# Patient Record
Sex: FEMALE | Race: WHITE | NOT HISPANIC OR LATINO | Employment: OTHER | URBAN - METROPOLITAN AREA
[De-identification: names, ages, dates, MRNs, and addresses within clinical notes are randomized per-mention and may not be internally consistent; named-entity substitution may affect disease eponyms.]

---

## 2017-01-04 ENCOUNTER — OFFICE VISIT (OUTPATIENT)
Dept: PAIN MEDICINE | Facility: CLINIC | Age: 52
End: 2017-01-04

## 2017-01-04 VITALS
BODY MASS INDEX: 45.99 KG/M2 | RESPIRATION RATE: 16 BRPM | WEIGHT: 293 LBS | TEMPERATURE: 98.2 F | HEART RATE: 99 BPM | DIASTOLIC BLOOD PRESSURE: 82 MMHG | HEIGHT: 67 IN | SYSTOLIC BLOOD PRESSURE: 151 MMHG | OXYGEN SATURATION: 90 %

## 2017-01-04 DIAGNOSIS — M51.36 DDD (DEGENERATIVE DISC DISEASE), LUMBAR: ICD-10-CM

## 2017-01-04 DIAGNOSIS — Z79.899 ENCOUNTER FOR LONG-TERM (CURRENT) USE OF HIGH-RISK MEDICATION: ICD-10-CM

## 2017-01-04 DIAGNOSIS — M96.1 POSTLAMINECTOMY SYNDROME OF THORACIC REGION: ICD-10-CM

## 2017-01-04 DIAGNOSIS — G89.29 OTHER CHRONIC PAIN: Primary | ICD-10-CM

## 2017-01-04 DIAGNOSIS — M47.817 OSTEOARTHRITIS OF LUMBOSACRAL SPINE WITHOUT MYELOPATHY: ICD-10-CM

## 2017-01-04 DIAGNOSIS — M54.5 LOW BACK PAIN, UNSPECIFIED BACK PAIN LATERALITY, UNSPECIFIED CHRONICITY, WITH SCIATICA PRESENCE UNSPECIFIED: ICD-10-CM

## 2017-01-04 PROCEDURE — 99214 OFFICE O/P EST MOD 30 MIN: CPT | Performed by: NURSE PRACTITIONER

## 2017-01-04 RX ORDER — CYCLOBENZAPRINE HCL 10 MG
TABLET ORAL
Qty: 90 TABLET | Refills: 0 | Status: SHIPPED | OUTPATIENT
Start: 2017-01-04 | End: 2017-01-30 | Stop reason: SDUPTHER

## 2017-01-04 RX ORDER — HYDROCODONE BITARTRATE AND ACETAMINOPHEN 10; 325 MG/1; MG/1
1 TABLET ORAL EVERY 4 HOURS PRN
Qty: 150 TABLET | Refills: 0 | Status: SHIPPED | OUTPATIENT
Start: 2017-01-04 | End: 2017-02-01 | Stop reason: SDUPTHER

## 2017-01-24 ENCOUNTER — TREATMENT (OUTPATIENT)
Dept: PHYSICAL THERAPY | Facility: CLINIC | Age: 52
End: 2017-01-24

## 2017-01-24 DIAGNOSIS — M54.6 CHRONIC THORACIC BACK PAIN, UNSPECIFIED BACK PAIN LATERALITY: ICD-10-CM

## 2017-01-24 DIAGNOSIS — G89.29 CHRONIC THORACIC BACK PAIN, UNSPECIFIED BACK PAIN LATERALITY: ICD-10-CM

## 2017-01-24 DIAGNOSIS — M54.42 CHRONIC LOW BACK PAIN WITH LEFT-SIDED SCIATICA, UNSPECIFIED BACK PAIN LATERALITY: Primary | ICD-10-CM

## 2017-01-24 DIAGNOSIS — G89.29 CHRONIC LOW BACK PAIN WITH LEFT-SIDED SCIATICA, UNSPECIFIED BACK PAIN LATERALITY: Primary | ICD-10-CM

## 2017-01-24 PROCEDURE — G8978 MOBILITY CURRENT STATUS: HCPCS | Performed by: PHYSICAL THERAPIST

## 2017-01-24 PROCEDURE — G8979 MOBILITY GOAL STATUS: HCPCS | Performed by: PHYSICAL THERAPIST

## 2017-01-24 PROCEDURE — 97110 THERAPEUTIC EXERCISES: CPT | Performed by: PHYSICAL THERAPIST

## 2017-01-24 PROCEDURE — 97162 PT EVAL MOD COMPLEX 30 MIN: CPT | Performed by: PHYSICAL THERAPIST

## 2017-01-30 RX ORDER — CYCLOBENZAPRINE HCL 10 MG
TABLET ORAL
Qty: 90 TABLET | Refills: 1 | Status: SHIPPED | OUTPATIENT
Start: 2017-01-30 | End: 2017-03-30 | Stop reason: SDUPTHER

## 2017-01-30 RX ORDER — GABAPENTIN 600 MG/1
TABLET ORAL
Qty: 180 TABLET | Refills: 1 | Status: SHIPPED | OUTPATIENT
Start: 2017-01-30 | End: 2017-03-30 | Stop reason: SDUPTHER

## 2017-01-30 RX ORDER — LEVETIRACETAM 500 MG/1
TABLET ORAL
Qty: 90 TABLET | Refills: 1 | Status: SHIPPED | OUTPATIENT
Start: 2017-01-30 | End: 2017-03-30 | Stop reason: SDUPTHER

## 2017-02-01 ENCOUNTER — TREATMENT (OUTPATIENT)
Dept: PHYSICAL THERAPY | Facility: CLINIC | Age: 52
End: 2017-02-01

## 2017-02-01 ENCOUNTER — OFFICE VISIT (OUTPATIENT)
Dept: PAIN MEDICINE | Facility: CLINIC | Age: 52
End: 2017-02-01

## 2017-02-01 VITALS
RESPIRATION RATE: 16 BRPM | BODY MASS INDEX: 45.67 KG/M2 | SYSTOLIC BLOOD PRESSURE: 147 MMHG | DIASTOLIC BLOOD PRESSURE: 90 MMHG | TEMPERATURE: 97.7 F | OXYGEN SATURATION: 96 % | HEART RATE: 102 BPM | WEIGHT: 291 LBS | HEIGHT: 67 IN

## 2017-02-01 DIAGNOSIS — G89.29 CHRONIC THORACIC BACK PAIN, UNSPECIFIED BACK PAIN LATERALITY: ICD-10-CM

## 2017-02-01 DIAGNOSIS — M51.36 DDD (DEGENERATIVE DISC DISEASE), LUMBAR: ICD-10-CM

## 2017-02-01 DIAGNOSIS — M51.36 BULGE OF LUMBAR DISC WITHOUT MYELOPATHY: ICD-10-CM

## 2017-02-01 DIAGNOSIS — Z79.899 ENCOUNTER FOR LONG-TERM (CURRENT) USE OF HIGH-RISK MEDICATION: ICD-10-CM

## 2017-02-01 DIAGNOSIS — M96.1 POSTLAMINECTOMY SYNDROME OF THORACIC REGION: ICD-10-CM

## 2017-02-01 DIAGNOSIS — G89.29 CHRONIC LOW BACK PAIN WITH LEFT-SIDED SCIATICA, UNSPECIFIED BACK PAIN LATERALITY: Primary | ICD-10-CM

## 2017-02-01 DIAGNOSIS — Z87.39 HISTORY OF OSTEOMYELITIS: ICD-10-CM

## 2017-02-01 DIAGNOSIS — G89.29 OTHER CHRONIC PAIN: Primary | ICD-10-CM

## 2017-02-01 DIAGNOSIS — M54.42 CHRONIC LOW BACK PAIN WITH LEFT-SIDED SCIATICA, UNSPECIFIED BACK PAIN LATERALITY: Primary | ICD-10-CM

## 2017-02-01 DIAGNOSIS — M54.6 CHRONIC THORACIC BACK PAIN, UNSPECIFIED BACK PAIN LATERALITY: ICD-10-CM

## 2017-02-01 DIAGNOSIS — M54.6 PAIN IN THORACIC SPINE: ICD-10-CM

## 2017-02-01 LAB
POC AMPHETAMINES: NEGATIVE
POC BARBITURATES: NEGATIVE
POC BENZODIAZEPHINES: NEGATIVE
POC COCAINE: NEGATIVE
POC METHADONE: NEGATIVE
POC METHAMPHETAMINE SCREEN URINE: NEGATIVE
POC OPIATES: POSITIVE
POC OXYCODONE: POSITIVE
POC PHENCYCLIDINE: NEGATIVE
POC PROPOXYPHENE: NEGATIVE
POC THC: NEGATIVE
POC TRICYCLIC ANTIDEPRESSANTS: POSITIVE

## 2017-02-01 PROCEDURE — 99213 OFFICE O/P EST LOW 20 MIN: CPT | Performed by: NURSE PRACTITIONER

## 2017-02-01 PROCEDURE — 80305 DRUG TEST PRSMV DIR OPT OBS: CPT | Performed by: NURSE PRACTITIONER

## 2017-02-01 PROCEDURE — 97110 THERAPEUTIC EXERCISES: CPT | Performed by: PHYSICAL THERAPIST

## 2017-02-01 RX ORDER — HYDROCODONE BITARTRATE AND ACETAMINOPHEN 10; 325 MG/1; MG/1
1 TABLET ORAL EVERY 4 HOURS PRN
Qty: 150 TABLET | Refills: 0 | Status: SHIPPED | OUTPATIENT
Start: 2017-02-01 | End: 2017-03-01 | Stop reason: SDUPTHER

## 2017-02-14 ENCOUNTER — TREATMENT (OUTPATIENT)
Dept: PHYSICAL THERAPY | Facility: CLINIC | Age: 52
End: 2017-02-14

## 2017-02-14 DIAGNOSIS — G89.29 CHRONIC LOW BACK PAIN WITH LEFT-SIDED SCIATICA, UNSPECIFIED BACK PAIN LATERALITY: Primary | ICD-10-CM

## 2017-02-14 DIAGNOSIS — M54.42 CHRONIC LOW BACK PAIN WITH LEFT-SIDED SCIATICA, UNSPECIFIED BACK PAIN LATERALITY: Primary | ICD-10-CM

## 2017-02-14 PROCEDURE — 97110 THERAPEUTIC EXERCISES: CPT | Performed by: PHYSICAL THERAPIST

## 2017-03-01 ENCOUNTER — OFFICE VISIT (OUTPATIENT)
Dept: PAIN MEDICINE | Facility: CLINIC | Age: 52
End: 2017-03-01

## 2017-03-01 VITALS
WEIGHT: 285 LBS | RESPIRATION RATE: 16 BRPM | HEART RATE: 104 BPM | OXYGEN SATURATION: 94 % | TEMPERATURE: 100.3 F | DIASTOLIC BLOOD PRESSURE: 86 MMHG | SYSTOLIC BLOOD PRESSURE: 174 MMHG | HEIGHT: 67 IN | BODY MASS INDEX: 44.73 KG/M2

## 2017-03-01 DIAGNOSIS — Z87.39 HISTORY OF OSTEOMYELITIS: ICD-10-CM

## 2017-03-01 DIAGNOSIS — Z79.899 ENCOUNTER FOR LONG-TERM (CURRENT) USE OF HIGH-RISK MEDICATION: ICD-10-CM

## 2017-03-01 DIAGNOSIS — G89.29 OTHER CHRONIC PAIN: Primary | ICD-10-CM

## 2017-03-01 DIAGNOSIS — M51.36 BULGE OF LUMBAR DISC WITHOUT MYELOPATHY: ICD-10-CM

## 2017-03-01 DIAGNOSIS — M96.1 POSTLAMINECTOMY SYNDROME OF THORACIC REGION: ICD-10-CM

## 2017-03-01 DIAGNOSIS — M54.6 PAIN IN THORACIC SPINE: ICD-10-CM

## 2017-03-01 PROCEDURE — 99214 OFFICE O/P EST MOD 30 MIN: CPT | Performed by: NURSE PRACTITIONER

## 2017-03-01 RX ORDER — HYDROCODONE BITARTRATE AND ACETAMINOPHEN 10; 325 MG/1; MG/1
1 TABLET ORAL EVERY 4 HOURS PRN
Qty: 150 TABLET | Refills: 0 | Status: SHIPPED | OUTPATIENT
Start: 2017-03-01 | End: 2017-03-30 | Stop reason: SDUPTHER

## 2017-03-30 ENCOUNTER — OFFICE VISIT (OUTPATIENT)
Dept: PAIN MEDICINE | Facility: CLINIC | Age: 52
End: 2017-03-30

## 2017-03-30 VITALS
OXYGEN SATURATION: 92 % | SYSTOLIC BLOOD PRESSURE: 143 MMHG | HEART RATE: 87 BPM | HEIGHT: 67 IN | TEMPERATURE: 98.1 F | BODY MASS INDEX: 45.77 KG/M2 | RESPIRATION RATE: 16 BRPM | WEIGHT: 291.6 LBS | DIASTOLIC BLOOD PRESSURE: 83 MMHG

## 2017-03-30 DIAGNOSIS — G89.29 OTHER CHRONIC PAIN: Primary | ICD-10-CM

## 2017-03-30 DIAGNOSIS — Z79.899 ENCOUNTER FOR LONG-TERM (CURRENT) USE OF HIGH-RISK MEDICATION: ICD-10-CM

## 2017-03-30 DIAGNOSIS — Z87.39 HISTORY OF OSTEOMYELITIS: ICD-10-CM

## 2017-03-30 DIAGNOSIS — M96.1 POSTLAMINECTOMY SYNDROME OF THORACIC REGION: ICD-10-CM

## 2017-03-30 DIAGNOSIS — M54.6 PAIN IN THORACIC SPINE: ICD-10-CM

## 2017-03-30 DIAGNOSIS — G54.3 THORACIC ROOT LESION: ICD-10-CM

## 2017-03-30 PROCEDURE — 99214 OFFICE O/P EST MOD 30 MIN: CPT | Performed by: NURSE PRACTITIONER

## 2017-03-30 RX ORDER — DOXYCYCLINE HYCLATE 100 MG/1
CAPSULE ORAL
COMMUNITY
Start: 2017-03-17 | End: 2017-04-27

## 2017-03-30 RX ORDER — HYDROCODONE BITARTRATE AND ACETAMINOPHEN 10; 325 MG/1; MG/1
1 TABLET ORAL EVERY 6 HOURS PRN
Qty: 120 TABLET | Refills: 0 | Status: SHIPPED | OUTPATIENT
Start: 2017-03-30 | End: 2017-04-27 | Stop reason: SDUPTHER

## 2017-03-30 RX ORDER — GABAPENTIN 600 MG/1
TABLET ORAL
Qty: 180 TABLET | Refills: 1 | Status: SHIPPED | OUTPATIENT
Start: 2017-03-30 | End: 2017-05-22 | Stop reason: SDUPTHER

## 2017-03-30 RX ORDER — CYCLOBENZAPRINE HCL 10 MG
TABLET ORAL
Qty: 90 TABLET | Refills: 1 | Status: SHIPPED | OUTPATIENT
Start: 2017-03-30 | End: 2017-05-22 | Stop reason: SDUPTHER

## 2017-03-30 RX ORDER — LEVETIRACETAM 500 MG/1
TABLET ORAL
Qty: 90 TABLET | Refills: 1 | Status: SHIPPED | OUTPATIENT
Start: 2017-03-30 | End: 2017-06-01 | Stop reason: SDUPTHER

## 2017-04-25 ENCOUNTER — DOCUMENTATION (OUTPATIENT)
Dept: PHYSICAL THERAPY | Facility: CLINIC | Age: 52
End: 2017-04-25

## 2017-04-27 ENCOUNTER — OFFICE VISIT (OUTPATIENT)
Dept: PAIN MEDICINE | Facility: CLINIC | Age: 52
End: 2017-04-27

## 2017-04-27 VITALS
SYSTOLIC BLOOD PRESSURE: 144 MMHG | BODY MASS INDEX: 42.19 KG/M2 | TEMPERATURE: 98.2 F | DIASTOLIC BLOOD PRESSURE: 89 MMHG | HEART RATE: 90 BPM | HEIGHT: 67 IN | RESPIRATION RATE: 18 BRPM | WEIGHT: 268.8 LBS | OXYGEN SATURATION: 92 %

## 2017-04-27 DIAGNOSIS — M96.1 POSTLAMINECTOMY SYNDROME OF THORACIC REGION: ICD-10-CM

## 2017-04-27 DIAGNOSIS — Z87.39 HISTORY OF OSTEOMYELITIS: ICD-10-CM

## 2017-04-27 DIAGNOSIS — G89.29 OTHER CHRONIC PAIN: Primary | ICD-10-CM

## 2017-04-27 DIAGNOSIS — M54.6 PAIN IN THORACIC SPINE: ICD-10-CM

## 2017-04-27 DIAGNOSIS — Z79.899 ENCOUNTER FOR LONG-TERM (CURRENT) USE OF HIGH-RISK MEDICATION: ICD-10-CM

## 2017-04-27 PROCEDURE — 80305 DRUG TEST PRSMV DIR OPT OBS: CPT | Performed by: NURSE PRACTITIONER

## 2017-04-27 PROCEDURE — 99213 OFFICE O/P EST LOW 20 MIN: CPT | Performed by: NURSE PRACTITIONER

## 2017-04-27 RX ORDER — HYDROCODONE BITARTRATE AND ACETAMINOPHEN 10; 325 MG/1; MG/1
1 TABLET ORAL EVERY 6 HOURS PRN
Qty: 120 TABLET | Refills: 0 | Status: SHIPPED | OUTPATIENT
Start: 2017-04-27 | End: 2017-05-26 | Stop reason: SDUPTHER

## 2017-05-22 RX ORDER — GABAPENTIN 600 MG/1
TABLET ORAL
Qty: 180 TABLET | Refills: 1 | Status: SHIPPED | OUTPATIENT
Start: 2017-05-22 | End: 2017-07-20 | Stop reason: SDUPTHER

## 2017-05-22 RX ORDER — CYCLOBENZAPRINE HCL 10 MG
TABLET ORAL
Qty: 90 TABLET | Refills: 1 | Status: SHIPPED | OUTPATIENT
Start: 2017-05-22 | End: 2017-07-18 | Stop reason: SDUPTHER

## 2017-05-26 ENCOUNTER — OFFICE VISIT (OUTPATIENT)
Dept: PAIN MEDICINE | Facility: CLINIC | Age: 52
End: 2017-05-26

## 2017-05-26 VITALS
HEIGHT: 67 IN | BODY MASS INDEX: 40.97 KG/M2 | SYSTOLIC BLOOD PRESSURE: 131 MMHG | HEART RATE: 88 BPM | TEMPERATURE: 99.2 F | DIASTOLIC BLOOD PRESSURE: 81 MMHG | WEIGHT: 261 LBS | OXYGEN SATURATION: 95 % | RESPIRATION RATE: 18 BRPM

## 2017-05-26 DIAGNOSIS — Z79.899 ENCOUNTER FOR LONG-TERM (CURRENT) USE OF HIGH-RISK MEDICATION: ICD-10-CM

## 2017-05-26 DIAGNOSIS — M96.1 POSTLAMINECTOMY SYNDROME OF THORACIC REGION: ICD-10-CM

## 2017-05-26 DIAGNOSIS — G89.29 OTHER CHRONIC PAIN: Primary | ICD-10-CM

## 2017-05-26 DIAGNOSIS — Z87.39 HISTORY OF OSTEOMYELITIS: ICD-10-CM

## 2017-05-26 DIAGNOSIS — M54.6 PAIN IN THORACIC SPINE: ICD-10-CM

## 2017-05-26 DIAGNOSIS — G54.3 THORACIC ROOT LESION: ICD-10-CM

## 2017-05-26 PROCEDURE — 99213 OFFICE O/P EST LOW 20 MIN: CPT | Performed by: NURSE PRACTITIONER

## 2017-05-26 RX ORDER — HYDROCODONE BITARTRATE AND ACETAMINOPHEN 10; 325 MG/1; MG/1
1 TABLET ORAL EVERY 6 HOURS PRN
Qty: 120 TABLET | Refills: 0 | Status: SHIPPED | OUTPATIENT
Start: 2017-05-26 | End: 2017-06-27 | Stop reason: SDUPTHER

## 2017-06-01 RX ORDER — LEVETIRACETAM 500 MG/1
TABLET ORAL
Qty: 90 TABLET | Refills: 2 | Status: SHIPPED | OUTPATIENT
Start: 2017-06-01 | End: 2017-08-26 | Stop reason: SDUPTHER

## 2017-06-27 ENCOUNTER — OFFICE VISIT (OUTPATIENT)
Dept: PAIN MEDICINE | Facility: CLINIC | Age: 52
End: 2017-06-27

## 2017-06-27 VITALS
DIASTOLIC BLOOD PRESSURE: 73 MMHG | WEIGHT: 271 LBS | HEART RATE: 109 BPM | SYSTOLIC BLOOD PRESSURE: 103 MMHG | OXYGEN SATURATION: 92 % | BODY MASS INDEX: 42.53 KG/M2 | TEMPERATURE: 99.3 F | HEIGHT: 67 IN | RESPIRATION RATE: 16 BRPM

## 2017-06-27 DIAGNOSIS — G89.29 OTHER CHRONIC PAIN: Primary | ICD-10-CM

## 2017-06-27 DIAGNOSIS — M54.6 PAIN IN THORACIC SPINE: ICD-10-CM

## 2017-06-27 DIAGNOSIS — Z79.899 ENCOUNTER FOR LONG-TERM (CURRENT) USE OF HIGH-RISK MEDICATION: ICD-10-CM

## 2017-06-27 DIAGNOSIS — M96.1 POSTLAMINECTOMY SYNDROME OF THORACIC REGION: ICD-10-CM

## 2017-06-27 DIAGNOSIS — Z87.39 HISTORY OF OSTEOMYELITIS: ICD-10-CM

## 2017-06-27 DIAGNOSIS — M54.5 LOW BACK PAIN, UNSPECIFIED BACK PAIN LATERALITY, UNSPECIFIED CHRONICITY, WITH SCIATICA PRESENCE UNSPECIFIED: ICD-10-CM

## 2017-06-27 PROCEDURE — 99214 OFFICE O/P EST MOD 30 MIN: CPT | Performed by: NURSE PRACTITIONER

## 2017-06-27 RX ORDER — HYDROCODONE BITARTRATE AND ACETAMINOPHEN 10; 325 MG/1; MG/1
1 TABLET ORAL EVERY 6 HOURS PRN
Qty: 120 TABLET | Refills: 0 | Status: SHIPPED | OUTPATIENT
Start: 2017-06-27 | End: 2017-07-31 | Stop reason: SDUPTHER

## 2017-07-18 RX ORDER — CYCLOBENZAPRINE HCL 10 MG
TABLET ORAL
Qty: 90 TABLET | Refills: 0 | Status: SHIPPED | OUTPATIENT
Start: 2017-07-18 | End: 2017-08-25 | Stop reason: SDUPTHER

## 2017-07-20 RX ORDER — GABAPENTIN 600 MG/1
600 TABLET ORAL 3 TIMES DAILY
Qty: 180 TABLET | Refills: 2 | OUTPATIENT
Start: 2017-07-20 | End: 2017-11-07 | Stop reason: SDUPTHER

## 2017-07-25 ENCOUNTER — APPOINTMENT (OUTPATIENT)
Dept: GENERAL RADIOLOGY | Facility: HOSPITAL | Age: 52
End: 2017-07-25

## 2017-07-25 ENCOUNTER — HOSPITAL ENCOUNTER (EMERGENCY)
Facility: HOSPITAL | Age: 52
Discharge: HOME | End: 2017-07-25
Attending: EMERGENCY MEDICINE | Admitting: EMERGENCY MEDICINE

## 2017-07-25 VITALS
BODY MASS INDEX: 41.91 KG/M2 | OXYGEN SATURATION: 95 % | WEIGHT: 267 LBS | HEIGHT: 67 IN | HEART RATE: 81 BPM | RESPIRATION RATE: 20 BRPM | SYSTOLIC BLOOD PRESSURE: 124 MMHG | DIASTOLIC BLOOD PRESSURE: 89 MMHG | TEMPERATURE: 97.9 F

## 2017-07-25 DIAGNOSIS — J44.1 COPD EXACERBATION (HCC): Primary | ICD-10-CM

## 2017-07-25 DIAGNOSIS — J18.9 PNEUMONIA OF RIGHT UPPER LOBE DUE TO INFECTIOUS ORGANISM: ICD-10-CM

## 2017-07-25 LAB
ALBUMIN SERPL-MCNC: 4.3 G/DL (ref 3.5–5.2)
ALBUMIN/GLOB SERPL: 1.1 G/DL
ALP SERPL-CCNC: 67 U/L (ref 39–117)
ALT SERPL W P-5'-P-CCNC: 26 U/L (ref 1–33)
ANION GAP SERPL CALCULATED.3IONS-SCNC: 14.8 MMOL/L
AST SERPL-CCNC: 17 U/L (ref 1–32)
BASOPHILS # BLD AUTO: 0.01 10*3/MM3 (ref 0–0.2)
BASOPHILS NFR BLD AUTO: 0.1 % (ref 0–1.5)
BILIRUB SERPL-MCNC: 0.8 MG/DL (ref 0.1–1.2)
BUN BLD-MCNC: 11 MG/DL (ref 6–20)
BUN/CREAT SERPL: 11.7 (ref 7–25)
CALCIUM SPEC-SCNC: 9.5 MG/DL (ref 8.6–10.5)
CHLORIDE SERPL-SCNC: 100 MMOL/L (ref 98–107)
CO2 SERPL-SCNC: 25.2 MMOL/L (ref 22–29)
CREAT BLD-MCNC: 0.94 MG/DL (ref 0.57–1)
D DIMER PPP FEU-MCNC: 0.49 MCGFEU/ML (ref 0–0.49)
D-LACTATE SERPL-SCNC: 1.3 MMOL/L (ref 0.5–2)
DEPRECATED RDW RBC AUTO: 50.1 FL (ref 37–54)
EOSINOPHIL # BLD AUTO: 0.03 10*3/MM3 (ref 0–0.7)
EOSINOPHIL NFR BLD AUTO: 0.3 % (ref 0.3–6.2)
ERYTHROCYTE [DISTWIDTH] IN BLOOD BY AUTOMATED COUNT: 15.4 % (ref 11.7–13)
GFR SERPL CREATININE-BSD FRML MDRD: 62 ML/MIN/1.73
GLOBULIN UR ELPH-MCNC: 4 GM/DL
GLUCOSE BLD-MCNC: 111 MG/DL (ref 65–99)
HCG SERPL QL: NEGATIVE
HCT VFR BLD AUTO: 43.4 % (ref 35.6–45.5)
HGB BLD-MCNC: 13.6 G/DL (ref 11.9–15.5)
HOLD SPECIMEN: NORMAL
IMM GRANULOCYTES # BLD: 0.03 10*3/MM3 (ref 0–0.03)
IMM GRANULOCYTES NFR BLD: 0.3 % (ref 0–0.5)
LYMPHOCYTES # BLD AUTO: 0.61 10*3/MM3 (ref 0.9–4.8)
LYMPHOCYTES NFR BLD AUTO: 7 % (ref 19.6–45.3)
MCH RBC QN AUTO: 27.8 PG (ref 26.9–32)
MCHC RBC AUTO-ENTMCNC: 31.3 G/DL (ref 32.4–36.3)
MCV RBC AUTO: 88.6 FL (ref 80.5–98.2)
MONOCYTES # BLD AUTO: 0.79 10*3/MM3 (ref 0.2–1.2)
MONOCYTES NFR BLD AUTO: 9 % (ref 5–12)
NEUTROPHILS # BLD AUTO: 7.29 10*3/MM3 (ref 1.9–8.1)
NEUTROPHILS NFR BLD AUTO: 83.3 % (ref 42.7–76)
NT-PROBNP SERPL-MCNC: 371.1 PG/ML (ref 5–900)
PLATELET # BLD AUTO: 255 10*3/MM3 (ref 140–500)
PMV BLD AUTO: 11.3 FL (ref 6–12)
POTASSIUM BLD-SCNC: 3.5 MMOL/L (ref 3.5–5.2)
PROT SERPL-MCNC: 8.3 G/DL (ref 6–8.5)
RBC # BLD AUTO: 4.9 10*6/MM3 (ref 3.9–5.2)
SODIUM BLD-SCNC: 140 MMOL/L (ref 136–145)
TROPONIN T SERPL-MCNC: <0.01 NG/ML (ref 0–0.03)
WBC NRBC COR # BLD: 8.76 10*3/MM3 (ref 4.5–10.7)
WHOLE BLOOD HOLD SPECIMEN: NORMAL
WHOLE BLOOD HOLD SPECIMEN: NORMAL

## 2017-07-25 PROCEDURE — 85025 COMPLETE CBC W/AUTO DIFF WBC: CPT

## 2017-07-25 PROCEDURE — 84703 CHORIONIC GONADOTROPIN ASSAY: CPT

## 2017-07-25 PROCEDURE — 83880 ASSAY OF NATRIURETIC PEPTIDE: CPT

## 2017-07-25 PROCEDURE — 96374 THER/PROPH/DIAG INJ IV PUSH: CPT

## 2017-07-25 PROCEDURE — 83605 ASSAY OF LACTIC ACID: CPT | Performed by: EMERGENCY MEDICINE

## 2017-07-25 PROCEDURE — 84484 ASSAY OF TROPONIN QUANT: CPT

## 2017-07-25 PROCEDURE — 93010 ELECTROCARDIOGRAM REPORT: CPT | Performed by: INTERNAL MEDICINE

## 2017-07-25 PROCEDURE — 85379 FIBRIN DEGRADATION QUANT: CPT | Performed by: EMERGENCY MEDICINE

## 2017-07-25 PROCEDURE — 99284 EMERGENCY DEPT VISIT MOD MDM: CPT

## 2017-07-25 PROCEDURE — 25010000002 METHYLPREDNISOLONE PER 125 MG: Performed by: EMERGENCY MEDICINE

## 2017-07-25 PROCEDURE — 80053 COMPREHEN METABOLIC PANEL: CPT

## 2017-07-25 PROCEDURE — 93005 ELECTROCARDIOGRAM TRACING: CPT

## 2017-07-25 PROCEDURE — 94640 AIRWAY INHALATION TREATMENT: CPT

## 2017-07-25 PROCEDURE — 71020 HC CHEST PA AND LATERAL: CPT

## 2017-07-25 PROCEDURE — 94799 UNLISTED PULMONARY SVC/PX: CPT

## 2017-07-25 RX ORDER — METHYLPREDNISOLONE SODIUM SUCCINATE 125 MG/2ML
125 INJECTION, POWDER, LYOPHILIZED, FOR SOLUTION INTRAMUSCULAR; INTRAVENOUS ONCE
Status: COMPLETED | OUTPATIENT
Start: 2017-07-25 | End: 2017-07-25

## 2017-07-25 RX ORDER — DOXYCYCLINE 100 MG/1
100 CAPSULE ORAL 2 TIMES DAILY
Qty: 14 CAPSULE | Refills: 0 | Status: SHIPPED | OUTPATIENT
Start: 2017-07-25 | End: 2017-08-01

## 2017-07-25 RX ORDER — SODIUM CHLORIDE 0.9 % (FLUSH) 0.9 %
10 SYRINGE (ML) INJECTION AS NEEDED
Status: DISCONTINUED | OUTPATIENT
Start: 2017-07-25 | End: 2017-07-25 | Stop reason: HOSPADM

## 2017-07-25 RX ORDER — PREDNISONE 20 MG/1
20 TABLET ORAL
Qty: 15 TABLET | Refills: 0 | Status: SHIPPED | OUTPATIENT
Start: 2017-07-25 | End: 2018-02-19

## 2017-07-25 RX ORDER — IPRATROPIUM BROMIDE AND ALBUTEROL SULFATE 2.5; .5 MG/3ML; MG/3ML
3 SOLUTION RESPIRATORY (INHALATION) ONCE
Status: COMPLETED | OUTPATIENT
Start: 2017-07-25 | End: 2017-07-25

## 2017-07-25 RX ORDER — ALBUTEROL SULFATE 2.5 MG/3ML
2.5 SOLUTION RESPIRATORY (INHALATION)
Status: COMPLETED | OUTPATIENT
Start: 2017-07-25 | End: 2017-07-25

## 2017-07-25 RX ADMIN — ALBUTEROL SULFATE 2.5 MG: 2.5 SOLUTION RESPIRATORY (INHALATION) at 15:22

## 2017-07-25 RX ADMIN — IPRATROPIUM BROMIDE AND ALBUTEROL SULFATE 3 ML: .5; 3 SOLUTION RESPIRATORY (INHALATION) at 15:21

## 2017-07-25 RX ADMIN — ALBUTEROL SULFATE 2.5 MG: 2.5 SOLUTION RESPIRATORY (INHALATION) at 15:42

## 2017-07-25 RX ADMIN — METHYLPREDNISOLONE SODIUM SUCCINATE 125 MG: 125 INJECTION, POWDER, FOR SOLUTION INTRAMUSCULAR; INTRAVENOUS at 15:34

## 2017-07-31 ENCOUNTER — OFFICE VISIT (OUTPATIENT)
Dept: PAIN MEDICINE | Facility: CLINIC | Age: 52
End: 2017-07-31

## 2017-07-31 VITALS
HEART RATE: 77 BPM | BODY MASS INDEX: 43.19 KG/M2 | TEMPERATURE: 98 F | WEIGHT: 275.2 LBS | RESPIRATION RATE: 18 BRPM | DIASTOLIC BLOOD PRESSURE: 85 MMHG | SYSTOLIC BLOOD PRESSURE: 153 MMHG | HEIGHT: 67 IN | OXYGEN SATURATION: 94 %

## 2017-07-31 DIAGNOSIS — Z79.899 ENCOUNTER FOR LONG-TERM (CURRENT) USE OF HIGH-RISK MEDICATION: ICD-10-CM

## 2017-07-31 DIAGNOSIS — M51.36 BULGE OF LUMBAR DISC WITHOUT MYELOPATHY: ICD-10-CM

## 2017-07-31 DIAGNOSIS — G89.4 CHRONIC PAIN SYNDROME: Primary | ICD-10-CM

## 2017-07-31 DIAGNOSIS — M96.1 POSTLAMINECTOMY SYNDROME OF THORACIC REGION: ICD-10-CM

## 2017-07-31 DIAGNOSIS — M51.36 DDD (DEGENERATIVE DISC DISEASE), LUMBAR: ICD-10-CM

## 2017-07-31 DIAGNOSIS — Z87.39 HISTORY OF OSTEOMYELITIS: ICD-10-CM

## 2017-07-31 PROCEDURE — 99213 OFFICE O/P EST LOW 20 MIN: CPT | Performed by: ANESTHESIOLOGY

## 2017-07-31 RX ORDER — HYDROCODONE BITARTRATE AND ACETAMINOPHEN 10; 325 MG/1; MG/1
1 TABLET ORAL EVERY 6 HOURS PRN
Qty: 120 TABLET | Refills: 0 | Status: SHIPPED | OUTPATIENT
Start: 2017-07-31 | End: 2017-08-30 | Stop reason: SDUPTHER

## 2017-08-03 RX ORDER — CYCLOBENZAPRINE HCL 10 MG
TABLET ORAL
Qty: 90 TABLET | Refills: 0 | OUTPATIENT
Start: 2017-08-03

## 2017-08-28 RX ORDER — LEVETIRACETAM 500 MG/1
TABLET ORAL
Qty: 90 TABLET | Refills: 2 | Status: SHIPPED | OUTPATIENT
Start: 2017-08-28 | End: 2018-01-05 | Stop reason: SDUPTHER

## 2017-08-28 RX ORDER — CYCLOBENZAPRINE HCL 10 MG
TABLET ORAL
Qty: 90 TABLET | Refills: 0 | Status: SHIPPED | OUTPATIENT
Start: 2017-08-28 | End: 2017-10-03 | Stop reason: SDUPTHER

## 2017-08-30 ENCOUNTER — OFFICE VISIT (OUTPATIENT)
Dept: PAIN MEDICINE | Facility: CLINIC | Age: 52
End: 2017-08-30

## 2017-08-30 VITALS
OXYGEN SATURATION: 92 % | HEIGHT: 67 IN | DIASTOLIC BLOOD PRESSURE: 93 MMHG | SYSTOLIC BLOOD PRESSURE: 144 MMHG | RESPIRATION RATE: 16 BRPM | TEMPERATURE: 98.3 F | BODY MASS INDEX: 41.81 KG/M2 | HEART RATE: 109 BPM | WEIGHT: 266.4 LBS

## 2017-08-30 DIAGNOSIS — M96.1 POSTLAMINECTOMY SYNDROME OF THORACIC REGION: ICD-10-CM

## 2017-08-30 DIAGNOSIS — Z87.39 HISTORY OF OSTEOMYELITIS: ICD-10-CM

## 2017-08-30 DIAGNOSIS — G89.4 CHRONIC PAIN SYNDROME: Primary | ICD-10-CM

## 2017-08-30 DIAGNOSIS — Z79.899 ENCOUNTER FOR LONG-TERM (CURRENT) USE OF HIGH-RISK MEDICATION: ICD-10-CM

## 2017-08-30 DIAGNOSIS — M40.209 ACQUIRED KYPHOSIS: ICD-10-CM

## 2017-08-30 PROCEDURE — 99212 OFFICE O/P EST SF 10 MIN: CPT | Performed by: ANESTHESIOLOGY

## 2017-08-30 RX ORDER — HYDROCODONE BITARTRATE AND ACETAMINOPHEN 10; 325 MG/1; MG/1
1 TABLET ORAL EVERY 6 HOURS PRN
Qty: 120 TABLET | Refills: 0 | Status: SHIPPED | OUTPATIENT
Start: 2017-08-30 | End: 2017-09-27 | Stop reason: SDUPTHER

## 2017-09-27 ENCOUNTER — OFFICE VISIT (OUTPATIENT)
Dept: PAIN MEDICINE | Facility: CLINIC | Age: 52
End: 2017-09-27

## 2017-09-27 VITALS
WEIGHT: 276 LBS | HEIGHT: 67 IN | TEMPERATURE: 98.4 F | SYSTOLIC BLOOD PRESSURE: 119 MMHG | BODY MASS INDEX: 43.32 KG/M2 | OXYGEN SATURATION: 93 % | HEART RATE: 104 BPM | DIASTOLIC BLOOD PRESSURE: 77 MMHG | RESPIRATION RATE: 16 BRPM

## 2017-09-27 DIAGNOSIS — G89.4 CHRONIC PAIN SYNDROME: Primary | ICD-10-CM

## 2017-09-27 DIAGNOSIS — M79.18 CERVICAL MYOFASCIAL PAIN SYNDROME: ICD-10-CM

## 2017-09-27 DIAGNOSIS — M51.36 DDD (DEGENERATIVE DISC DISEASE), LUMBAR: ICD-10-CM

## 2017-09-27 DIAGNOSIS — M96.1 POSTLAMINECTOMY SYNDROME OF THORACIC REGION: ICD-10-CM

## 2017-09-27 DIAGNOSIS — Z79.899 ENCOUNTER FOR LONG-TERM (CURRENT) USE OF HIGH-RISK MEDICATION: ICD-10-CM

## 2017-09-27 DIAGNOSIS — Z87.39 HISTORY OF OSTEOMYELITIS: ICD-10-CM

## 2017-09-27 PROCEDURE — 99212 OFFICE O/P EST SF 10 MIN: CPT | Performed by: ANESTHESIOLOGY

## 2017-09-27 PROCEDURE — 20552 NJX 1/MLT TRIGGER POINT 1/2: CPT | Performed by: ANESTHESIOLOGY

## 2017-09-27 RX ORDER — HYDROCODONE BITARTRATE AND ACETAMINOPHEN 10; 325 MG/1; MG/1
1 TABLET ORAL EVERY 6 HOURS PRN
Qty: 120 TABLET | Refills: 0 | Status: SHIPPED | OUTPATIENT
Start: 2017-09-27 | End: 2017-10-25 | Stop reason: SDUPTHER

## 2017-10-04 RX ORDER — CYCLOBENZAPRINE HCL 10 MG
TABLET ORAL
Qty: 90 TABLET | Refills: 1 | Status: SHIPPED | OUTPATIENT
Start: 2017-10-04 | End: 2017-11-22 | Stop reason: SDUPTHER

## 2017-10-25 ENCOUNTER — OFFICE VISIT (OUTPATIENT)
Dept: PAIN MEDICINE | Facility: CLINIC | Age: 52
End: 2017-10-25

## 2017-10-25 VITALS
WEIGHT: 268.4 LBS | RESPIRATION RATE: 16 BRPM | HEART RATE: 91 BPM | TEMPERATURE: 98.5 F | BODY MASS INDEX: 42.12 KG/M2 | HEIGHT: 67 IN | DIASTOLIC BLOOD PRESSURE: 83 MMHG | SYSTOLIC BLOOD PRESSURE: 118 MMHG | OXYGEN SATURATION: 95 %

## 2017-10-25 DIAGNOSIS — M79.18 CERVICAL MYOFASCIAL PAIN SYNDROME: ICD-10-CM

## 2017-10-25 DIAGNOSIS — Z87.39 HISTORY OF OSTEOMYELITIS: ICD-10-CM

## 2017-10-25 DIAGNOSIS — G89.4 CHRONIC PAIN SYNDROME: Primary | ICD-10-CM

## 2017-10-25 DIAGNOSIS — Z79.899 ENCOUNTER FOR LONG-TERM (CURRENT) USE OF HIGH-RISK MEDICATION: ICD-10-CM

## 2017-10-25 DIAGNOSIS — M96.1 POSTLAMINECTOMY SYNDROME OF THORACIC REGION: ICD-10-CM

## 2017-10-25 DIAGNOSIS — M51.36 DDD (DEGENERATIVE DISC DISEASE), LUMBAR: ICD-10-CM

## 2017-10-25 PROCEDURE — 99213 OFFICE O/P EST LOW 20 MIN: CPT | Performed by: ANESTHESIOLOGY

## 2017-10-25 RX ORDER — HYDROCODONE BITARTRATE AND ACETAMINOPHEN 10; 325 MG/1; MG/1
1 TABLET ORAL EVERY 6 HOURS PRN
Qty: 120 TABLET | Refills: 0 | Status: SHIPPED | OUTPATIENT
Start: 2017-10-25 | End: 2017-12-01 | Stop reason: SDUPTHER

## 2017-11-07 RX ORDER — GABAPENTIN 600 MG/1
TABLET ORAL
Qty: 180 TABLET | Refills: 2 | OUTPATIENT
Start: 2017-11-07 | End: 2018-03-09 | Stop reason: SDUPTHER

## 2017-11-22 RX ORDER — CYCLOBENZAPRINE HCL 10 MG
TABLET ORAL
Qty: 90 TABLET | Refills: 1 | Status: SHIPPED | OUTPATIENT
Start: 2017-11-22 | End: 2018-04-02 | Stop reason: SDUPTHER

## 2017-12-01 RX ORDER — HYDROCODONE BITARTRATE AND ACETAMINOPHEN 10; 325 MG/1; MG/1
1 TABLET ORAL EVERY 6 HOURS PRN
Qty: 120 TABLET | Refills: 0 | Status: SHIPPED | OUTPATIENT
Start: 2017-12-01 | End: 2017-12-20 | Stop reason: SDUPTHER

## 2017-12-20 ENCOUNTER — OFFICE VISIT (OUTPATIENT)
Dept: PAIN MEDICINE | Facility: CLINIC | Age: 52
End: 2017-12-20

## 2017-12-20 VITALS
SYSTOLIC BLOOD PRESSURE: 131 MMHG | OXYGEN SATURATION: 95 % | BODY MASS INDEX: 40.65 KG/M2 | HEIGHT: 67 IN | TEMPERATURE: 99.1 F | WEIGHT: 259 LBS | DIASTOLIC BLOOD PRESSURE: 91 MMHG | RESPIRATION RATE: 16 BRPM | HEART RATE: 106 BPM

## 2017-12-20 DIAGNOSIS — M40.209 ACQUIRED KYPHOSIS: ICD-10-CM

## 2017-12-20 DIAGNOSIS — M79.18 CERVICAL MYOFASCIAL PAIN SYNDROME: ICD-10-CM

## 2017-12-20 DIAGNOSIS — Z79.899 ENCOUNTER FOR LONG-TERM (CURRENT) USE OF HIGH-RISK MEDICATION: ICD-10-CM

## 2017-12-20 DIAGNOSIS — M51.36 DDD (DEGENERATIVE DISC DISEASE), LUMBAR: ICD-10-CM

## 2017-12-20 DIAGNOSIS — M96.1 POSTLAMINECTOMY SYNDROME OF THORACIC REGION: ICD-10-CM

## 2017-12-20 DIAGNOSIS — M53.3 COCCYDYNIA: Primary | ICD-10-CM

## 2017-12-20 DIAGNOSIS — M54.14 THORACIC RADICULOPATHY DUE TO INTERVERTEBRAL DISC DISORDER: ICD-10-CM

## 2017-12-20 DIAGNOSIS — G89.4 CHRONIC PAIN SYNDROME: ICD-10-CM

## 2017-12-20 PROCEDURE — 99214 OFFICE O/P EST MOD 30 MIN: CPT | Performed by: ANESTHESIOLOGY

## 2017-12-20 RX ORDER — HYDROCODONE BITARTRATE AND ACETAMINOPHEN 10; 325 MG/1; MG/1
1 TABLET ORAL EVERY 6 HOURS PRN
Qty: 120 TABLET | Refills: 0 | Status: SHIPPED | OUTPATIENT
Start: 2017-12-20 | End: 2018-02-06 | Stop reason: SDUPTHER

## 2018-01-07 RX ORDER — LEVETIRACETAM 500 MG/1
TABLET ORAL
Qty: 90 TABLET | Refills: 2 | Status: SHIPPED | OUTPATIENT
Start: 2018-01-07 | End: 2018-04-02 | Stop reason: SDUPTHER

## 2018-02-06 RX ORDER — HYDROCODONE BITARTRATE AND ACETAMINOPHEN 10; 325 MG/1; MG/1
1 TABLET ORAL EVERY 6 HOURS PRN
Qty: 120 TABLET | Refills: 0 | Status: SHIPPED | OUTPATIENT
Start: 2018-02-06 | End: 2018-02-19 | Stop reason: SDUPTHER

## 2018-02-19 ENCOUNTER — OFFICE VISIT (OUTPATIENT)
Dept: PAIN MEDICINE | Facility: CLINIC | Age: 53
End: 2018-02-19

## 2018-02-19 VITALS
OXYGEN SATURATION: 93 % | DIASTOLIC BLOOD PRESSURE: 83 MMHG | TEMPERATURE: 98.6 F | RESPIRATION RATE: 18 BRPM | BODY MASS INDEX: 40.93 KG/M2 | SYSTOLIC BLOOD PRESSURE: 111 MMHG | HEIGHT: 67 IN | WEIGHT: 260.8 LBS | HEART RATE: 98 BPM

## 2018-02-19 DIAGNOSIS — M51.36 DDD (DEGENERATIVE DISC DISEASE), LUMBAR: ICD-10-CM

## 2018-02-19 DIAGNOSIS — M96.1 POSTLAMINECTOMY SYNDROME OF THORACIC REGION: ICD-10-CM

## 2018-02-19 DIAGNOSIS — Z79.899 ENCOUNTER FOR LONG-TERM (CURRENT) USE OF HIGH-RISK MEDICATION: ICD-10-CM

## 2018-02-19 DIAGNOSIS — M79.18 CERVICAL MYOFASCIAL PAIN SYNDROME: ICD-10-CM

## 2018-02-19 DIAGNOSIS — M54.16 LUMBAR RADICULOPATHY: ICD-10-CM

## 2018-02-19 DIAGNOSIS — G89.4 CHRONIC PAIN SYNDROME: Primary | ICD-10-CM

## 2018-02-19 PROCEDURE — 20552 NJX 1/MLT TRIGGER POINT 1/2: CPT | Performed by: ANESTHESIOLOGY

## 2018-02-19 PROCEDURE — 99214 OFFICE O/P EST MOD 30 MIN: CPT | Performed by: ANESTHESIOLOGY

## 2018-02-19 RX ORDER — HYDROCODONE BITARTRATE AND ACETAMINOPHEN 10; 325 MG/1; MG/1
1 TABLET ORAL EVERY 6 HOURS PRN
Qty: 120 TABLET | Refills: 0 | Status: SHIPPED | OUTPATIENT
Start: 2018-02-19 | End: 2018-04-02 | Stop reason: SDUPTHER

## 2018-02-19 RX ORDER — PANTOPRAZOLE SODIUM 40 MG/1
TABLET, DELAYED RELEASE ORAL
Status: ON HOLD | COMMUNITY
Start: 2018-01-25 | End: 2019-04-22 | Stop reason: SDUPTHER

## 2018-03-01 ENCOUNTER — OUTSIDE FACILITY SERVICE (OUTPATIENT)
Dept: PAIN MEDICINE | Facility: CLINIC | Age: 53
End: 2018-03-01

## 2018-03-01 ENCOUNTER — DOCUMENTATION (OUTPATIENT)
Dept: PAIN MEDICINE | Facility: CLINIC | Age: 53
End: 2018-03-01

## 2018-03-01 PROCEDURE — 62323 NJX INTERLAMINAR LMBR/SAC: CPT | Performed by: ANESTHESIOLOGY

## 2018-03-01 PROCEDURE — 62321 NJX INTERLAMINAR CRV/THRC: CPT | Performed by: ANESTHESIOLOGY

## 2018-03-09 RX ORDER — GABAPENTIN 600 MG/1
TABLET ORAL
Qty: 180 TABLET | Refills: 5 | OUTPATIENT
Start: 2018-03-09 | End: 2018-08-09

## 2018-04-02 ENCOUNTER — OFFICE VISIT (OUTPATIENT)
Dept: PAIN MEDICINE | Facility: CLINIC | Age: 53
End: 2018-04-02

## 2018-04-02 VITALS
DIASTOLIC BLOOD PRESSURE: 88 MMHG | TEMPERATURE: 99.2 F | RESPIRATION RATE: 16 BRPM | HEIGHT: 67 IN | BODY MASS INDEX: 40.81 KG/M2 | SYSTOLIC BLOOD PRESSURE: 153 MMHG | OXYGEN SATURATION: 95 % | HEART RATE: 102 BPM | WEIGHT: 260 LBS

## 2018-04-02 DIAGNOSIS — G89.4 CHRONIC PAIN SYNDROME: Primary | ICD-10-CM

## 2018-04-02 DIAGNOSIS — Z79.899 ENCOUNTER FOR LONG-TERM (CURRENT) USE OF HIGH-RISK MEDICATION: ICD-10-CM

## 2018-04-02 DIAGNOSIS — M51.36 DDD (DEGENERATIVE DISC DISEASE), LUMBAR: ICD-10-CM

## 2018-04-02 DIAGNOSIS — M96.1 POSTLAMINECTOMY SYNDROME OF THORACIC REGION: ICD-10-CM

## 2018-04-02 PROCEDURE — 99214 OFFICE O/P EST MOD 30 MIN: CPT | Performed by: ANESTHESIOLOGY

## 2018-04-02 RX ORDER — CYCLOBENZAPRINE HCL 10 MG
10 TABLET ORAL 3 TIMES DAILY PRN
Qty: 270 TABLET | Refills: 1 | Status: SHIPPED | OUTPATIENT
Start: 2018-04-02 | End: 2018-10-17 | Stop reason: SDUPTHER

## 2018-04-02 RX ORDER — HYDROCODONE BITARTRATE AND ACETAMINOPHEN 10; 325 MG/1; MG/1
1 TABLET ORAL EVERY 6 HOURS PRN
Qty: 120 TABLET | Refills: 0 | Status: SHIPPED | OUTPATIENT
Start: 2018-04-02 | End: 2018-05-02 | Stop reason: SDUPTHER

## 2018-04-02 RX ORDER — LEVETIRACETAM 500 MG/1
500 TABLET ORAL 3 TIMES DAILY
Qty: 270 TABLET | Refills: 1 | Status: SHIPPED | OUTPATIENT
Start: 2018-04-02 | End: 2018-10-28 | Stop reason: SDUPTHER

## 2018-05-02 ENCOUNTER — OFFICE VISIT (OUTPATIENT)
Dept: PAIN MEDICINE | Facility: CLINIC | Age: 53
End: 2018-05-02

## 2018-05-02 VITALS
RESPIRATION RATE: 16 BRPM | SYSTOLIC BLOOD PRESSURE: 113 MMHG | HEART RATE: 99 BPM | HEIGHT: 67 IN | DIASTOLIC BLOOD PRESSURE: 81 MMHG | WEIGHT: 260 LBS | BODY MASS INDEX: 40.81 KG/M2 | OXYGEN SATURATION: 94 % | TEMPERATURE: 98.6 F

## 2018-05-02 DIAGNOSIS — M96.1 POSTLAMINECTOMY SYNDROME OF THORACIC REGION: ICD-10-CM

## 2018-05-02 DIAGNOSIS — Z79.899 ENCOUNTER FOR LONG-TERM (CURRENT) USE OF HIGH-RISK MEDICATION: ICD-10-CM

## 2018-05-02 DIAGNOSIS — M51.36 DDD (DEGENERATIVE DISC DISEASE), LUMBAR: ICD-10-CM

## 2018-05-02 DIAGNOSIS — G89.4 CHRONIC PAIN SYNDROME: Primary | ICD-10-CM

## 2018-05-02 DIAGNOSIS — Z87.39 HISTORY OF OSTEOMYELITIS: ICD-10-CM

## 2018-05-02 DIAGNOSIS — G54.3 THORACIC ROOT LESION: ICD-10-CM

## 2018-05-02 DIAGNOSIS — M54.6 PAIN IN THORACIC SPINE: ICD-10-CM

## 2018-05-02 PROCEDURE — 99213 OFFICE O/P EST LOW 20 MIN: CPT | Performed by: ANESTHESIOLOGY

## 2018-05-02 RX ORDER — HYDROCODONE BITARTRATE AND ACETAMINOPHEN 10; 325 MG/1; MG/1
1 TABLET ORAL EVERY 6 HOURS PRN
Qty: 120 TABLET | Refills: 0 | Status: SHIPPED | OUTPATIENT
Start: 2018-05-02 | End: 2018-06-13 | Stop reason: SDUPTHER

## 2018-06-13 ENCOUNTER — OFFICE VISIT (OUTPATIENT)
Dept: PAIN MEDICINE | Facility: CLINIC | Age: 53
End: 2018-06-13

## 2018-06-13 VITALS
TEMPERATURE: 98.4 F | HEART RATE: 107 BPM | DIASTOLIC BLOOD PRESSURE: 84 MMHG | BODY MASS INDEX: 42.85 KG/M2 | OXYGEN SATURATION: 97 % | WEIGHT: 273 LBS | RESPIRATION RATE: 18 BRPM | SYSTOLIC BLOOD PRESSURE: 143 MMHG | HEIGHT: 67 IN

## 2018-06-13 DIAGNOSIS — M51.36 DDD (DEGENERATIVE DISC DISEASE), LUMBAR: ICD-10-CM

## 2018-06-13 DIAGNOSIS — M51.36 BULGE OF LUMBAR DISC WITHOUT MYELOPATHY: ICD-10-CM

## 2018-06-13 DIAGNOSIS — G89.4 CHRONIC PAIN SYNDROME: Primary | ICD-10-CM

## 2018-06-13 DIAGNOSIS — Z79.899 ENCOUNTER FOR LONG-TERM (CURRENT) USE OF HIGH-RISK MEDICATION: ICD-10-CM

## 2018-06-13 DIAGNOSIS — G54.3 THORACIC ROOT LESION: ICD-10-CM

## 2018-06-13 DIAGNOSIS — Z87.39 HISTORY OF OSTEOMYELITIS: ICD-10-CM

## 2018-06-13 DIAGNOSIS — M96.1 POSTLAMINECTOMY SYNDROME OF THORACIC REGION: ICD-10-CM

## 2018-06-13 PROCEDURE — 80305 DRUG TEST PRSMV DIR OPT OBS: CPT | Performed by: NURSE PRACTITIONER

## 2018-06-13 PROCEDURE — 99214 OFFICE O/P EST MOD 30 MIN: CPT | Performed by: NURSE PRACTITIONER

## 2018-06-13 RX ORDER — HYDROCODONE BITARTRATE AND ACETAMINOPHEN 10; 325 MG/1; MG/1
1 TABLET ORAL EVERY 6 HOURS PRN
Qty: 120 TABLET | Refills: 0 | Status: SHIPPED | OUTPATIENT
Start: 2018-06-13 | End: 2018-07-09 | Stop reason: SDUPTHER

## 2018-06-14 ENCOUNTER — OUTSIDE FACILITY SERVICE (OUTPATIENT)
Dept: PAIN MEDICINE | Facility: CLINIC | Age: 53
End: 2018-06-14

## 2018-06-14 ENCOUNTER — DOCUMENTATION (OUTPATIENT)
Dept: PAIN MEDICINE | Facility: CLINIC | Age: 53
End: 2018-06-14

## 2018-06-14 ENCOUNTER — RESULTS ENCOUNTER (OUTPATIENT)
Dept: PAIN MEDICINE | Facility: CLINIC | Age: 53
End: 2018-06-14

## 2018-06-14 DIAGNOSIS — G89.4 CHRONIC PAIN SYNDROME: ICD-10-CM

## 2018-06-14 PROCEDURE — 62323 NJX INTERLAMINAR LMBR/SAC: CPT | Performed by: ANESTHESIOLOGY

## 2018-07-09 ENCOUNTER — OFFICE VISIT (OUTPATIENT)
Dept: PAIN MEDICINE | Facility: CLINIC | Age: 53
End: 2018-07-09

## 2018-07-09 VITALS
RESPIRATION RATE: 16 BRPM | HEIGHT: 67 IN | OXYGEN SATURATION: 95 % | BODY MASS INDEX: 42.53 KG/M2 | SYSTOLIC BLOOD PRESSURE: 156 MMHG | WEIGHT: 271 LBS | HEART RATE: 93 BPM | DIASTOLIC BLOOD PRESSURE: 77 MMHG | TEMPERATURE: 97.9 F

## 2018-07-09 DIAGNOSIS — G89.4 CHRONIC PAIN SYNDROME: Primary | ICD-10-CM

## 2018-07-09 DIAGNOSIS — Z79.899 ENCOUNTER FOR LONG-TERM (CURRENT) USE OF HIGH-RISK MEDICATION: ICD-10-CM

## 2018-07-09 DIAGNOSIS — M96.1 POSTLAMINECTOMY SYNDROME OF THORACIC REGION: ICD-10-CM

## 2018-07-09 DIAGNOSIS — M51.36 DDD (DEGENERATIVE DISC DISEASE), LUMBAR: ICD-10-CM

## 2018-07-09 DIAGNOSIS — M54.14 THORACIC RADICULOPATHY: ICD-10-CM

## 2018-07-09 PROCEDURE — 99214 OFFICE O/P EST MOD 30 MIN: CPT | Performed by: ANESTHESIOLOGY

## 2018-07-09 RX ORDER — HYDROCODONE BITARTRATE AND ACETAMINOPHEN 10; 325 MG/1; MG/1
1 TABLET ORAL EVERY 6 HOURS PRN
Qty: 120 TABLET | Refills: 0 | Status: SHIPPED | OUTPATIENT
Start: 2018-07-09 | End: 2018-08-09 | Stop reason: SDUPTHER

## 2018-07-17 ENCOUNTER — DOCUMENTATION (OUTPATIENT)
Dept: PAIN MEDICINE | Facility: CLINIC | Age: 53
End: 2018-07-17

## 2018-07-26 ENCOUNTER — OUTSIDE FACILITY SERVICE (OUTPATIENT)
Dept: PAIN MEDICINE | Facility: CLINIC | Age: 53
End: 2018-07-26

## 2018-07-26 ENCOUNTER — DOCUMENTATION (OUTPATIENT)
Dept: PAIN MEDICINE | Facility: CLINIC | Age: 53
End: 2018-07-26

## 2018-07-26 PROCEDURE — 62321 NJX INTERLAMINAR CRV/THRC: CPT | Performed by: ANESTHESIOLOGY

## 2018-08-09 ENCOUNTER — OFFICE VISIT (OUTPATIENT)
Dept: PAIN MEDICINE | Facility: CLINIC | Age: 53
End: 2018-08-09

## 2018-08-09 VITALS
TEMPERATURE: 99.5 F | SYSTOLIC BLOOD PRESSURE: 131 MMHG | HEIGHT: 67 IN | BODY MASS INDEX: 42.22 KG/M2 | DIASTOLIC BLOOD PRESSURE: 88 MMHG | OXYGEN SATURATION: 92 % | RESPIRATION RATE: 15 BRPM | HEART RATE: 101 BPM | WEIGHT: 269 LBS

## 2018-08-09 DIAGNOSIS — Z79.899 ENCOUNTER FOR LONG-TERM (CURRENT) USE OF HIGH-RISK MEDICATION: ICD-10-CM

## 2018-08-09 DIAGNOSIS — G89.4 CHRONIC PAIN SYNDROME: Primary | ICD-10-CM

## 2018-08-09 DIAGNOSIS — M54.6 PAIN IN THORACIC SPINE: ICD-10-CM

## 2018-08-09 DIAGNOSIS — M51.36 DDD (DEGENERATIVE DISC DISEASE), LUMBAR: ICD-10-CM

## 2018-08-09 DIAGNOSIS — M96.1 POSTLAMINECTOMY SYNDROME OF THORACIC REGION: ICD-10-CM

## 2018-08-09 DIAGNOSIS — M54.5 LOW BACK PAIN, UNSPECIFIED BACK PAIN LATERALITY, UNSPECIFIED CHRONICITY, WITH SCIATICA PRESENCE UNSPECIFIED: ICD-10-CM

## 2018-08-09 PROCEDURE — 99214 OFFICE O/P EST MOD 30 MIN: CPT | Performed by: NURSE PRACTITIONER

## 2018-08-09 RX ORDER — HYDROCODONE BITARTRATE AND ACETAMINOPHEN 10; 325 MG/1; MG/1
1 TABLET ORAL EVERY 6 HOURS PRN
Qty: 120 TABLET | Refills: 0 | Status: SHIPPED | OUTPATIENT
Start: 2018-08-09 | End: 2018-09-13 | Stop reason: SDUPTHER

## 2018-09-13 ENCOUNTER — OFFICE VISIT (OUTPATIENT)
Dept: PAIN MEDICINE | Facility: CLINIC | Age: 53
End: 2018-09-13

## 2018-09-13 VITALS
DIASTOLIC BLOOD PRESSURE: 81 MMHG | SYSTOLIC BLOOD PRESSURE: 141 MMHG | HEART RATE: 95 BPM | WEIGHT: 271 LBS | HEIGHT: 67 IN | OXYGEN SATURATION: 95 % | RESPIRATION RATE: 16 BRPM | TEMPERATURE: 97.8 F | BODY MASS INDEX: 42.53 KG/M2

## 2018-09-13 DIAGNOSIS — M96.1 POSTLAMINECTOMY SYNDROME OF THORACIC REGION: ICD-10-CM

## 2018-09-13 DIAGNOSIS — M51.36 DDD (DEGENERATIVE DISC DISEASE), LUMBAR: ICD-10-CM

## 2018-09-13 DIAGNOSIS — M54.6 PAIN IN THORACIC SPINE: ICD-10-CM

## 2018-09-13 DIAGNOSIS — Z79.899 ENCOUNTER FOR LONG-TERM (CURRENT) USE OF HIGH-RISK MEDICATION: ICD-10-CM

## 2018-09-13 DIAGNOSIS — G89.4 CHRONIC PAIN SYNDROME: Primary | ICD-10-CM

## 2018-09-13 PROCEDURE — 99213 OFFICE O/P EST LOW 20 MIN: CPT | Performed by: NURSE PRACTITIONER

## 2018-09-13 RX ORDER — HYDROCODONE BITARTRATE AND ACETAMINOPHEN 10; 325 MG/1; MG/1
1 TABLET ORAL EVERY 6 HOURS PRN
Qty: 120 TABLET | Refills: 0 | Status: SHIPPED | OUTPATIENT
Start: 2018-09-13 | End: 2018-10-01 | Stop reason: SDUPTHER

## 2018-09-18 RX ORDER — GABAPENTIN 600 MG/1
TABLET ORAL
Qty: 180 TABLET | Refills: 2 | Status: SHIPPED | OUTPATIENT
Start: 2018-09-18 | End: 2018-12-13 | Stop reason: SDUPTHER

## 2018-10-01 RX ORDER — HYDROCODONE BITARTRATE AND ACETAMINOPHEN 10; 325 MG/1; MG/1
1 TABLET ORAL EVERY 6 HOURS PRN
Qty: 20 TABLET | Refills: 0 | Status: SHIPPED | OUTPATIENT
Start: 2018-10-01 | End: 2018-10-16 | Stop reason: SDUPTHER

## 2018-10-16 ENCOUNTER — OFFICE VISIT (OUTPATIENT)
Dept: PAIN MEDICINE | Facility: CLINIC | Age: 53
End: 2018-10-16

## 2018-10-16 VITALS
HEIGHT: 67 IN | HEART RATE: 93 BPM | WEIGHT: 279 LBS | SYSTOLIC BLOOD PRESSURE: 144 MMHG | RESPIRATION RATE: 15 BRPM | OXYGEN SATURATION: 97 % | TEMPERATURE: 98.7 F | DIASTOLIC BLOOD PRESSURE: 93 MMHG | BODY MASS INDEX: 43.79 KG/M2

## 2018-10-16 DIAGNOSIS — M54.6 PAIN IN THORACIC SPINE: ICD-10-CM

## 2018-10-16 DIAGNOSIS — M51.36 DDD (DEGENERATIVE DISC DISEASE), LUMBAR: ICD-10-CM

## 2018-10-16 DIAGNOSIS — G89.4 CHRONIC PAIN SYNDROME: Primary | ICD-10-CM

## 2018-10-16 DIAGNOSIS — M47.817 OSTEOARTHRITIS OF LUMBOSACRAL SPINE WITHOUT MYELOPATHY: ICD-10-CM

## 2018-10-16 DIAGNOSIS — Z79.899 ENCOUNTER FOR LONG-TERM (CURRENT) USE OF HIGH-RISK MEDICATION: ICD-10-CM

## 2018-10-16 DIAGNOSIS — M96.1 POSTLAMINECTOMY SYNDROME OF THORACIC REGION: ICD-10-CM

## 2018-10-16 PROCEDURE — 99214 OFFICE O/P EST MOD 30 MIN: CPT | Performed by: NURSE PRACTITIONER

## 2018-10-16 RX ORDER — HYDROCODONE BITARTRATE AND ACETAMINOPHEN 10; 325 MG/1; MG/1
1 TABLET ORAL EVERY 6 HOURS PRN
Qty: 120 TABLET | Refills: 0 | Status: SHIPPED | OUTPATIENT
Start: 2018-10-16 | End: 2018-11-13 | Stop reason: SDUPTHER

## 2018-10-17 RX ORDER — CYCLOBENZAPRINE HCL 10 MG
10 TABLET ORAL 3 TIMES DAILY PRN
Qty: 270 TABLET | Refills: 1 | Status: SHIPPED | OUTPATIENT
Start: 2018-10-17 | End: 2019-03-26 | Stop reason: ALTCHOICE

## 2018-10-30 RX ORDER — LEVETIRACETAM 500 MG/1
TABLET ORAL
Qty: 270 TABLET | Refills: 1 | Status: SHIPPED | OUTPATIENT
Start: 2018-10-30 | End: 2019-07-02 | Stop reason: SDUPTHER

## 2018-11-13 ENCOUNTER — OFFICE VISIT (OUTPATIENT)
Dept: PAIN MEDICINE | Facility: CLINIC | Age: 53
End: 2018-11-13

## 2018-11-13 VITALS
SYSTOLIC BLOOD PRESSURE: 154 MMHG | HEART RATE: 85 BPM | RESPIRATION RATE: 16 BRPM | DIASTOLIC BLOOD PRESSURE: 84 MMHG | WEIGHT: 279 LBS | OXYGEN SATURATION: 94 % | HEIGHT: 67 IN | BODY MASS INDEX: 43.79 KG/M2

## 2018-11-13 DIAGNOSIS — M51.36 DDD (DEGENERATIVE DISC DISEASE), LUMBAR: ICD-10-CM

## 2018-11-13 DIAGNOSIS — M54.6 PAIN IN THORACIC SPINE: ICD-10-CM

## 2018-11-13 DIAGNOSIS — M96.1 POSTLAMINECTOMY SYNDROME OF THORACIC REGION: ICD-10-CM

## 2018-11-13 DIAGNOSIS — M62.838 MUSCLE SPASM: ICD-10-CM

## 2018-11-13 DIAGNOSIS — Z79.899 ENCOUNTER FOR LONG-TERM (CURRENT) USE OF HIGH-RISK MEDICATION: ICD-10-CM

## 2018-11-13 DIAGNOSIS — G89.29 OTHER CHRONIC PAIN: Primary | ICD-10-CM

## 2018-11-13 PROCEDURE — 99213 OFFICE O/P EST LOW 20 MIN: CPT | Performed by: NURSE PRACTITIONER

## 2018-11-13 PROCEDURE — 20552 NJX 1/MLT TRIGGER POINT 1/2: CPT | Performed by: NURSE PRACTITIONER

## 2018-11-13 RX ORDER — HYDROCODONE BITARTRATE AND ACETAMINOPHEN 10; 325 MG/1; MG/1
1 TABLET ORAL EVERY 6 HOURS PRN
Qty: 120 TABLET | Refills: 0 | Status: SHIPPED | OUTPATIENT
Start: 2018-11-13 | End: 2018-12-13 | Stop reason: SDUPTHER

## 2018-12-13 ENCOUNTER — RESULTS ENCOUNTER (OUTPATIENT)
Dept: PAIN MEDICINE | Facility: CLINIC | Age: 53
End: 2018-12-13

## 2018-12-13 ENCOUNTER — OFFICE VISIT (OUTPATIENT)
Dept: PAIN MEDICINE | Facility: CLINIC | Age: 53
End: 2018-12-13

## 2018-12-13 VITALS
SYSTOLIC BLOOD PRESSURE: 134 MMHG | HEIGHT: 67 IN | DIASTOLIC BLOOD PRESSURE: 88 MMHG | WEIGHT: 268.4 LBS | RESPIRATION RATE: 15 BRPM | BODY MASS INDEX: 42.12 KG/M2 | HEART RATE: 102 BPM | OXYGEN SATURATION: 94 % | TEMPERATURE: 98.8 F

## 2018-12-13 DIAGNOSIS — M54.6 PAIN IN THORACIC SPINE: ICD-10-CM

## 2018-12-13 DIAGNOSIS — Z79.899 ENCOUNTER FOR LONG-TERM (CURRENT) USE OF HIGH-RISK MEDICATION: ICD-10-CM

## 2018-12-13 DIAGNOSIS — M96.1 POSTLAMINECTOMY SYNDROME OF THORACIC REGION: ICD-10-CM

## 2018-12-13 DIAGNOSIS — M54.5 LOW BACK PAIN, UNSPECIFIED BACK PAIN LATERALITY, UNSPECIFIED CHRONICITY, WITH SCIATICA PRESENCE UNSPECIFIED: ICD-10-CM

## 2018-12-13 DIAGNOSIS — G89.4 CHRONIC PAIN SYNDROME: ICD-10-CM

## 2018-12-13 PROCEDURE — 99213 OFFICE O/P EST LOW 20 MIN: CPT | Performed by: NURSE PRACTITIONER

## 2018-12-13 RX ORDER — HYDROCODONE BITARTRATE AND ACETAMINOPHEN 10; 325 MG/1; MG/1
1 TABLET ORAL EVERY 6 HOURS PRN
Qty: 120 TABLET | Refills: 0 | Status: SHIPPED | OUTPATIENT
Start: 2018-12-13 | End: 2019-01-11 | Stop reason: SDUPTHER

## 2018-12-14 RX ORDER — GABAPENTIN 600 MG/1
TABLET ORAL
Qty: 180 TABLET | Refills: 5 | Status: SHIPPED | OUTPATIENT
Start: 2018-12-14 | End: 2019-01-24 | Stop reason: SDUPTHER

## 2018-12-25 ENCOUNTER — HOSPITAL ENCOUNTER (EMERGENCY)
Facility: HOSPITAL | Age: 53
Discharge: HOME | End: 2018-12-26
Attending: EMERGENCY MEDICINE | Admitting: EMERGENCY MEDICINE

## 2018-12-25 DIAGNOSIS — R60.9 PERIPHERAL EDEMA: Primary | ICD-10-CM

## 2018-12-25 DIAGNOSIS — L03.115 CELLULITIS OF RIGHT LOWER EXTREMITY: ICD-10-CM

## 2018-12-25 LAB
BASOPHILS # BLD AUTO: 0.03 10*3/MM3 (ref 0–0.2)
BASOPHILS NFR BLD AUTO: 0.4 % (ref 0–1.5)
DEPRECATED RDW RBC AUTO: 49.6 FL (ref 37–54)
EOSINOPHIL # BLD AUTO: 0.43 10*3/MM3 (ref 0–0.7)
EOSINOPHIL NFR BLD AUTO: 6.1 % (ref 0.3–6.2)
ERYTHROCYTE [DISTWIDTH] IN BLOOD BY AUTOMATED COUNT: 15.1 % (ref 11.7–13)
HCT VFR BLD AUTO: 36.7 % (ref 35.6–45.5)
HGB BLD-MCNC: 11.1 G/DL (ref 11.9–15.5)
IMM GRANULOCYTES # BLD AUTO: 0.05 10*3/MM3 (ref 0–0.03)
IMM GRANULOCYTES NFR BLD AUTO: 0.7 % (ref 0–0.5)
LYMPHOCYTES # BLD AUTO: 0.81 10*3/MM3 (ref 0.9–4.8)
LYMPHOCYTES NFR BLD AUTO: 11.5 % (ref 19.6–45.3)
MCH RBC QN AUTO: 27.3 PG (ref 26.9–32)
MCHC RBC AUTO-ENTMCNC: 30.2 G/DL (ref 32.4–36.3)
MCV RBC AUTO: 90.2 FL (ref 80.5–98.2)
MONOCYTES # BLD AUTO: 0.6 10*3/MM3 (ref 0.2–1.2)
MONOCYTES NFR BLD AUTO: 8.5 % (ref 5–12)
NEUTROPHILS # BLD AUTO: 5.14 10*3/MM3 (ref 1.9–8.1)
NEUTROPHILS NFR BLD AUTO: 72.8 % (ref 42.7–76)
PLATELET # BLD AUTO: 246 10*3/MM3 (ref 140–500)
PMV BLD AUTO: 10 FL (ref 6–12)
RBC # BLD AUTO: 4.07 10*6/MM3 (ref 3.9–5.2)
WBC NRBC COR # BLD: 7.06 10*3/MM3 (ref 4.5–10.7)

## 2018-12-25 PROCEDURE — 99284 EMERGENCY DEPT VISIT MOD MDM: CPT

## 2018-12-25 PROCEDURE — 83880 ASSAY OF NATRIURETIC PEPTIDE: CPT | Performed by: EMERGENCY MEDICINE

## 2018-12-25 PROCEDURE — 85025 COMPLETE CBC W/AUTO DIFF WBC: CPT | Performed by: EMERGENCY MEDICINE

## 2018-12-25 PROCEDURE — 80053 COMPREHEN METABOLIC PANEL: CPT | Performed by: EMERGENCY MEDICINE

## 2018-12-25 RX ORDER — SODIUM CHLORIDE 0.9 % (FLUSH) 0.9 %
10 SYRINGE (ML) INJECTION AS NEEDED
Status: DISCONTINUED | OUTPATIENT
Start: 2018-12-25 | End: 2018-12-26 | Stop reason: HOSPADM

## 2018-12-26 VITALS
OXYGEN SATURATION: 99 % | HEART RATE: 77 BPM | RESPIRATION RATE: 16 BRPM | WEIGHT: 293 LBS | TEMPERATURE: 97.8 F | BODY MASS INDEX: 47.09 KG/M2 | SYSTOLIC BLOOD PRESSURE: 141 MMHG | HEIGHT: 66 IN | DIASTOLIC BLOOD PRESSURE: 78 MMHG

## 2018-12-26 LAB
ALBUMIN SERPL-MCNC: 3.4 G/DL (ref 3.5–5.2)
ALBUMIN/GLOB SERPL: 0.9 G/DL
ALP SERPL-CCNC: 60 U/L (ref 39–117)
ALT SERPL W P-5'-P-CCNC: 24 U/L (ref 1–33)
ANION GAP SERPL CALCULATED.3IONS-SCNC: 11.8 MMOL/L
AST SERPL-CCNC: 25 U/L (ref 1–32)
BILIRUB SERPL-MCNC: 0.2 MG/DL (ref 0.1–1.2)
BUN BLD-MCNC: 21 MG/DL (ref 6–20)
BUN/CREAT SERPL: 20.4 (ref 7–25)
CALCIUM SPEC-SCNC: 9.1 MG/DL (ref 8.6–10.5)
CHLORIDE SERPL-SCNC: 108 MMOL/L (ref 98–107)
CO2 SERPL-SCNC: 24.2 MMOL/L (ref 22–29)
CREAT BLD-MCNC: 1.03 MG/DL (ref 0.57–1)
GFR SERPL CREATININE-BSD FRML MDRD: 56 ML/MIN/1.73
GLOBULIN UR ELPH-MCNC: 3.6 GM/DL
GLUCOSE BLD-MCNC: 96 MG/DL (ref 65–99)
HOLD SPECIMEN: NORMAL
HOLD SPECIMEN: NORMAL
NT-PROBNP SERPL-MCNC: 116.9 PG/ML (ref 5–900)
POTASSIUM BLD-SCNC: 4.1 MMOL/L (ref 3.5–5.2)
PROT SERPL-MCNC: 7 G/DL (ref 6–8.5)
SODIUM BLD-SCNC: 144 MMOL/L (ref 136–145)
WHOLE BLOOD HOLD SPECIMEN: NORMAL
WHOLE BLOOD HOLD SPECIMEN: NORMAL

## 2018-12-26 RX ORDER — CEPHALEXIN 500 MG/1
500 CAPSULE ORAL 3 TIMES DAILY
Qty: 21 CAPSULE | Refills: 0 | Status: SHIPPED | OUTPATIENT
Start: 2018-12-26 | End: 2019-02-25

## 2018-12-26 RX ORDER — FUROSEMIDE 20 MG/1
20 TABLET ORAL DAILY
Qty: 3 TABLET | Refills: 0 | Status: ON HOLD | OUTPATIENT
Start: 2018-12-26 | End: 2021-05-05

## 2019-01-11 RX ORDER — HYDROCODONE BITARTRATE AND ACETAMINOPHEN 10; 325 MG/1; MG/1
1 TABLET ORAL EVERY 6 HOURS PRN
Qty: 40 TABLET | Refills: 0 | Status: SHIPPED | OUTPATIENT
Start: 2019-01-11 | End: 2019-01-24 | Stop reason: SDUPTHER

## 2019-01-24 ENCOUNTER — OFFICE VISIT (OUTPATIENT)
Dept: PAIN MEDICINE | Facility: CLINIC | Age: 54
End: 2019-01-24

## 2019-01-24 VITALS
HEIGHT: 66 IN | RESPIRATION RATE: 15 BRPM | HEART RATE: 91 BPM | OXYGEN SATURATION: 95 % | DIASTOLIC BLOOD PRESSURE: 84 MMHG | SYSTOLIC BLOOD PRESSURE: 138 MMHG | TEMPERATURE: 98.2 F | WEIGHT: 261.6 LBS | BODY MASS INDEX: 42.04 KG/M2

## 2019-01-24 DIAGNOSIS — M47.817 OSTEOARTHRITIS OF LUMBOSACRAL SPINE WITHOUT MYELOPATHY: ICD-10-CM

## 2019-01-24 DIAGNOSIS — G89.4 CHRONIC PAIN SYNDROME: Primary | ICD-10-CM

## 2019-01-24 DIAGNOSIS — Z79.899 ENCOUNTER FOR LONG-TERM (CURRENT) USE OF HIGH-RISK MEDICATION: ICD-10-CM

## 2019-01-24 DIAGNOSIS — M51.36 BULGE OF LUMBAR DISC WITHOUT MYELOPATHY: ICD-10-CM

## 2019-01-24 DIAGNOSIS — M96.1 POSTLAMINECTOMY SYNDROME OF THORACIC REGION: ICD-10-CM

## 2019-01-24 DIAGNOSIS — Z87.39 HISTORY OF OSTEOMYELITIS: ICD-10-CM

## 2019-01-24 LAB
POC AMPHETAMINES: NEGATIVE
POC BARBITURATES: NEGATIVE
POC BENZODIAZEPHINES: NEGATIVE
POC COCAINE: NEGATIVE
POC METHADONE: NEGATIVE
POC METHAMPHETAMINE SCREEN URINE: NEGATIVE
POC OPIATES: POSITIVE
POC OXYCODONE: NEGATIVE
POC PHENCYCLIDINE: NEGATIVE
POC PROPOXYPHENE: NEGATIVE
POC THC: NEGATIVE
POC TRICYCLIC ANTIDEPRESSANTS: POSITIVE

## 2019-01-24 PROCEDURE — 99214 OFFICE O/P EST MOD 30 MIN: CPT | Performed by: NURSE PRACTITIONER

## 2019-01-24 PROCEDURE — 80305 DRUG TEST PRSMV DIR OPT OBS: CPT | Performed by: NURSE PRACTITIONER

## 2019-01-24 RX ORDER — HYDROCODONE BITARTRATE AND ACETAMINOPHEN 10; 325 MG/1; MG/1
1 TABLET ORAL EVERY 6 HOURS PRN
Qty: 120 TABLET | Refills: 0 | Status: SHIPPED | OUTPATIENT
Start: 2019-01-24 | End: 2019-02-25 | Stop reason: SDUPTHER

## 2019-01-24 RX ORDER — GABAPENTIN 600 MG/1
1200 TABLET ORAL 3 TIMES DAILY
Qty: 180 TABLET | Refills: 5 | Status: SHIPPED | OUTPATIENT
Start: 2019-01-24 | End: 2019-02-26 | Stop reason: SDUPTHER

## 2019-01-29 ENCOUNTER — RESULTS ENCOUNTER (OUTPATIENT)
Dept: PAIN MEDICINE | Facility: CLINIC | Age: 54
End: 2019-01-29

## 2019-01-29 DIAGNOSIS — Z79.899 ENCOUNTER FOR LONG-TERM (CURRENT) USE OF HIGH-RISK MEDICATION: ICD-10-CM

## 2019-01-29 DIAGNOSIS — M96.1 POSTLAMINECTOMY SYNDROME OF THORACIC REGION: ICD-10-CM

## 2019-01-29 DIAGNOSIS — Z87.39 HISTORY OF OSTEOMYELITIS: ICD-10-CM

## 2019-01-29 DIAGNOSIS — M51.36 BULGE OF LUMBAR DISC WITHOUT MYELOPATHY: ICD-10-CM

## 2019-01-29 DIAGNOSIS — M47.817 OSTEOARTHRITIS OF LUMBOSACRAL SPINE WITHOUT MYELOPATHY: ICD-10-CM

## 2019-01-29 DIAGNOSIS — G89.4 CHRONIC PAIN SYNDROME: ICD-10-CM

## 2019-02-19 ENCOUNTER — TELEPHONE (OUTPATIENT)
Dept: PAIN MEDICINE | Facility: CLINIC | Age: 54
End: 2019-02-19

## 2019-02-19 RX ORDER — HYDROCODONE BITARTRATE AND ACETAMINOPHEN 10; 325 MG/1; MG/1
1 TABLET ORAL EVERY 6 HOURS PRN
Qty: 120 TABLET | Refills: 0 | Status: CANCELLED | OUTPATIENT
Start: 2019-02-19

## 2019-02-25 ENCOUNTER — OFFICE VISIT (OUTPATIENT)
Dept: PAIN MEDICINE | Facility: CLINIC | Age: 54
End: 2019-02-25

## 2019-02-25 VITALS
HEIGHT: 66 IN | BODY MASS INDEX: 43.1 KG/M2 | WEIGHT: 268.2 LBS | HEART RATE: 110 BPM | SYSTOLIC BLOOD PRESSURE: 134 MMHG | TEMPERATURE: 99.4 F | OXYGEN SATURATION: 92 % | DIASTOLIC BLOOD PRESSURE: 88 MMHG | RESPIRATION RATE: 18 BRPM

## 2019-02-25 DIAGNOSIS — M51.36 BULGE OF LUMBAR DISC WITHOUT MYELOPATHY: ICD-10-CM

## 2019-02-25 DIAGNOSIS — Z87.39 HISTORY OF OSTEOMYELITIS: ICD-10-CM

## 2019-02-25 DIAGNOSIS — G89.4 CHRONIC PAIN SYNDROME: Primary | ICD-10-CM

## 2019-02-25 DIAGNOSIS — M96.1 POSTLAMINECTOMY SYNDROME OF THORACIC REGION: ICD-10-CM

## 2019-02-25 DIAGNOSIS — Z79.899 ENCOUNTER FOR LONG-TERM (CURRENT) USE OF HIGH-RISK MEDICATION: ICD-10-CM

## 2019-02-25 DIAGNOSIS — G54.3 THORACIC ROOT LESION: ICD-10-CM

## 2019-02-25 PROCEDURE — 99213 OFFICE O/P EST LOW 20 MIN: CPT | Performed by: NURSE PRACTITIONER

## 2019-02-25 RX ORDER — HYDROCODONE BITARTRATE AND ACETAMINOPHEN 10; 325 MG/1; MG/1
1 TABLET ORAL EVERY 6 HOURS PRN
Qty: 120 TABLET | Refills: 0 | Status: SHIPPED | OUTPATIENT
Start: 2019-02-25 | End: 2019-03-26 | Stop reason: SDUPTHER

## 2019-02-26 RX ORDER — GABAPENTIN 600 MG/1
1200 TABLET ORAL 3 TIMES DAILY
Qty: 180 TABLET | Refills: 5 | Status: SHIPPED | OUTPATIENT
Start: 2019-02-26 | End: 2019-08-07 | Stop reason: SDUPTHER

## 2019-03-26 ENCOUNTER — OFFICE VISIT (OUTPATIENT)
Dept: PAIN MEDICINE | Facility: CLINIC | Age: 54
End: 2019-03-26

## 2019-03-26 VITALS
BODY MASS INDEX: 41.18 KG/M2 | SYSTOLIC BLOOD PRESSURE: 128 MMHG | TEMPERATURE: 98.6 F | RESPIRATION RATE: 18 BRPM | DIASTOLIC BLOOD PRESSURE: 84 MMHG | HEART RATE: 93 BPM | HEIGHT: 67 IN | WEIGHT: 262.4 LBS | OXYGEN SATURATION: 96 %

## 2019-03-26 DIAGNOSIS — M96.1 POSTLAMINECTOMY SYNDROME OF THORACIC REGION: ICD-10-CM

## 2019-03-26 DIAGNOSIS — Z87.39 HISTORY OF OSTEOMYELITIS: ICD-10-CM

## 2019-03-26 DIAGNOSIS — Z79.899 ENCOUNTER FOR LONG-TERM (CURRENT) USE OF HIGH-RISK MEDICATION: ICD-10-CM

## 2019-03-26 DIAGNOSIS — G89.4 CHRONIC PAIN SYNDROME: Primary | ICD-10-CM

## 2019-03-26 DIAGNOSIS — M54.6 PAIN IN THORACIC SPINE: ICD-10-CM

## 2019-03-26 DIAGNOSIS — M51.36 BULGE OF LUMBAR DISC WITHOUT MYELOPATHY: ICD-10-CM

## 2019-03-26 PROCEDURE — 99214 OFFICE O/P EST MOD 30 MIN: CPT | Performed by: NURSE PRACTITIONER

## 2019-03-26 RX ORDER — HYDROCODONE BITARTRATE AND ACETAMINOPHEN 10; 325 MG/1; MG/1
1 TABLET ORAL EVERY 6 HOURS PRN
Qty: 120 TABLET | Refills: 0 | Status: SHIPPED | OUTPATIENT
Start: 2019-03-26 | End: 2019-04-17 | Stop reason: SDUPTHER

## 2019-03-26 RX ORDER — TIZANIDINE 4 MG/1
4 TABLET ORAL EVERY 8 HOURS PRN
Qty: 90 TABLET | Refills: 1 | Status: SHIPPED | OUTPATIENT
Start: 2019-03-26 | End: 2019-05-28 | Stop reason: SDUPTHER

## 2019-04-17 ENCOUNTER — OFFICE VISIT (OUTPATIENT)
Dept: PAIN MEDICINE | Facility: CLINIC | Age: 54
End: 2019-04-17

## 2019-04-17 VITALS
DIASTOLIC BLOOD PRESSURE: 86 MMHG | RESPIRATION RATE: 20 BRPM | HEART RATE: 81 BPM | HEIGHT: 67 IN | OXYGEN SATURATION: 91 % | WEIGHT: 255.2 LBS | TEMPERATURE: 97.6 F | SYSTOLIC BLOOD PRESSURE: 124 MMHG | BODY MASS INDEX: 40.06 KG/M2

## 2019-04-17 DIAGNOSIS — Z79.899 ENCOUNTER FOR LONG-TERM (CURRENT) USE OF HIGH-RISK MEDICATION: ICD-10-CM

## 2019-04-17 DIAGNOSIS — M96.1 POSTLAMINECTOMY SYNDROME OF THORACIC REGION: ICD-10-CM

## 2019-04-17 DIAGNOSIS — G89.4 CHRONIC PAIN SYNDROME: Primary | ICD-10-CM

## 2019-04-17 DIAGNOSIS — M51.36 DDD (DEGENERATIVE DISC DISEASE), LUMBAR: ICD-10-CM

## 2019-04-17 DIAGNOSIS — Z87.39 HISTORY OF OSTEOMYELITIS: ICD-10-CM

## 2019-04-17 PROCEDURE — 99213 OFFICE O/P EST LOW 20 MIN: CPT | Performed by: NURSE PRACTITIONER

## 2019-04-17 RX ORDER — HYDROCODONE BITARTRATE AND ACETAMINOPHEN 10; 325 MG/1; MG/1
1 TABLET ORAL EVERY 6 HOURS PRN
Qty: 120 TABLET | Refills: 0 | Status: SHIPPED | OUTPATIENT
Start: 2019-04-17 | End: 2019-05-22 | Stop reason: SDUPTHER

## 2019-04-17 RX ORDER — ZOSTER VACCINE RECOMBINANT, ADJUVANTED 50 MCG/0.5
KIT INTRAMUSCULAR
COMMUNITY
Start: 2019-02-25 | End: 2019-04-25 | Stop reason: HOSPADM

## 2019-04-22 ENCOUNTER — HOSPITAL ENCOUNTER (INPATIENT)
Facility: HOSPITAL | Age: 54
LOS: 3 days | Discharge: HOME | End: 2019-04-25
Attending: EMERGENCY MEDICINE | Admitting: INTERNAL MEDICINE

## 2019-04-22 ENCOUNTER — APPOINTMENT (OUTPATIENT)
Dept: GENERAL RADIOLOGY | Facility: HOSPITAL | Age: 54
End: 2019-04-22

## 2019-04-22 DIAGNOSIS — R50.9 FEVER, UNSPECIFIED FEVER CAUSE: ICD-10-CM

## 2019-04-22 DIAGNOSIS — Z74.09 DECREASED FUNCTIONAL MOBILITY AND ENDURANCE: ICD-10-CM

## 2019-04-22 DIAGNOSIS — M86.9 OSTEOMYELITIS OF RIGHT ANKLE, UNSPECIFIED TYPE (HCC): ICD-10-CM

## 2019-04-22 DIAGNOSIS — R79.82 ELEVATED C-REACTIVE PROTEIN (CRP): ICD-10-CM

## 2019-04-22 DIAGNOSIS — M25.571 ACUTE RIGHT ANKLE PAIN: Primary | ICD-10-CM

## 2019-04-22 DIAGNOSIS — R70.0 ELEVATED SED RATE: ICD-10-CM

## 2019-04-22 PROBLEM — N18.30 CKD (CHRONIC KIDNEY DISEASE) STAGE 3, GFR 30-59 ML/MIN (HCC): Status: ACTIVE | Noted: 2019-04-22

## 2019-04-22 PROBLEM — F11.20 OPIATE DEPENDENCE, CONTINUOUS: Status: ACTIVE | Noted: 2019-04-22

## 2019-04-22 PROBLEM — J42 CHRONIC BRONCHITIS: Status: ACTIVE | Noted: 2019-04-22

## 2019-04-22 LAB
ALBUMIN SERPL-MCNC: 4.2 G/DL (ref 3.5–5.2)
ALBUMIN/GLOB SERPL: 1.1 G/DL
ALP SERPL-CCNC: 60 U/L (ref 39–117)
ALT SERPL W P-5'-P-CCNC: 32 U/L (ref 1–33)
ANION GAP SERPL CALCULATED.3IONS-SCNC: 10.6 MMOL/L
ANION GAP SERPL CALCULATED.3IONS-SCNC: 13.1 MMOL/L
AST SERPL-CCNC: 25 U/L (ref 1–32)
BACTERIA UR QL AUTO: ABNORMAL /HPF
BASOPHILS # BLD AUTO: 0.05 10*3/MM3 (ref 0–0.2)
BASOPHILS NFR BLD AUTO: 0.5 % (ref 0–1.5)
BILIRUB SERPL-MCNC: 0.2 MG/DL (ref 0.2–1.2)
BILIRUB UR QL STRIP: NEGATIVE
BUN BLD-MCNC: 19 MG/DL (ref 6–20)
BUN BLD-MCNC: 23 MG/DL (ref 6–20)
BUN/CREAT SERPL: 22.1 (ref 7–25)
BUN/CREAT SERPL: 22.6 (ref 7–25)
CALCIUM SPEC-SCNC: 8.3 MG/DL (ref 8.6–10.5)
CALCIUM SPEC-SCNC: 9.6 MG/DL (ref 8.6–10.5)
CHLORIDE SERPL-SCNC: 103 MMOL/L (ref 98–107)
CHLORIDE SERPL-SCNC: 103 MMOL/L (ref 98–107)
CLARITY UR: CLEAR
CO2 SERPL-SCNC: 26.4 MMOL/L (ref 22–29)
CO2 SERPL-SCNC: 26.9 MMOL/L (ref 22–29)
COLOR UR: YELLOW
CREAT BLD-MCNC: 0.84 MG/DL (ref 0.57–1)
CREAT BLD-MCNC: 1.04 MG/DL (ref 0.57–1)
CRP SERPL-MCNC: 1.54 MG/DL (ref 0–0.5)
D-LACTATE SERPL-SCNC: 1.2 MMOL/L (ref 0.5–2)
DEPRECATED RDW RBC AUTO: 46 FL (ref 37–54)
DEPRECATED RDW RBC AUTO: 46.1 FL (ref 37–54)
EOSINOPHIL # BLD AUTO: 0.29 10*3/MM3 (ref 0–0.4)
EOSINOPHIL NFR BLD AUTO: 2.7 % (ref 0.3–6.2)
ERYTHROCYTE [DISTWIDTH] IN BLOOD BY AUTOMATED COUNT: 14.5 % (ref 12.3–15.4)
ERYTHROCYTE [DISTWIDTH] IN BLOOD BY AUTOMATED COUNT: 14.6 % (ref 12.3–15.4)
ERYTHROCYTE [SEDIMENTATION RATE] IN BLOOD: 52 MM/HR (ref 0–30)
GFR SERPL CREATININE-BSD FRML MDRD: 55 ML/MIN/1.73
GFR SERPL CREATININE-BSD FRML MDRD: 70 ML/MIN/1.73
GLOBULIN UR ELPH-MCNC: 3.7 GM/DL
GLUCOSE BLD-MCNC: 105 MG/DL (ref 65–99)
GLUCOSE BLD-MCNC: 114 MG/DL (ref 65–99)
GLUCOSE UR STRIP-MCNC: NEGATIVE MG/DL
HCT VFR BLD AUTO: 34.5 % (ref 34–46.6)
HCT VFR BLD AUTO: 39 % (ref 34–46.6)
HGB BLD-MCNC: 10.6 G/DL (ref 12–15.9)
HGB BLD-MCNC: 11.8 G/DL (ref 12–15.9)
HGB UR QL STRIP.AUTO: NEGATIVE
HOLD SPECIMEN: NORMAL
HYALINE CASTS UR QL AUTO: ABNORMAL /LPF
IMM GRANULOCYTES # BLD AUTO: 0.06 10*3/MM3 (ref 0–0.05)
IMM GRANULOCYTES NFR BLD AUTO: 0.6 % (ref 0–0.5)
KETONES UR QL STRIP: ABNORMAL
LEUKOCYTE ESTERASE UR QL STRIP.AUTO: ABNORMAL
LYMPHOCYTES # BLD AUTO: 0.81 10*3/MM3 (ref 0.7–3.1)
LYMPHOCYTES NFR BLD AUTO: 7.6 % (ref 19.6–45.3)
MCH RBC QN AUTO: 26.4 PG (ref 26.6–33)
MCH RBC QN AUTO: 27 PG (ref 26.6–33)
MCHC RBC AUTO-ENTMCNC: 30.3 G/DL (ref 31.5–35.7)
MCHC RBC AUTO-ENTMCNC: 30.7 G/DL (ref 31.5–35.7)
MCV RBC AUTO: 87.2 FL (ref 79–97)
MCV RBC AUTO: 87.8 FL (ref 79–97)
MONOCYTES # BLD AUTO: 1.03 10*3/MM3 (ref 0.1–0.9)
MONOCYTES NFR BLD AUTO: 9.6 % (ref 5–12)
MUCOUS THREADS URNS QL MICRO: ABNORMAL /HPF
NEUTROPHILS # BLD AUTO: 8.48 10*3/MM3 (ref 1.7–7)
NEUTROPHILS NFR BLD AUTO: 79 % (ref 42.7–76)
NITRITE UR QL STRIP: NEGATIVE
NRBC BLD AUTO-RTO: 0 /100 WBC (ref 0–0.2)
PH UR STRIP.AUTO: 6 [PH] (ref 5–8)
PLATELET # BLD AUTO: 174 10*3/MM3 (ref 140–450)
PLATELET # BLD AUTO: 208 10*3/MM3 (ref 140–450)
PMV BLD AUTO: 12.5 FL (ref 6–12)
PMV BLD AUTO: 12.8 FL (ref 6–12)
POTASSIUM BLD-SCNC: 3.5 MMOL/L (ref 3.5–5.2)
POTASSIUM BLD-SCNC: 3.6 MMOL/L (ref 3.5–5.2)
PROCALCITONIN SERPL-MCNC: 0.07 NG/ML (ref 0.1–0.25)
PROT SERPL-MCNC: 7.9 G/DL (ref 6–8.5)
PROT UR QL STRIP: NEGATIVE
RBC # BLD AUTO: 3.93 10*6/MM3 (ref 3.77–5.28)
RBC # BLD AUTO: 4.47 10*6/MM3 (ref 3.77–5.28)
RBC # UR: ABNORMAL /HPF
REF LAB TEST METHOD: ABNORMAL
SODIUM BLD-SCNC: 140 MMOL/L (ref 136–145)
SODIUM BLD-SCNC: 143 MMOL/L (ref 136–145)
SP GR UR STRIP: 1.03 (ref 1–1.03)
SQUAMOUS #/AREA URNS HPF: ABNORMAL /HPF
UROBILINOGEN UR QL STRIP: ABNORMAL
WBC NRBC COR # BLD: 10.72 10*3/MM3 (ref 3.4–10.8)
WBC NRBC COR # BLD: 9.32 10*3/MM3 (ref 3.4–10.8)
WBC UR QL AUTO: ABNORMAL /HPF
WHOLE BLOOD HOLD SPECIMEN: NORMAL

## 2019-04-22 PROCEDURE — 25010000002 ENOXAPARIN PER 10 MG: Performed by: INTERNAL MEDICINE

## 2019-04-22 PROCEDURE — 86140 C-REACTIVE PROTEIN: CPT | Performed by: PHYSICIAN ASSISTANT

## 2019-04-22 PROCEDURE — 87040 BLOOD CULTURE FOR BACTERIA: CPT | Performed by: PHYSICIAN ASSISTANT

## 2019-04-22 PROCEDURE — 73610 X-RAY EXAM OF ANKLE: CPT

## 2019-04-22 PROCEDURE — 94640 AIRWAY INHALATION TREATMENT: CPT

## 2019-04-22 PROCEDURE — 99285 EMERGENCY DEPT VISIT HI MDM: CPT

## 2019-04-22 PROCEDURE — 85027 COMPLETE CBC AUTOMATED: CPT | Performed by: NURSE PRACTITIONER

## 2019-04-22 PROCEDURE — 25010000002 PIPERACILLIN SOD-TAZOBACTAM PER 1 G: Performed by: PHYSICIAN ASSISTANT

## 2019-04-22 PROCEDURE — 25010000002 PIPERACILLIN SOD-TAZOBACTAM PER 1 G: Performed by: NURSE PRACTITIONER

## 2019-04-22 PROCEDURE — 94799 UNLISTED PULMONARY SVC/PX: CPT

## 2019-04-22 PROCEDURE — 71046 X-RAY EXAM CHEST 2 VIEWS: CPT

## 2019-04-22 PROCEDURE — 85025 COMPLETE CBC W/AUTO DIFF WBC: CPT | Performed by: PHYSICIAN ASSISTANT

## 2019-04-22 PROCEDURE — 83605 ASSAY OF LACTIC ACID: CPT | Performed by: PHYSICIAN ASSISTANT

## 2019-04-22 PROCEDURE — 36415 COLL VENOUS BLD VENIPUNCTURE: CPT | Performed by: NURSE PRACTITIONER

## 2019-04-22 PROCEDURE — 25010000002 VANCOMYCIN 5 G RECONSTITUTED SOLUTION: Performed by: PHYSICIAN ASSISTANT

## 2019-04-22 PROCEDURE — 36415 COLL VENOUS BLD VENIPUNCTURE: CPT

## 2019-04-22 PROCEDURE — 81001 URINALYSIS AUTO W/SCOPE: CPT | Performed by: PHYSICIAN ASSISTANT

## 2019-04-22 PROCEDURE — 85652 RBC SED RATE AUTOMATED: CPT | Performed by: PHYSICIAN ASSISTANT

## 2019-04-22 PROCEDURE — 84145 PROCALCITONIN (PCT): CPT | Performed by: PHYSICIAN ASSISTANT

## 2019-04-22 PROCEDURE — 25010000002 VANCOMYCIN 10 G RECONSTITUTED SOLUTION: Performed by: NURSE PRACTITIONER

## 2019-04-22 PROCEDURE — 94762 N-INVAS EAR/PLS OXIMTRY CONT: CPT

## 2019-04-22 PROCEDURE — 80053 COMPREHEN METABOLIC PANEL: CPT | Performed by: PHYSICIAN ASSISTANT

## 2019-04-22 RX ORDER — ACETAMINOPHEN 325 MG/1
650 TABLET ORAL EVERY 4 HOURS PRN
Status: DISCONTINUED | OUTPATIENT
Start: 2019-04-22 | End: 2019-04-25 | Stop reason: HOSPADM

## 2019-04-22 RX ORDER — PANTOPRAZOLE SODIUM 40 MG/1
40 TABLET, DELAYED RELEASE ORAL
Status: DISCONTINUED | OUTPATIENT
Start: 2019-04-22 | End: 2019-04-25 | Stop reason: HOSPADM

## 2019-04-22 RX ORDER — ALBUTEROL SULFATE 2.5 MG/3ML
2.5 SOLUTION RESPIRATORY (INHALATION) 3 TIMES DAILY
Status: DISCONTINUED | OUTPATIENT
Start: 2019-04-22 | End: 2019-04-25 | Stop reason: HOSPADM

## 2019-04-22 RX ORDER — LEVETIRACETAM 500 MG/1
500 TABLET ORAL 3 TIMES DAILY
Status: DISCONTINUED | OUTPATIENT
Start: 2019-04-22 | End: 2019-04-25 | Stop reason: HOSPADM

## 2019-04-22 RX ORDER — CETIRIZINE HYDROCHLORIDE 10 MG/1
10 TABLET ORAL DAILY
Status: DISCONTINUED | OUTPATIENT
Start: 2019-04-22 | End: 2019-04-25 | Stop reason: HOSPADM

## 2019-04-22 RX ORDER — SODIUM CHLORIDE 0.9 % (FLUSH) 0.9 %
3 SYRINGE (ML) INJECTION EVERY 12 HOURS SCHEDULED
Status: DISCONTINUED | OUTPATIENT
Start: 2019-04-22 | End: 2019-04-25 | Stop reason: HOSPADM

## 2019-04-22 RX ORDER — HYDROCODONE BITARTRATE AND ACETAMINOPHEN 10; 325 MG/1; MG/1
1 TABLET ORAL EVERY 6 HOURS PRN
Status: DISCONTINUED | OUTPATIENT
Start: 2019-04-22 | End: 2019-04-25 | Stop reason: HOSPADM

## 2019-04-22 RX ORDER — FUROSEMIDE 20 MG/1
20 TABLET ORAL DAILY
Status: DISCONTINUED | OUTPATIENT
Start: 2019-04-22 | End: 2019-04-25 | Stop reason: HOSPADM

## 2019-04-22 RX ORDER — FLUTICASONE PROPIONATE 50 MCG
1 SPRAY, SUSPENSION (ML) NASAL DAILY
Status: DISCONTINUED | OUTPATIENT
Start: 2019-04-22 | End: 2019-04-25 | Stop reason: HOSPADM

## 2019-04-22 RX ORDER — SODIUM CHLORIDE 9 MG/ML
75 INJECTION, SOLUTION INTRAVENOUS CONTINUOUS
Status: DISCONTINUED | OUTPATIENT
Start: 2019-04-22 | End: 2019-04-22

## 2019-04-22 RX ORDER — SODIUM CHLORIDE 0.9 % (FLUSH) 0.9 %
1-10 SYRINGE (ML) INJECTION AS NEEDED
Status: DISCONTINUED | OUTPATIENT
Start: 2019-04-22 | End: 2019-04-25 | Stop reason: HOSPADM

## 2019-04-22 RX ORDER — TIZANIDINE 4 MG/1
4 TABLET ORAL EVERY 8 HOURS PRN
Status: DISCONTINUED | OUTPATIENT
Start: 2019-04-22 | End: 2019-04-25 | Stop reason: HOSPADM

## 2019-04-22 RX ORDER — BUDESONIDE AND FORMOTEROL FUMARATE DIHYDRATE 160; 4.5 UG/1; UG/1
2 AEROSOL RESPIRATORY (INHALATION)
Status: DISCONTINUED | OUTPATIENT
Start: 2019-04-22 | End: 2019-04-25 | Stop reason: HOSPADM

## 2019-04-22 RX ORDER — CLINDAMYCIN HYDROCHLORIDE 300 MG/1
600 CAPSULE ORAL 3 TIMES DAILY
Qty: 42 CAPSULE | Refills: 0 | Status: ON HOLD | OUTPATIENT
Start: 2019-04-22 | End: 2019-06-09

## 2019-04-22 RX ORDER — ONDANSETRON 2 MG/ML
4 INJECTION INTRAMUSCULAR; INTRAVENOUS EVERY 6 HOURS PRN
Status: DISCONTINUED | OUTPATIENT
Start: 2019-04-22 | End: 2019-04-25 | Stop reason: HOSPADM

## 2019-04-22 RX ORDER — PANTOPRAZOLE SODIUM 40 MG/1
40 TABLET, DELAYED RELEASE ORAL DAILY
Start: 2019-04-29 | End: 2019-06-13 | Stop reason: HOSPADM

## 2019-04-22 RX ORDER — ACETAMINOPHEN 500 MG
1000 TABLET ORAL ONCE
Status: COMPLETED | OUTPATIENT
Start: 2019-04-22 | End: 2019-04-22

## 2019-04-22 RX ORDER — SODIUM CHLORIDE 0.9 % (FLUSH) 0.9 %
10 SYRINGE (ML) INJECTION AS NEEDED
Status: DISCONTINUED | OUTPATIENT
Start: 2019-04-22 | End: 2019-04-25 | Stop reason: HOSPADM

## 2019-04-22 RX ORDER — LORAZEPAM 2 MG/ML
0.5 INJECTION INTRAMUSCULAR
Status: DISCONTINUED | OUTPATIENT
Start: 2019-04-22 | End: 2019-04-22

## 2019-04-22 RX ORDER — PAROXETINE HYDROCHLORIDE 20 MG/1
40 TABLET, FILM COATED ORAL EVERY MORNING
Status: DISCONTINUED | OUTPATIENT
Start: 2019-04-22 | End: 2019-04-25 | Stop reason: HOSPADM

## 2019-04-22 RX ORDER — BUPROPION HYDROCHLORIDE 300 MG/1
300 TABLET ORAL DAILY
Status: DISCONTINUED | OUTPATIENT
Start: 2019-04-22 | End: 2019-04-25 | Stop reason: HOSPADM

## 2019-04-22 RX ORDER — MONTELUKAST SODIUM 10 MG/1
10 TABLET ORAL NIGHTLY
Status: DISCONTINUED | OUTPATIENT
Start: 2019-04-22 | End: 2019-04-25 | Stop reason: HOSPADM

## 2019-04-22 RX ORDER — GABAPENTIN 300 MG/1
600 CAPSULE ORAL EVERY 8 HOURS SCHEDULED
Status: DISCONTINUED | OUTPATIENT
Start: 2019-04-22 | End: 2019-04-25 | Stop reason: HOSPADM

## 2019-04-22 RX ORDER — ONDANSETRON 4 MG/1
4 TABLET, FILM COATED ORAL EVERY 6 HOURS PRN
Status: DISCONTINUED | OUTPATIENT
Start: 2019-04-22 | End: 2019-04-25 | Stop reason: HOSPADM

## 2019-04-22 RX ADMIN — BUPROPION HYDROCHLORIDE 300 MG: 300 TABLET, EXTENDED RELEASE ORAL at 13:03

## 2019-04-22 RX ADMIN — TAZOBACTAM SODIUM AND PIPERACILLIN SODIUM 4.5 G: 500; 4 INJECTION, SOLUTION INTRAVENOUS at 17:16

## 2019-04-22 RX ADMIN — PAROXETINE HYDROCHLORIDE 40 MG: 20 TABLET, FILM COATED ORAL at 13:03

## 2019-04-22 RX ADMIN — TIZANIDINE 4 MG: 4 TABLET ORAL at 13:03

## 2019-04-22 RX ADMIN — LEVETIRACETAM 500 MG: 500 TABLET, FILM COATED ORAL at 13:03

## 2019-04-22 RX ADMIN — FLUTICASONE PROPIONATE 1 SPRAY: 50 SPRAY, METERED NASAL at 13:03

## 2019-04-22 RX ADMIN — ALBUTEROL SULFATE 2.5 MG: 2.5 SOLUTION RESPIRATORY (INHALATION) at 12:26

## 2019-04-22 RX ADMIN — SODIUM CHLORIDE 75 ML/HR: 9 INJECTION, SOLUTION INTRAVENOUS at 08:02

## 2019-04-22 RX ADMIN — VANCOMYCIN HYDROCHLORIDE 1500 MG: 10 INJECTION, POWDER, LYOPHILIZED, FOR SOLUTION INTRAVENOUS at 14:38

## 2019-04-22 RX ADMIN — TAZOBACTAM SODIUM AND PIPERACILLIN SODIUM 3.38 G: 375; 3 INJECTION, SOLUTION INTRAVENOUS at 02:08

## 2019-04-22 RX ADMIN — HYDROCODONE BITARTRATE AND ACETAMINOPHEN 1 TABLET: 10; 325 TABLET ORAL at 20:13

## 2019-04-22 RX ADMIN — CETIRIZINE HYDROCHLORIDE 10 MG: 10 TABLET, FILM COATED ORAL at 13:03

## 2019-04-22 RX ADMIN — SODIUM CHLORIDE, PRESERVATIVE FREE 3 ML: 5 INJECTION INTRAVENOUS at 08:04

## 2019-04-22 RX ADMIN — SODIUM CHLORIDE, POTASSIUM CHLORIDE, SODIUM LACTATE AND CALCIUM CHLORIDE 1848 ML: 600; 310; 30; 20 INJECTION, SOLUTION INTRAVENOUS at 01:23

## 2019-04-22 RX ADMIN — BUDESONIDE AND FORMOTEROL FUMARATE DIHYDRATE 2 PUFF: 160; 4.5 AEROSOL RESPIRATORY (INHALATION) at 22:09

## 2019-04-22 RX ADMIN — TAZOBACTAM SODIUM AND PIPERACILLIN SODIUM 4.5 G: 500; 4 INJECTION, SOLUTION INTRAVENOUS at 08:05

## 2019-04-22 RX ADMIN — LEVETIRACETAM 500 MG: 500 TABLET, FILM COATED ORAL at 20:13

## 2019-04-22 RX ADMIN — VANCOMYCIN HYDROCHLORIDE 2500 MG: 5 INJECTION, POWDER, LYOPHILIZED, FOR SOLUTION INTRAVENOUS at 02:54

## 2019-04-22 RX ADMIN — GABAPENTIN 600 MG: 300 CAPSULE ORAL at 14:41

## 2019-04-22 RX ADMIN — ALBUTEROL SULFATE 2.5 MG: 2.5 SOLUTION RESPIRATORY (INHALATION) at 15:43

## 2019-04-22 RX ADMIN — TIZANIDINE 4 MG: 4 TABLET ORAL at 21:11

## 2019-04-22 RX ADMIN — GABAPENTIN 600 MG: 300 CAPSULE ORAL at 21:11

## 2019-04-22 RX ADMIN — MONTELUKAST SODIUM 10 MG: 10 TABLET, FILM COATED ORAL at 20:13

## 2019-04-22 RX ADMIN — ENOXAPARIN SODIUM 40 MG: 40 INJECTION SUBCUTANEOUS at 10:37

## 2019-04-22 RX ADMIN — HYDROCODONE BITARTRATE AND ACETAMINOPHEN 1 TABLET: 10; 325 TABLET ORAL at 10:37

## 2019-04-22 RX ADMIN — ACETAMINOPHEN 1000 MG: 500 TABLET, FILM COATED ORAL at 01:32

## 2019-04-22 RX ADMIN — PANTOPRAZOLE SODIUM 40 MG: 40 TABLET, DELAYED RELEASE ORAL at 10:37

## 2019-04-22 RX ADMIN — FUROSEMIDE 20 MG: 20 TABLET ORAL at 13:03

## 2019-04-23 LAB
ANION GAP SERPL CALCULATED.3IONS-SCNC: 8.7 MMOL/L
BUN BLD-MCNC: 12 MG/DL (ref 6–20)
BUN/CREAT SERPL: 13.8 (ref 7–25)
CALCIUM SPEC-SCNC: 8 MG/DL (ref 8.6–10.5)
CHLORIDE SERPL-SCNC: 102 MMOL/L (ref 98–107)
CO2 SERPL-SCNC: 29.3 MMOL/L (ref 22–29)
CREAT BLD-MCNC: 0.87 MG/DL (ref 0.57–1)
DEPRECATED RDW RBC AUTO: 48.1 FL (ref 37–54)
ERYTHROCYTE [DISTWIDTH] IN BLOOD BY AUTOMATED COUNT: 14.7 % (ref 12.3–15.4)
GFR SERPL CREATININE-BSD FRML MDRD: 68 ML/MIN/1.73
GLUCOSE BLD-MCNC: 98 MG/DL (ref 65–99)
HCT VFR BLD AUTO: 35.3 % (ref 34–46.6)
HGB BLD-MCNC: 10.4 G/DL (ref 12–15.9)
MCH RBC QN AUTO: 26.3 PG (ref 26.6–33)
MCHC RBC AUTO-ENTMCNC: 29.5 G/DL (ref 31.5–35.7)
MCV RBC AUTO: 89.4 FL (ref 79–97)
PLATELET # BLD AUTO: 166 10*3/MM3 (ref 140–450)
PMV BLD AUTO: 12.6 FL (ref 6–12)
POTASSIUM BLD-SCNC: 3.9 MMOL/L (ref 3.5–5.2)
RBC # BLD AUTO: 3.95 10*6/MM3 (ref 3.77–5.28)
SODIUM BLD-SCNC: 140 MMOL/L (ref 136–145)
WBC NRBC COR # BLD: 5 10*3/MM3 (ref 3.4–10.8)

## 2019-04-23 PROCEDURE — 80048 BASIC METABOLIC PNL TOTAL CA: CPT | Performed by: INTERNAL MEDICINE

## 2019-04-23 PROCEDURE — 97110 THERAPEUTIC EXERCISES: CPT

## 2019-04-23 PROCEDURE — 85027 COMPLETE CBC AUTOMATED: CPT | Performed by: INTERNAL MEDICINE

## 2019-04-23 PROCEDURE — 25010000002 VANCOMYCIN 10 G RECONSTITUTED SOLUTION: Performed by: NURSE PRACTITIONER

## 2019-04-23 PROCEDURE — 25010000002 ENOXAPARIN PER 10 MG: Performed by: INTERNAL MEDICINE

## 2019-04-23 PROCEDURE — 94799 UNLISTED PULMONARY SVC/PX: CPT

## 2019-04-23 PROCEDURE — 25010000002 PIPERACILLIN SOD-TAZOBACTAM PER 1 G: Performed by: NURSE PRACTITIONER

## 2019-04-23 PROCEDURE — 97162 PT EVAL MOD COMPLEX 30 MIN: CPT

## 2019-04-23 RX ORDER — CLINDAMYCIN PHOSPHATE 600 MG/50ML
600 INJECTION INTRAVENOUS EVERY 8 HOURS
Status: DISCONTINUED | OUTPATIENT
Start: 2019-04-23 | End: 2019-04-25 | Stop reason: HOSPADM

## 2019-04-23 RX ADMIN — BUPROPION HYDROCHLORIDE 300 MG: 300 TABLET, EXTENDED RELEASE ORAL at 08:25

## 2019-04-23 RX ADMIN — LEVETIRACETAM 500 MG: 500 TABLET, FILM COATED ORAL at 08:25

## 2019-04-23 RX ADMIN — PANTOPRAZOLE SODIUM 40 MG: 40 TABLET, DELAYED RELEASE ORAL at 06:02

## 2019-04-23 RX ADMIN — PAROXETINE HYDROCHLORIDE 40 MG: 20 TABLET, FILM COATED ORAL at 06:02

## 2019-04-23 RX ADMIN — TAZOBACTAM SODIUM AND PIPERACILLIN SODIUM 4.5 G: 500; 4 INJECTION, SOLUTION INTRAVENOUS at 00:03

## 2019-04-23 RX ADMIN — SODIUM CHLORIDE, PRESERVATIVE FREE 3 ML: 5 INJECTION INTRAVENOUS at 08:28

## 2019-04-23 RX ADMIN — CETIRIZINE HYDROCHLORIDE 10 MG: 10 TABLET, FILM COATED ORAL at 08:25

## 2019-04-23 RX ADMIN — FUROSEMIDE 20 MG: 20 TABLET ORAL at 08:25

## 2019-04-23 RX ADMIN — TIZANIDINE 4 MG: 4 TABLET ORAL at 15:35

## 2019-04-23 RX ADMIN — BUDESONIDE AND FORMOTEROL FUMARATE DIHYDRATE 2 PUFF: 160; 4.5 AEROSOL RESPIRATORY (INHALATION) at 21:01

## 2019-04-23 RX ADMIN — GABAPENTIN 600 MG: 300 CAPSULE ORAL at 14:12

## 2019-04-23 RX ADMIN — MONTELUKAST SODIUM 10 MG: 10 TABLET, FILM COATED ORAL at 22:17

## 2019-04-23 RX ADMIN — GABAPENTIN 600 MG: 300 CAPSULE ORAL at 22:17

## 2019-04-23 RX ADMIN — ENOXAPARIN SODIUM 40 MG: 40 INJECTION SUBCUTANEOUS at 10:22

## 2019-04-23 RX ADMIN — TIZANIDINE 4 MG: 4 TABLET ORAL at 23:53

## 2019-04-23 RX ADMIN — TAZOBACTAM SODIUM AND PIPERACILLIN SODIUM 4.5 G: 500; 4 INJECTION, SOLUTION INTRAVENOUS at 08:25

## 2019-04-23 RX ADMIN — HYDROCODONE BITARTRATE AND ACETAMINOPHEN 1 TABLET: 10; 325 TABLET ORAL at 22:51

## 2019-04-23 RX ADMIN — VANCOMYCIN HYDROCHLORIDE 1500 MG: 10 INJECTION, POWDER, LYOPHILIZED, FOR SOLUTION INTRAVENOUS at 04:04

## 2019-04-23 RX ADMIN — FLUTICASONE PROPIONATE 1 SPRAY: 50 SPRAY, METERED NASAL at 08:25

## 2019-04-23 RX ADMIN — ALBUTEROL SULFATE 2.5 MG: 2.5 SOLUTION RESPIRATORY (INHALATION) at 16:06

## 2019-04-23 RX ADMIN — CLINDAMYCIN PHOSPHATE 600 MG: 600 INJECTION, SOLUTION INTRAVENOUS at 19:53

## 2019-04-23 RX ADMIN — LEVETIRACETAM 500 MG: 500 TABLET, FILM COATED ORAL at 22:17

## 2019-04-23 RX ADMIN — HYDROCODONE BITARTRATE AND ACETAMINOPHEN 1 TABLET: 10; 325 TABLET ORAL at 10:23

## 2019-04-23 RX ADMIN — ENOXAPARIN SODIUM 40 MG: 40 INJECTION SUBCUTANEOUS at 22:18

## 2019-04-23 RX ADMIN — HYDROCODONE BITARTRATE AND ACETAMINOPHEN 1 TABLET: 10; 325 TABLET ORAL at 16:49

## 2019-04-23 RX ADMIN — BUDESONIDE AND FORMOTEROL FUMARATE DIHYDRATE 2 PUFF: 160; 4.5 AEROSOL RESPIRATORY (INHALATION) at 09:17

## 2019-04-23 RX ADMIN — GABAPENTIN 600 MG: 300 CAPSULE ORAL at 06:02

## 2019-04-23 RX ADMIN — TIZANIDINE 4 MG: 4 TABLET ORAL at 06:02

## 2019-04-23 RX ADMIN — LEVETIRACETAM 500 MG: 500 TABLET, FILM COATED ORAL at 16:39

## 2019-04-23 RX ADMIN — TIOTROPIUM BROMIDE INHALATION SPRAY 2 PUFF: 3.12 SPRAY, METERED RESPIRATORY (INHALATION) at 09:17

## 2019-04-23 RX ADMIN — HYDROCODONE BITARTRATE AND ACETAMINOPHEN 1 TABLET: 10; 325 TABLET ORAL at 03:29

## 2019-04-24 PROCEDURE — 94640 AIRWAY INHALATION TREATMENT: CPT

## 2019-04-24 PROCEDURE — 97110 THERAPEUTIC EXERCISES: CPT

## 2019-04-24 PROCEDURE — 94799 UNLISTED PULMONARY SVC/PX: CPT

## 2019-04-24 PROCEDURE — 25010000002 ENOXAPARIN PER 10 MG: Performed by: INTERNAL MEDICINE

## 2019-04-24 RX ORDER — SACCHAROMYCES BOULARDII 250 MG
250 CAPSULE ORAL 2 TIMES DAILY
Status: DISCONTINUED | OUTPATIENT
Start: 2019-04-24 | End: 2019-04-25 | Stop reason: HOSPADM

## 2019-04-24 RX ADMIN — LEVETIRACETAM 500 MG: 500 TABLET, FILM COATED ORAL at 09:04

## 2019-04-24 RX ADMIN — FUROSEMIDE 20 MG: 20 TABLET ORAL at 09:03

## 2019-04-24 RX ADMIN — TIOTROPIUM BROMIDE INHALATION SPRAY 2 PUFF: 3.12 SPRAY, METERED RESPIRATORY (INHALATION) at 09:02

## 2019-04-24 RX ADMIN — Medication 250 MG: at 23:31

## 2019-04-24 RX ADMIN — HYDROCODONE BITARTRATE AND ACETAMINOPHEN 1 TABLET: 10; 325 TABLET ORAL at 06:08

## 2019-04-24 RX ADMIN — TIZANIDINE 4 MG: 4 TABLET ORAL at 09:05

## 2019-04-24 RX ADMIN — ALBUTEROL SULFATE 2.5 MG: 2.5 SOLUTION RESPIRATORY (INHALATION) at 09:03

## 2019-04-24 RX ADMIN — CLINDAMYCIN PHOSPHATE 600 MG: 600 INJECTION, SOLUTION INTRAVENOUS at 04:47

## 2019-04-24 RX ADMIN — ALBUTEROL SULFATE 2.5 MG: 2.5 SOLUTION RESPIRATORY (INHALATION) at 15:10

## 2019-04-24 RX ADMIN — HYDROCODONE BITARTRATE AND ACETAMINOPHEN 1 TABLET: 10; 325 TABLET ORAL at 16:32

## 2019-04-24 RX ADMIN — HYDROCODONE BITARTRATE AND ACETAMINOPHEN 1 TABLET: 10; 325 TABLET ORAL at 23:00

## 2019-04-24 RX ADMIN — CLINDAMYCIN PHOSPHATE 600 MG: 600 INJECTION, SOLUTION INTRAVENOUS at 21:02

## 2019-04-24 RX ADMIN — BUDESONIDE AND FORMOTEROL FUMARATE DIHYDRATE 2 PUFF: 160; 4.5 AEROSOL RESPIRATORY (INHALATION) at 09:01

## 2019-04-24 RX ADMIN — LEVETIRACETAM 500 MG: 500 TABLET, FILM COATED ORAL at 21:01

## 2019-04-24 RX ADMIN — GABAPENTIN 600 MG: 300 CAPSULE ORAL at 13:53

## 2019-04-24 RX ADMIN — PANTOPRAZOLE SODIUM 40 MG: 40 TABLET, DELAYED RELEASE ORAL at 06:08

## 2019-04-24 RX ADMIN — MONTELUKAST SODIUM 10 MG: 10 TABLET, FILM COATED ORAL at 21:01

## 2019-04-24 RX ADMIN — ENOXAPARIN SODIUM 40 MG: 40 INJECTION SUBCUTANEOUS at 23:00

## 2019-04-24 RX ADMIN — GABAPENTIN 600 MG: 300 CAPSULE ORAL at 21:01

## 2019-04-24 RX ADMIN — TIZANIDINE 4 MG: 4 TABLET ORAL at 19:10

## 2019-04-24 RX ADMIN — PAROXETINE HYDROCHLORIDE 40 MG: 20 TABLET, FILM COATED ORAL at 06:08

## 2019-04-24 RX ADMIN — SODIUM CHLORIDE, PRESERVATIVE FREE 3 ML: 5 INJECTION INTRAVENOUS at 09:04

## 2019-04-24 RX ADMIN — LEVETIRACETAM 500 MG: 500 TABLET, FILM COATED ORAL at 16:32

## 2019-04-24 RX ADMIN — ENOXAPARIN SODIUM 40 MG: 40 INJECTION SUBCUTANEOUS at 10:21

## 2019-04-24 RX ADMIN — CLINDAMYCIN PHOSPHATE 600 MG: 600 INJECTION, SOLUTION INTRAVENOUS at 12:07

## 2019-04-24 RX ADMIN — BUDESONIDE AND FORMOTEROL FUMARATE DIHYDRATE 2 PUFF: 160; 4.5 AEROSOL RESPIRATORY (INHALATION) at 21:14

## 2019-04-24 RX ADMIN — GABAPENTIN 600 MG: 300 CAPSULE ORAL at 06:08

## 2019-04-24 RX ADMIN — FLUTICASONE PROPIONATE 1 SPRAY: 50 SPRAY, METERED NASAL at 09:02

## 2019-04-24 RX ADMIN — BUPROPION HYDROCHLORIDE 300 MG: 300 TABLET, EXTENDED RELEASE ORAL at 09:03

## 2019-04-24 RX ADMIN — CETIRIZINE HYDROCHLORIDE 10 MG: 10 TABLET, FILM COATED ORAL at 09:03

## 2019-04-25 VITALS
DIASTOLIC BLOOD PRESSURE: 90 MMHG | HEART RATE: 70 BPM | SYSTOLIC BLOOD PRESSURE: 155 MMHG | BODY MASS INDEX: 43.22 KG/M2 | OXYGEN SATURATION: 95 % | HEIGHT: 67 IN | WEIGHT: 275.4 LBS | RESPIRATION RATE: 18 BRPM | TEMPERATURE: 97.4 F

## 2019-04-25 PROCEDURE — 25010000002 ENOXAPARIN PER 10 MG: Performed by: INTERNAL MEDICINE

## 2019-04-25 PROCEDURE — 97110 THERAPEUTIC EXERCISES: CPT

## 2019-04-25 PROCEDURE — 94799 UNLISTED PULMONARY SVC/PX: CPT

## 2019-04-25 RX ORDER — SACCHAROMYCES BOULARDII 250 MG
250 CAPSULE ORAL 2 TIMES DAILY
Qty: 30 CAPSULE | Refills: 0 | Status: SHIPPED | OUTPATIENT
Start: 2019-04-25 | End: 2019-08-20

## 2019-04-25 RX ADMIN — BUDESONIDE AND FORMOTEROL FUMARATE DIHYDRATE 2 PUFF: 160; 4.5 AEROSOL RESPIRATORY (INHALATION) at 08:06

## 2019-04-25 RX ADMIN — CLINDAMYCIN PHOSPHATE 600 MG: 600 INJECTION, SOLUTION INTRAVENOUS at 05:09

## 2019-04-25 RX ADMIN — CLINDAMYCIN PHOSPHATE 600 MG: 600 INJECTION, SOLUTION INTRAVENOUS at 12:35

## 2019-04-25 RX ADMIN — HYDROCODONE BITARTRATE AND ACETAMINOPHEN 1 TABLET: 10; 325 TABLET ORAL at 16:56

## 2019-04-25 RX ADMIN — HYDROCODONE BITARTRATE AND ACETAMINOPHEN 1 TABLET: 10; 325 TABLET ORAL at 05:09

## 2019-04-25 RX ADMIN — PANTOPRAZOLE SODIUM 40 MG: 40 TABLET, DELAYED RELEASE ORAL at 06:07

## 2019-04-25 RX ADMIN — GABAPENTIN 600 MG: 300 CAPSULE ORAL at 06:07

## 2019-04-25 RX ADMIN — ENOXAPARIN SODIUM 40 MG: 40 INJECTION SUBCUTANEOUS at 09:02

## 2019-04-25 RX ADMIN — ALBUTEROL SULFATE 2.5 MG: 2.5 SOLUTION RESPIRATORY (INHALATION) at 15:21

## 2019-04-25 RX ADMIN — GABAPENTIN 600 MG: 300 CAPSULE ORAL at 14:19

## 2019-04-25 RX ADMIN — Medication 250 MG: at 09:02

## 2019-04-25 RX ADMIN — BUPROPION HYDROCHLORIDE 300 MG: 300 TABLET, EXTENDED RELEASE ORAL at 09:01

## 2019-04-25 RX ADMIN — LEVETIRACETAM 500 MG: 500 TABLET, FILM COATED ORAL at 16:56

## 2019-04-25 RX ADMIN — LEVETIRACETAM 500 MG: 500 TABLET, FILM COATED ORAL at 09:02

## 2019-04-25 RX ADMIN — FUROSEMIDE 20 MG: 20 TABLET ORAL at 09:02

## 2019-04-25 RX ADMIN — HYDROCODONE BITARTRATE AND ACETAMINOPHEN 1 TABLET: 10; 325 TABLET ORAL at 11:03

## 2019-04-25 RX ADMIN — PAROXETINE HYDROCHLORIDE 40 MG: 20 TABLET, FILM COATED ORAL at 06:06

## 2019-04-25 RX ADMIN — ALBUTEROL SULFATE 2.5 MG: 2.5 SOLUTION RESPIRATORY (INHALATION) at 08:05

## 2019-04-25 RX ADMIN — TIZANIDINE 4 MG: 4 TABLET ORAL at 09:01

## 2019-04-25 RX ADMIN — FLUTICASONE PROPIONATE 1 SPRAY: 50 SPRAY, METERED NASAL at 09:03

## 2019-04-25 RX ADMIN — TIOTROPIUM BROMIDE INHALATION SPRAY 2 PUFF: 3.12 SPRAY, METERED RESPIRATORY (INHALATION) at 08:07

## 2019-04-25 RX ADMIN — CETIRIZINE HYDROCHLORIDE 10 MG: 10 TABLET, FILM COATED ORAL at 09:02

## 2019-04-26 ENCOUNTER — READMISSION MANAGEMENT (OUTPATIENT)
Dept: CALL CENTER | Facility: HOSPITAL | Age: 54
End: 2019-04-26

## 2019-04-27 LAB
BACTERIA SPEC AEROBE CULT: NORMAL
BACTERIA SPEC AEROBE CULT: NORMAL

## 2019-04-29 ENCOUNTER — READMISSION MANAGEMENT (OUTPATIENT)
Dept: CALL CENTER | Facility: HOSPITAL | Age: 54
End: 2019-04-29

## 2019-04-30 ENCOUNTER — READMISSION MANAGEMENT (OUTPATIENT)
Dept: CALL CENTER | Facility: HOSPITAL | Age: 54
End: 2019-04-30

## 2019-05-22 ENCOUNTER — OFFICE VISIT (OUTPATIENT)
Dept: PAIN MEDICINE | Facility: CLINIC | Age: 54
End: 2019-05-22

## 2019-05-22 VITALS
OXYGEN SATURATION: 95 % | TEMPERATURE: 97.7 F | RESPIRATION RATE: 18 BRPM | SYSTOLIC BLOOD PRESSURE: 148 MMHG | BODY MASS INDEX: 41.72 KG/M2 | HEIGHT: 67 IN | DIASTOLIC BLOOD PRESSURE: 85 MMHG | WEIGHT: 265.8 LBS | HEART RATE: 99 BPM

## 2019-05-22 DIAGNOSIS — G54.3 THORACIC ROOT LESION: ICD-10-CM

## 2019-05-22 DIAGNOSIS — M47.817 OSTEOARTHRITIS OF LUMBOSACRAL SPINE WITHOUT MYELOPATHY: ICD-10-CM

## 2019-05-22 DIAGNOSIS — M96.1 POSTLAMINECTOMY SYNDROME OF THORACIC REGION: ICD-10-CM

## 2019-05-22 DIAGNOSIS — G89.4 CHRONIC PAIN SYNDROME: Primary | ICD-10-CM

## 2019-05-22 DIAGNOSIS — M51.36 DDD (DEGENERATIVE DISC DISEASE), LUMBAR: ICD-10-CM

## 2019-05-22 DIAGNOSIS — Z79.899 ENCOUNTER FOR LONG-TERM (CURRENT) USE OF HIGH-RISK MEDICATION: ICD-10-CM

## 2019-05-22 PROCEDURE — 99213 OFFICE O/P EST LOW 20 MIN: CPT | Performed by: NURSE PRACTITIONER

## 2019-05-22 RX ORDER — AMMONIUM LACTATE 12 G/100G
LOTION TOPICAL
Refills: 1 | COMMUNITY
Start: 2019-05-13 | End: 2019-08-20

## 2019-05-22 RX ORDER — HYDROCODONE BITARTRATE AND ACETAMINOPHEN 10; 325 MG/1; MG/1
1 TABLET ORAL EVERY 6 HOURS PRN
Qty: 120 TABLET | Refills: 0 | Status: SHIPPED | OUTPATIENT
Start: 2019-05-22 | End: 2019-06-21 | Stop reason: SDUPTHER

## 2019-05-22 RX ORDER — HYDROXYZINE HYDROCHLORIDE 25 MG/1
TABLET, FILM COATED ORAL
Refills: 1 | COMMUNITY
Start: 2019-05-13 | End: 2019-08-20

## 2019-05-28 RX ORDER — TIZANIDINE 4 MG/1
TABLET ORAL
Qty: 90 TABLET | Refills: 1 | Status: SHIPPED | OUTPATIENT
Start: 2019-05-28 | End: 2019-08-04 | Stop reason: SDUPTHER

## 2019-06-08 ENCOUNTER — APPOINTMENT (OUTPATIENT)
Dept: CT IMAGING | Facility: HOSPITAL | Age: 54
End: 2019-06-08

## 2019-06-08 ENCOUNTER — HOSPITAL ENCOUNTER (INPATIENT)
Facility: HOSPITAL | Age: 54
LOS: 4 days | Discharge: HOME HEALTH | End: 2019-06-13
Attending: EMERGENCY MEDICINE | Admitting: INTERNAL MEDICINE

## 2019-06-08 DIAGNOSIS — N12 PYELONEPHRITIS: Primary | ICD-10-CM

## 2019-06-08 LAB
ALBUMIN SERPL-MCNC: 3.6 G/DL (ref 3.5–5.2)
ALBUMIN/GLOB SERPL: 0.9 G/DL
ALP SERPL-CCNC: 75 U/L (ref 39–117)
ALT SERPL W P-5'-P-CCNC: 37 U/L (ref 1–33)
ANION GAP SERPL CALCULATED.3IONS-SCNC: 12.8 MMOL/L
AST SERPL-CCNC: 20 U/L (ref 1–32)
BACTERIA UR QL AUTO: ABNORMAL /HPF
BASOPHILS # BLD AUTO: 0.03 10*3/MM3 (ref 0–0.2)
BASOPHILS NFR BLD AUTO: 0.2 % (ref 0–1.5)
BILIRUB SERPL-MCNC: 1.4 MG/DL (ref 0.2–1.2)
BILIRUB UR QL STRIP: NEGATIVE
BUN BLD-MCNC: 13 MG/DL (ref 6–20)
BUN/CREAT SERPL: 14 (ref 7–25)
CALCIUM SPEC-SCNC: 8.8 MG/DL (ref 8.6–10.5)
CHLORIDE SERPL-SCNC: 97 MMOL/L (ref 98–107)
CLARITY UR: ABNORMAL
CO2 SERPL-SCNC: 25.2 MMOL/L (ref 22–29)
COLOR UR: ABNORMAL
CREAT BLD-MCNC: 0.93 MG/DL (ref 0.57–1)
D-LACTATE SERPL-SCNC: 1 MMOL/L (ref 0.5–2)
DEPRECATED RDW RBC AUTO: 47.7 FL (ref 37–54)
EOSINOPHIL # BLD AUTO: 0 10*3/MM3 (ref 0–0.4)
EOSINOPHIL NFR BLD AUTO: 0 % (ref 0.3–6.2)
ERYTHROCYTE [DISTWIDTH] IN BLOOD BY AUTOMATED COUNT: 15.3 % (ref 12.3–15.4)
GFR SERPL CREATININE-BSD FRML MDRD: 63 ML/MIN/1.73
GLOBULIN UR ELPH-MCNC: 3.8 GM/DL
GLUCOSE BLD-MCNC: 121 MG/DL (ref 65–99)
GLUCOSE UR STRIP-MCNC: NEGATIVE MG/DL
HCT VFR BLD AUTO: 38.1 % (ref 34–46.6)
HGB BLD-MCNC: 11.7 G/DL (ref 12–15.9)
HGB UR QL STRIP.AUTO: ABNORMAL
HYALINE CASTS UR QL AUTO: ABNORMAL /LPF
IMM GRANULOCYTES # BLD AUTO: 0.08 10*3/MM3 (ref 0–0.05)
IMM GRANULOCYTES NFR BLD AUTO: 0.5 % (ref 0–0.5)
KETONES UR QL STRIP: NEGATIVE
LEUKOCYTE ESTERASE UR QL STRIP.AUTO: ABNORMAL
LYMPHOCYTES # BLD AUTO: 0.46 10*3/MM3 (ref 0.7–3.1)
LYMPHOCYTES NFR BLD AUTO: 3 % (ref 19.6–45.3)
MCH RBC QN AUTO: 26.2 PG (ref 26.6–33)
MCHC RBC AUTO-ENTMCNC: 30.7 G/DL (ref 31.5–35.7)
MCV RBC AUTO: 85.2 FL (ref 79–97)
MONOCYTES # BLD AUTO: 1.9 10*3/MM3 (ref 0.1–0.9)
MONOCYTES NFR BLD AUTO: 12.4 % (ref 5–12)
NEUTROPHILS # BLD AUTO: 12.82 10*3/MM3 (ref 1.7–7)
NEUTROPHILS NFR BLD AUTO: 83.9 % (ref 42.7–76)
NITRITE UR QL STRIP: NEGATIVE
NRBC BLD AUTO-RTO: 0 /100 WBC (ref 0–0.2)
PH UR STRIP.AUTO: 6.5 [PH] (ref 5–8)
PLATELET # BLD AUTO: 160 10*3/MM3 (ref 140–450)
PMV BLD AUTO: 12.1 FL (ref 6–12)
POTASSIUM BLD-SCNC: 3.3 MMOL/L (ref 3.5–5.2)
PROCALCITONIN SERPL-MCNC: 1.76 NG/ML (ref 0.1–0.25)
PROT SERPL-MCNC: 7.4 G/DL (ref 6–8.5)
PROT UR QL STRIP: ABNORMAL
RBC # BLD AUTO: 4.47 10*6/MM3 (ref 3.77–5.28)
RBC # UR: ABNORMAL /HPF
REF LAB TEST METHOD: ABNORMAL
SODIUM BLD-SCNC: 135 MMOL/L (ref 136–145)
SP GR UR STRIP: 1.02 (ref 1–1.03)
SQUAMOUS #/AREA URNS HPF: ABNORMAL /HPF
UROBILINOGEN UR QL STRIP: ABNORMAL
WBC NRBC COR # BLD: 15.29 10*3/MM3 (ref 3.4–10.8)
WBC UR QL AUTO: ABNORMAL /HPF

## 2019-06-08 PROCEDURE — 87186 SC STD MICRODIL/AGAR DIL: CPT | Performed by: NURSE PRACTITIONER

## 2019-06-08 PROCEDURE — 83605 ASSAY OF LACTIC ACID: CPT | Performed by: PHYSICIAN ASSISTANT

## 2019-06-08 PROCEDURE — 25010000002 ONDANSETRON PER 1 MG: Performed by: EMERGENCY MEDICINE

## 2019-06-08 PROCEDURE — 74177 CT ABD & PELVIS W/CONTRAST: CPT

## 2019-06-08 PROCEDURE — 80053 COMPREHEN METABOLIC PANEL: CPT | Performed by: EMERGENCY MEDICINE

## 2019-06-08 PROCEDURE — 87150 DNA/RNA AMPLIFIED PROBE: CPT | Performed by: PHYSICIAN ASSISTANT

## 2019-06-08 PROCEDURE — 25010000002 IOPAMIDOL 61 % SOLUTION: Performed by: EMERGENCY MEDICINE

## 2019-06-08 PROCEDURE — 87040 BLOOD CULTURE FOR BACTERIA: CPT | Performed by: PHYSICIAN ASSISTANT

## 2019-06-08 PROCEDURE — 87077 CULTURE AEROBIC IDENTIFY: CPT | Performed by: NURSE PRACTITIONER

## 2019-06-08 PROCEDURE — 87186 SC STD MICRODIL/AGAR DIL: CPT | Performed by: PHYSICIAN ASSISTANT

## 2019-06-08 PROCEDURE — 99284 EMERGENCY DEPT VISIT MOD MDM: CPT

## 2019-06-08 PROCEDURE — 25010000002 HYDROMORPHONE 1 MG/ML SOLUTION: Performed by: EMERGENCY MEDICINE

## 2019-06-08 PROCEDURE — 81001 URINALYSIS AUTO W/SCOPE: CPT | Performed by: EMERGENCY MEDICINE

## 2019-06-08 PROCEDURE — 84145 PROCALCITONIN (PCT): CPT | Performed by: PHYSICIAN ASSISTANT

## 2019-06-08 PROCEDURE — 85025 COMPLETE CBC W/AUTO DIFF WBC: CPT | Performed by: EMERGENCY MEDICINE

## 2019-06-08 PROCEDURE — 87086 URINE CULTURE/COLONY COUNT: CPT | Performed by: NURSE PRACTITIONER

## 2019-06-08 RX ORDER — CEFTRIAXONE SODIUM 2 G/50ML
2 INJECTION, SOLUTION INTRAVENOUS ONCE
Status: COMPLETED | OUTPATIENT
Start: 2019-06-08 | End: 2019-06-09

## 2019-06-08 RX ORDER — SODIUM CHLORIDE 0.9 % (FLUSH) 0.9 %
10 SYRINGE (ML) INJECTION AS NEEDED
Status: DISCONTINUED | OUTPATIENT
Start: 2019-06-08 | End: 2019-06-13 | Stop reason: HOSPADM

## 2019-06-08 RX ORDER — ONDANSETRON 2 MG/ML
4 INJECTION INTRAMUSCULAR; INTRAVENOUS ONCE
Status: COMPLETED | OUTPATIENT
Start: 2019-06-08 | End: 2019-06-08

## 2019-06-08 RX ADMIN — ONDANSETRON HYDROCHLORIDE 4 MG: 2 SOLUTION INTRAMUSCULAR; INTRAVENOUS at 22:09

## 2019-06-08 RX ADMIN — HYDROMORPHONE HYDROCHLORIDE 1 MG: 1 INJECTION, SOLUTION INTRAMUSCULAR; INTRAVENOUS; SUBCUTANEOUS at 22:09

## 2019-06-08 RX ADMIN — IOPAMIDOL 85 ML: 612 INJECTION, SOLUTION INTRAVENOUS at 23:19

## 2019-06-08 RX ADMIN — SODIUM CHLORIDE 1000 ML: 9 INJECTION, SOLUTION INTRAVENOUS at 22:09

## 2019-06-09 PROBLEM — A41.9 SEPSIS: Status: ACTIVE | Noted: 2019-06-09

## 2019-06-09 PROBLEM — E87.6 HYPOKALEMIA: Status: ACTIVE | Noted: 2019-06-09

## 2019-06-09 PROBLEM — D72.829 LEUKOCYTOSIS: Status: ACTIVE | Noted: 2019-06-09

## 2019-06-09 PROBLEM — A04.72 CLOSTRIDIUM DIFFICILE COLITIS: Status: ACTIVE | Noted: 2019-06-09

## 2019-06-09 PROBLEM — N12 PYELONEPHRITIS: Status: ACTIVE | Noted: 2019-06-09

## 2019-06-09 PROBLEM — R78.81 BACTEREMIA DUE TO GRAM-NEGATIVE BACTERIA: Status: ACTIVE | Noted: 2019-06-09

## 2019-06-09 LAB
ADV 40+41 DNA STL QL NAA+NON-PROBE: NOT DETECTED
ANION GAP SERPL CALCULATED.3IONS-SCNC: 12 MMOL/L
ASTRO TYP 1-8 RNA STL QL NAA+NON-PROBE: NOT DETECTED
BACTERIA BLD CULT: ABNORMAL
BUN BLD-MCNC: 12 MG/DL (ref 6–20)
BUN/CREAT SERPL: 12 (ref 7–25)
C CAYETANENSIS DNA STL QL NAA+NON-PROBE: NOT DETECTED
C DIFF TOX GENS STL QL NAA+PROBE: POSITIVE
CALCIUM SPEC-SCNC: 8.5 MG/DL (ref 8.6–10.5)
CAMPY SP DNA.DIARRHEA STL QL NAA+PROBE: NOT DETECTED
CHLORIDE SERPL-SCNC: 101 MMOL/L (ref 98–107)
CO2 SERPL-SCNC: 27 MMOL/L (ref 22–29)
CREAT BLD-MCNC: 1 MG/DL (ref 0.57–1)
CRYPTOSP STL CULT: NOT DETECTED
DEPRECATED RDW RBC AUTO: 49.7 FL (ref 37–54)
E COLI DNA SPEC QL NAA+PROBE: NOT DETECTED
E HISTOLYT AG STL-ACNC: NOT DETECTED
EAEC PAA PLAS AGGR+AATA ST NAA+NON-PRB: NOT DETECTED
EC STX1 + STX2 GENES STL NAA+PROBE: NOT DETECTED
EPEC EAE GENE STL QL NAA+NON-PROBE: NOT DETECTED
ERYTHROCYTE [DISTWIDTH] IN BLOOD BY AUTOMATED COUNT: 15.4 % (ref 12.3–15.4)
ETEC LTA+ST1A+ST1B TOX ST NAA+NON-PROBE: NOT DETECTED
G LAMBLIA DNA SPEC QL NAA+PROBE: NOT DETECTED
GFR SERPL CREATININE-BSD FRML MDRD: 58 ML/MIN/1.73
GLUCOSE BLD-MCNC: 116 MG/DL (ref 65–99)
HCT VFR BLD AUTO: 37.2 % (ref 34–46.6)
HGB BLD-MCNC: 11.2 G/DL (ref 12–15.9)
MCH RBC QN AUTO: 26.5 PG (ref 26.6–33)
MCHC RBC AUTO-ENTMCNC: 30.1 G/DL (ref 31.5–35.7)
MCV RBC AUTO: 88.2 FL (ref 79–97)
NOROVIRUS GI+II RNA STL QL NAA+NON-PROBE: NOT DETECTED
P SHIGELLOIDES DNA STL QL NAA+NON-PROBE: NOT DETECTED
PLATELET # BLD AUTO: 170 10*3/MM3 (ref 140–450)
PMV BLD AUTO: 12.3 FL (ref 6–12)
POTASSIUM BLD-SCNC: 3.3 MMOL/L (ref 3.5–5.2)
RBC # BLD AUTO: 4.22 10*6/MM3 (ref 3.77–5.28)
RV RNA STL NAA+PROBE: NOT DETECTED
SALMONELLA DNA SPEC QL NAA+PROBE: NOT DETECTED
SAPO I+II+IV+V RNA STL QL NAA+NON-PROBE: NOT DETECTED
SHIGELLA SP+EIEC IPAH STL QL NAA+PROBE: NOT DETECTED
SODIUM BLD-SCNC: 140 MMOL/L (ref 136–145)
V CHOLERAE DNA SPEC QL NAA+PROBE: NOT DETECTED
VIBRIO DNA SPEC NAA+PROBE: NOT DETECTED
WBC NRBC COR # BLD: 19.33 10*3/MM3 (ref 3.4–10.8)
YERSINIA STL CULT: NOT DETECTED

## 2019-06-09 PROCEDURE — 87493 C DIFF AMPLIFIED PROBE: CPT | Performed by: NURSE PRACTITIONER

## 2019-06-09 PROCEDURE — 25810000003 SODIUM CHLORIDE 0.9 % WITH KCL 20 MEQ 20-0.9 MEQ/L-% SOLUTION: Performed by: INTERNAL MEDICINE

## 2019-06-09 PROCEDURE — 25010000003 CEFTRIAXONE PER 250 MG: Performed by: NURSE PRACTITIONER

## 2019-06-09 PROCEDURE — 85027 COMPLETE CBC AUTOMATED: CPT | Performed by: NURSE PRACTITIONER

## 2019-06-09 PROCEDURE — 25010000002 HYDROMORPHONE PER 4 MG: Performed by: EMERGENCY MEDICINE

## 2019-06-09 PROCEDURE — 25010000003 CEFTRIAXONE PER 250 MG: Performed by: PHYSICIAN ASSISTANT

## 2019-06-09 PROCEDURE — 87999 UNLISTED MICROBIOLOGY PX: CPT | Performed by: NURSE PRACTITIONER

## 2019-06-09 PROCEDURE — 25010000002 HYDROMORPHONE PER 4 MG: Performed by: INTERNAL MEDICINE

## 2019-06-09 PROCEDURE — 80048 BASIC METABOLIC PNL TOTAL CA: CPT | Performed by: NURSE PRACTITIONER

## 2019-06-09 PROCEDURE — 25010000002 ONDANSETRON PER 1 MG: Performed by: NURSE PRACTITIONER

## 2019-06-09 RX ORDER — HYDROCODONE BITARTRATE AND ACETAMINOPHEN 10; 325 MG/1; MG/1
1 TABLET ORAL EVERY 6 HOURS PRN
Status: DISCONTINUED | OUTPATIENT
Start: 2019-06-09 | End: 2019-06-12

## 2019-06-09 RX ORDER — CETIRIZINE HYDROCHLORIDE 10 MG/1
10 TABLET ORAL DAILY
Status: DISCONTINUED | OUTPATIENT
Start: 2019-06-09 | End: 2019-06-13 | Stop reason: HOSPADM

## 2019-06-09 RX ORDER — ONDANSETRON 2 MG/ML
4 INJECTION INTRAMUSCULAR; INTRAVENOUS EVERY 6 HOURS PRN
Status: DISCONTINUED | OUTPATIENT
Start: 2019-06-09 | End: 2019-06-13 | Stop reason: HOSPADM

## 2019-06-09 RX ORDER — HYDROXYZINE HYDROCHLORIDE 25 MG/1
25 TABLET, FILM COATED ORAL 3 TIMES DAILY PRN
Status: DISCONTINUED | OUTPATIENT
Start: 2019-06-09 | End: 2019-06-13 | Stop reason: HOSPADM

## 2019-06-09 RX ORDER — POTASSIUM CHLORIDE 750 MG/1
40 CAPSULE, EXTENDED RELEASE ORAL ONCE
Status: COMPLETED | OUTPATIENT
Start: 2019-06-09 | End: 2019-06-09

## 2019-06-09 RX ORDER — CEFTRIAXONE SODIUM 2 G/50ML
2 INJECTION, SOLUTION INTRAVENOUS EVERY 24 HOURS
Status: DISCONTINUED | OUTPATIENT
Start: 2019-06-09 | End: 2019-06-10

## 2019-06-09 RX ORDER — GABAPENTIN 400 MG/1
1200 CAPSULE ORAL EVERY 8 HOURS SCHEDULED
Status: DISCONTINUED | OUTPATIENT
Start: 2019-06-09 | End: 2019-06-13 | Stop reason: HOSPADM

## 2019-06-09 RX ORDER — SODIUM CHLORIDE 9 MG/ML
100 INJECTION, SOLUTION INTRAVENOUS CONTINUOUS
Status: DISCONTINUED | OUTPATIENT
Start: 2019-06-09 | End: 2019-06-09

## 2019-06-09 RX ORDER — SODIUM CHLORIDE 0.9 % (FLUSH) 0.9 %
3 SYRINGE (ML) INJECTION EVERY 12 HOURS SCHEDULED
Status: DISCONTINUED | OUTPATIENT
Start: 2019-06-09 | End: 2019-06-13 | Stop reason: HOSPADM

## 2019-06-09 RX ORDER — TIZANIDINE 4 MG/1
4 TABLET ORAL EVERY 8 HOURS PRN
Status: DISCONTINUED | OUTPATIENT
Start: 2019-06-09 | End: 2019-06-13 | Stop reason: HOSPADM

## 2019-06-09 RX ORDER — ALBUTEROL SULFATE 2.5 MG/3ML
2.5 SOLUTION RESPIRATORY (INHALATION) EVERY 6 HOURS PRN
Status: DISCONTINUED | OUTPATIENT
Start: 2019-06-09 | End: 2019-06-13 | Stop reason: HOSPADM

## 2019-06-09 RX ORDER — MONTELUKAST SODIUM 10 MG/1
10 TABLET ORAL NIGHTLY
Status: DISCONTINUED | OUTPATIENT
Start: 2019-06-09 | End: 2019-06-13 | Stop reason: HOSPADM

## 2019-06-09 RX ORDER — POTASSIUM CHLORIDE 750 MG/1
40 CAPSULE, EXTENDED RELEASE ORAL AS NEEDED
Status: DISCONTINUED | OUTPATIENT
Start: 2019-06-09 | End: 2019-06-13 | Stop reason: HOSPADM

## 2019-06-09 RX ORDER — SODIUM CHLORIDE AND POTASSIUM CHLORIDE 150; 900 MG/100ML; MG/100ML
125 INJECTION, SOLUTION INTRAVENOUS CONTINUOUS
Status: DISCONTINUED | OUTPATIENT
Start: 2019-06-09 | End: 2019-06-11

## 2019-06-09 RX ORDER — FLUTICASONE PROPIONATE 50 MCG
1 SPRAY, SUSPENSION (ML) NASAL DAILY
Status: DISCONTINUED | OUTPATIENT
Start: 2019-06-09 | End: 2019-06-13 | Stop reason: HOSPADM

## 2019-06-09 RX ORDER — HYDROMORPHONE HYDROCHLORIDE 1 MG/ML
0.5 INJECTION, SOLUTION INTRAMUSCULAR; INTRAVENOUS; SUBCUTANEOUS ONCE
Status: COMPLETED | OUTPATIENT
Start: 2019-06-09 | End: 2019-06-09

## 2019-06-09 RX ORDER — PANTOPRAZOLE SODIUM 40 MG/1
40 TABLET, DELAYED RELEASE ORAL DAILY
Status: DISCONTINUED | OUTPATIENT
Start: 2019-06-09 | End: 2019-06-10 | Stop reason: ALTCHOICE

## 2019-06-09 RX ORDER — POTASSIUM CHLORIDE 7.45 MG/ML
10 INJECTION INTRAVENOUS
Status: DISCONTINUED | OUTPATIENT
Start: 2019-06-09 | End: 2019-06-13 | Stop reason: HOSPADM

## 2019-06-09 RX ORDER — BUPROPION HYDROCHLORIDE 300 MG/1
300 TABLET ORAL DAILY
Status: DISCONTINUED | OUTPATIENT
Start: 2019-06-09 | End: 2019-06-13 | Stop reason: HOSPADM

## 2019-06-09 RX ORDER — BUDESONIDE AND FORMOTEROL FUMARATE DIHYDRATE 160; 4.5 UG/1; UG/1
2 AEROSOL RESPIRATORY (INHALATION)
Status: DISCONTINUED | OUTPATIENT
Start: 2019-06-09 | End: 2019-06-13 | Stop reason: HOSPADM

## 2019-06-09 RX ORDER — ONDANSETRON 4 MG/1
4 TABLET, FILM COATED ORAL EVERY 6 HOURS PRN
Status: DISCONTINUED | OUTPATIENT
Start: 2019-06-09 | End: 2019-06-13 | Stop reason: HOSPADM

## 2019-06-09 RX ORDER — HYDROMORPHONE HYDROCHLORIDE 1 MG/ML
0.5 INJECTION, SOLUTION INTRAMUSCULAR; INTRAVENOUS; SUBCUTANEOUS
Status: DISCONTINUED | OUTPATIENT
Start: 2019-06-09 | End: 2019-06-12

## 2019-06-09 RX ORDER — SACCHAROMYCES BOULARDII 250 MG
250 CAPSULE ORAL 2 TIMES DAILY
Status: DISCONTINUED | OUTPATIENT
Start: 2019-06-09 | End: 2019-06-13 | Stop reason: HOSPADM

## 2019-06-09 RX ORDER — PAROXETINE HYDROCHLORIDE 20 MG/1
40 TABLET, FILM COATED ORAL EVERY MORNING
Status: DISCONTINUED | OUTPATIENT
Start: 2019-06-10 | End: 2019-06-13 | Stop reason: HOSPADM

## 2019-06-09 RX ORDER — SODIUM CHLORIDE 0.9 % (FLUSH) 0.9 %
1-10 SYRINGE (ML) INJECTION AS NEEDED
Status: DISCONTINUED | OUTPATIENT
Start: 2019-06-09 | End: 2019-06-13 | Stop reason: HOSPADM

## 2019-06-09 RX ORDER — LEVETIRACETAM 500 MG/1
500 TABLET ORAL 3 TIMES DAILY
Status: DISCONTINUED | OUTPATIENT
Start: 2019-06-09 | End: 2019-06-13 | Stop reason: HOSPADM

## 2019-06-09 RX ORDER — POTASSIUM CHLORIDE 1.5 G/1.77G
40 POWDER, FOR SOLUTION ORAL AS NEEDED
Status: DISCONTINUED | OUTPATIENT
Start: 2019-06-09 | End: 2019-06-13 | Stop reason: HOSPADM

## 2019-06-09 RX ORDER — ACETAMINOPHEN 325 MG/1
650 TABLET ORAL EVERY 4 HOURS PRN
Status: DISCONTINUED | OUTPATIENT
Start: 2019-06-09 | End: 2019-06-13 | Stop reason: HOSPADM

## 2019-06-09 RX ADMIN — ACETAMINOPHEN 650 MG: 325 TABLET, FILM COATED ORAL at 09:51

## 2019-06-09 RX ADMIN — ACETAMINOPHEN 650 MG: 325 TABLET, FILM COATED ORAL at 18:47

## 2019-06-09 RX ADMIN — POTASSIUM CHLORIDE AND SODIUM CHLORIDE 125 ML/HR: 900; 150 INJECTION, SOLUTION INTRAVENOUS at 15:39

## 2019-06-09 RX ADMIN — MONTELUKAST SODIUM 10 MG: 10 TABLET, FILM COATED ORAL at 20:54

## 2019-06-09 RX ADMIN — SODIUM CHLORIDE 1000 ML: 9 INJECTION, SOLUTION INTRAVENOUS at 01:11

## 2019-06-09 RX ADMIN — HYDROCODONE BITARTRATE AND ACETAMINOPHEN 1 TABLET: 10; 325 TABLET ORAL at 18:47

## 2019-06-09 RX ADMIN — POTASSIUM CHLORIDE 40 MEQ: 750 CAPSULE, EXTENDED RELEASE ORAL at 03:26

## 2019-06-09 RX ADMIN — ACETAMINOPHEN 650 MG: 325 TABLET, FILM COATED ORAL at 14:25

## 2019-06-09 RX ADMIN — ACETAMINOPHEN 650 MG: 325 TABLET, FILM COATED ORAL at 01:44

## 2019-06-09 RX ADMIN — BUPROPION HYDROCHLORIDE 300 MG: 300 TABLET, EXTENDED RELEASE ORAL at 20:54

## 2019-06-09 RX ADMIN — TIZANIDINE 4 MG: 4 TABLET ORAL at 22:23

## 2019-06-09 RX ADMIN — LEVETIRACETAM 500 MG: 500 TABLET, FILM COATED ORAL at 20:54

## 2019-06-09 RX ADMIN — HYDROMORPHONE HYDROCHLORIDE 0.5 MG: 1 INJECTION, SOLUTION INTRAMUSCULAR; INTRAVENOUS; SUBCUTANEOUS at 06:02

## 2019-06-09 RX ADMIN — HYDROMORPHONE HYDROCHLORIDE 0.5 MG: 1 INJECTION, SOLUTION INTRAMUSCULAR; INTRAVENOUS; SUBCUTANEOUS at 03:26

## 2019-06-09 RX ADMIN — HYDROMORPHONE HYDROCHLORIDE 0.5 MG: 1 INJECTION, SOLUTION INTRAMUSCULAR; INTRAVENOUS; SUBCUTANEOUS at 14:25

## 2019-06-09 RX ADMIN — ACETAMINOPHEN 650 MG: 325 TABLET, FILM COATED ORAL at 06:01

## 2019-06-09 RX ADMIN — ONDANSETRON HYDROCHLORIDE 4 MG: 2 SOLUTION INTRAMUSCULAR; INTRAVENOUS at 16:51

## 2019-06-09 RX ADMIN — CEFTRIAXONE SODIUM 2 G: 2 INJECTION, SOLUTION INTRAVENOUS at 23:13

## 2019-06-09 RX ADMIN — HYDROMORPHONE HYDROCHLORIDE 0.5 MG: 1 INJECTION, SOLUTION INTRAMUSCULAR; INTRAVENOUS; SUBCUTANEOUS at 16:51

## 2019-06-09 RX ADMIN — VANCOMYCIN 125 MG: KIT at 18:47

## 2019-06-09 RX ADMIN — GABAPENTIN 1200 MG: 400 CAPSULE ORAL at 22:23

## 2019-06-09 RX ADMIN — Medication 250 MG: at 20:54

## 2019-06-09 RX ADMIN — SODIUM CHLORIDE 100 ML/HR: 9 INJECTION, SOLUTION INTRAVENOUS at 11:34

## 2019-06-09 RX ADMIN — POTASSIUM CHLORIDE 40 MEQ: 750 CAPSULE, EXTENDED RELEASE ORAL at 20:55

## 2019-06-09 RX ADMIN — CETIRIZINE HYDROCHLORIDE 10 MG: 10 TABLET, FILM COATED ORAL at 20:55

## 2019-06-09 RX ADMIN — SODIUM CHLORIDE 100 ML/HR: 9 INJECTION, SOLUTION INTRAVENOUS at 01:46

## 2019-06-09 RX ADMIN — HYDROMORPHONE HYDROCHLORIDE 0.5 MG: 1 INJECTION, SOLUTION INTRAMUSCULAR; INTRAVENOUS; SUBCUTANEOUS at 01:20

## 2019-06-09 RX ADMIN — CEFTRIAXONE SODIUM 2 G: 2 INJECTION, SOLUTION INTRAVENOUS at 00:02

## 2019-06-09 RX ADMIN — PANTOPRAZOLE SODIUM 40 MG: 40 TABLET, DELAYED RELEASE ORAL at 18:47

## 2019-06-09 RX ADMIN — SODIUM CHLORIDE, PRESERVATIVE FREE 3 ML: 5 INJECTION INTRAVENOUS at 20:55

## 2019-06-09 RX ADMIN — FLUTICASONE PROPIONATE 1 SPRAY: 50 SPRAY, METERED NASAL at 20:53

## 2019-06-09 RX ADMIN — HYDROMORPHONE HYDROCHLORIDE 0.5 MG: 1 INJECTION, SOLUTION INTRAMUSCULAR; INTRAVENOUS; SUBCUTANEOUS at 10:42

## 2019-06-09 RX ADMIN — VANCOMYCIN 125 MG: KIT at 15:39

## 2019-06-10 LAB
ANION GAP SERPL CALCULATED.3IONS-SCNC: 9.3 MMOL/L
BUN BLD-MCNC: 14 MG/DL (ref 6–20)
BUN/CREAT SERPL: 14.6 (ref 7–25)
CALCIUM SPEC-SCNC: 8.7 MG/DL (ref 8.6–10.5)
CHLORIDE SERPL-SCNC: 102 MMOL/L (ref 98–107)
CO2 SERPL-SCNC: 24.7 MMOL/L (ref 22–29)
CREAT BLD-MCNC: 0.96 MG/DL (ref 0.57–1)
DEPRECATED RDW RBC AUTO: 51.5 FL (ref 37–54)
ERYTHROCYTE [DISTWIDTH] IN BLOOD BY AUTOMATED COUNT: 15.6 % (ref 12.3–15.4)
GFR SERPL CREATININE-BSD FRML MDRD: 60 ML/MIN/1.73
GLUCOSE BLD-MCNC: 109 MG/DL (ref 65–99)
HCT VFR BLD AUTO: 33.4 % (ref 34–46.6)
HGB BLD-MCNC: 9.6 G/DL (ref 12–15.9)
HYPOCHROMIA BLD QL: ABNORMAL
LYMPHOCYTES # BLD MANUAL: 0.36 10*3/MM3 (ref 0.7–3.1)
LYMPHOCYTES NFR BLD MANUAL: 3 % (ref 19.6–45.3)
LYMPHOCYTES NFR BLD MANUAL: 8 % (ref 5–12)
MCH RBC QN AUTO: 25.9 PG (ref 26.6–33)
MCHC RBC AUTO-ENTMCNC: 28.7 G/DL (ref 31.5–35.7)
MCV RBC AUTO: 90.3 FL (ref 79–97)
MONOCYTES # BLD AUTO: 0.96 10*3/MM3 (ref 0.1–0.9)
NEUTROPHILS # BLD AUTO: 10.7 10*3/MM3 (ref 1.7–7)
NEUTROPHILS NFR BLD MANUAL: 89 % (ref 42.7–76)
NRBC BLD AUTO-RTO: 0 /100 WBC (ref 0–0.2)
PLAT MORPH BLD: NORMAL
PLATELET # BLD AUTO: 162 10*3/MM3 (ref 140–450)
PMV BLD AUTO: 12.2 FL (ref 6–12)
POTASSIUM BLD-SCNC: 4.1 MMOL/L (ref 3.5–5.2)
POTASSIUM BLD-SCNC: 4.1 MMOL/L (ref 3.5–5.2)
RBC # BLD AUTO: 3.7 10*6/MM3 (ref 3.77–5.28)
SODIUM BLD-SCNC: 136 MMOL/L (ref 136–145)
WBC MORPH BLD: NORMAL
WBC NRBC COR # BLD: 12.02 10*3/MM3 (ref 3.4–10.8)

## 2019-06-10 PROCEDURE — 85007 BL SMEAR W/DIFF WBC COUNT: CPT | Performed by: INTERNAL MEDICINE

## 2019-06-10 PROCEDURE — 94640 AIRWAY INHALATION TREATMENT: CPT

## 2019-06-10 PROCEDURE — 85025 COMPLETE CBC W/AUTO DIFF WBC: CPT | Performed by: INTERNAL MEDICINE

## 2019-06-10 PROCEDURE — 25010000002 HYDROMORPHONE PER 4 MG: Performed by: INTERNAL MEDICINE

## 2019-06-10 PROCEDURE — 25810000003 SODIUM CHLORIDE 0.9 % WITH KCL 20 MEQ 20-0.9 MEQ/L-% SOLUTION: Performed by: INTERNAL MEDICINE

## 2019-06-10 PROCEDURE — 94799 UNLISTED PULMONARY SVC/PX: CPT

## 2019-06-10 PROCEDURE — 25010000003 CEFTRIAXONE PER 250 MG: Performed by: INTERNAL MEDICINE

## 2019-06-10 PROCEDURE — 84132 ASSAY OF SERUM POTASSIUM: CPT | Performed by: INTERNAL MEDICINE

## 2019-06-10 PROCEDURE — 87040 BLOOD CULTURE FOR BACTERIA: CPT | Performed by: INTERNAL MEDICINE

## 2019-06-10 PROCEDURE — 80048 BASIC METABOLIC PNL TOTAL CA: CPT | Performed by: INTERNAL MEDICINE

## 2019-06-10 RX ORDER — FAMOTIDINE 20 MG/1
20 TABLET, FILM COATED ORAL EVERY 12 HOURS
Status: DISCONTINUED | OUTPATIENT
Start: 2019-06-10 | End: 2019-06-13 | Stop reason: HOSPADM

## 2019-06-10 RX ORDER — CEFTRIAXONE SODIUM 2 G/50ML
2 INJECTION, SOLUTION INTRAVENOUS EVERY 24 HOURS
Status: DISCONTINUED | OUTPATIENT
Start: 2019-06-10 | End: 2019-06-13 | Stop reason: HOSPADM

## 2019-06-10 RX ADMIN — HYDROMORPHONE HYDROCHLORIDE 0.5 MG: 1 INJECTION, SOLUTION INTRAMUSCULAR; INTRAVENOUS; SUBCUTANEOUS at 22:34

## 2019-06-10 RX ADMIN — TIZANIDINE 4 MG: 4 TABLET ORAL at 20:11

## 2019-06-10 RX ADMIN — FLUTICASONE PROPIONATE 1 SPRAY: 50 SPRAY, METERED NASAL at 08:48

## 2019-06-10 RX ADMIN — CEFTRIAXONE SODIUM 2 G: 2 INJECTION, SOLUTION INTRAVENOUS at 20:05

## 2019-06-10 RX ADMIN — TIZANIDINE 4 MG: 4 TABLET ORAL at 10:04

## 2019-06-10 RX ADMIN — HYDROMORPHONE HYDROCHLORIDE 0.5 MG: 1 INJECTION, SOLUTION INTRAMUSCULAR; INTRAVENOUS; SUBCUTANEOUS at 08:47

## 2019-06-10 RX ADMIN — PANTOPRAZOLE SODIUM 40 MG: 40 TABLET, DELAYED RELEASE ORAL at 08:47

## 2019-06-10 RX ADMIN — SODIUM CHLORIDE, PRESERVATIVE FREE 3 ML: 5 INJECTION INTRAVENOUS at 20:05

## 2019-06-10 RX ADMIN — Medication 250 MG: at 08:47

## 2019-06-10 RX ADMIN — GABAPENTIN 1200 MG: 400 CAPSULE ORAL at 22:34

## 2019-06-10 RX ADMIN — POTASSIUM CHLORIDE AND SODIUM CHLORIDE 125 ML/HR: 900; 150 INJECTION, SOLUTION INTRAVENOUS at 10:04

## 2019-06-10 RX ADMIN — VANCOMYCIN 125 MG: KIT at 17:01

## 2019-06-10 RX ADMIN — FAMOTIDINE 20 MG: 20 TABLET, FILM COATED ORAL at 20:04

## 2019-06-10 RX ADMIN — BUPROPION HYDROCHLORIDE 300 MG: 300 TABLET, EXTENDED RELEASE ORAL at 08:47

## 2019-06-10 RX ADMIN — PAROXETINE 40 MG: 20 TABLET, FILM COATED ORAL at 06:06

## 2019-06-10 RX ADMIN — Medication 250 MG: at 20:04

## 2019-06-10 RX ADMIN — TIOTROPIUM BROMIDE INHALATION SPRAY 2 PUFF: 3.12 SPRAY, METERED RESPIRATORY (INHALATION) at 07:40

## 2019-06-10 RX ADMIN — HYDROCODONE BITARTRATE AND ACETAMINOPHEN 1 TABLET: 10; 325 TABLET ORAL at 20:04

## 2019-06-10 RX ADMIN — BUDESONIDE AND FORMOTEROL FUMARATE DIHYDRATE 2 PUFF: 160; 4.5 AEROSOL RESPIRATORY (INHALATION) at 22:07

## 2019-06-10 RX ADMIN — VANCOMYCIN 125 MG: KIT at 06:06

## 2019-06-10 RX ADMIN — POTASSIUM CHLORIDE AND SODIUM CHLORIDE 125 ML/HR: 900; 150 INJECTION, SOLUTION INTRAVENOUS at 17:41

## 2019-06-10 RX ADMIN — MONTELUKAST SODIUM 10 MG: 10 TABLET, FILM COATED ORAL at 20:04

## 2019-06-10 RX ADMIN — LEVETIRACETAM 500 MG: 500 TABLET, FILM COATED ORAL at 08:47

## 2019-06-10 RX ADMIN — CETIRIZINE HYDROCHLORIDE 10 MG: 10 TABLET, FILM COATED ORAL at 08:47

## 2019-06-10 RX ADMIN — VANCOMYCIN 125 MG: KIT at 00:47

## 2019-06-10 RX ADMIN — HYDROCODONE BITARTRATE AND ACETAMINOPHEN 1 TABLET: 10; 325 TABLET ORAL at 03:53

## 2019-06-10 RX ADMIN — POTASSIUM CHLORIDE 40 MEQ: 750 CAPSULE, EXTENDED RELEASE ORAL at 00:57

## 2019-06-10 RX ADMIN — LEVETIRACETAM 500 MG: 500 TABLET, FILM COATED ORAL at 20:04

## 2019-06-10 RX ADMIN — ACETAMINOPHEN 650 MG: 325 TABLET, FILM COATED ORAL at 10:04

## 2019-06-10 RX ADMIN — HYDROMORPHONE HYDROCHLORIDE 0.5 MG: 1 INJECTION, SOLUTION INTRAMUSCULAR; INTRAVENOUS; SUBCUTANEOUS at 06:06

## 2019-06-10 RX ADMIN — VANCOMYCIN 125 MG: KIT at 13:41

## 2019-06-10 RX ADMIN — HYDROMORPHONE HYDROCHLORIDE 0.5 MG: 1 INJECTION, SOLUTION INTRAMUSCULAR; INTRAVENOUS; SUBCUTANEOUS at 17:01

## 2019-06-10 RX ADMIN — HYDROCODONE BITARTRATE AND ACETAMINOPHEN 1 TABLET: 10; 325 TABLET ORAL at 13:41

## 2019-06-10 RX ADMIN — LEVETIRACETAM 500 MG: 500 TABLET, FILM COATED ORAL at 17:01

## 2019-06-10 RX ADMIN — GABAPENTIN 1200 MG: 400 CAPSULE ORAL at 06:06

## 2019-06-10 RX ADMIN — GABAPENTIN 1200 MG: 400 CAPSULE ORAL at 13:41

## 2019-06-10 RX ADMIN — BUDESONIDE AND FORMOTEROL FUMARATE DIHYDRATE 2 PUFF: 160; 4.5 AEROSOL RESPIRATORY (INHALATION) at 07:44

## 2019-06-10 RX ADMIN — SODIUM CHLORIDE, PRESERVATIVE FREE 3 ML: 5 INJECTION INTRAVENOUS at 08:48

## 2019-06-11 LAB
ANION GAP SERPL CALCULATED.3IONS-SCNC: 9.5 MMOL/L
BACTERIA SPEC AEROBE CULT: ABNORMAL
BASOPHILS # BLD AUTO: 0.04 10*3/MM3 (ref 0–0.2)
BASOPHILS NFR BLD AUTO: 0.6 % (ref 0–1.5)
BUN BLD-MCNC: 13 MG/DL (ref 6–20)
BUN/CREAT SERPL: 15.9 (ref 7–25)
CALCIUM SPEC-SCNC: 8.5 MG/DL (ref 8.6–10.5)
CHLORIDE SERPL-SCNC: 105 MMOL/L (ref 98–107)
CO2 SERPL-SCNC: 25.5 MMOL/L (ref 22–29)
CREAT BLD-MCNC: 0.82 MG/DL (ref 0.57–1)
DEPRECATED RDW RBC AUTO: 54.5 FL (ref 37–54)
EOSINOPHIL # BLD AUTO: 0.13 10*3/MM3 (ref 0–0.4)
EOSINOPHIL NFR BLD AUTO: 2 % (ref 0.3–6.2)
ERYTHROCYTE [DISTWIDTH] IN BLOOD BY AUTOMATED COUNT: 15.9 % (ref 12.3–15.4)
GFR SERPL CREATININE-BSD FRML MDRD: 72 ML/MIN/1.73
GLUCOSE BLD-MCNC: 89 MG/DL (ref 65–99)
GRAM STN SPEC: ABNORMAL
HCT VFR BLD AUTO: 34.1 % (ref 34–46.6)
HGB BLD-MCNC: 9.6 G/DL (ref 12–15.9)
IMM GRANULOCYTES # BLD AUTO: 0.05 10*3/MM3 (ref 0–0.05)
IMM GRANULOCYTES NFR BLD AUTO: 0.8 % (ref 0–0.5)
LYMPHOCYTES # BLD AUTO: 0.65 10*3/MM3 (ref 0.7–3.1)
LYMPHOCYTES NFR BLD AUTO: 9.9 % (ref 19.6–45.3)
MCH RBC QN AUTO: 25.9 PG (ref 26.6–33)
MCHC RBC AUTO-ENTMCNC: 28.2 G/DL (ref 31.5–35.7)
MCV RBC AUTO: 92.2 FL (ref 79–97)
MONOCYTES # BLD AUTO: 1.09 10*3/MM3 (ref 0.1–0.9)
MONOCYTES NFR BLD AUTO: 16.6 % (ref 5–12)
NEUTROPHILS # BLD AUTO: 4.59 10*3/MM3 (ref 1.7–7)
NEUTROPHILS NFR BLD AUTO: 70.1 % (ref 42.7–76)
NRBC BLD AUTO-RTO: 0 /100 WBC (ref 0–0.2)
PLATELET # BLD AUTO: 186 10*3/MM3 (ref 140–450)
PMV BLD AUTO: 11.9 FL (ref 6–12)
POTASSIUM BLD-SCNC: 3.8 MMOL/L (ref 3.5–5.2)
RBC # BLD AUTO: 3.7 10*6/MM3 (ref 3.77–5.28)
SODIUM BLD-SCNC: 140 MMOL/L (ref 136–145)
WBC NRBC COR # BLD: 6.55 10*3/MM3 (ref 3.4–10.8)

## 2019-06-11 PROCEDURE — 85025 COMPLETE CBC W/AUTO DIFF WBC: CPT | Performed by: INTERNAL MEDICINE

## 2019-06-11 PROCEDURE — 25010000002 FUROSEMIDE PER 20 MG: Performed by: INTERNAL MEDICINE

## 2019-06-11 PROCEDURE — 25010000002 HYDROMORPHONE PER 4 MG: Performed by: INTERNAL MEDICINE

## 2019-06-11 PROCEDURE — 80048 BASIC METABOLIC PNL TOTAL CA: CPT | Performed by: INTERNAL MEDICINE

## 2019-06-11 PROCEDURE — 94799 UNLISTED PULMONARY SVC/PX: CPT

## 2019-06-11 PROCEDURE — 25010000003 CEFTRIAXONE PER 250 MG: Performed by: INTERNAL MEDICINE

## 2019-06-11 PROCEDURE — 25810000003 SODIUM CHLORIDE 0.9 % WITH KCL 20 MEQ 20-0.9 MEQ/L-% SOLUTION: Performed by: INTERNAL MEDICINE

## 2019-06-11 RX ORDER — FUROSEMIDE 10 MG/ML
20 INJECTION INTRAMUSCULAR; INTRAVENOUS ONCE
Status: COMPLETED | OUTPATIENT
Start: 2019-06-11 | End: 2019-06-11

## 2019-06-11 RX ADMIN — HYDROMORPHONE HYDROCHLORIDE 0.5 MG: 1 INJECTION, SOLUTION INTRAMUSCULAR; INTRAVENOUS; SUBCUTANEOUS at 16:57

## 2019-06-11 RX ADMIN — BUDESONIDE AND FORMOTEROL FUMARATE DIHYDRATE 2 PUFF: 160; 4.5 AEROSOL RESPIRATORY (INHALATION) at 08:48

## 2019-06-11 RX ADMIN — BUPROPION HYDROCHLORIDE 300 MG: 300 TABLET, EXTENDED RELEASE ORAL at 08:12

## 2019-06-11 RX ADMIN — FLUTICASONE PROPIONATE 1 SPRAY: 50 SPRAY, METERED NASAL at 08:12

## 2019-06-11 RX ADMIN — Medication 250 MG: at 21:38

## 2019-06-11 RX ADMIN — LEVETIRACETAM 500 MG: 500 TABLET, FILM COATED ORAL at 16:57

## 2019-06-11 RX ADMIN — CETIRIZINE HYDROCHLORIDE 10 MG: 10 TABLET, FILM COATED ORAL at 08:11

## 2019-06-11 RX ADMIN — FAMOTIDINE 20 MG: 20 TABLET, FILM COATED ORAL at 08:12

## 2019-06-11 RX ADMIN — HYDROMORPHONE HYDROCHLORIDE 0.5 MG: 1 INJECTION, SOLUTION INTRAMUSCULAR; INTRAVENOUS; SUBCUTANEOUS at 04:50

## 2019-06-11 RX ADMIN — GABAPENTIN 1200 MG: 400 CAPSULE ORAL at 14:46

## 2019-06-11 RX ADMIN — Medication 250 MG: at 08:12

## 2019-06-11 RX ADMIN — SODIUM CHLORIDE, PRESERVATIVE FREE 3 ML: 5 INJECTION INTRAVENOUS at 21:55

## 2019-06-11 RX ADMIN — CEFTRIAXONE SODIUM 2 G: 2 INJECTION, SOLUTION INTRAVENOUS at 21:39

## 2019-06-11 RX ADMIN — GABAPENTIN 1200 MG: 400 CAPSULE ORAL at 08:11

## 2019-06-11 RX ADMIN — TIOTROPIUM BROMIDE INHALATION SPRAY 2 PUFF: 3.12 SPRAY, METERED RESPIRATORY (INHALATION) at 08:48

## 2019-06-11 RX ADMIN — LEVETIRACETAM 500 MG: 500 TABLET, FILM COATED ORAL at 08:12

## 2019-06-11 RX ADMIN — LEVETIRACETAM 500 MG: 500 TABLET, FILM COATED ORAL at 21:38

## 2019-06-11 RX ADMIN — VANCOMYCIN 125 MG: KIT at 06:11

## 2019-06-11 RX ADMIN — ACETAMINOPHEN 650 MG: 325 TABLET, FILM COATED ORAL at 04:50

## 2019-06-11 RX ADMIN — HYDROCODONE BITARTRATE AND ACETAMINOPHEN 1 TABLET: 10; 325 TABLET ORAL at 21:37

## 2019-06-11 RX ADMIN — PAROXETINE 40 MG: 20 TABLET, FILM COATED ORAL at 06:09

## 2019-06-11 RX ADMIN — VANCOMYCIN 125 MG: KIT at 18:35

## 2019-06-11 RX ADMIN — HYDROCODONE BITARTRATE AND ACETAMINOPHEN 1 TABLET: 10; 325 TABLET ORAL at 02:06

## 2019-06-11 RX ADMIN — FAMOTIDINE 20 MG: 20 TABLET, FILM COATED ORAL at 21:38

## 2019-06-11 RX ADMIN — SODIUM CHLORIDE, PRESERVATIVE FREE 3 ML: 5 INJECTION INTRAVENOUS at 08:11

## 2019-06-11 RX ADMIN — ACETAMINOPHEN 650 MG: 325 TABLET, FILM COATED ORAL at 00:22

## 2019-06-11 RX ADMIN — HYDROCODONE BITARTRATE AND ACETAMINOPHEN 1 TABLET: 10; 325 TABLET ORAL at 08:20

## 2019-06-11 RX ADMIN — POTASSIUM CHLORIDE AND SODIUM CHLORIDE 125 ML/HR: 900; 150 INJECTION, SOLUTION INTRAVENOUS at 02:06

## 2019-06-11 RX ADMIN — BUDESONIDE AND FORMOTEROL FUMARATE DIHYDRATE 2 PUFF: 160; 4.5 AEROSOL RESPIRATORY (INHALATION) at 23:15

## 2019-06-11 RX ADMIN — TIZANIDINE 4 MG: 4 TABLET ORAL at 06:09

## 2019-06-11 RX ADMIN — HYDROCODONE BITARTRATE AND ACETAMINOPHEN 1 TABLET: 10; 325 TABLET ORAL at 14:46

## 2019-06-11 RX ADMIN — VANCOMYCIN 125 MG: KIT at 11:16

## 2019-06-11 RX ADMIN — HYDROMORPHONE HYDROCHLORIDE 0.5 MG: 1 INJECTION, SOLUTION INTRAMUSCULAR; INTRAVENOUS; SUBCUTANEOUS at 11:15

## 2019-06-11 RX ADMIN — HYDROMORPHONE HYDROCHLORIDE 0.5 MG: 1 INJECTION, SOLUTION INTRAMUSCULAR; INTRAVENOUS; SUBCUTANEOUS at 01:06

## 2019-06-11 RX ADMIN — VANCOMYCIN 125 MG: KIT at 00:22

## 2019-06-11 RX ADMIN — MONTELUKAST SODIUM 10 MG: 10 TABLET, FILM COATED ORAL at 21:38

## 2019-06-11 RX ADMIN — FUROSEMIDE 20 MG: 10 INJECTION, SOLUTION INTRAMUSCULAR; INTRAVENOUS at 14:35

## 2019-06-11 RX ADMIN — GABAPENTIN 1200 MG: 400 CAPSULE ORAL at 21:36

## 2019-06-12 LAB
ANION GAP SERPL CALCULATED.3IONS-SCNC: 9.9 MMOL/L
BACTERIA SPEC AEROBE CULT: ABNORMAL
BACTERIA SPEC AEROBE CULT: ABNORMAL
BASOPHILS # BLD AUTO: 0.06 10*3/MM3 (ref 0–0.2)
BASOPHILS NFR BLD AUTO: 0.9 % (ref 0–1.5)
BUN BLD-MCNC: 12 MG/DL (ref 6–20)
BUN/CREAT SERPL: 14.1 (ref 7–25)
CALCIUM SPEC-SCNC: 9.1 MG/DL (ref 8.6–10.5)
CHLORIDE SERPL-SCNC: 104 MMOL/L (ref 98–107)
CO2 SERPL-SCNC: 29.1 MMOL/L (ref 22–29)
CREAT BLD-MCNC: 0.85 MG/DL (ref 0.57–1)
DEPRECATED RDW RBC AUTO: 51.6 FL (ref 37–54)
EOSINOPHIL # BLD AUTO: 0.31 10*3/MM3 (ref 0–0.4)
EOSINOPHIL NFR BLD AUTO: 4.6 % (ref 0.3–6.2)
ERYTHROCYTE [DISTWIDTH] IN BLOOD BY AUTOMATED COUNT: 15.9 % (ref 12.3–15.4)
GFR SERPL CREATININE-BSD FRML MDRD: 69 ML/MIN/1.73
GLUCOSE BLD-MCNC: 88 MG/DL (ref 65–99)
HCT VFR BLD AUTO: 37.2 % (ref 34–46.6)
HGB BLD-MCNC: 10.9 G/DL (ref 12–15.9)
IMM GRANULOCYTES # BLD AUTO: 0.08 10*3/MM3 (ref 0–0.05)
IMM GRANULOCYTES NFR BLD AUTO: 1.2 % (ref 0–0.5)
LYMPHOCYTES # BLD AUTO: 0.7 10*3/MM3 (ref 0.7–3.1)
LYMPHOCYTES NFR BLD AUTO: 10.3 % (ref 19.6–45.3)
MCH RBC QN AUTO: 25.7 PG (ref 26.6–33)
MCHC RBC AUTO-ENTMCNC: 29.3 G/DL (ref 31.5–35.7)
MCV RBC AUTO: 87.7 FL (ref 79–97)
MONOCYTES # BLD AUTO: 0.83 10*3/MM3 (ref 0.1–0.9)
MONOCYTES NFR BLD AUTO: 12.2 % (ref 5–12)
NEUTROPHILS # BLD AUTO: 4.82 10*3/MM3 (ref 1.7–7)
NEUTROPHILS NFR BLD AUTO: 70.8 % (ref 42.7–76)
NRBC BLD AUTO-RTO: 0 /100 WBC (ref 0–0.2)
PLATELET # BLD AUTO: 253 10*3/MM3 (ref 140–450)
PMV BLD AUTO: 11 FL (ref 6–12)
POTASSIUM BLD-SCNC: 3.8 MMOL/L (ref 3.5–5.2)
RBC # BLD AUTO: 4.24 10*6/MM3 (ref 3.77–5.28)
SODIUM BLD-SCNC: 143 MMOL/L (ref 136–145)
WBC NRBC COR # BLD: 6.8 10*3/MM3 (ref 3.4–10.8)

## 2019-06-12 PROCEDURE — 94799 UNLISTED PULMONARY SVC/PX: CPT

## 2019-06-12 PROCEDURE — 25010000002 HYDROMORPHONE PER 4 MG: Performed by: INTERNAL MEDICINE

## 2019-06-12 PROCEDURE — 25010000003 CEFTRIAXONE PER 250 MG: Performed by: INTERNAL MEDICINE

## 2019-06-12 PROCEDURE — 80048 BASIC METABOLIC PNL TOTAL CA: CPT | Performed by: INTERNAL MEDICINE

## 2019-06-12 PROCEDURE — 85025 COMPLETE CBC W/AUTO DIFF WBC: CPT | Performed by: INTERNAL MEDICINE

## 2019-06-12 RX ORDER — HYDROMORPHONE HYDROCHLORIDE 1 MG/ML
0.5 INJECTION, SOLUTION INTRAMUSCULAR; INTRAVENOUS; SUBCUTANEOUS EVERY 4 HOURS PRN
Status: DISCONTINUED | OUTPATIENT
Start: 2019-06-12 | End: 2019-06-13 | Stop reason: HOSPADM

## 2019-06-12 RX ORDER — HYDROCODONE BITARTRATE AND ACETAMINOPHEN 10; 325 MG/1; MG/1
1 TABLET ORAL EVERY 4 HOURS PRN
Status: DISCONTINUED | OUTPATIENT
Start: 2019-06-12 | End: 2019-06-13 | Stop reason: HOSPADM

## 2019-06-12 RX ORDER — FUROSEMIDE 20 MG/1
20 TABLET ORAL DAILY
Status: DISCONTINUED | OUTPATIENT
Start: 2019-06-12 | End: 2019-06-13 | Stop reason: HOSPADM

## 2019-06-12 RX ADMIN — PAROXETINE 40 MG: 20 TABLET, FILM COATED ORAL at 06:07

## 2019-06-12 RX ADMIN — BUPROPION HYDROCHLORIDE 300 MG: 300 TABLET, EXTENDED RELEASE ORAL at 09:29

## 2019-06-12 RX ADMIN — VANCOMYCIN 125 MG: KIT at 00:00

## 2019-06-12 RX ADMIN — CETIRIZINE HYDROCHLORIDE 10 MG: 10 TABLET, FILM COATED ORAL at 09:29

## 2019-06-12 RX ADMIN — VANCOMYCIN 125 MG: KIT at 19:04

## 2019-06-12 RX ADMIN — GABAPENTIN 1200 MG: 400 CAPSULE ORAL at 14:58

## 2019-06-12 RX ADMIN — CEFTRIAXONE SODIUM 2 G: 2 INJECTION, SOLUTION INTRAVENOUS at 20:39

## 2019-06-12 RX ADMIN — HYDROMORPHONE HYDROCHLORIDE 0.5 MG: 1 INJECTION, SOLUTION INTRAMUSCULAR; INTRAVENOUS; SUBCUTANEOUS at 09:37

## 2019-06-12 RX ADMIN — HYDROCODONE BITARTRATE AND ACETAMINOPHEN 1 TABLET: 10; 325 TABLET ORAL at 19:03

## 2019-06-12 RX ADMIN — GABAPENTIN 1200 MG: 400 CAPSULE ORAL at 22:25

## 2019-06-12 RX ADMIN — ACETAMINOPHEN 650 MG: 325 TABLET, FILM COATED ORAL at 00:12

## 2019-06-12 RX ADMIN — LEVETIRACETAM 500 MG: 500 TABLET, FILM COATED ORAL at 09:29

## 2019-06-12 RX ADMIN — FUROSEMIDE 20 MG: 20 TABLET ORAL at 16:40

## 2019-06-12 RX ADMIN — VANCOMYCIN 125 MG: KIT at 12:45

## 2019-06-12 RX ADMIN — GABAPENTIN 1200 MG: 400 CAPSULE ORAL at 06:08

## 2019-06-12 RX ADMIN — VANCOMYCIN 125 MG: KIT at 23:52

## 2019-06-12 RX ADMIN — FLUTICASONE PROPIONATE 1 SPRAY: 50 SPRAY, METERED NASAL at 09:29

## 2019-06-12 RX ADMIN — HYDROMORPHONE HYDROCHLORIDE 0.5 MG: 1 INJECTION, SOLUTION INTRAMUSCULAR; INTRAVENOUS; SUBCUTANEOUS at 00:13

## 2019-06-12 RX ADMIN — Medication 250 MG: at 20:38

## 2019-06-12 RX ADMIN — LEVETIRACETAM 500 MG: 500 TABLET, FILM COATED ORAL at 16:40

## 2019-06-12 RX ADMIN — FAMOTIDINE 20 MG: 20 TABLET, FILM COATED ORAL at 09:29

## 2019-06-12 RX ADMIN — HYDROCODONE BITARTRATE AND ACETAMINOPHEN 1 TABLET: 10; 325 TABLET ORAL at 06:49

## 2019-06-12 RX ADMIN — TIZANIDINE 4 MG: 4 TABLET ORAL at 20:40

## 2019-06-12 RX ADMIN — SODIUM CHLORIDE, PRESERVATIVE FREE 3 ML: 5 INJECTION INTRAVENOUS at 20:42

## 2019-06-12 RX ADMIN — HYDROCODONE BITARTRATE AND ACETAMINOPHEN 1 TABLET: 10; 325 TABLET ORAL at 12:45

## 2019-06-12 RX ADMIN — LEVETIRACETAM 500 MG: 500 TABLET, FILM COATED ORAL at 20:38

## 2019-06-12 RX ADMIN — VANCOMYCIN 125 MG: KIT at 06:08

## 2019-06-12 RX ADMIN — BUDESONIDE AND FORMOTEROL FUMARATE DIHYDRATE 2 PUFF: 160; 4.5 AEROSOL RESPIRATORY (INHALATION) at 08:00

## 2019-06-12 RX ADMIN — SODIUM CHLORIDE, PRESERVATIVE FREE 3 ML: 5 INJECTION INTRAVENOUS at 09:32

## 2019-06-12 RX ADMIN — FAMOTIDINE 20 MG: 20 TABLET, FILM COATED ORAL at 20:38

## 2019-06-12 RX ADMIN — Medication 250 MG: at 09:29

## 2019-06-12 RX ADMIN — MONTELUKAST SODIUM 10 MG: 10 TABLET, FILM COATED ORAL at 20:38

## 2019-06-12 RX ADMIN — TIOTROPIUM BROMIDE INHALATION SPRAY 2 PUFF: 3.12 SPRAY, METERED RESPIRATORY (INHALATION) at 07:59

## 2019-06-13 VITALS
DIASTOLIC BLOOD PRESSURE: 81 MMHG | HEART RATE: 78 BPM | SYSTOLIC BLOOD PRESSURE: 159 MMHG | BODY MASS INDEX: 42.04 KG/M2 | OXYGEN SATURATION: 93 % | HEIGHT: 67 IN | WEIGHT: 267.86 LBS | TEMPERATURE: 97.9 F | RESPIRATION RATE: 18 BRPM

## 2019-06-13 LAB
ANION GAP SERPL CALCULATED.3IONS-SCNC: 8.6 MMOL/L
BACTERIA SPEC AEROBE CULT: NORMAL
BUN BLD-MCNC: 9 MG/DL (ref 6–20)
BUN/CREAT SERPL: 11.3 (ref 7–25)
CALCIUM SPEC-SCNC: 8.6 MG/DL (ref 8.6–10.5)
CHLORIDE SERPL-SCNC: 96 MMOL/L (ref 98–107)
CO2 SERPL-SCNC: 31.4 MMOL/L (ref 22–29)
CREAT BLD-MCNC: 0.8 MG/DL (ref 0.57–1)
DEPRECATED RDW RBC AUTO: 50.8 FL (ref 37–54)
ERYTHROCYTE [DISTWIDTH] IN BLOOD BY AUTOMATED COUNT: 15.9 % (ref 12.3–15.4)
GFR SERPL CREATININE-BSD FRML MDRD: 74 ML/MIN/1.73
GLUCOSE BLD-MCNC: 85 MG/DL (ref 65–99)
HCT VFR BLD AUTO: 32.7 % (ref 34–46.6)
HGB BLD-MCNC: 9.8 G/DL (ref 12–15.9)
MCH RBC QN AUTO: 26.3 PG (ref 26.6–33)
MCHC RBC AUTO-ENTMCNC: 30 G/DL (ref 31.5–35.7)
MCV RBC AUTO: 87.7 FL (ref 79–97)
PLATELET # BLD AUTO: 241 10*3/MM3 (ref 140–450)
PMV BLD AUTO: 10.2 FL (ref 6–12)
POTASSIUM BLD-SCNC: 3.1 MMOL/L (ref 3.5–5.2)
RBC # BLD AUTO: 3.73 10*6/MM3 (ref 3.77–5.28)
SODIUM BLD-SCNC: 136 MMOL/L (ref 136–145)
WBC NRBC COR # BLD: 5.17 10*3/MM3 (ref 3.4–10.8)

## 2019-06-13 PROCEDURE — 94799 UNLISTED PULMONARY SVC/PX: CPT

## 2019-06-13 PROCEDURE — 85027 COMPLETE CBC AUTOMATED: CPT | Performed by: INTERNAL MEDICINE

## 2019-06-13 PROCEDURE — 80048 BASIC METABOLIC PNL TOTAL CA: CPT | Performed by: INTERNAL MEDICINE

## 2019-06-13 RX ORDER — LEVOFLOXACIN 750 MG/1
750 TABLET ORAL DAILY
Qty: 11 TABLET | Refills: 0 | Status: SHIPPED | OUTPATIENT
Start: 2019-06-13 | End: 2019-06-24

## 2019-06-13 RX ORDER — POTASSIUM CHLORIDE 750 MG/1
20 CAPSULE, EXTENDED RELEASE ORAL DAILY
Qty: 60 CAPSULE | Refills: 0 | Status: SHIPPED | OUTPATIENT
Start: 2019-06-13 | End: 2019-08-20

## 2019-06-13 RX ORDER — VANCOMYCIN HYDROCHLORIDE 125 MG/1
125 CAPSULE ORAL EVERY 6 HOURS SCHEDULED
Qty: 100 CAPSULE | Refills: 0 | Status: SHIPPED | OUTPATIENT
Start: 2019-06-13 | End: 2019-07-08

## 2019-06-13 RX ORDER — FAMOTIDINE 20 MG/1
20 TABLET, FILM COATED ORAL 2 TIMES DAILY
Qty: 60 TABLET | Refills: 0 | Status: SHIPPED | OUTPATIENT
Start: 2019-06-13 | End: 2019-08-02

## 2019-06-13 RX ADMIN — VANCOMYCIN 125 MG: KIT at 12:15

## 2019-06-13 RX ADMIN — TIOTROPIUM BROMIDE INHALATION SPRAY 2 PUFF: 3.12 SPRAY, METERED RESPIRATORY (INHALATION) at 09:02

## 2019-06-13 RX ADMIN — BUDESONIDE AND FORMOTEROL FUMARATE DIHYDRATE 2 PUFF: 160; 4.5 AEROSOL RESPIRATORY (INHALATION) at 09:02

## 2019-06-13 RX ADMIN — CETIRIZINE HYDROCHLORIDE 10 MG: 10 TABLET, FILM COATED ORAL at 09:41

## 2019-06-13 RX ADMIN — BUDESONIDE AND FORMOTEROL FUMARATE DIHYDRATE 2 PUFF: 160; 4.5 AEROSOL RESPIRATORY (INHALATION) at 00:21

## 2019-06-13 RX ADMIN — VANCOMYCIN 125 MG: KIT at 05:17

## 2019-06-13 RX ADMIN — HYDROCODONE BITARTRATE AND ACETAMINOPHEN 1 TABLET: 10; 325 TABLET ORAL at 12:16

## 2019-06-13 RX ADMIN — FAMOTIDINE 20 MG: 20 TABLET, FILM COATED ORAL at 09:41

## 2019-06-13 RX ADMIN — LEVETIRACETAM 500 MG: 500 TABLET, FILM COATED ORAL at 09:41

## 2019-06-13 RX ADMIN — SODIUM CHLORIDE, PRESERVATIVE FREE 3 ML: 5 INJECTION INTRAVENOUS at 09:41

## 2019-06-13 RX ADMIN — FLUTICASONE PROPIONATE 1 SPRAY: 50 SPRAY, METERED NASAL at 09:42

## 2019-06-13 RX ADMIN — Medication 250 MG: at 09:41

## 2019-06-13 RX ADMIN — HYDROCODONE BITARTRATE AND ACETAMINOPHEN 1 TABLET: 10; 325 TABLET ORAL at 01:14

## 2019-06-13 RX ADMIN — FUROSEMIDE 20 MG: 20 TABLET ORAL at 09:41

## 2019-06-13 RX ADMIN — PAROXETINE 40 MG: 20 TABLET, FILM COATED ORAL at 06:27

## 2019-06-13 RX ADMIN — GABAPENTIN 1200 MG: 400 CAPSULE ORAL at 05:17

## 2019-06-13 RX ADMIN — BUPROPION HYDROCHLORIDE 300 MG: 300 TABLET, EXTENDED RELEASE ORAL at 09:41

## 2019-06-13 RX ADMIN — HYDROCODONE BITARTRATE AND ACETAMINOPHEN 1 TABLET: 10; 325 TABLET ORAL at 05:18

## 2019-06-14 ENCOUNTER — READMISSION MANAGEMENT (OUTPATIENT)
Dept: CALL CENTER | Facility: HOSPITAL | Age: 54
End: 2019-06-14

## 2019-06-15 LAB
BACTERIA SPEC AEROBE CULT: NORMAL
BACTERIA SPEC AEROBE CULT: NORMAL

## 2019-06-17 ENCOUNTER — READMISSION MANAGEMENT (OUTPATIENT)
Dept: CALL CENTER | Facility: HOSPITAL | Age: 54
End: 2019-06-17

## 2019-06-24 RX ORDER — HYDROCODONE BITARTRATE AND ACETAMINOPHEN 10; 325 MG/1; MG/1
1 TABLET ORAL EVERY 6 HOURS PRN
Qty: 40 TABLET | Refills: 0 | Status: SHIPPED | OUTPATIENT
Start: 2019-06-24 | End: 2019-07-09 | Stop reason: SDUPTHER

## 2019-06-25 ENCOUNTER — READMISSION MANAGEMENT (OUTPATIENT)
Dept: CALL CENTER | Facility: HOSPITAL | Age: 54
End: 2019-06-25

## 2019-06-27 ENCOUNTER — READMISSION MANAGEMENT (OUTPATIENT)
Dept: CALL CENTER | Facility: HOSPITAL | Age: 54
End: 2019-06-27

## 2019-07-02 RX ORDER — LEVETIRACETAM 500 MG/1
TABLET ORAL
Qty: 270 TABLET | Refills: 1 | Status: SHIPPED | OUTPATIENT
Start: 2019-07-02 | End: 2019-08-20

## 2019-07-05 ENCOUNTER — READMISSION MANAGEMENT (OUTPATIENT)
Dept: CALL CENTER | Facility: HOSPITAL | Age: 54
End: 2019-07-05

## 2019-07-09 ENCOUNTER — OFFICE VISIT (OUTPATIENT)
Dept: PAIN MEDICINE | Facility: CLINIC | Age: 54
End: 2019-07-09

## 2019-07-09 VITALS
RESPIRATION RATE: 20 BRPM | DIASTOLIC BLOOD PRESSURE: 72 MMHG | SYSTOLIC BLOOD PRESSURE: 111 MMHG | OXYGEN SATURATION: 93 % | BODY MASS INDEX: 41.4 KG/M2 | WEIGHT: 263.8 LBS | HEART RATE: 74 BPM | HEIGHT: 67 IN | TEMPERATURE: 99 F

## 2019-07-09 DIAGNOSIS — F11.20 OPIATE DEPENDENCE, CONTINUOUS (HCC): ICD-10-CM

## 2019-07-09 DIAGNOSIS — M51.36 DDD (DEGENERATIVE DISC DISEASE), LUMBAR: ICD-10-CM

## 2019-07-09 DIAGNOSIS — M96.1 POSTLAMINECTOMY SYNDROME OF THORACIC REGION: ICD-10-CM

## 2019-07-09 DIAGNOSIS — G89.4 CHRONIC PAIN SYNDROME: Primary | ICD-10-CM

## 2019-07-09 PROCEDURE — 99213 OFFICE O/P EST LOW 20 MIN: CPT | Performed by: NURSE PRACTITIONER

## 2019-07-09 RX ORDER — CHOLESTYRAMINE 4 G/9G
POWDER, FOR SUSPENSION ORAL
Refills: 0 | COMMUNITY
Start: 2019-07-03 | End: 2019-08-20

## 2019-07-09 RX ORDER — PANTOPRAZOLE SODIUM 40 MG/1
40 TABLET, DELAYED RELEASE ORAL EVERY MORNING
Refills: 3 | COMMUNITY
Start: 2019-07-02

## 2019-07-09 RX ORDER — HYDROCODONE BITARTRATE AND ACETAMINOPHEN 10; 325 MG/1; MG/1
1 TABLET ORAL EVERY 6 HOURS PRN
Qty: 120 TABLET | Refills: 0 | Status: SHIPPED | OUTPATIENT
Start: 2019-07-09 | End: 2019-08-07 | Stop reason: SDUPTHER

## 2019-07-09 RX ORDER — ALBUTEROL SULFATE 2.5 MG/3ML
2.5 SOLUTION RESPIRATORY (INHALATION) EVERY 6 HOURS PRN
Refills: 3 | COMMUNITY
Start: 2019-05-06

## 2019-07-12 ENCOUNTER — READMISSION MANAGEMENT (OUTPATIENT)
Dept: CALL CENTER | Facility: HOSPITAL | Age: 54
End: 2019-07-12

## 2019-08-02 ENCOUNTER — APPOINTMENT (OUTPATIENT)
Dept: CARDIOLOGY | Facility: HOSPITAL | Age: 54
End: 2019-08-02

## 2019-08-02 ENCOUNTER — HOSPITAL ENCOUNTER (OUTPATIENT)
Facility: HOSPITAL | Age: 54
Setting detail: OBSERVATION
LOS: 2 days | Discharge: HOME | End: 2019-08-04
Attending: EMERGENCY MEDICINE | Admitting: HOSPITALIST

## 2019-08-02 DIAGNOSIS — L03.119 CELLULITIS OF LOWER EXTREMITY, UNSPECIFIED LATERALITY: Primary | ICD-10-CM

## 2019-08-02 DIAGNOSIS — G62.9 NEUROPATHY: ICD-10-CM

## 2019-08-02 LAB
ALBUMIN SERPL-MCNC: 4 G/DL (ref 3.5–5.2)
ALBUMIN/GLOB SERPL: 1.1 G/DL
ALP SERPL-CCNC: 65 U/L (ref 39–117)
ALT SERPL W P-5'-P-CCNC: 38 U/L (ref 1–33)
ANION GAP SERPL CALCULATED.3IONS-SCNC: 11 MMOL/L (ref 5–15)
ANION GAP SERPL CALCULATED.3IONS-SCNC: 12.7 MMOL/L (ref 5–15)
AST SERPL-CCNC: 23 U/L (ref 1–32)
BACTERIA UR QL AUTO: ABNORMAL /HPF
BASOPHILS # BLD AUTO: 0.05 10*3/MM3 (ref 0–0.2)
BASOPHILS # BLD AUTO: 0.05 10*3/MM3 (ref 0–0.2)
BASOPHILS NFR BLD AUTO: 0.5 % (ref 0–1.5)
BASOPHILS NFR BLD AUTO: 0.6 % (ref 0–1.5)
BH CV LOWER VASCULAR LEFT COMMON FEMORAL AUGMENT: NORMAL
BH CV LOWER VASCULAR LEFT COMMON FEMORAL COMPETENT: NORMAL
BH CV LOWER VASCULAR LEFT COMMON FEMORAL COMPRESS: NORMAL
BH CV LOWER VASCULAR LEFT COMMON FEMORAL PHASIC: NORMAL
BH CV LOWER VASCULAR LEFT COMMON FEMORAL SPONT: NORMAL
BH CV LOWER VASCULAR LEFT DISTAL FEMORAL COMPRESS: NORMAL
BH CV LOWER VASCULAR LEFT GASTRONEMIUS COMPRESS: NORMAL
BH CV LOWER VASCULAR LEFT GREATER SAPH AK COMPRESS: NORMAL
BH CV LOWER VASCULAR LEFT GREATER SAPH BK COMPRESS: NORMAL
BH CV LOWER VASCULAR LEFT MID FEMORAL AUGMENT: NORMAL
BH CV LOWER VASCULAR LEFT MID FEMORAL COMPETENT: NORMAL
BH CV LOWER VASCULAR LEFT MID FEMORAL COMPRESS: NORMAL
BH CV LOWER VASCULAR LEFT MID FEMORAL PHASIC: NORMAL
BH CV LOWER VASCULAR LEFT MID FEMORAL SPONT: NORMAL
BH CV LOWER VASCULAR LEFT PERONEAL COMPRESS: NORMAL
BH CV LOWER VASCULAR LEFT POPLITEAL AUGMENT: NORMAL
BH CV LOWER VASCULAR LEFT POPLITEAL COMPETENT: NORMAL
BH CV LOWER VASCULAR LEFT POPLITEAL COMPRESS: NORMAL
BH CV LOWER VASCULAR LEFT POPLITEAL PHASIC: NORMAL
BH CV LOWER VASCULAR LEFT POPLITEAL SPONT: NORMAL
BH CV LOWER VASCULAR LEFT POSTERIOR TIBIAL COMPRESS: NORMAL
BH CV LOWER VASCULAR LEFT PROXIMAL FEMORAL COMPRESS: NORMAL
BH CV LOWER VASCULAR LEFT SAPHENOFEMORAL JUNCTION COMPRESS: NORMAL
BH CV LOWER VASCULAR RIGHT COMMON FEMORAL AUGMENT: NORMAL
BH CV LOWER VASCULAR RIGHT COMMON FEMORAL COMPETENT: NORMAL
BH CV LOWER VASCULAR RIGHT COMMON FEMORAL COMPRESS: NORMAL
BH CV LOWER VASCULAR RIGHT COMMON FEMORAL PHASIC: NORMAL
BH CV LOWER VASCULAR RIGHT COMMON FEMORAL SPONT: NORMAL
BH CV LOWER VASCULAR RIGHT DISTAL FEMORAL COMPRESS: NORMAL
BH CV LOWER VASCULAR RIGHT GASTRONEMIUS COMPRESS: NORMAL
BH CV LOWER VASCULAR RIGHT GREATER SAPH AK COMPRESS: NORMAL
BH CV LOWER VASCULAR RIGHT GREATER SAPH BK COMPRESS: NORMAL
BH CV LOWER VASCULAR RIGHT MID FEMORAL AUGMENT: NORMAL
BH CV LOWER VASCULAR RIGHT MID FEMORAL COMPETENT: NORMAL
BH CV LOWER VASCULAR RIGHT MID FEMORAL COMPRESS: NORMAL
BH CV LOWER VASCULAR RIGHT MID FEMORAL PHASIC: NORMAL
BH CV LOWER VASCULAR RIGHT MID FEMORAL SPONT: NORMAL
BH CV LOWER VASCULAR RIGHT PERONEAL COMPRESS: NORMAL
BH CV LOWER VASCULAR RIGHT POPLITEAL AUGMENT: NORMAL
BH CV LOWER VASCULAR RIGHT POPLITEAL COMPETENT: NORMAL
BH CV LOWER VASCULAR RIGHT POPLITEAL COMPRESS: NORMAL
BH CV LOWER VASCULAR RIGHT POPLITEAL PHASIC: NORMAL
BH CV LOWER VASCULAR RIGHT POPLITEAL SPONT: NORMAL
BH CV LOWER VASCULAR RIGHT POSTERIOR TIBIAL COMPRESS: NORMAL
BH CV LOWER VASCULAR RIGHT PROXIMAL FEMORAL COMPRESS: NORMAL
BH CV LOWER VASCULAR RIGHT SAPHENOFEMORAL JUNCTION COMPRESS: NORMAL
BILIRUB SERPL-MCNC: 0.4 MG/DL (ref 0.2–1.2)
BILIRUB UR QL STRIP: NEGATIVE
BUN BLD-MCNC: 18 MG/DL (ref 6–20)
BUN BLD-MCNC: 18 MG/DL (ref 6–20)
BUN/CREAT SERPL: 17.3 (ref 7–25)
BUN/CREAT SERPL: 17.6 (ref 7–25)
CALCIUM SPEC-SCNC: 8.9 MG/DL (ref 8.6–10.5)
CALCIUM SPEC-SCNC: 9.3 MG/DL (ref 8.6–10.5)
CHLORIDE SERPL-SCNC: 102 MMOL/L (ref 98–107)
CHLORIDE SERPL-SCNC: 102 MMOL/L (ref 98–107)
CLARITY UR: CLEAR
CO2 SERPL-SCNC: 27.3 MMOL/L (ref 22–29)
CO2 SERPL-SCNC: 29 MMOL/L (ref 22–29)
COLOR UR: YELLOW
CREAT BLD-MCNC: 1.02 MG/DL (ref 0.57–1)
CREAT BLD-MCNC: 1.04 MG/DL (ref 0.57–1)
D-LACTATE SERPL-SCNC: 1.1 MMOL/L (ref 0.5–2)
DEPRECATED RDW RBC AUTO: 50 FL (ref 37–54)
DEPRECATED RDW RBC AUTO: 51.2 FL (ref 37–54)
EOSINOPHIL # BLD AUTO: 0.17 10*3/MM3 (ref 0–0.4)
EOSINOPHIL # BLD AUTO: 0.17 10*3/MM3 (ref 0–0.4)
EOSINOPHIL NFR BLD AUTO: 1.8 % (ref 0.3–6.2)
EOSINOPHIL NFR BLD AUTO: 2.1 % (ref 0.3–6.2)
ERYTHROCYTE [DISTWIDTH] IN BLOOD BY AUTOMATED COUNT: 15.7 % (ref 12.3–15.4)
ERYTHROCYTE [DISTWIDTH] IN BLOOD BY AUTOMATED COUNT: 15.8 % (ref 12.3–15.4)
GFR SERPL CREATININE-BSD FRML MDRD: 55 ML/MIN/1.73
GFR SERPL CREATININE-BSD FRML MDRD: 56 ML/MIN/1.73
GLOBULIN UR ELPH-MCNC: 3.7 GM/DL
GLUCOSE BLD-MCNC: 107 MG/DL (ref 65–99)
GLUCOSE BLD-MCNC: 90 MG/DL (ref 65–99)
GLUCOSE UR STRIP-MCNC: NEGATIVE MG/DL
HCT VFR BLD AUTO: 36.2 % (ref 34–46.6)
HCT VFR BLD AUTO: 39 % (ref 34–46.6)
HGB BLD-MCNC: 10.8 G/DL (ref 12–15.9)
HGB BLD-MCNC: 11.7 G/DL (ref 12–15.9)
HGB UR QL STRIP.AUTO: NEGATIVE
HYALINE CASTS UR QL AUTO: ABNORMAL /LPF
IMM GRANULOCYTES # BLD AUTO: 0.05 10*3/MM3 (ref 0–0.05)
IMM GRANULOCYTES # BLD AUTO: 0.09 10*3/MM3 (ref 0–0.05)
IMM GRANULOCYTES NFR BLD AUTO: 0.6 % (ref 0–0.5)
IMM GRANULOCYTES NFR BLD AUTO: 1 % (ref 0–0.5)
INR PPP: 0.97 (ref 0.9–1.1)
KETONES UR QL STRIP: NEGATIVE
LEUKOCYTE ESTERASE UR QL STRIP.AUTO: NEGATIVE
LYMPHOCYTES # BLD AUTO: 0.7 10*3/MM3 (ref 0.7–3.1)
LYMPHOCYTES # BLD AUTO: 0.92 10*3/MM3 (ref 0.7–3.1)
LYMPHOCYTES NFR BLD AUTO: 11.3 % (ref 19.6–45.3)
LYMPHOCYTES NFR BLD AUTO: 7.5 % (ref 19.6–45.3)
MCH RBC QN AUTO: 26.3 PG (ref 26.6–33)
MCH RBC QN AUTO: 26.4 PG (ref 26.6–33)
MCHC RBC AUTO-ENTMCNC: 29.8 G/DL (ref 31.5–35.7)
MCHC RBC AUTO-ENTMCNC: 30 G/DL (ref 31.5–35.7)
MCV RBC AUTO: 87.8 FL (ref 79–97)
MCV RBC AUTO: 88.3 FL (ref 79–97)
MONOCYTES # BLD AUTO: 0.88 10*3/MM3 (ref 0.1–0.9)
MONOCYTES # BLD AUTO: 1.08 10*3/MM3 (ref 0.1–0.9)
MONOCYTES NFR BLD AUTO: 13.3 % (ref 5–12)
MONOCYTES NFR BLD AUTO: 9.4 % (ref 5–12)
NEUTROPHILS # BLD AUTO: 5.87 10*3/MM3 (ref 1.7–7)
NEUTROPHILS # BLD AUTO: 7.45 10*3/MM3 (ref 1.7–7)
NEUTROPHILS NFR BLD AUTO: 72.1 % (ref 42.7–76)
NEUTROPHILS NFR BLD AUTO: 79.8 % (ref 42.7–76)
NITRITE UR QL STRIP: POSITIVE
NRBC BLD AUTO-RTO: 0 /100 WBC (ref 0–0.2)
NRBC BLD AUTO-RTO: 0 /100 WBC (ref 0–0.2)
PH UR STRIP.AUTO: 6 [PH] (ref 5–8)
PLATELET # BLD AUTO: 193 10*3/MM3 (ref 140–450)
PLATELET # BLD AUTO: 208 10*3/MM3 (ref 140–450)
PMV BLD AUTO: 12.1 FL (ref 6–12)
PMV BLD AUTO: 12.3 FL (ref 6–12)
POTASSIUM BLD-SCNC: 3.9 MMOL/L (ref 3.5–5.2)
POTASSIUM BLD-SCNC: 4 MMOL/L (ref 3.5–5.2)
PROT SERPL-MCNC: 7.7 G/DL (ref 6–8.5)
PROT UR QL STRIP: NEGATIVE
PROTHROMBIN TIME: 12.6 SECONDS (ref 11.7–14.2)
RBC # BLD AUTO: 4.1 10*6/MM3 (ref 3.77–5.28)
RBC # BLD AUTO: 4.44 10*6/MM3 (ref 3.77–5.28)
RBC # UR: ABNORMAL /HPF
REF LAB TEST METHOD: ABNORMAL
SODIUM BLD-SCNC: 142 MMOL/L (ref 136–145)
SODIUM BLD-SCNC: 142 MMOL/L (ref 136–145)
SP GR UR STRIP: 1.02 (ref 1–1.03)
SQUAMOUS #/AREA URNS HPF: ABNORMAL /HPF
UROBILINOGEN UR QL STRIP: ABNORMAL
WBC NRBC COR # BLD: 8.14 10*3/MM3 (ref 3.4–10.8)
WBC NRBC COR # BLD: 9.34 10*3/MM3 (ref 3.4–10.8)
WBC UR QL AUTO: ABNORMAL /HPF

## 2019-08-02 PROCEDURE — 25010000002 ONDANSETRON PER 1 MG: Performed by: EMERGENCY MEDICINE

## 2019-08-02 PROCEDURE — 99284 EMERGENCY DEPT VISIT MOD MDM: CPT

## 2019-08-02 PROCEDURE — 93970 EXTREMITY STUDY: CPT

## 2019-08-02 PROCEDURE — 96366 THER/PROPH/DIAG IV INF ADDON: CPT

## 2019-08-02 PROCEDURE — 87081 CULTURE SCREEN ONLY: CPT | Performed by: HOSPITALIST

## 2019-08-02 PROCEDURE — 80053 COMPREHEN METABOLIC PANEL: CPT | Performed by: EMERGENCY MEDICINE

## 2019-08-02 PROCEDURE — 25010000003 CEFAZOLIN 1-4 GM/50ML-% SOLUTION: Performed by: EMERGENCY MEDICINE

## 2019-08-02 PROCEDURE — 85025 COMPLETE CBC W/AUTO DIFF WBC: CPT | Performed by: EMERGENCY MEDICINE

## 2019-08-02 PROCEDURE — 87040 BLOOD CULTURE FOR BACTERIA: CPT | Performed by: EMERGENCY MEDICINE

## 2019-08-02 PROCEDURE — 25010000003 CEFAZOLIN 1-4 GM/50ML-% SOLUTION: Performed by: HOSPITALIST

## 2019-08-02 PROCEDURE — 85025 COMPLETE CBC W/AUTO DIFF WBC: CPT | Performed by: HOSPITALIST

## 2019-08-02 PROCEDURE — 25010000002 ENOXAPARIN PER 10 MG: Performed by: HOSPITALIST

## 2019-08-02 PROCEDURE — 96361 HYDRATE IV INFUSION ADD-ON: CPT

## 2019-08-02 PROCEDURE — 81001 URINALYSIS AUTO W/SCOPE: CPT | Performed by: EMERGENCY MEDICINE

## 2019-08-02 PROCEDURE — 96372 THER/PROPH/DIAG INJ SC/IM: CPT

## 2019-08-02 PROCEDURE — 94799 UNLISTED PULMONARY SVC/PX: CPT

## 2019-08-02 PROCEDURE — 25010000002 MORPHINE PER 10 MG: Performed by: EMERGENCY MEDICINE

## 2019-08-02 PROCEDURE — 96365 THER/PROPH/DIAG IV INF INIT: CPT

## 2019-08-02 PROCEDURE — 85610 PROTHROMBIN TIME: CPT | Performed by: EMERGENCY MEDICINE

## 2019-08-02 PROCEDURE — 96375 TX/PRO/DX INJ NEW DRUG ADDON: CPT

## 2019-08-02 PROCEDURE — 83605 ASSAY OF LACTIC ACID: CPT | Performed by: EMERGENCY MEDICINE

## 2019-08-02 RX ORDER — SODIUM CHLORIDE 0.9 % (FLUSH) 0.9 %
3 SYRINGE (ML) INJECTION EVERY 12 HOURS SCHEDULED
Status: DISCONTINUED | OUTPATIENT
Start: 2019-08-02 | End: 2019-08-04 | Stop reason: HOSPADM

## 2019-08-02 RX ORDER — PAROXETINE HYDROCHLORIDE 20 MG/1
40 TABLET, FILM COATED ORAL EVERY MORNING
Status: DISCONTINUED | OUTPATIENT
Start: 2019-08-02 | End: 2019-08-04 | Stop reason: HOSPADM

## 2019-08-02 RX ORDER — TIZANIDINE 4 MG/1
4 TABLET ORAL EVERY 8 HOURS PRN
Status: DISCONTINUED | OUTPATIENT
Start: 2019-08-02 | End: 2019-08-04 | Stop reason: HOSPADM

## 2019-08-02 RX ORDER — HYDROCODONE BITARTRATE AND ACETAMINOPHEN 10; 325 MG/1; MG/1
1 TABLET ORAL EVERY 6 HOURS PRN
Status: DISCONTINUED | OUTPATIENT
Start: 2019-08-02 | End: 2019-08-04 | Stop reason: HOSPADM

## 2019-08-02 RX ORDER — ONDANSETRON 4 MG/1
4 TABLET, FILM COATED ORAL EVERY 6 HOURS PRN
Status: DISCONTINUED | OUTPATIENT
Start: 2019-08-02 | End: 2019-08-04 | Stop reason: HOSPADM

## 2019-08-02 RX ORDER — CEFAZOLIN SODIUM 1 G/50ML
1 INJECTION, SOLUTION INTRAVENOUS EVERY 8 HOURS
Status: DISCONTINUED | OUTPATIENT
Start: 2019-08-02 | End: 2019-08-03

## 2019-08-02 RX ORDER — SODIUM CHLORIDE 0.9 % (FLUSH) 0.9 %
10 SYRINGE (ML) INJECTION AS NEEDED
Status: DISCONTINUED | OUTPATIENT
Start: 2019-08-02 | End: 2019-08-04 | Stop reason: HOSPADM

## 2019-08-02 RX ORDER — MONTELUKAST SODIUM 10 MG/1
10 TABLET ORAL NIGHTLY
Status: DISCONTINUED | OUTPATIENT
Start: 2019-08-02 | End: 2019-08-04 | Stop reason: HOSPADM

## 2019-08-02 RX ORDER — BUPROPION HYDROCHLORIDE 150 MG/1
150 TABLET ORAL DAILY
Status: DISCONTINUED | OUTPATIENT
Start: 2019-08-02 | End: 2019-08-04 | Stop reason: HOSPADM

## 2019-08-02 RX ORDER — ACETAMINOPHEN 500 MG
1000 TABLET ORAL ONCE
Status: COMPLETED | OUTPATIENT
Start: 2019-08-02 | End: 2019-08-02

## 2019-08-02 RX ORDER — MORPHINE SULFATE 2 MG/ML
4 INJECTION, SOLUTION INTRAMUSCULAR; INTRAVENOUS ONCE
Status: COMPLETED | OUTPATIENT
Start: 2019-08-02 | End: 2019-08-02

## 2019-08-02 RX ORDER — ONDANSETRON 2 MG/ML
4 INJECTION INTRAMUSCULAR; INTRAVENOUS ONCE
Status: COMPLETED | OUTPATIENT
Start: 2019-08-02 | End: 2019-08-02

## 2019-08-02 RX ORDER — CEFAZOLIN SODIUM 1 G/50ML
1 INJECTION, SOLUTION INTRAVENOUS ONCE
Status: COMPLETED | OUTPATIENT
Start: 2019-08-02 | End: 2019-08-02

## 2019-08-02 RX ORDER — SODIUM CHLORIDE 0.9 % (FLUSH) 0.9 %
3-10 SYRINGE (ML) INJECTION AS NEEDED
Status: DISCONTINUED | OUTPATIENT
Start: 2019-08-02 | End: 2019-08-04 | Stop reason: HOSPADM

## 2019-08-02 RX ORDER — HYDROXYZINE HYDROCHLORIDE 25 MG/1
25 TABLET, FILM COATED ORAL 3 TIMES DAILY PRN
Status: DISCONTINUED | OUTPATIENT
Start: 2019-08-02 | End: 2019-08-04 | Stop reason: HOSPADM

## 2019-08-02 RX ORDER — FUROSEMIDE 20 MG/1
20 TABLET ORAL DAILY
Status: DISCONTINUED | OUTPATIENT
Start: 2019-08-02 | End: 2019-08-04 | Stop reason: HOSPADM

## 2019-08-02 RX ORDER — IPRATROPIUM BROMIDE AND ALBUTEROL SULFATE 2.5; .5 MG/3ML; MG/3ML
3 SOLUTION RESPIRATORY (INHALATION) EVERY 4 HOURS PRN
Status: DISCONTINUED | OUTPATIENT
Start: 2019-08-02 | End: 2019-08-04 | Stop reason: HOSPADM

## 2019-08-02 RX ORDER — GABAPENTIN 300 MG/1
600 CAPSULE ORAL EVERY 8 HOURS SCHEDULED
Status: DISCONTINUED | OUTPATIENT
Start: 2019-08-02 | End: 2019-08-03

## 2019-08-02 RX ORDER — ONDANSETRON 2 MG/ML
4 INJECTION INTRAMUSCULAR; INTRAVENOUS EVERY 6 HOURS PRN
Status: DISCONTINUED | OUTPATIENT
Start: 2019-08-02 | End: 2019-08-04 | Stop reason: HOSPADM

## 2019-08-02 RX ORDER — PANTOPRAZOLE SODIUM 40 MG/1
40 TABLET, DELAYED RELEASE ORAL
Status: DISCONTINUED | OUTPATIENT
Start: 2019-08-02 | End: 2019-08-04 | Stop reason: HOSPADM

## 2019-08-02 RX ORDER — BUDESONIDE AND FORMOTEROL FUMARATE DIHYDRATE 160; 4.5 UG/1; UG/1
2 AEROSOL RESPIRATORY (INHALATION)
Status: DISCONTINUED | OUTPATIENT
Start: 2019-08-02 | End: 2019-08-04 | Stop reason: HOSPADM

## 2019-08-02 RX ORDER — ACETAMINOPHEN 325 MG/1
650 TABLET ORAL EVERY 4 HOURS PRN
Status: DISCONTINUED | OUTPATIENT
Start: 2019-08-02 | End: 2019-08-04 | Stop reason: HOSPADM

## 2019-08-02 RX ORDER — SODIUM CHLORIDE 9 MG/ML
100 INJECTION, SOLUTION INTRAVENOUS CONTINUOUS
Status: DISCONTINUED | OUTPATIENT
Start: 2019-08-02 | End: 2019-08-03

## 2019-08-02 RX ADMIN — CEFAZOLIN SODIUM 1 G: 1 INJECTION, SOLUTION INTRAVENOUS at 00:46

## 2019-08-02 RX ADMIN — ACETAMINOPHEN 1000 MG: 500 TABLET, FILM COATED ORAL at 00:46

## 2019-08-02 RX ADMIN — HYDROCODONE BITARTRATE AND ACETAMINOPHEN 1 TABLET: 10; 325 TABLET ORAL at 18:21

## 2019-08-02 RX ADMIN — MONTELUKAST 10 MG: 10 TABLET, FILM COATED ORAL at 21:43

## 2019-08-02 RX ADMIN — CEFAZOLIN SODIUM 1 G: 1 INJECTION, SOLUTION INTRAVENOUS at 21:41

## 2019-08-02 RX ADMIN — FUROSEMIDE 20 MG: 20 TABLET ORAL at 08:47

## 2019-08-02 RX ADMIN — GABAPENTIN 600 MG: 300 CAPSULE ORAL at 05:23

## 2019-08-02 RX ADMIN — CEFAZOLIN SODIUM 1 G: 1 INJECTION, SOLUTION INTRAVENOUS at 13:00

## 2019-08-02 RX ADMIN — BUDESONIDE AND FORMOTEROL FUMARATE DIHYDRATE 2 PUFF: 160; 4.5 AEROSOL RESPIRATORY (INHALATION) at 09:12

## 2019-08-02 RX ADMIN — GABAPENTIN 600 MG: 300 CAPSULE ORAL at 14:24

## 2019-08-02 RX ADMIN — BUDESONIDE AND FORMOTEROL FUMARATE DIHYDRATE 2 PUFF: 160; 4.5 AEROSOL RESPIRATORY (INHALATION) at 21:22

## 2019-08-02 RX ADMIN — PAROXETINE 40 MG: 20 TABLET, FILM COATED ORAL at 07:08

## 2019-08-02 RX ADMIN — ENOXAPARIN SODIUM 40 MG: 40 INJECTION SUBCUTANEOUS at 21:42

## 2019-08-02 RX ADMIN — ONDANSETRON 4 MG: 2 INJECTION INTRAMUSCULAR; INTRAVENOUS at 00:46

## 2019-08-02 RX ADMIN — MORPHINE SULFATE 4 MG: 2 INJECTION, SOLUTION INTRAMUSCULAR; INTRAVENOUS at 00:46

## 2019-08-02 RX ADMIN — SODIUM CHLORIDE 100 ML/HR: 9 INJECTION, SOLUTION INTRAVENOUS at 15:27

## 2019-08-02 RX ADMIN — TIZANIDINE 4 MG: 4 TABLET ORAL at 09:52

## 2019-08-02 RX ADMIN — GABAPENTIN 600 MG: 300 CAPSULE ORAL at 21:43

## 2019-08-02 RX ADMIN — HYDROCODONE BITARTRATE AND ACETAMINOPHEN 1 TABLET: 10; 325 TABLET ORAL at 13:00

## 2019-08-02 RX ADMIN — BUPROPION HYDROCHLORIDE 150 MG: 150 TABLET, EXTENDED RELEASE ORAL at 08:47

## 2019-08-02 RX ADMIN — TIOTROPIUM BROMIDE INHALATION SPRAY 2 PUFF: 3.12 SPRAY, METERED RESPIRATORY (INHALATION) at 09:12

## 2019-08-02 RX ADMIN — CEFAZOLIN SODIUM 1 G: 1 INJECTION, SOLUTION INTRAVENOUS at 05:11

## 2019-08-02 RX ADMIN — SODIUM CHLORIDE 100 ML/HR: 9 INJECTION, SOLUTION INTRAVENOUS at 05:11

## 2019-08-02 RX ADMIN — TIZANIDINE 4 MG: 4 TABLET ORAL at 18:21

## 2019-08-02 RX ADMIN — HYDROCODONE BITARTRATE AND ACETAMINOPHEN 1 TABLET: 10; 325 TABLET ORAL at 05:23

## 2019-08-02 RX ADMIN — ENOXAPARIN SODIUM 40 MG: 40 INJECTION SUBCUTANEOUS at 08:47

## 2019-08-02 RX ADMIN — PANTOPRAZOLE SODIUM 40 MG: 40 TABLET, DELAYED RELEASE ORAL at 05:23

## 2019-08-03 LAB
ALBUMIN SERPL-MCNC: 3.4 G/DL (ref 3.5–5.2)
ANION GAP SERPL CALCULATED.3IONS-SCNC: 11 MMOL/L (ref 5–15)
BUN BLD-MCNC: 13 MG/DL (ref 6–20)
BUN/CREAT SERPL: 18.8 (ref 7–25)
CALCIUM SPEC-SCNC: 8.6 MG/DL (ref 8.6–10.5)
CHLORIDE SERPL-SCNC: 103 MMOL/L (ref 98–107)
CO2 SERPL-SCNC: 25 MMOL/L (ref 22–29)
CREAT BLD-MCNC: 0.69 MG/DL (ref 0.57–1)
DEPRECATED RDW RBC AUTO: 50 FL (ref 37–54)
ERYTHROCYTE [DISTWIDTH] IN BLOOD BY AUTOMATED COUNT: 15.7 % (ref 12.3–15.4)
GFR SERPL CREATININE-BSD FRML MDRD: 88 ML/MIN/1.73
GLUCOSE BLD-MCNC: 102 MG/DL (ref 65–99)
HCT VFR BLD AUTO: 35.3 % (ref 34–46.6)
HGB BLD-MCNC: 10.7 G/DL (ref 12–15.9)
MAGNESIUM SERPL-MCNC: 1.9 MG/DL (ref 1.6–2.6)
MCH RBC QN AUTO: 26.5 PG (ref 26.6–33)
MCHC RBC AUTO-ENTMCNC: 30.3 G/DL (ref 31.5–35.7)
MCV RBC AUTO: 87.4 FL (ref 79–97)
MRSA SPEC QL CULT: NORMAL
PHOSPHATE SERPL-MCNC: 3.4 MG/DL (ref 2.5–4.5)
PLATELET # BLD AUTO: 165 10*3/MM3 (ref 140–450)
PMV BLD AUTO: 12.1 FL (ref 6–12)
POTASSIUM BLD-SCNC: 3.8 MMOL/L (ref 3.5–5.2)
RBC # BLD AUTO: 4.04 10*6/MM3 (ref 3.77–5.28)
SODIUM BLD-SCNC: 139 MMOL/L (ref 136–145)
WBC NRBC COR # BLD: 5.13 10*3/MM3 (ref 3.4–10.8)

## 2019-08-03 PROCEDURE — 25010000002 ENOXAPARIN PER 10 MG: Performed by: HOSPITALIST

## 2019-08-03 PROCEDURE — 80069 RENAL FUNCTION PANEL: CPT | Performed by: HOSPITALIST

## 2019-08-03 PROCEDURE — 85027 COMPLETE CBC AUTOMATED: CPT | Performed by: HOSPITALIST

## 2019-08-03 PROCEDURE — 94799 UNLISTED PULMONARY SVC/PX: CPT

## 2019-08-03 PROCEDURE — 83735 ASSAY OF MAGNESIUM: CPT | Performed by: HOSPITALIST

## 2019-08-03 PROCEDURE — 96361 HYDRATE IV INFUSION ADD-ON: CPT

## 2019-08-03 PROCEDURE — 25010000003 CEFAZOLIN 1-4 GM/50ML-% SOLUTION: Performed by: HOSPITALIST

## 2019-08-03 PROCEDURE — 96372 THER/PROPH/DIAG INJ SC/IM: CPT

## 2019-08-03 RX ORDER — LEVETIRACETAM 500 MG/1
500 TABLET ORAL 3 TIMES DAILY
Status: DISCONTINUED | OUTPATIENT
Start: 2019-08-03 | End: 2019-08-04 | Stop reason: HOSPADM

## 2019-08-03 RX ORDER — CEPHALEXIN 500 MG/1
500 CAPSULE ORAL EVERY 8 HOURS SCHEDULED
Status: DISCONTINUED | OUTPATIENT
Start: 2019-08-03 | End: 2019-08-04 | Stop reason: HOSPADM

## 2019-08-03 RX ORDER — GABAPENTIN 400 MG/1
1200 CAPSULE ORAL EVERY 8 HOURS SCHEDULED
Status: DISCONTINUED | OUTPATIENT
Start: 2019-08-03 | End: 2019-08-04 | Stop reason: HOSPADM

## 2019-08-03 RX ADMIN — PAROXETINE 40 MG: 20 TABLET, FILM COATED ORAL at 06:22

## 2019-08-03 RX ADMIN — SODIUM CHLORIDE 100 ML/HR: 9 INJECTION, SOLUTION INTRAVENOUS at 02:22

## 2019-08-03 RX ADMIN — MONTELUKAST 10 MG: 10 TABLET, FILM COATED ORAL at 21:11

## 2019-08-03 RX ADMIN — TIZANIDINE 4 MG: 4 TABLET ORAL at 10:36

## 2019-08-03 RX ADMIN — GABAPENTIN 1200 MG: 400 CAPSULE ORAL at 14:18

## 2019-08-03 RX ADMIN — BUDESONIDE AND FORMOTEROL FUMARATE DIHYDRATE 2 PUFF: 160; 4.5 AEROSOL RESPIRATORY (INHALATION) at 20:48

## 2019-08-03 RX ADMIN — CEFAZOLIN SODIUM 1 G: 1 INJECTION, SOLUTION INTRAVENOUS at 05:39

## 2019-08-03 RX ADMIN — PANTOPRAZOLE SODIUM 40 MG: 40 TABLET, DELAYED RELEASE ORAL at 06:22

## 2019-08-03 RX ADMIN — LEVETIRACETAM 500 MG: 500 TABLET, FILM COATED ORAL at 16:40

## 2019-08-03 RX ADMIN — HYDROCODONE BITARTRATE AND ACETAMINOPHEN 1 TABLET: 10; 325 TABLET ORAL at 18:57

## 2019-08-03 RX ADMIN — SODIUM CHLORIDE, PRESERVATIVE FREE 3 ML: 5 INJECTION INTRAVENOUS at 21:11

## 2019-08-03 RX ADMIN — GABAPENTIN 1200 MG: 400 CAPSULE ORAL at 21:11

## 2019-08-03 RX ADMIN — LEVETIRACETAM 500 MG: 500 TABLET, FILM COATED ORAL at 21:11

## 2019-08-03 RX ADMIN — HYDROCODONE BITARTRATE AND ACETAMINOPHEN 1 TABLET: 10; 325 TABLET ORAL at 00:31

## 2019-08-03 RX ADMIN — FUROSEMIDE 20 MG: 20 TABLET ORAL at 09:04

## 2019-08-03 RX ADMIN — GABAPENTIN 600 MG: 300 CAPSULE ORAL at 06:23

## 2019-08-03 RX ADMIN — BUDESONIDE AND FORMOTEROL FUMARATE DIHYDRATE 2 PUFF: 160; 4.5 AEROSOL RESPIRATORY (INHALATION) at 08:21

## 2019-08-03 RX ADMIN — TIZANIDINE 4 MG: 4 TABLET ORAL at 02:22

## 2019-08-03 RX ADMIN — ENOXAPARIN SODIUM 40 MG: 40 INJECTION SUBCUTANEOUS at 21:12

## 2019-08-03 RX ADMIN — BUPROPION HYDROCHLORIDE 150 MG: 150 TABLET, EXTENDED RELEASE ORAL at 09:03

## 2019-08-03 RX ADMIN — CEPHALEXIN 500 MG: 500 CAPSULE ORAL at 21:11

## 2019-08-03 RX ADMIN — CEPHALEXIN 500 MG: 500 CAPSULE ORAL at 14:18

## 2019-08-03 RX ADMIN — TIZANIDINE 4 MG: 4 TABLET ORAL at 18:56

## 2019-08-03 RX ADMIN — HYDROCODONE BITARTRATE AND ACETAMINOPHEN 1 TABLET: 10; 325 TABLET ORAL at 06:22

## 2019-08-03 RX ADMIN — ENOXAPARIN SODIUM 40 MG: 40 INJECTION SUBCUTANEOUS at 09:04

## 2019-08-03 RX ADMIN — LEVETIRACETAM 500 MG: 500 TABLET, FILM COATED ORAL at 10:36

## 2019-08-03 RX ADMIN — TIOTROPIUM BROMIDE INHALATION SPRAY 2 PUFF: 3.12 SPRAY, METERED RESPIRATORY (INHALATION) at 08:22

## 2019-08-03 RX ADMIN — HYDROCODONE BITARTRATE AND ACETAMINOPHEN 1 TABLET: 10; 325 TABLET ORAL at 12:38

## 2019-08-04 VITALS
WEIGHT: 272.4 LBS | HEIGHT: 67 IN | HEART RATE: 77 BPM | RESPIRATION RATE: 16 BRPM | TEMPERATURE: 98.6 F | DIASTOLIC BLOOD PRESSURE: 83 MMHG | SYSTOLIC BLOOD PRESSURE: 128 MMHG | BODY MASS INDEX: 42.75 KG/M2 | OXYGEN SATURATION: 93 %

## 2019-08-04 PROCEDURE — G0378 HOSPITAL OBSERVATION PER HR: HCPCS

## 2019-08-04 PROCEDURE — 25010000002 ENOXAPARIN PER 10 MG: Performed by: HOSPITALIST

## 2019-08-04 PROCEDURE — 94799 UNLISTED PULMONARY SVC/PX: CPT

## 2019-08-04 PROCEDURE — 96372 THER/PROPH/DIAG INJ SC/IM: CPT

## 2019-08-04 RX ORDER — CEPHALEXIN 500 MG/1
500 CAPSULE ORAL EVERY 8 HOURS SCHEDULED
Qty: 18 CAPSULE | Refills: 0 | Status: SHIPPED | OUTPATIENT
Start: 2019-08-04 | End: 2019-08-10

## 2019-08-04 RX ADMIN — FUROSEMIDE 20 MG: 20 TABLET ORAL at 09:32

## 2019-08-04 RX ADMIN — CEPHALEXIN 500 MG: 500 CAPSULE ORAL at 13:38

## 2019-08-04 RX ADMIN — CEPHALEXIN 500 MG: 500 CAPSULE ORAL at 06:03

## 2019-08-04 RX ADMIN — BUPROPION HYDROCHLORIDE 150 MG: 150 TABLET, EXTENDED RELEASE ORAL at 09:32

## 2019-08-04 RX ADMIN — GABAPENTIN 1200 MG: 400 CAPSULE ORAL at 06:03

## 2019-08-04 RX ADMIN — LEVETIRACETAM 500 MG: 500 TABLET, FILM COATED ORAL at 09:32

## 2019-08-04 RX ADMIN — HYDROCODONE BITARTRATE AND ACETAMINOPHEN 1 TABLET: 10; 325 TABLET ORAL at 09:32

## 2019-08-04 RX ADMIN — HYDROCODONE BITARTRATE AND ACETAMINOPHEN 1 TABLET: 10; 325 TABLET ORAL at 01:17

## 2019-08-04 RX ADMIN — PANTOPRAZOLE SODIUM 40 MG: 40 TABLET, DELAYED RELEASE ORAL at 06:03

## 2019-08-04 RX ADMIN — GABAPENTIN 1200 MG: 400 CAPSULE ORAL at 13:38

## 2019-08-04 RX ADMIN — ENOXAPARIN SODIUM 40 MG: 40 INJECTION SUBCUTANEOUS at 09:31

## 2019-08-04 RX ADMIN — TIOTROPIUM BROMIDE INHALATION SPRAY 2 PUFF: 3.12 SPRAY, METERED RESPIRATORY (INHALATION) at 11:18

## 2019-08-04 RX ADMIN — BUDESONIDE AND FORMOTEROL FUMARATE DIHYDRATE 2 PUFF: 160; 4.5 AEROSOL RESPIRATORY (INHALATION) at 11:18

## 2019-08-04 RX ADMIN — TIZANIDINE 4 MG: 4 TABLET ORAL at 11:52

## 2019-08-04 RX ADMIN — TIZANIDINE 4 MG: 4 TABLET ORAL at 03:23

## 2019-08-04 RX ADMIN — PAROXETINE 40 MG: 20 TABLET, FILM COATED ORAL at 06:03

## 2019-08-05 ENCOUNTER — READMISSION MANAGEMENT (OUTPATIENT)
Dept: CALL CENTER | Facility: HOSPITAL | Age: 54
End: 2019-08-05

## 2019-08-05 RX ORDER — TIZANIDINE 4 MG/1
TABLET ORAL
Qty: 90 TABLET | Refills: 1 | Status: SHIPPED | OUTPATIENT
Start: 2019-08-05 | End: 2019-08-20

## 2019-08-06 ENCOUNTER — READMISSION MANAGEMENT (OUTPATIENT)
Dept: CALL CENTER | Facility: HOSPITAL | Age: 54
End: 2019-08-06

## 2019-08-07 ENCOUNTER — TELEPHONE (OUTPATIENT)
Dept: PAIN MEDICINE | Facility: CLINIC | Age: 54
End: 2019-08-07

## 2019-08-07 ENCOUNTER — READMISSION MANAGEMENT (OUTPATIENT)
Dept: CALL CENTER | Facility: HOSPITAL | Age: 54
End: 2019-08-07

## 2019-08-07 LAB
BACTERIA SPEC AEROBE CULT: NORMAL
BACTERIA SPEC AEROBE CULT: NORMAL

## 2019-08-07 RX ORDER — HYDROCODONE BITARTRATE AND ACETAMINOPHEN 10; 325 MG/1; MG/1
1 TABLET ORAL EVERY 6 HOURS PRN
Qty: 60 TABLET | Refills: 0 | Status: SHIPPED | OUTPATIENT
Start: 2019-08-07 | End: 2019-08-26 | Stop reason: SDUPTHER

## 2019-08-07 RX ORDER — GABAPENTIN 600 MG/1
1200 TABLET ORAL 3 TIMES DAILY
Qty: 180 TABLET | Refills: 0 | Status: SHIPPED | OUTPATIENT
Start: 2019-08-07 | End: 2019-09-08 | Stop reason: SDUPTHER

## 2019-08-12 ENCOUNTER — READMISSION MANAGEMENT (OUTPATIENT)
Dept: CALL CENTER | Facility: HOSPITAL | Age: 54
End: 2019-08-12

## 2019-08-20 ENCOUNTER — APPOINTMENT (OUTPATIENT)
Dept: GENERAL RADIOLOGY | Facility: HOSPITAL | Age: 54
End: 2019-08-20

## 2019-08-20 ENCOUNTER — HOSPITAL ENCOUNTER (INPATIENT)
Facility: HOSPITAL | Age: 54
LOS: 4 days | Discharge: HOME | End: 2019-08-24
Attending: EMERGENCY MEDICINE | Admitting: HOSPITALIST

## 2019-08-20 ENCOUNTER — TELEPHONE (OUTPATIENT)
Dept: PAIN MEDICINE | Facility: CLINIC | Age: 54
End: 2019-08-20

## 2019-08-20 DIAGNOSIS — J18.9 HCAP (HEALTHCARE-ASSOCIATED PNEUMONIA): Primary | ICD-10-CM

## 2019-08-20 DIAGNOSIS — N39.0 ACUTE UTI: ICD-10-CM

## 2019-08-20 PROBLEM — E87.6 HYPOKALEMIA: Status: ACTIVE | Noted: 2019-08-20

## 2019-08-20 PROBLEM — E66.01 MORBID OBESITY WITH BMI OF 40.0-44.9, ADULT (HCC): Status: ACTIVE | Noted: 2019-08-20

## 2019-08-20 LAB
ALBUMIN SERPL-MCNC: 4.3 G/DL (ref 3.5–5.2)
ALBUMIN/GLOB SERPL: 1.3 G/DL
ALP SERPL-CCNC: 66 U/L (ref 39–117)
ALT SERPL W P-5'-P-CCNC: 26 U/L (ref 1–33)
ANION GAP SERPL CALCULATED.3IONS-SCNC: 13 MMOL/L (ref 5–15)
AST SERPL-CCNC: 19 U/L (ref 1–32)
BACTERIA UR QL AUTO: ABNORMAL /HPF
BASOPHILS # BLD AUTO: 0.05 10*3/MM3 (ref 0–0.2)
BASOPHILS NFR BLD AUTO: 0.3 % (ref 0–1.5)
BILIRUB SERPL-MCNC: 1.2 MG/DL (ref 0.2–1.2)
BILIRUB UR QL STRIP: NEGATIVE
BUN BLD-MCNC: 15 MG/DL (ref 6–20)
BUN/CREAT SERPL: 14.7 (ref 7–25)
CALCIUM SPEC-SCNC: 9.1 MG/DL (ref 8.6–10.5)
CHLORIDE SERPL-SCNC: 100 MMOL/L (ref 98–107)
CLARITY UR: CLEAR
CO2 SERPL-SCNC: 27 MMOL/L (ref 22–29)
COLOR UR: ABNORMAL
CREAT BLD-MCNC: 1.02 MG/DL (ref 0.57–1)
D-LACTATE SERPL-SCNC: 0.7 MMOL/L (ref 0.5–2)
DEPRECATED RDW RBC AUTO: 48.2 FL (ref 37–54)
EOSINOPHIL # BLD AUTO: 0.01 10*3/MM3 (ref 0–0.4)
EOSINOPHIL NFR BLD AUTO: 0.1 % (ref 0.3–6.2)
ERYTHROCYTE [DISTWIDTH] IN BLOOD BY AUTOMATED COUNT: 15.3 % (ref 12.3–15.4)
GFR SERPL CREATININE-BSD FRML MDRD: 56 ML/MIN/1.73
GLOBULIN UR ELPH-MCNC: 3.3 GM/DL
GLUCOSE BLD-MCNC: 114 MG/DL (ref 65–99)
GLUCOSE UR STRIP-MCNC: NEGATIVE MG/DL
HCT VFR BLD AUTO: 39.8 % (ref 34–46.6)
HGB BLD-MCNC: 12.2 G/DL (ref 12–15.9)
HGB UR QL STRIP.AUTO: ABNORMAL
HYALINE CASTS UR QL AUTO: ABNORMAL /LPF
IMM GRANULOCYTES # BLD AUTO: 0.09 10*3/MM3 (ref 0–0.05)
IMM GRANULOCYTES NFR BLD AUTO: 0.6 % (ref 0–0.5)
KETONES UR QL STRIP: ABNORMAL
LEUKOCYTE ESTERASE UR QL STRIP.AUTO: ABNORMAL
LYMPHOCYTES # BLD AUTO: 0.6 10*3/MM3 (ref 0.7–3.1)
LYMPHOCYTES NFR BLD AUTO: 3.9 % (ref 19.6–45.3)
MCH RBC QN AUTO: 26.3 PG (ref 26.6–33)
MCHC RBC AUTO-ENTMCNC: 30.7 G/DL (ref 31.5–35.7)
MCV RBC AUTO: 85.8 FL (ref 79–97)
MONOCYTES # BLD AUTO: 1.99 10*3/MM3 (ref 0.1–0.9)
MONOCYTES NFR BLD AUTO: 12.9 % (ref 5–12)
NEUTROPHILS # BLD AUTO: 12.63 10*3/MM3 (ref 1.7–7)
NEUTROPHILS NFR BLD AUTO: 82.2 % (ref 42.7–76)
NITRITE UR QL STRIP: POSITIVE
NRBC BLD AUTO-RTO: 0 /100 WBC (ref 0–0.2)
PH UR STRIP.AUTO: 6.5 [PH] (ref 5–8)
PLATELET # BLD AUTO: 183 10*3/MM3 (ref 140–450)
PMV BLD AUTO: 11.7 FL (ref 6–12)
POTASSIUM BLD-SCNC: 3.4 MMOL/L (ref 3.5–5.2)
PROT SERPL-MCNC: 7.6 G/DL (ref 6–8.5)
PROT UR QL STRIP: ABNORMAL
RBC # BLD AUTO: 4.64 10*6/MM3 (ref 3.77–5.28)
RBC # UR: ABNORMAL /HPF
REF LAB TEST METHOD: ABNORMAL
SODIUM BLD-SCNC: 140 MMOL/L (ref 136–145)
SP GR UR STRIP: 1.02 (ref 1–1.03)
SQUAMOUS #/AREA URNS HPF: ABNORMAL /HPF
UROBILINOGEN UR QL STRIP: ABNORMAL
WBC NRBC COR # BLD: 15.37 10*3/MM3 (ref 3.4–10.8)
WBC UR QL AUTO: ABNORMAL /HPF

## 2019-08-20 PROCEDURE — 36415 COLL VENOUS BLD VENIPUNCTURE: CPT

## 2019-08-20 PROCEDURE — 99285 EMERGENCY DEPT VISIT HI MDM: CPT

## 2019-08-20 PROCEDURE — 83605 ASSAY OF LACTIC ACID: CPT | Performed by: EMERGENCY MEDICINE

## 2019-08-20 PROCEDURE — 25010000002 ONDANSETRON PER 1 MG: Performed by: EMERGENCY MEDICINE

## 2019-08-20 PROCEDURE — 87186 SC STD MICRODIL/AGAR DIL: CPT | Performed by: EMERGENCY MEDICINE

## 2019-08-20 PROCEDURE — 25010000002 CEFEPIME PER 500 MG: Performed by: EMERGENCY MEDICINE

## 2019-08-20 PROCEDURE — 87899 AGENT NOS ASSAY W/OPTIC: CPT | Performed by: HOSPITALIST

## 2019-08-20 PROCEDURE — 25010000002 VANCOMYCIN 10 G RECONSTITUTED SOLUTION: Performed by: EMERGENCY MEDICINE

## 2019-08-20 PROCEDURE — 25010000002 HYDROMORPHONE PER 4 MG: Performed by: EMERGENCY MEDICINE

## 2019-08-20 PROCEDURE — 25010000002 CEFTRIAXONE PER 250 MG: Performed by: EMERGENCY MEDICINE

## 2019-08-20 PROCEDURE — 87086 URINE CULTURE/COLONY COUNT: CPT | Performed by: EMERGENCY MEDICINE

## 2019-08-20 PROCEDURE — 85025 COMPLETE CBC W/AUTO DIFF WBC: CPT | Performed by: EMERGENCY MEDICINE

## 2019-08-20 PROCEDURE — 80053 COMPREHEN METABOLIC PANEL: CPT | Performed by: EMERGENCY MEDICINE

## 2019-08-20 PROCEDURE — 87088 URINE BACTERIA CULTURE: CPT | Performed by: EMERGENCY MEDICINE

## 2019-08-20 PROCEDURE — 71046 X-RAY EXAM CHEST 2 VIEWS: CPT

## 2019-08-20 PROCEDURE — 81001 URINALYSIS AUTO W/SCOPE: CPT | Performed by: EMERGENCY MEDICINE

## 2019-08-20 PROCEDURE — 87040 BLOOD CULTURE FOR BACTERIA: CPT | Performed by: EMERGENCY MEDICINE

## 2019-08-20 RX ORDER — BUPROPION HYDROCHLORIDE 300 MG/1
300 TABLET ORAL DAILY
Status: DISCONTINUED | OUTPATIENT
Start: 2019-08-21 | End: 2019-08-24 | Stop reason: HOSPADM

## 2019-08-20 RX ORDER — ACETAMINOPHEN 325 MG/1
650 TABLET ORAL EVERY 4 HOURS PRN
Status: DISCONTINUED | OUTPATIENT
Start: 2019-08-20 | End: 2019-08-24 | Stop reason: HOSPADM

## 2019-08-20 RX ORDER — LEVETIRACETAM 500 MG/1
500 TABLET ORAL 2 TIMES DAILY
COMMUNITY
End: 2019-12-17 | Stop reason: SDUPTHER

## 2019-08-20 RX ORDER — HYDROMORPHONE HYDROCHLORIDE 1 MG/ML
0.5 INJECTION, SOLUTION INTRAMUSCULAR; INTRAVENOUS; SUBCUTANEOUS ONCE
Status: COMPLETED | OUTPATIENT
Start: 2019-08-20 | End: 2019-08-20

## 2019-08-20 RX ORDER — ALBUTEROL SULFATE 90 UG/1
2 AEROSOL, METERED RESPIRATORY (INHALATION) 4 TIMES DAILY PRN
COMMUNITY
End: 2019-12-24 | Stop reason: HOSPADM

## 2019-08-20 RX ORDER — BUDESONIDE AND FORMOTEROL FUMARATE DIHYDRATE 160; 4.5 UG/1; UG/1
2 AEROSOL RESPIRATORY (INHALATION)
Status: DISCONTINUED | OUTPATIENT
Start: 2019-08-20 | End: 2019-08-24 | Stop reason: HOSPADM

## 2019-08-20 RX ORDER — TIZANIDINE 4 MG/1
4 TABLET ORAL EVERY 8 HOURS PRN
Status: DISCONTINUED | OUTPATIENT
Start: 2019-08-20 | End: 2019-08-24 | Stop reason: HOSPADM

## 2019-08-20 RX ORDER — ACETAMINOPHEN 650 MG/1
650 SUPPOSITORY RECTAL EVERY 4 HOURS PRN
Status: DISCONTINUED | OUTPATIENT
Start: 2019-08-20 | End: 2019-08-24 | Stop reason: HOSPADM

## 2019-08-20 RX ORDER — PANTOPRAZOLE SODIUM 40 MG/1
40 TABLET, DELAYED RELEASE ORAL
Status: DISCONTINUED | OUTPATIENT
Start: 2019-08-21 | End: 2019-08-24 | Stop reason: HOSPADM

## 2019-08-20 RX ORDER — MONTELUKAST SODIUM 10 MG/1
10 TABLET ORAL NIGHTLY
Status: DISCONTINUED | OUTPATIENT
Start: 2019-08-20 | End: 2019-08-24 | Stop reason: HOSPADM

## 2019-08-20 RX ORDER — LEVETIRACETAM 500 MG/1
500 TABLET ORAL EVERY 12 HOURS SCHEDULED
Status: DISCONTINUED | OUTPATIENT
Start: 2019-08-20 | End: 2019-08-24 | Stop reason: HOSPADM

## 2019-08-20 RX ORDER — ACETAMINOPHEN 160 MG/5ML
650 SOLUTION ORAL EVERY 4 HOURS PRN
Status: DISCONTINUED | OUTPATIENT
Start: 2019-08-20 | End: 2019-08-24 | Stop reason: HOSPADM

## 2019-08-20 RX ORDER — CEFTRIAXONE SODIUM 1 G/50ML
1 INJECTION, SOLUTION INTRAVENOUS ONCE
Status: COMPLETED | OUTPATIENT
Start: 2019-08-20 | End: 2019-08-20

## 2019-08-20 RX ORDER — IPRATROPIUM BROMIDE AND ALBUTEROL SULFATE 2.5; .5 MG/3ML; MG/3ML
3 SOLUTION RESPIRATORY (INHALATION) EVERY 4 HOURS PRN
Status: DISCONTINUED | OUTPATIENT
Start: 2019-08-20 | End: 2019-08-21

## 2019-08-20 RX ORDER — GABAPENTIN 300 MG/1
600 CAPSULE ORAL EVERY 8 HOURS SCHEDULED
Status: DISCONTINUED | OUTPATIENT
Start: 2019-08-20 | End: 2019-08-24 | Stop reason: HOSPADM

## 2019-08-20 RX ORDER — HYDROCODONE BITARTRATE AND ACETAMINOPHEN 10; 325 MG/1; MG/1
1 TABLET ORAL EVERY 6 HOURS PRN
Status: DISCONTINUED | OUTPATIENT
Start: 2019-08-20 | End: 2019-08-21

## 2019-08-20 RX ORDER — TIZANIDINE 4 MG/1
4 TABLET ORAL EVERY 8 HOURS PRN
Status: ON HOLD | COMMUNITY
End: 2019-12-22

## 2019-08-20 RX ORDER — FLUTICASONE PROPIONATE 50 MCG
1 SPRAY, SUSPENSION (ML) NASAL DAILY
Status: DISCONTINUED | OUTPATIENT
Start: 2019-08-21 | End: 2019-08-24 | Stop reason: HOSPADM

## 2019-08-20 RX ORDER — ALBUTEROL SULFATE 2.5 MG/3ML
2.5 SOLUTION RESPIRATORY (INHALATION) EVERY 6 HOURS PRN
Status: DISCONTINUED | OUTPATIENT
Start: 2019-08-20 | End: 2019-08-21

## 2019-08-20 RX ORDER — CETIRIZINE HYDROCHLORIDE 10 MG/1
10 TABLET ORAL DAILY
Status: DISCONTINUED | OUTPATIENT
Start: 2019-08-21 | End: 2019-08-24 | Stop reason: HOSPADM

## 2019-08-20 RX ORDER — PAROXETINE HYDROCHLORIDE 20 MG/1
40 TABLET, FILM COATED ORAL EVERY MORNING
Status: DISCONTINUED | OUTPATIENT
Start: 2019-08-21 | End: 2019-08-24 | Stop reason: HOSPADM

## 2019-08-20 RX ORDER — SODIUM CHLORIDE 0.9 % (FLUSH) 0.9 %
10 SYRINGE (ML) INJECTION AS NEEDED
Status: DISCONTINUED | OUTPATIENT
Start: 2019-08-20 | End: 2019-08-24 | Stop reason: HOSPADM

## 2019-08-20 RX ORDER — ONDANSETRON 2 MG/ML
4 INJECTION INTRAMUSCULAR; INTRAVENOUS ONCE
Status: COMPLETED | OUTPATIENT
Start: 2019-08-20 | End: 2019-08-20

## 2019-08-20 RX ADMIN — CEFEPIME HYDROCHLORIDE 2 G: 2 INJECTION, POWDER, FOR SOLUTION INTRAVENOUS at 20:52

## 2019-08-20 RX ADMIN — ONDANSETRON 4 MG: 2 INJECTION INTRAMUSCULAR; INTRAVENOUS at 19:35

## 2019-08-20 RX ADMIN — VANCOMYCIN HYDROCHLORIDE 2500 MG: 10 INJECTION, POWDER, LYOPHILIZED, FOR SOLUTION INTRAVENOUS at 21:35

## 2019-08-20 RX ADMIN — HYDROMORPHONE HYDROCHLORIDE 0.5 MG: 1 INJECTION, SOLUTION INTRAMUSCULAR; INTRAVENOUS; SUBCUTANEOUS at 19:35

## 2019-08-20 RX ADMIN — CEFTRIAXONE SODIUM 1 G: 1 INJECTION, SOLUTION INTRAVENOUS at 19:58

## 2019-08-21 ENCOUNTER — APPOINTMENT (OUTPATIENT)
Dept: CT IMAGING | Facility: HOSPITAL | Age: 54
End: 2019-08-21

## 2019-08-21 LAB
ANION GAP SERPL CALCULATED.3IONS-SCNC: 14.6 MMOL/L (ref 5–15)
B PARAPERT DNA SPEC QL NAA+PROBE: NOT DETECTED
B PERT DNA SPEC QL NAA+PROBE: NOT DETECTED
BUN BLD-MCNC: 13 MG/DL (ref 6–20)
BUN/CREAT SERPL: 12.7 (ref 7–25)
C PNEUM DNA NPH QL NAA+NON-PROBE: NOT DETECTED
CALCIUM SPEC-SCNC: 8.9 MG/DL (ref 8.6–10.5)
CHLORIDE SERPL-SCNC: 97 MMOL/L (ref 98–107)
CO2 SERPL-SCNC: 24.4 MMOL/L (ref 22–29)
CREAT BLD-MCNC: 1.02 MG/DL (ref 0.57–1)
DEPRECATED RDW RBC AUTO: 49.1 FL (ref 37–54)
ERYTHROCYTE [DISTWIDTH] IN BLOOD BY AUTOMATED COUNT: 15.2 % (ref 12.3–15.4)
FLUAV H1 2009 PAND RNA NPH QL NAA+PROBE: NOT DETECTED
FLUAV H1 HA GENE NPH QL NAA+PROBE: NOT DETECTED
FLUAV H3 RNA NPH QL NAA+PROBE: NOT DETECTED
FLUAV SUBTYP SPEC NAA+PROBE: NOT DETECTED
FLUBV RNA ISLT QL NAA+PROBE: NOT DETECTED
GFR SERPL CREATININE-BSD FRML MDRD: 56 ML/MIN/1.73
GLUCOSE BLD-MCNC: 154 MG/DL (ref 65–99)
HADV DNA SPEC NAA+PROBE: NOT DETECTED
HCOV 229E RNA SPEC QL NAA+PROBE: NOT DETECTED
HCOV HKU1 RNA SPEC QL NAA+PROBE: NOT DETECTED
HCOV NL63 RNA SPEC QL NAA+PROBE: NOT DETECTED
HCOV OC43 RNA SPEC QL NAA+PROBE: NOT DETECTED
HCT VFR BLD AUTO: 36 % (ref 34–46.6)
HGB BLD-MCNC: 11 G/DL (ref 12–15.9)
HMPV RNA NPH QL NAA+NON-PROBE: NOT DETECTED
HPIV1 RNA SPEC QL NAA+PROBE: NOT DETECTED
HPIV2 RNA SPEC QL NAA+PROBE: NOT DETECTED
HPIV3 RNA NPH QL NAA+PROBE: NOT DETECTED
HPIV4 P GENE NPH QL NAA+PROBE: NOT DETECTED
L PNEUMO1 AG UR QL IA: NEGATIVE
M PNEUMO IGG SER IA-ACNC: NOT DETECTED
MCH RBC QN AUTO: 27 PG (ref 26.6–33)
MCHC RBC AUTO-ENTMCNC: 30.6 G/DL (ref 31.5–35.7)
MCV RBC AUTO: 88.2 FL (ref 79–97)
PLATELET # BLD AUTO: 172 10*3/MM3 (ref 140–450)
PMV BLD AUTO: 12.1 FL (ref 6–12)
POTASSIUM BLD-SCNC: 3.3 MMOL/L (ref 3.5–5.2)
POTASSIUM BLD-SCNC: 3.7 MMOL/L (ref 3.5–5.2)
PROCALCITONIN SERPL-MCNC: 0.53 NG/ML (ref 0.1–0.25)
RBC # BLD AUTO: 4.08 10*6/MM3 (ref 3.77–5.28)
RHINOVIRUS RNA SPEC NAA+PROBE: NOT DETECTED
RSV RNA NPH QL NAA+NON-PROBE: NOT DETECTED
S PNEUM AG SPEC QL LA: NEGATIVE
SODIUM BLD-SCNC: 136 MMOL/L (ref 136–145)
WBC NRBC COR # BLD: 14.46 10*3/MM3 (ref 3.4–10.8)

## 2019-08-21 PROCEDURE — 25010000002 ENOXAPARIN PER 10 MG: Performed by: HOSPITALIST

## 2019-08-21 PROCEDURE — 84132 ASSAY OF SERUM POTASSIUM: CPT | Performed by: INTERNAL MEDICINE

## 2019-08-21 PROCEDURE — 87070 CULTURE OTHR SPECIMN AEROBIC: CPT | Performed by: HOSPITALIST

## 2019-08-21 PROCEDURE — 80048 BASIC METABOLIC PNL TOTAL CA: CPT | Performed by: HOSPITALIST

## 2019-08-21 PROCEDURE — 25010000002 CEFEPIME PER 500 MG: Performed by: HOSPITALIST

## 2019-08-21 PROCEDURE — 0100U HC BIOFIRE FILMARRAY RESP PANEL 2: CPT | Performed by: HOSPITALIST

## 2019-08-21 PROCEDURE — 87081 CULTURE SCREEN ONLY: CPT | Performed by: HOSPITALIST

## 2019-08-21 PROCEDURE — 94799 UNLISTED PULMONARY SVC/PX: CPT

## 2019-08-21 PROCEDURE — 87205 SMEAR GRAM STAIN: CPT | Performed by: HOSPITALIST

## 2019-08-21 PROCEDURE — 85027 COMPLETE CBC AUTOMATED: CPT | Performed by: HOSPITALIST

## 2019-08-21 PROCEDURE — 84145 PROCALCITONIN (PCT): CPT | Performed by: INTERNAL MEDICINE

## 2019-08-21 PROCEDURE — 94640 AIRWAY INHALATION TREATMENT: CPT

## 2019-08-21 PROCEDURE — 25010000002 VANCOMYCIN 10 G RECONSTITUTED SOLUTION: Performed by: HOSPITALIST

## 2019-08-21 PROCEDURE — 74176 CT ABD & PELVIS W/O CONTRAST: CPT

## 2019-08-21 RX ORDER — POTASSIUM CHLORIDE 750 MG/1
40 CAPSULE, EXTENDED RELEASE ORAL AS NEEDED
Status: DISCONTINUED | OUTPATIENT
Start: 2019-08-21 | End: 2019-08-24 | Stop reason: HOSPADM

## 2019-08-21 RX ORDER — POTASSIUM CHLORIDE 7.45 MG/ML
10 INJECTION INTRAVENOUS
Status: DISCONTINUED | OUTPATIENT
Start: 2019-08-21 | End: 2019-08-24 | Stop reason: HOSPADM

## 2019-08-21 RX ORDER — POTASSIUM CHLORIDE 1.5 G/1.77G
40 POWDER, FOR SOLUTION ORAL AS NEEDED
Status: DISCONTINUED | OUTPATIENT
Start: 2019-08-21 | End: 2019-08-24 | Stop reason: HOSPADM

## 2019-08-21 RX ORDER — BUTALBITAL, ACETAMINOPHEN AND CAFFEINE 50; 325; 40 MG/1; MG/1; MG/1
1 TABLET ORAL EVERY 4 HOURS PRN
Status: DISCONTINUED | OUTPATIENT
Start: 2019-08-21 | End: 2019-08-22

## 2019-08-21 RX ORDER — HYDROCODONE BITARTRATE AND ACETAMINOPHEN 10; 325 MG/1; MG/1
1 TABLET ORAL EVERY 4 HOURS PRN
Status: DISCONTINUED | OUTPATIENT
Start: 2019-08-21 | End: 2019-08-22

## 2019-08-21 RX ORDER — IPRATROPIUM BROMIDE AND ALBUTEROL SULFATE 2.5; .5 MG/3ML; MG/3ML
3 SOLUTION RESPIRATORY (INHALATION) EVERY 6 HOURS PRN
Status: DISCONTINUED | OUTPATIENT
Start: 2019-08-21 | End: 2019-08-24 | Stop reason: HOSPADM

## 2019-08-21 RX ADMIN — PANTOPRAZOLE SODIUM 40 MG: 40 TABLET, DELAYED RELEASE ORAL at 06:12

## 2019-08-21 RX ADMIN — MONTELUKAST SODIUM 10 MG: 10 TABLET, FILM COATED ORAL at 00:47

## 2019-08-21 RX ADMIN — CEFEPIME HYDROCHLORIDE 2 G: 2 INJECTION, POWDER, FOR SOLUTION INTRAVENOUS at 20:07

## 2019-08-21 RX ADMIN — BUDESONIDE AND FORMOTEROL FUMARATE DIHYDRATE 2 PUFF: 160; 4.5 AEROSOL RESPIRATORY (INHALATION) at 08:40

## 2019-08-21 RX ADMIN — POTASSIUM CHLORIDE 40 MEQ: 750 CAPSULE, EXTENDED RELEASE ORAL at 10:15

## 2019-08-21 RX ADMIN — MONTELUKAST SODIUM 10 MG: 10 TABLET, FILM COATED ORAL at 20:07

## 2019-08-21 RX ADMIN — ENOXAPARIN SODIUM 40 MG: 80 INJECTION SUBCUTANEOUS at 00:46

## 2019-08-21 RX ADMIN — BUTALBITAL, ACETAMINOPHEN, AND CAFFEINE 1 TABLET: 50; 325; 40 TABLET ORAL at 23:30

## 2019-08-21 RX ADMIN — BUTALBITAL, ACETAMINOPHEN, AND CAFFEINE 1 TABLET: 50; 325; 40 TABLET ORAL at 19:15

## 2019-08-21 RX ADMIN — POTASSIUM CHLORIDE 40 MEQ: 750 CAPSULE, EXTENDED RELEASE ORAL at 14:56

## 2019-08-21 RX ADMIN — CETIRIZINE HYDROCHLORIDE 10 MG: 10 TABLET, FILM COATED ORAL at 08:54

## 2019-08-21 RX ADMIN — HYDROCODONE BITARTRATE AND ACETAMINOPHEN 1 TABLET: 10; 325 TABLET ORAL at 19:26

## 2019-08-21 RX ADMIN — LEVETIRACETAM 500 MG: 500 TABLET, FILM COATED ORAL at 00:47

## 2019-08-21 RX ADMIN — HYDROCODONE BITARTRATE AND ACETAMINOPHEN 1 TABLET: 10; 325 TABLET ORAL at 00:47

## 2019-08-21 RX ADMIN — HYDROCODONE BITARTRATE AND ACETAMINOPHEN 1 TABLET: 10; 325 TABLET ORAL at 14:56

## 2019-08-21 RX ADMIN — IPRATROPIUM BROMIDE AND ALBUTEROL SULFATE 3 ML: 2.5; .5 SOLUTION RESPIRATORY (INHALATION) at 11:15

## 2019-08-21 RX ADMIN — GABAPENTIN 600 MG: 300 CAPSULE ORAL at 13:41

## 2019-08-21 RX ADMIN — TIOTROPIUM BROMIDE INHALATION SPRAY 2 PUFF: 3.12 SPRAY, METERED RESPIRATORY (INHALATION) at 08:40

## 2019-08-21 RX ADMIN — HYDROCODONE BITARTRATE AND ACETAMINOPHEN 1 TABLET: 10; 325 TABLET ORAL at 23:31

## 2019-08-21 RX ADMIN — BUDESONIDE AND FORMOTEROL FUMARATE DIHYDRATE 2 PUFF: 160; 4.5 AEROSOL RESPIRATORY (INHALATION) at 20:35

## 2019-08-21 RX ADMIN — GABAPENTIN 600 MG: 300 CAPSULE ORAL at 00:47

## 2019-08-21 RX ADMIN — ENOXAPARIN SODIUM 40 MG: 80 INJECTION SUBCUTANEOUS at 20:07

## 2019-08-21 RX ADMIN — VANCOMYCIN HYDROCHLORIDE 2000 MG: 10 INJECTION, POWDER, LYOPHILIZED, FOR SOLUTION INTRAVENOUS at 07:37

## 2019-08-21 RX ADMIN — LEVETIRACETAM 500 MG: 500 TABLET, FILM COATED ORAL at 20:08

## 2019-08-21 RX ADMIN — LEVETIRACETAM 500 MG: 500 TABLET, FILM COATED ORAL at 08:54

## 2019-08-21 RX ADMIN — PAROXETINE 40 MG: 20 TABLET, FILM COATED ORAL at 06:44

## 2019-08-21 RX ADMIN — HYDROCODONE BITARTRATE AND ACETAMINOPHEN 1 TABLET: 10; 325 TABLET ORAL at 06:12

## 2019-08-21 RX ADMIN — ACETAMINOPHEN 650 MG: 325 TABLET, FILM COATED ORAL at 04:06

## 2019-08-21 RX ADMIN — ENOXAPARIN SODIUM 40 MG: 80 INJECTION SUBCUTANEOUS at 08:54

## 2019-08-21 RX ADMIN — CEFEPIME HYDROCHLORIDE 2 G: 2 INJECTION, POWDER, FOR SOLUTION INTRAVENOUS at 13:41

## 2019-08-21 RX ADMIN — FLUTICASONE PROPIONATE 1 SPRAY: 50 SPRAY, METERED NASAL at 08:54

## 2019-08-21 RX ADMIN — GABAPENTIN 600 MG: 300 CAPSULE ORAL at 21:21

## 2019-08-21 RX ADMIN — HYDROCODONE BITARTRATE AND ACETAMINOPHEN 1 TABLET: 10; 325 TABLET ORAL at 10:15

## 2019-08-21 RX ADMIN — BUPROPION HYDROCHLORIDE 300 MG: 300 TABLET, EXTENDED RELEASE ORAL at 08:54

## 2019-08-21 RX ADMIN — GABAPENTIN 600 MG: 300 CAPSULE ORAL at 06:12

## 2019-08-21 RX ADMIN — CEFEPIME HYDROCHLORIDE 2 G: 2 INJECTION, POWDER, FOR SOLUTION INTRAVENOUS at 06:36

## 2019-08-22 LAB
ANION GAP SERPL CALCULATED.3IONS-SCNC: 9.1 MMOL/L (ref 5–15)
BACTERIA SPEC AEROBE CULT: ABNORMAL
BUN BLD-MCNC: 14 MG/DL (ref 6–20)
BUN/CREAT SERPL: 18.4 (ref 7–25)
CALCIUM SPEC-SCNC: 8.7 MG/DL (ref 8.6–10.5)
CHLORIDE SERPL-SCNC: 105 MMOL/L (ref 98–107)
CO2 SERPL-SCNC: 25.9 MMOL/L (ref 22–29)
CREAT BLD-MCNC: 0.76 MG/DL (ref 0.57–1)
DEPRECATED RDW RBC AUTO: 48.6 FL (ref 37–54)
EOSINOPHIL # BLD MANUAL: 0.13 10*3/MM3 (ref 0–0.4)
EOSINOPHIL NFR BLD MANUAL: 2 % (ref 0.3–6.2)
ERYTHROCYTE [DISTWIDTH] IN BLOOD BY AUTOMATED COUNT: 15.2 % (ref 12.3–15.4)
GFR SERPL CREATININE-BSD FRML MDRD: 79 ML/MIN/1.73
GLUCOSE BLD-MCNC: 106 MG/DL (ref 65–99)
HCT VFR BLD AUTO: 35.4 % (ref 34–46.6)
HGB BLD-MCNC: 10.5 G/DL (ref 12–15.9)
LYMPHOCYTES # BLD MANUAL: 0.33 10*3/MM3 (ref 0.7–3.1)
LYMPHOCYTES NFR BLD MANUAL: 5 % (ref 19.6–45.3)
LYMPHOCYTES NFR BLD MANUAL: 6 % (ref 5–12)
MAGNESIUM SERPL-MCNC: 2.3 MG/DL (ref 1.6–2.6)
MCH RBC QN AUTO: 26 PG (ref 26.6–33)
MCHC RBC AUTO-ENTMCNC: 29.7 G/DL (ref 31.5–35.7)
MCV RBC AUTO: 87.6 FL (ref 79–97)
MONOCYTES # BLD AUTO: 0.4 10*3/MM3 (ref 0.1–0.9)
MRSA SPEC QL CULT: NORMAL
NEUTROPHILS # BLD AUTO: 5.79 10*3/MM3 (ref 1.7–7)
NEUTROPHILS NFR BLD MANUAL: 87 % (ref 42.7–76)
PLAT MORPH BLD: NORMAL
PLATELET # BLD AUTO: 160 10*3/MM3 (ref 140–450)
PMV BLD AUTO: 12.7 FL (ref 6–12)
POTASSIUM BLD-SCNC: 4.2 MMOL/L (ref 3.5–5.2)
RBC # BLD AUTO: 4.04 10*6/MM3 (ref 3.77–5.28)
RBC MORPH BLD: NORMAL
SODIUM BLD-SCNC: 140 MMOL/L (ref 136–145)
WBC MORPH BLD: NORMAL
WBC NRBC COR # BLD: 6.65 10*3/MM3 (ref 3.4–10.8)

## 2019-08-22 PROCEDURE — 85025 COMPLETE CBC W/AUTO DIFF WBC: CPT | Performed by: INTERNAL MEDICINE

## 2019-08-22 PROCEDURE — 25010000002 CEFEPIME PER 500 MG: Performed by: HOSPITALIST

## 2019-08-22 PROCEDURE — 83735 ASSAY OF MAGNESIUM: CPT | Performed by: INTERNAL MEDICINE

## 2019-08-22 PROCEDURE — 25010000002 ENOXAPARIN PER 10 MG: Performed by: HOSPITALIST

## 2019-08-22 PROCEDURE — 85007 BL SMEAR W/DIFF WBC COUNT: CPT | Performed by: INTERNAL MEDICINE

## 2019-08-22 PROCEDURE — 94799 UNLISTED PULMONARY SVC/PX: CPT

## 2019-08-22 PROCEDURE — 80048 BASIC METABOLIC PNL TOTAL CA: CPT | Performed by: INTERNAL MEDICINE

## 2019-08-22 RX ORDER — BUTALBITAL, ACETAMINOPHEN AND CAFFEINE 50; 325; 40 MG/1; MG/1; MG/1
1 TABLET ORAL EVERY 6 HOURS PRN
Status: DISCONTINUED | OUTPATIENT
Start: 2019-08-22 | End: 2019-08-24 | Stop reason: HOSPADM

## 2019-08-22 RX ORDER — HYDROCODONE BITARTRATE AND ACETAMINOPHEN 10; 325 MG/1; MG/1
1 TABLET ORAL EVERY 6 HOURS PRN
Status: DISCONTINUED | OUTPATIENT
Start: 2019-08-22 | End: 2019-08-24 | Stop reason: HOSPADM

## 2019-08-22 RX ADMIN — BUPROPION HYDROCHLORIDE 300 MG: 300 TABLET, EXTENDED RELEASE ORAL at 08:37

## 2019-08-22 RX ADMIN — CEFEPIME HYDROCHLORIDE 2 G: 2 INJECTION, POWDER, FOR SOLUTION INTRAVENOUS at 05:05

## 2019-08-22 RX ADMIN — BUTALBITAL, ACETAMINOPHEN, AND CAFFEINE 1 TABLET: 50; 325; 40 TABLET ORAL at 07:47

## 2019-08-22 RX ADMIN — HYDROCODONE BITARTRATE AND ACETAMINOPHEN 1 TABLET: 10; 325 TABLET ORAL at 13:01

## 2019-08-22 RX ADMIN — BUTALBITAL, ACETAMINOPHEN, AND CAFFEINE 1 TABLET: 50; 325; 40 TABLET ORAL at 19:09

## 2019-08-22 RX ADMIN — ENOXAPARIN SODIUM 40 MG: 80 INJECTION SUBCUTANEOUS at 21:00

## 2019-08-22 RX ADMIN — BUDESONIDE AND FORMOTEROL FUMARATE DIHYDRATE 2 PUFF: 160; 4.5 AEROSOL RESPIRATORY (INHALATION) at 08:17

## 2019-08-22 RX ADMIN — HYDROCODONE BITARTRATE AND ACETAMINOPHEN 1 TABLET: 10; 325 TABLET ORAL at 19:09

## 2019-08-22 RX ADMIN — GABAPENTIN 600 MG: 300 CAPSULE ORAL at 06:10

## 2019-08-22 RX ADMIN — CEFEPIME HYDROCHLORIDE 2 G: 2 INJECTION, POWDER, FOR SOLUTION INTRAVENOUS at 21:00

## 2019-08-22 RX ADMIN — ENOXAPARIN SODIUM 40 MG: 80 INJECTION SUBCUTANEOUS at 08:38

## 2019-08-22 RX ADMIN — BUDESONIDE AND FORMOTEROL FUMARATE DIHYDRATE 2 PUFF: 160; 4.5 AEROSOL RESPIRATORY (INHALATION) at 20:25

## 2019-08-22 RX ADMIN — BUTALBITAL, ACETAMINOPHEN, AND CAFFEINE 1 TABLET: 50; 325; 40 TABLET ORAL at 13:01

## 2019-08-22 RX ADMIN — PANTOPRAZOLE SODIUM 40 MG: 40 TABLET, DELAYED RELEASE ORAL at 06:10

## 2019-08-22 RX ADMIN — GABAPENTIN 600 MG: 300 CAPSULE ORAL at 13:01

## 2019-08-22 RX ADMIN — IPRATROPIUM BROMIDE AND ALBUTEROL SULFATE 3 ML: 2.5; .5 SOLUTION RESPIRATORY (INHALATION) at 03:47

## 2019-08-22 RX ADMIN — LEVETIRACETAM 500 MG: 500 TABLET, FILM COATED ORAL at 21:00

## 2019-08-22 RX ADMIN — BUTALBITAL, ACETAMINOPHEN, AND CAFFEINE 1 TABLET: 50; 325; 40 TABLET ORAL at 03:46

## 2019-08-22 RX ADMIN — HYDROCODONE BITARTRATE AND ACETAMINOPHEN 1 TABLET: 10; 325 TABLET ORAL at 03:45

## 2019-08-22 RX ADMIN — TIOTROPIUM BROMIDE INHALATION SPRAY 2 PUFF: 3.12 SPRAY, METERED RESPIRATORY (INHALATION) at 08:17

## 2019-08-22 RX ADMIN — CEFEPIME HYDROCHLORIDE 2 G: 2 INJECTION, POWDER, FOR SOLUTION INTRAVENOUS at 13:01

## 2019-08-22 RX ADMIN — PAROXETINE 40 MG: 20 TABLET, FILM COATED ORAL at 06:10

## 2019-08-22 RX ADMIN — MONTELUKAST SODIUM 10 MG: 10 TABLET, FILM COATED ORAL at 21:00

## 2019-08-22 RX ADMIN — LEVETIRACETAM 500 MG: 500 TABLET, FILM COATED ORAL at 08:37

## 2019-08-22 RX ADMIN — CETIRIZINE HYDROCHLORIDE 10 MG: 10 TABLET, FILM COATED ORAL at 08:37

## 2019-08-22 RX ADMIN — HYDROCODONE BITARTRATE AND ACETAMINOPHEN 1 TABLET: 10; 325 TABLET ORAL at 07:47

## 2019-08-22 RX ADMIN — GABAPENTIN 600 MG: 300 CAPSULE ORAL at 21:00

## 2019-08-23 LAB
ALBUMIN SERPL-MCNC: 3.9 G/DL (ref 3.5–5.2)
ANION GAP SERPL CALCULATED.3IONS-SCNC: 8.3 MMOL/L (ref 5–15)
BACTERIA SPEC RESP CULT: NORMAL
BUN BLD-MCNC: 13 MG/DL (ref 6–20)
BUN/CREAT SERPL: 13.5 (ref 7–25)
CALCIUM SPEC-SCNC: 9.1 MG/DL (ref 8.6–10.5)
CHLORIDE SERPL-SCNC: 100 MMOL/L (ref 98–107)
CO2 SERPL-SCNC: 29.7 MMOL/L (ref 22–29)
CREAT BLD-MCNC: 0.96 MG/DL (ref 0.57–1)
DEPRECATED RDW RBC AUTO: 50.4 FL (ref 37–54)
ERYTHROCYTE [DISTWIDTH] IN BLOOD BY AUTOMATED COUNT: 15.2 % (ref 12.3–15.4)
GFR SERPL CREATININE-BSD FRML MDRD: 60 ML/MIN/1.73
GLUCOSE BLD-MCNC: 87 MG/DL (ref 65–99)
GRAM STN SPEC: NORMAL
HCT VFR BLD AUTO: 35.4 % (ref 34–46.6)
HGB BLD-MCNC: 10.3 G/DL (ref 12–15.9)
MAGNESIUM SERPL-MCNC: 2.2 MG/DL (ref 1.6–2.6)
MCH RBC QN AUTO: 26.3 PG (ref 26.6–33)
MCHC RBC AUTO-ENTMCNC: 29.1 G/DL (ref 31.5–35.7)
MCV RBC AUTO: 90.3 FL (ref 79–97)
PHOSPHATE SERPL-MCNC: 2.9 MG/DL (ref 2.5–4.5)
PLATELET # BLD AUTO: 195 10*3/MM3 (ref 140–450)
PMV BLD AUTO: 11.5 FL (ref 6–12)
POTASSIUM BLD-SCNC: 3.8 MMOL/L (ref 3.5–5.2)
RBC # BLD AUTO: 3.92 10*6/MM3 (ref 3.77–5.28)
SODIUM BLD-SCNC: 138 MMOL/L (ref 136–145)
WBC NRBC COR # BLD: 4.78 10*3/MM3 (ref 3.4–10.8)

## 2019-08-23 PROCEDURE — 94799 UNLISTED PULMONARY SVC/PX: CPT

## 2019-08-23 PROCEDURE — 25010000002 ENOXAPARIN PER 10 MG: Performed by: HOSPITALIST

## 2019-08-23 PROCEDURE — 80069 RENAL FUNCTION PANEL: CPT | Performed by: HOSPITALIST

## 2019-08-23 PROCEDURE — 83735 ASSAY OF MAGNESIUM: CPT | Performed by: HOSPITALIST

## 2019-08-23 PROCEDURE — 85027 COMPLETE CBC AUTOMATED: CPT | Performed by: HOSPITALIST

## 2019-08-23 PROCEDURE — 25010000002 CEFEPIME PER 500 MG: Performed by: HOSPITALIST

## 2019-08-23 RX ADMIN — CETIRIZINE HYDROCHLORIDE 10 MG: 10 TABLET, FILM COATED ORAL at 08:44

## 2019-08-23 RX ADMIN — HYDROCODONE BITARTRATE AND ACETAMINOPHEN 1 TABLET: 10; 325 TABLET ORAL at 07:05

## 2019-08-23 RX ADMIN — CEFEPIME HYDROCHLORIDE 2 G: 2 INJECTION, POWDER, FOR SOLUTION INTRAVENOUS at 05:15

## 2019-08-23 RX ADMIN — BUTALBITAL, ACETAMINOPHEN, AND CAFFEINE 1 TABLET: 50; 325; 40 TABLET ORAL at 01:20

## 2019-08-23 RX ADMIN — MONTELUKAST SODIUM 10 MG: 10 TABLET, FILM COATED ORAL at 21:14

## 2019-08-23 RX ADMIN — LEVETIRACETAM 500 MG: 500 TABLET, FILM COATED ORAL at 08:44

## 2019-08-23 RX ADMIN — HYDROCODONE BITARTRATE AND ACETAMINOPHEN 1 TABLET: 10; 325 TABLET ORAL at 19:04

## 2019-08-23 RX ADMIN — LEVETIRACETAM 500 MG: 500 TABLET, FILM COATED ORAL at 21:14

## 2019-08-23 RX ADMIN — HYDROCODONE BITARTRATE AND ACETAMINOPHEN 1 TABLET: 10; 325 TABLET ORAL at 13:08

## 2019-08-23 RX ADMIN — BUDESONIDE AND FORMOTEROL FUMARATE DIHYDRATE 2 PUFF: 160; 4.5 AEROSOL RESPIRATORY (INHALATION) at 07:47

## 2019-08-23 RX ADMIN — GABAPENTIN 600 MG: 300 CAPSULE ORAL at 13:08

## 2019-08-23 RX ADMIN — BUPROPION HYDROCHLORIDE 300 MG: 300 TABLET, EXTENDED RELEASE ORAL at 08:44

## 2019-08-23 RX ADMIN — ENOXAPARIN SODIUM 40 MG: 80 INJECTION SUBCUTANEOUS at 08:44

## 2019-08-23 RX ADMIN — TIOTROPIUM BROMIDE INHALATION SPRAY 2 PUFF: 3.12 SPRAY, METERED RESPIRATORY (INHALATION) at 07:47

## 2019-08-23 RX ADMIN — FLUTICASONE PROPIONATE 1 SPRAY: 50 SPRAY, METERED NASAL at 08:44

## 2019-08-23 RX ADMIN — PAROXETINE 40 MG: 20 TABLET, FILM COATED ORAL at 06:51

## 2019-08-23 RX ADMIN — HYDROCODONE BITARTRATE AND ACETAMINOPHEN 1 TABLET: 10; 325 TABLET ORAL at 01:20

## 2019-08-23 RX ADMIN — BUTALBITAL, ACETAMINOPHEN, AND CAFFEINE 1 TABLET: 50; 325; 40 TABLET ORAL at 07:05

## 2019-08-23 RX ADMIN — PANTOPRAZOLE SODIUM 40 MG: 40 TABLET, DELAYED RELEASE ORAL at 06:05

## 2019-08-23 RX ADMIN — CEFEPIME HYDROCHLORIDE 2 G: 2 INJECTION, POWDER, FOR SOLUTION INTRAVENOUS at 13:08

## 2019-08-23 RX ADMIN — GABAPENTIN 600 MG: 300 CAPSULE ORAL at 06:05

## 2019-08-23 RX ADMIN — GABAPENTIN 600 MG: 300 CAPSULE ORAL at 21:14

## 2019-08-23 RX ADMIN — ENOXAPARIN SODIUM 40 MG: 80 INJECTION SUBCUTANEOUS at 21:14

## 2019-08-23 RX ADMIN — CEFEPIME HYDROCHLORIDE 2 G: 2 INJECTION, POWDER, FOR SOLUTION INTRAVENOUS at 23:49

## 2019-08-24 VITALS
HEART RATE: 64 BPM | SYSTOLIC BLOOD PRESSURE: 122 MMHG | BODY MASS INDEX: 41.83 KG/M2 | WEIGHT: 266.5 LBS | DIASTOLIC BLOOD PRESSURE: 72 MMHG | OXYGEN SATURATION: 91 % | RESPIRATION RATE: 16 BRPM | HEIGHT: 67 IN | TEMPERATURE: 97.5 F

## 2019-08-24 LAB
ALBUMIN SERPL-MCNC: 4.2 G/DL (ref 3.5–5.2)
ANION GAP SERPL CALCULATED.3IONS-SCNC: 9.7 MMOL/L (ref 5–15)
BASOPHILS # BLD AUTO: 0.06 10*3/MM3 (ref 0–0.2)
BASOPHILS NFR BLD AUTO: 1.1 % (ref 0–1.5)
BUN BLD-MCNC: 11 MG/DL (ref 6–20)
BUN/CREAT SERPL: 14.3 (ref 7–25)
CALCIUM SPEC-SCNC: 9.5 MG/DL (ref 8.6–10.5)
CHLORIDE SERPL-SCNC: 103 MMOL/L (ref 98–107)
CO2 SERPL-SCNC: 30.3 MMOL/L (ref 22–29)
CREAT BLD-MCNC: 0.77 MG/DL (ref 0.57–1)
DEPRECATED RDW RBC AUTO: 48.6 FL (ref 37–54)
EOSINOPHIL # BLD AUTO: 0.26 10*3/MM3 (ref 0–0.4)
EOSINOPHIL NFR BLD AUTO: 4.7 % (ref 0.3–6.2)
ERYTHROCYTE [DISTWIDTH] IN BLOOD BY AUTOMATED COUNT: 15.2 % (ref 12.3–15.4)
GFR SERPL CREATININE-BSD FRML MDRD: 78 ML/MIN/1.73
GLUCOSE BLD-MCNC: 83 MG/DL (ref 65–99)
HCT VFR BLD AUTO: 38.1 % (ref 34–46.6)
HGB BLD-MCNC: 11.6 G/DL (ref 12–15.9)
IMM GRANULOCYTES # BLD AUTO: 0.04 10*3/MM3 (ref 0–0.05)
IMM GRANULOCYTES NFR BLD AUTO: 0.7 % (ref 0–0.5)
LYMPHOCYTES # BLD AUTO: 0.73 10*3/MM3 (ref 0.7–3.1)
LYMPHOCYTES NFR BLD AUTO: 13.2 % (ref 19.6–45.3)
MAGNESIUM SERPL-MCNC: 2.3 MG/DL (ref 1.6–2.6)
MCH RBC QN AUTO: 26.4 PG (ref 26.6–33)
MCHC RBC AUTO-ENTMCNC: 30.4 G/DL (ref 31.5–35.7)
MCV RBC AUTO: 86.8 FL (ref 79–97)
MONOCYTES # BLD AUTO: 0.67 10*3/MM3 (ref 0.1–0.9)
MONOCYTES NFR BLD AUTO: 12.1 % (ref 5–12)
NEUTROPHILS # BLD AUTO: 3.77 10*3/MM3 (ref 1.7–7)
NEUTROPHILS NFR BLD AUTO: 68.2 % (ref 42.7–76)
NRBC BLD AUTO-RTO: 0 /100 WBC (ref 0–0.2)
PHOSPHATE SERPL-MCNC: 3.5 MG/DL (ref 2.5–4.5)
PLATELET # BLD AUTO: 252 10*3/MM3 (ref 140–450)
PMV BLD AUTO: 11.7 FL (ref 6–12)
POTASSIUM BLD-SCNC: 4 MMOL/L (ref 3.5–5.2)
RBC # BLD AUTO: 4.39 10*6/MM3 (ref 3.77–5.28)
SODIUM BLD-SCNC: 143 MMOL/L (ref 136–145)
WBC NRBC COR # BLD: 5.53 10*3/MM3 (ref 3.4–10.8)

## 2019-08-24 PROCEDURE — 25010000002 CEFEPIME PER 500 MG: Performed by: HOSPITALIST

## 2019-08-24 PROCEDURE — 83735 ASSAY OF MAGNESIUM: CPT | Performed by: HOSPITALIST

## 2019-08-24 PROCEDURE — 85025 COMPLETE CBC W/AUTO DIFF WBC: CPT | Performed by: NURSE PRACTITIONER

## 2019-08-24 PROCEDURE — 94799 UNLISTED PULMONARY SVC/PX: CPT

## 2019-08-24 PROCEDURE — 80069 RENAL FUNCTION PANEL: CPT | Performed by: HOSPITALIST

## 2019-08-24 PROCEDURE — 25010000002 ENOXAPARIN PER 10 MG: Performed by: HOSPITALIST

## 2019-08-24 RX ORDER — CEFTRIAXONE SODIUM 1 G/50ML
1 INJECTION, SOLUTION INTRAVENOUS EVERY 24 HOURS
Status: DISCONTINUED | OUTPATIENT
Start: 2019-08-24 | End: 2019-08-24 | Stop reason: HOSPADM

## 2019-08-24 RX ORDER — LEVOFLOXACIN 500 MG/1
500 TABLET, FILM COATED ORAL DAILY
Qty: 10 TABLET | Refills: 0 | Status: ON HOLD | OUTPATIENT
Start: 2019-08-24 | End: 2019-12-22

## 2019-08-24 RX ADMIN — CEFEPIME HYDROCHLORIDE 2 G: 2 INJECTION, POWDER, FOR SOLUTION INTRAVENOUS at 09:00

## 2019-08-24 RX ADMIN — HYDROCODONE BITARTRATE AND ACETAMINOPHEN 1 TABLET: 10; 325 TABLET ORAL at 03:09

## 2019-08-24 RX ADMIN — LEVETIRACETAM 500 MG: 500 TABLET, FILM COATED ORAL at 09:01

## 2019-08-24 RX ADMIN — GABAPENTIN 600 MG: 300 CAPSULE ORAL at 06:10

## 2019-08-24 RX ADMIN — HYDROCODONE BITARTRATE AND ACETAMINOPHEN 1 TABLET: 10; 325 TABLET ORAL at 16:29

## 2019-08-24 RX ADMIN — CETIRIZINE HYDROCHLORIDE 10 MG: 10 TABLET, FILM COATED ORAL at 09:01

## 2019-08-24 RX ADMIN — PANTOPRAZOLE SODIUM 40 MG: 40 TABLET, DELAYED RELEASE ORAL at 06:10

## 2019-08-24 RX ADMIN — BUPROPION HYDROCHLORIDE 300 MG: 300 TABLET, EXTENDED RELEASE ORAL at 09:01

## 2019-08-24 RX ADMIN — ENOXAPARIN SODIUM 40 MG: 80 INJECTION SUBCUTANEOUS at 09:08

## 2019-08-24 RX ADMIN — TIOTROPIUM BROMIDE INHALATION SPRAY 2 PUFF: 3.12 SPRAY, METERED RESPIRATORY (INHALATION) at 10:25

## 2019-08-24 RX ADMIN — GABAPENTIN 600 MG: 300 CAPSULE ORAL at 16:27

## 2019-08-24 RX ADMIN — FLUTICASONE PROPIONATE 1 SPRAY: 50 SPRAY, METERED NASAL at 09:02

## 2019-08-24 RX ADMIN — PAROXETINE 40 MG: 20 TABLET, FILM COATED ORAL at 06:10

## 2019-08-25 ENCOUNTER — READMISSION MANAGEMENT (OUTPATIENT)
Dept: CALL CENTER | Facility: HOSPITAL | Age: 54
End: 2019-08-25

## 2019-08-25 LAB
BACTERIA SPEC AEROBE CULT: NORMAL
BACTERIA SPEC AEROBE CULT: NORMAL

## 2019-08-26 ENCOUNTER — OFFICE VISIT (OUTPATIENT)
Dept: PAIN MEDICINE | Facility: CLINIC | Age: 54
End: 2019-08-26

## 2019-08-26 VITALS
SYSTOLIC BLOOD PRESSURE: 107 MMHG | BODY MASS INDEX: 40.87 KG/M2 | WEIGHT: 260.4 LBS | DIASTOLIC BLOOD PRESSURE: 68 MMHG | HEIGHT: 67 IN | OXYGEN SATURATION: 94 % | RESPIRATION RATE: 20 BRPM | TEMPERATURE: 98.8 F | HEART RATE: 85 BPM

## 2019-08-26 DIAGNOSIS — Z79.899 ENCOUNTER FOR LONG-TERM (CURRENT) USE OF HIGH-RISK MEDICATION: ICD-10-CM

## 2019-08-26 DIAGNOSIS — M47.817 OSTEOARTHRITIS OF LUMBOSACRAL SPINE WITHOUT MYELOPATHY: ICD-10-CM

## 2019-08-26 DIAGNOSIS — Z87.39 HISTORY OF OSTEOMYELITIS: ICD-10-CM

## 2019-08-26 DIAGNOSIS — G89.4 CHRONIC PAIN SYNDROME: Primary | ICD-10-CM

## 2019-08-26 DIAGNOSIS — G54.3 THORACIC ROOT LESION: ICD-10-CM

## 2019-08-26 DIAGNOSIS — M54.6 PAIN IN THORACIC SPINE: ICD-10-CM

## 2019-08-26 PROCEDURE — 99214 OFFICE O/P EST MOD 30 MIN: CPT | Performed by: NURSE PRACTITIONER

## 2019-08-26 RX ORDER — HYDROCODONE BITARTRATE AND ACETAMINOPHEN 10; 325 MG/1; MG/1
1 TABLET ORAL EVERY 6 HOURS PRN
Qty: 120 TABLET | Refills: 0 | Status: SHIPPED | OUTPATIENT
Start: 2019-08-26 | End: 2019-09-23 | Stop reason: SDUPTHER

## 2019-08-27 ENCOUNTER — READMISSION MANAGEMENT (OUTPATIENT)
Dept: CALL CENTER | Facility: HOSPITAL | Age: 54
End: 2019-08-27

## 2019-08-28 ENCOUNTER — READMISSION MANAGEMENT (OUTPATIENT)
Dept: CALL CENTER | Facility: HOSPITAL | Age: 54
End: 2019-08-28

## 2019-09-04 ENCOUNTER — READMISSION MANAGEMENT (OUTPATIENT)
Dept: CALL CENTER | Facility: HOSPITAL | Age: 54
End: 2019-09-04

## 2019-09-09 RX ORDER — GABAPENTIN 600 MG/1
TABLET ORAL
Qty: 180 TABLET | Refills: 2 | Status: SHIPPED | OUTPATIENT
Start: 2019-09-09 | End: 2019-12-04 | Stop reason: SDUPTHER

## 2019-09-11 ENCOUNTER — READMISSION MANAGEMENT (OUTPATIENT)
Dept: CALL CENTER | Facility: HOSPITAL | Age: 54
End: 2019-09-11

## 2019-09-12 ENCOUNTER — READMISSION MANAGEMENT (OUTPATIENT)
Dept: CALL CENTER | Facility: HOSPITAL | Age: 54
End: 2019-09-12

## 2019-09-23 ENCOUNTER — OFFICE VISIT (OUTPATIENT)
Dept: PAIN MEDICINE | Facility: CLINIC | Age: 54
End: 2019-09-23

## 2019-09-23 VITALS
OXYGEN SATURATION: 93 % | DIASTOLIC BLOOD PRESSURE: 72 MMHG | SYSTOLIC BLOOD PRESSURE: 123 MMHG | HEIGHT: 67 IN | TEMPERATURE: 99.5 F | HEART RATE: 92 BPM | BODY MASS INDEX: 42.6 KG/M2 | WEIGHT: 271.4 LBS | RESPIRATION RATE: 20 BRPM

## 2019-09-23 DIAGNOSIS — G54.3 THORACIC ROOT LESION: ICD-10-CM

## 2019-09-23 DIAGNOSIS — G89.4 CHRONIC PAIN SYNDROME: Primary | ICD-10-CM

## 2019-09-23 DIAGNOSIS — M96.1 POSTLAMINECTOMY SYNDROME OF THORACIC REGION: ICD-10-CM

## 2019-09-23 DIAGNOSIS — M47.817 OSTEOARTHRITIS OF LUMBOSACRAL SPINE WITHOUT MYELOPATHY: ICD-10-CM

## 2019-09-23 DIAGNOSIS — Z79.899 ENCOUNTER FOR LONG-TERM (CURRENT) USE OF HIGH-RISK MEDICATION: ICD-10-CM

## 2019-09-23 DIAGNOSIS — M54.6 PAIN IN THORACIC SPINE: ICD-10-CM

## 2019-09-23 DIAGNOSIS — Z87.39 HISTORY OF OSTEOMYELITIS: ICD-10-CM

## 2019-09-23 PROCEDURE — 99213 OFFICE O/P EST LOW 20 MIN: CPT | Performed by: NURSE PRACTITIONER

## 2019-09-23 RX ORDER — HYDROCODONE BITARTRATE AND ACETAMINOPHEN 10; 325 MG/1; MG/1
1 TABLET ORAL EVERY 6 HOURS PRN
Qty: 120 TABLET | Refills: 0 | Status: SHIPPED | OUTPATIENT
Start: 2019-09-23 | End: 2019-10-22 | Stop reason: SDUPTHER

## 2019-10-22 ENCOUNTER — OFFICE VISIT (OUTPATIENT)
Dept: PAIN MEDICINE | Facility: CLINIC | Age: 54
End: 2019-10-22

## 2019-10-22 VITALS
DIASTOLIC BLOOD PRESSURE: 80 MMHG | HEART RATE: 91 BPM | RESPIRATION RATE: 20 BRPM | HEIGHT: 67 IN | WEIGHT: 280.2 LBS | SYSTOLIC BLOOD PRESSURE: 127 MMHG | OXYGEN SATURATION: 95 % | TEMPERATURE: 99.5 F | BODY MASS INDEX: 43.98 KG/M2

## 2019-10-22 DIAGNOSIS — M96.1 POSTLAMINECTOMY SYNDROME OF THORACIC REGION: ICD-10-CM

## 2019-10-22 DIAGNOSIS — G54.3 THORACIC ROOT LESION: ICD-10-CM

## 2019-10-22 DIAGNOSIS — Z87.39 HISTORY OF OSTEOMYELITIS: ICD-10-CM

## 2019-10-22 DIAGNOSIS — Z79.899 ENCOUNTER FOR LONG-TERM (CURRENT) USE OF HIGH-RISK MEDICATION: ICD-10-CM

## 2019-10-22 DIAGNOSIS — M54.6 PAIN IN THORACIC SPINE: ICD-10-CM

## 2019-10-22 DIAGNOSIS — G89.4 CHRONIC PAIN SYNDROME: Primary | ICD-10-CM

## 2019-10-22 PROCEDURE — 99213 OFFICE O/P EST LOW 20 MIN: CPT | Performed by: NURSE PRACTITIONER

## 2019-10-22 RX ORDER — HYDROCODONE BITARTRATE AND ACETAMINOPHEN 10; 325 MG/1; MG/1
1 TABLET ORAL EVERY 6 HOURS PRN
Qty: 120 TABLET | Refills: 0 | Status: SHIPPED | OUTPATIENT
Start: 2019-10-22 | End: 2019-11-19 | Stop reason: SDUPTHER

## 2019-10-28 RX ORDER — TIZANIDINE 4 MG/1
TABLET ORAL
Qty: 90 TABLET | Refills: 1 | Status: SHIPPED | OUTPATIENT
Start: 2019-10-28 | End: 2019-12-17 | Stop reason: SDUPTHER

## 2019-11-19 ENCOUNTER — OFFICE VISIT (OUTPATIENT)
Dept: PAIN MEDICINE | Facility: CLINIC | Age: 54
End: 2019-11-19

## 2019-11-19 VITALS
HEART RATE: 86 BPM | WEIGHT: 293 LBS | BODY MASS INDEX: 45.99 KG/M2 | TEMPERATURE: 98.6 F | OXYGEN SATURATION: 95 % | DIASTOLIC BLOOD PRESSURE: 81 MMHG | RESPIRATION RATE: 20 BRPM | HEIGHT: 67 IN | SYSTOLIC BLOOD PRESSURE: 131 MMHG

## 2019-11-19 DIAGNOSIS — Z79.899 ENCOUNTER FOR LONG-TERM (CURRENT) USE OF HIGH-RISK MEDICATION: ICD-10-CM

## 2019-11-19 DIAGNOSIS — M51.36 BULGE OF LUMBAR DISC WITHOUT MYELOPATHY: ICD-10-CM

## 2019-11-19 DIAGNOSIS — M47.817 OSTEOARTHRITIS OF LUMBOSACRAL SPINE WITHOUT MYELOPATHY: ICD-10-CM

## 2019-11-19 DIAGNOSIS — G54.3 THORACIC ROOT LESION: ICD-10-CM

## 2019-11-19 DIAGNOSIS — M54.2 NECK PAIN: ICD-10-CM

## 2019-11-19 DIAGNOSIS — M79.603 PAIN OF UPPER EXTREMITY, UNSPECIFIED LATERALITY: ICD-10-CM

## 2019-11-19 DIAGNOSIS — Z87.39 HISTORY OF OSTEOMYELITIS: ICD-10-CM

## 2019-11-19 DIAGNOSIS — M96.1 POSTLAMINECTOMY SYNDROME OF THORACIC REGION: ICD-10-CM

## 2019-11-19 DIAGNOSIS — M54.6 PAIN IN THORACIC SPINE: ICD-10-CM

## 2019-11-19 DIAGNOSIS — G89.4 CHRONIC PAIN SYNDROME: Primary | ICD-10-CM

## 2019-11-19 PROCEDURE — 99214 OFFICE O/P EST MOD 30 MIN: CPT | Performed by: NURSE PRACTITIONER

## 2019-11-19 RX ORDER — SULFAMETHOXAZOLE AND TRIMETHOPRIM 800; 160 MG/1; MG/1
TABLET ORAL
Refills: 0 | Status: ON HOLD | COMMUNITY
Start: 2019-11-04 | End: 2019-12-22

## 2019-11-19 RX ORDER — NALOXONE HYDROCHLORIDE 4 MG/.1ML
1 SPRAY NASAL AS NEEDED
Refills: 0 | COMMUNITY
Start: 2019-10-29 | End: 2019-12-24 | Stop reason: HOSPADM

## 2019-11-19 RX ORDER — HYDROCODONE BITARTRATE AND ACETAMINOPHEN 10; 325 MG/1; MG/1
1 TABLET ORAL EVERY 6 HOURS PRN
Qty: 120 TABLET | Refills: 0 | Status: SHIPPED | OUTPATIENT
Start: 2019-11-19 | End: 2019-12-17 | Stop reason: SDUPTHER

## 2019-11-19 RX ORDER — METRONIDAZOLE 500 MG/1
TABLET ORAL
Refills: 0 | Status: ON HOLD | COMMUNITY
Start: 2019-10-25 | End: 2019-12-22

## 2019-12-03 ENCOUNTER — TELEPHONE (OUTPATIENT)
Dept: PAIN MEDICINE | Facility: CLINIC | Age: 54
End: 2019-12-03

## 2019-12-05 RX ORDER — LEVETIRACETAM 500 MG/1
TABLET ORAL
Qty: 270 TABLET | Refills: 1 | Status: ON HOLD | OUTPATIENT
Start: 2019-12-05 | End: 2019-12-22

## 2019-12-05 RX ORDER — GABAPENTIN 600 MG/1
TABLET ORAL
Qty: 180 TABLET | Refills: 2 | Status: SHIPPED | OUTPATIENT
Start: 2019-12-05 | End: 2019-12-17 | Stop reason: SDUPTHER

## 2019-12-17 ENCOUNTER — OFFICE VISIT (OUTPATIENT)
Dept: PAIN MEDICINE | Facility: CLINIC | Age: 54
End: 2019-12-17

## 2019-12-17 VITALS
DIASTOLIC BLOOD PRESSURE: 68 MMHG | OXYGEN SATURATION: 94 % | RESPIRATION RATE: 20 BRPM | WEIGHT: 272 LBS | TEMPERATURE: 99.3 F | HEART RATE: 88 BPM | SYSTOLIC BLOOD PRESSURE: 109 MMHG | BODY MASS INDEX: 42.69 KG/M2 | HEIGHT: 67 IN

## 2019-12-17 DIAGNOSIS — M96.1 POSTLAMINECTOMY SYNDROME OF THORACIC REGION: ICD-10-CM

## 2019-12-17 DIAGNOSIS — G89.4 CHRONIC PAIN SYNDROME: Primary | ICD-10-CM

## 2019-12-17 DIAGNOSIS — Z79.899 ENCOUNTER FOR LONG-TERM (CURRENT) USE OF HIGH-RISK MEDICATION: ICD-10-CM

## 2019-12-17 DIAGNOSIS — M54.6 PAIN IN THORACIC SPINE: ICD-10-CM

## 2019-12-17 DIAGNOSIS — M51.36 DDD (DEGENERATIVE DISC DISEASE), LUMBAR: ICD-10-CM

## 2019-12-17 DIAGNOSIS — M51.36 BULGE OF LUMBAR DISC WITHOUT MYELOPATHY: ICD-10-CM

## 2019-12-17 DIAGNOSIS — Z87.39 HISTORY OF OSTEOMYELITIS: ICD-10-CM

## 2019-12-17 DIAGNOSIS — G54.3 THORACIC ROOT LESION: ICD-10-CM

## 2019-12-17 DIAGNOSIS — M47.817 OSTEOARTHRITIS OF LUMBOSACRAL SPINE WITHOUT MYELOPATHY: ICD-10-CM

## 2019-12-17 PROCEDURE — 99214 OFFICE O/P EST MOD 30 MIN: CPT | Performed by: NURSE PRACTITIONER

## 2019-12-17 RX ORDER — HYDROCODONE BITARTRATE AND ACETAMINOPHEN 10; 325 MG/1; MG/1
1 TABLET ORAL EVERY 6 HOURS PRN
Qty: 120 TABLET | Refills: 0 | Status: SHIPPED | OUTPATIENT
Start: 2019-12-17 | End: 2020-01-14 | Stop reason: SDUPTHER

## 2019-12-17 RX ORDER — LEVETIRACETAM 500 MG/1
500 TABLET ORAL 3 TIMES DAILY
Qty: 90 TABLET | Refills: 2 | Status: SHIPPED | OUTPATIENT
Start: 2019-12-17 | End: 2020-06-17 | Stop reason: SDUPTHER

## 2019-12-17 RX ORDER — GABAPENTIN 600 MG/1
1200 TABLET ORAL 3 TIMES DAILY
Qty: 180 TABLET | Refills: 2 | Status: SHIPPED | OUTPATIENT
Start: 2019-12-17 | End: 2020-06-03

## 2019-12-17 RX ORDER — TIZANIDINE 4 MG/1
TABLET ORAL
Qty: 90 TABLET | Refills: 3 | Status: SHIPPED | OUTPATIENT
Start: 2019-12-17 | End: 2020-04-21 | Stop reason: SDUPTHER

## 2019-12-21 PROCEDURE — 99284 EMERGENCY DEPT VISIT MOD MDM: CPT

## 2019-12-21 RX ORDER — ACETAMINOPHEN 500 MG
1000 TABLET ORAL ONCE
Status: COMPLETED | OUTPATIENT
Start: 2019-12-21 | End: 2019-12-21

## 2019-12-21 RX ADMIN — ACETAMINOPHEN 1000 MG: 500 TABLET, FILM COATED ORAL at 23:44

## 2019-12-22 ENCOUNTER — APPOINTMENT (OUTPATIENT)
Dept: CARDIOLOGY | Facility: HOSPITAL | Age: 54
End: 2019-12-22

## 2019-12-22 ENCOUNTER — HOSPITAL ENCOUNTER (INPATIENT)
Facility: HOSPITAL | Age: 54
LOS: 2 days | Discharge: HOME | End: 2019-12-24
Attending: EMERGENCY MEDICINE | Admitting: HOSPITALIST

## 2019-12-22 ENCOUNTER — APPOINTMENT (OUTPATIENT)
Dept: CT IMAGING | Facility: HOSPITAL | Age: 54
End: 2019-12-22

## 2019-12-22 DIAGNOSIS — M54.12 CERVICAL RADICULOPATHY: ICD-10-CM

## 2019-12-22 DIAGNOSIS — L03.119 CELLULITIS OF LOWER EXTREMITY, UNSPECIFIED LATERALITY: Primary | ICD-10-CM

## 2019-12-22 DIAGNOSIS — M48.02 DEGENERATIVE CERVICAL SPINAL STENOSIS: ICD-10-CM

## 2019-12-22 DIAGNOSIS — M54.12 RIGHT CERVICAL RADICULOPATHY: ICD-10-CM

## 2019-12-22 PROBLEM — Z87.891 FORMER SMOKER: Status: ACTIVE | Noted: 2019-12-22

## 2019-12-22 PROBLEM — Z86.19 HISTORY OF CLOSTRIDIOIDES DIFFICILE COLITIS: Status: ACTIVE | Noted: 2019-12-22

## 2019-12-22 PROBLEM — L03.90 SEPSIS DUE TO CELLULITIS (HCC): Status: ACTIVE | Noted: 2019-06-09

## 2019-12-22 PROBLEM — F32.A DEPRESSION: Status: ACTIVE | Noted: 2019-12-22

## 2019-12-22 PROBLEM — G89.4 CHRONIC PAIN SYNDROME: Status: ACTIVE | Noted: 2019-12-22

## 2019-12-22 PROBLEM — N17.9 AKI (ACUTE KIDNEY INJURY) (HCC): Status: ACTIVE | Noted: 2019-12-22

## 2019-12-22 LAB
ALBUMIN SERPL-MCNC: 4.2 G/DL (ref 3.5–5.2)
ALBUMIN/GLOB SERPL: 1.2 G/DL
ALP SERPL-CCNC: 58 U/L (ref 39–117)
ALT SERPL W P-5'-P-CCNC: 28 U/L (ref 1–33)
ANION GAP SERPL CALCULATED.3IONS-SCNC: 13.9 MMOL/L (ref 5–15)
ANION GAP SERPL CALCULATED.3IONS-SCNC: 14 MMOL/L (ref 5–15)
AST SERPL-CCNC: 27 U/L (ref 1–32)
BASOPHILS # BLD AUTO: 0.04 10*3/MM3 (ref 0–0.2)
BASOPHILS # BLD AUTO: 0.06 10*3/MM3 (ref 0–0.2)
BASOPHILS NFR BLD AUTO: 0.4 % (ref 0–1.5)
BASOPHILS NFR BLD AUTO: 0.5 % (ref 0–1.5)
BH CV LOWER VASCULAR LEFT COMMON FEMORAL AUGMENT: NORMAL
BH CV LOWER VASCULAR LEFT COMMON FEMORAL COMPETENT: NORMAL
BH CV LOWER VASCULAR LEFT COMMON FEMORAL COMPRESS: NORMAL
BH CV LOWER VASCULAR LEFT COMMON FEMORAL PHASIC: NORMAL
BH CV LOWER VASCULAR LEFT COMMON FEMORAL SPONT: NORMAL
BH CV LOWER VASCULAR LEFT DISTAL FEMORAL COMPRESS: NORMAL
BH CV LOWER VASCULAR LEFT GASTRONEMIUS COMPRESS: NORMAL
BH CV LOWER VASCULAR LEFT GREATER SAPH AK COMPRESS: NORMAL
BH CV LOWER VASCULAR LEFT GREATER SAPH BK COMPRESS: NORMAL
BH CV LOWER VASCULAR LEFT MID FEMORAL AUGMENT: NORMAL
BH CV LOWER VASCULAR LEFT MID FEMORAL COMPETENT: NORMAL
BH CV LOWER VASCULAR LEFT MID FEMORAL COMPRESS: NORMAL
BH CV LOWER VASCULAR LEFT MID FEMORAL PHASIC: NORMAL
BH CV LOWER VASCULAR LEFT MID FEMORAL SPONT: NORMAL
BH CV LOWER VASCULAR LEFT PERONEAL COMPRESS: NORMAL
BH CV LOWER VASCULAR LEFT POPLITEAL AUGMENT: NORMAL
BH CV LOWER VASCULAR LEFT POPLITEAL COMPETENT: NORMAL
BH CV LOWER VASCULAR LEFT POPLITEAL COMPRESS: NORMAL
BH CV LOWER VASCULAR LEFT POPLITEAL PHASIC: NORMAL
BH CV LOWER VASCULAR LEFT POPLITEAL SPONT: NORMAL
BH CV LOWER VASCULAR LEFT POSTERIOR TIBIAL COMPRESS: NORMAL
BH CV LOWER VASCULAR LEFT PROFUNDA FEMORAL COMPRESS: NORMAL
BH CV LOWER VASCULAR LEFT PROXIMAL FEMORAL COMPRESS: NORMAL
BH CV LOWER VASCULAR LEFT SAPHENOFEMORAL JUNCTION COMPRESS: NORMAL
BH CV LOWER VASCULAR RIGHT COMMON FEMORAL AUGMENT: NORMAL
BH CV LOWER VASCULAR RIGHT COMMON FEMORAL COMPETENT: NORMAL
BH CV LOWER VASCULAR RIGHT COMMON FEMORAL COMPRESS: NORMAL
BH CV LOWER VASCULAR RIGHT COMMON FEMORAL PHASIC: NORMAL
BH CV LOWER VASCULAR RIGHT COMMON FEMORAL SPONT: NORMAL
BH CV LOWER VASCULAR RIGHT DISTAL FEMORAL COMPRESS: NORMAL
BH CV LOWER VASCULAR RIGHT GASTRONEMIUS COMPRESS: NORMAL
BH CV LOWER VASCULAR RIGHT GREATER SAPH AK COMPRESS: NORMAL
BH CV LOWER VASCULAR RIGHT GREATER SAPH BK COMPRESS: NORMAL
BH CV LOWER VASCULAR RIGHT MID FEMORAL AUGMENT: NORMAL
BH CV LOWER VASCULAR RIGHT MID FEMORAL COMPETENT: NORMAL
BH CV LOWER VASCULAR RIGHT MID FEMORAL COMPRESS: NORMAL
BH CV LOWER VASCULAR RIGHT MID FEMORAL PHASIC: NORMAL
BH CV LOWER VASCULAR RIGHT MID FEMORAL SPONT: NORMAL
BH CV LOWER VASCULAR RIGHT PERONEAL COMPRESS: NORMAL
BH CV LOWER VASCULAR RIGHT POPLITEAL AUGMENT: NORMAL
BH CV LOWER VASCULAR RIGHT POPLITEAL COMPETENT: NORMAL
BH CV LOWER VASCULAR RIGHT POPLITEAL COMPRESS: NORMAL
BH CV LOWER VASCULAR RIGHT POPLITEAL PHASIC: NORMAL
BH CV LOWER VASCULAR RIGHT POPLITEAL SPONT: NORMAL
BH CV LOWER VASCULAR RIGHT POSTERIOR TIBIAL COMPRESS: NORMAL
BH CV LOWER VASCULAR RIGHT PROFUNDA FEMORAL COMPRESS: NORMAL
BH CV LOWER VASCULAR RIGHT PROXIMAL FEMORAL COMPRESS: NORMAL
BH CV LOWER VASCULAR RIGHT SAPHENOFEMORAL JUNCTION COMPRESS: NORMAL
BILIRUB SERPL-MCNC: 0.3 MG/DL (ref 0.2–1.2)
BUN BLD-MCNC: 22 MG/DL (ref 6–20)
BUN BLD-MCNC: 24 MG/DL (ref 6–20)
BUN/CREAT SERPL: 22.2 (ref 7–25)
BUN/CREAT SERPL: 22.4 (ref 7–25)
CALCIUM SPEC-SCNC: 8.4 MG/DL (ref 8.6–10.5)
CALCIUM SPEC-SCNC: 9.1 MG/DL (ref 8.6–10.5)
CHLORIDE SERPL-SCNC: 105 MMOL/L (ref 98–107)
CHLORIDE SERPL-SCNC: 106 MMOL/L (ref 98–107)
CO2 SERPL-SCNC: 24.1 MMOL/L (ref 22–29)
CO2 SERPL-SCNC: 26 MMOL/L (ref 22–29)
CREAT BLD-MCNC: 0.98 MG/DL (ref 0.57–1)
CREAT BLD-MCNC: 1.08 MG/DL (ref 0.57–1)
CRP SERPL-MCNC: 5.78 MG/DL (ref 0–0.5)
D DIMER PPP FEU-MCNC: 0.43 MCGFEU/ML (ref 0–0.49)
D-LACTATE SERPL-SCNC: 1.5 MMOL/L (ref 0.5–2)
DEPRECATED RDW RBC AUTO: 43.5 FL (ref 37–54)
DEPRECATED RDW RBC AUTO: 44.4 FL (ref 37–54)
EOSINOPHIL # BLD AUTO: 0.25 10*3/MM3 (ref 0–0.4)
EOSINOPHIL # BLD AUTO: 0.27 10*3/MM3 (ref 0–0.4)
EOSINOPHIL NFR BLD AUTO: 2.4 % (ref 0.3–6.2)
EOSINOPHIL NFR BLD AUTO: 2.5 % (ref 0.3–6.2)
ERYTHROCYTE [DISTWIDTH] IN BLOOD BY AUTOMATED COUNT: 15 % (ref 12.3–15.4)
ERYTHROCYTE [DISTWIDTH] IN BLOOD BY AUTOMATED COUNT: 15.1 % (ref 12.3–15.4)
ERYTHROCYTE [SEDIMENTATION RATE] IN BLOOD: 33 MM/HR (ref 0–30)
GFR SERPL CREATININE-BSD FRML MDRD: 52 ML/MIN/1.73
GFR SERPL CREATININE-BSD FRML MDRD: 59 ML/MIN/1.73
GLOBULIN UR ELPH-MCNC: 3.5 GM/DL
GLUCOSE BLD-MCNC: 106 MG/DL (ref 65–99)
GLUCOSE BLD-MCNC: 115 MG/DL (ref 65–99)
HBA1C MFR BLD: 5.2 % (ref 4.8–5.6)
HCT VFR BLD AUTO: 32.6 % (ref 34–46.6)
HCT VFR BLD AUTO: 35.8 % (ref 34–46.6)
HGB BLD-MCNC: 10.8 G/DL (ref 12–15.9)
HGB BLD-MCNC: 11.8 G/DL (ref 12–15.9)
IMM GRANULOCYTES # BLD AUTO: 0.04 10*3/MM3 (ref 0–0.05)
IMM GRANULOCYTES # BLD AUTO: 0.06 10*3/MM3 (ref 0–0.05)
IMM GRANULOCYTES NFR BLD AUTO: 0.4 % (ref 0–0.5)
IMM GRANULOCYTES NFR BLD AUTO: 0.5 % (ref 0–0.5)
INR PPP: 1.06 (ref 0.9–1.1)
LYMPHOCYTES # BLD AUTO: 0.71 10*3/MM3 (ref 0.7–3.1)
LYMPHOCYTES # BLD AUTO: 0.98 10*3/MM3 (ref 0.7–3.1)
LYMPHOCYTES NFR BLD AUTO: 10 % (ref 19.6–45.3)
LYMPHOCYTES NFR BLD AUTO: 6.4 % (ref 19.6–45.3)
MCH RBC QN AUTO: 26.7 PG (ref 26.6–33)
MCH RBC QN AUTO: 27.1 PG (ref 26.6–33)
MCHC RBC AUTO-ENTMCNC: 33 G/DL (ref 31.5–35.7)
MCHC RBC AUTO-ENTMCNC: 33.1 G/DL (ref 31.5–35.7)
MCV RBC AUTO: 80.5 FL (ref 79–97)
MCV RBC AUTO: 82.1 FL (ref 79–97)
MONOCYTES # BLD AUTO: 1.06 10*3/MM3 (ref 0.1–0.9)
MONOCYTES # BLD AUTO: 1.25 10*3/MM3 (ref 0.1–0.9)
MONOCYTES NFR BLD AUTO: 12.7 % (ref 5–12)
MONOCYTES NFR BLD AUTO: 9.6 % (ref 5–12)
NEUTROPHILS # BLD AUTO: 7.25 10*3/MM3 (ref 1.7–7)
NEUTROPHILS # BLD AUTO: 8.93 10*3/MM3 (ref 1.7–7)
NEUTROPHILS NFR BLD AUTO: 74 % (ref 42.7–76)
NEUTROPHILS NFR BLD AUTO: 80.6 % (ref 42.7–76)
NRBC BLD AUTO-RTO: 0 /100 WBC (ref 0–0.2)
NRBC BLD AUTO-RTO: 0 /100 WBC (ref 0–0.2)
PLATELET # BLD AUTO: 175 10*3/MM3 (ref 140–450)
PLATELET # BLD AUTO: 202 10*3/MM3 (ref 140–450)
PMV BLD AUTO: 12.1 FL (ref 6–12)
PMV BLD AUTO: 12.1 FL (ref 6–12)
POTASSIUM BLD-SCNC: 3.4 MMOL/L (ref 3.5–5.2)
POTASSIUM BLD-SCNC: 3.5 MMOL/L (ref 3.5–5.2)
PROT SERPL-MCNC: 7.7 G/DL (ref 6–8.5)
PROTHROMBIN TIME: 13.6 SECONDS (ref 11.7–14.2)
RBC # BLD AUTO: 4.05 10*6/MM3 (ref 3.77–5.28)
RBC # BLD AUTO: 4.36 10*6/MM3 (ref 3.77–5.28)
SODIUM BLD-SCNC: 144 MMOL/L (ref 136–145)
SODIUM BLD-SCNC: 145 MMOL/L (ref 136–145)
WBC NRBC COR # BLD: 11.09 10*3/MM3 (ref 3.4–10.8)
WBC NRBC COR # BLD: 9.81 10*3/MM3 (ref 3.4–10.8)

## 2019-12-22 PROCEDURE — 85025 COMPLETE CBC W/AUTO DIFF WBC: CPT | Performed by: NURSE PRACTITIONER

## 2019-12-22 PROCEDURE — 25010000002 MORPHINE PER 10 MG: Performed by: EMERGENCY MEDICINE

## 2019-12-22 PROCEDURE — 72125 CT NECK SPINE W/O DYE: CPT

## 2019-12-22 PROCEDURE — 85379 FIBRIN DEGRADATION QUANT: CPT | Performed by: EMERGENCY MEDICINE

## 2019-12-22 PROCEDURE — 87076 CULTURE ANAEROBE IDENT EACH: CPT | Performed by: EMERGENCY MEDICINE

## 2019-12-22 PROCEDURE — 94799 UNLISTED PULMONARY SVC/PX: CPT

## 2019-12-22 PROCEDURE — 85652 RBC SED RATE AUTOMATED: CPT | Performed by: HOSPITALIST

## 2019-12-22 PROCEDURE — 87040 BLOOD CULTURE FOR BACTERIA: CPT | Performed by: EMERGENCY MEDICINE

## 2019-12-22 PROCEDURE — 25010000003 CEFAZOLIN 1-4 GM/50ML-% SOLUTION: Performed by: EMERGENCY MEDICINE

## 2019-12-22 PROCEDURE — 25010000002 ONDANSETRON PER 1 MG: Performed by: EMERGENCY MEDICINE

## 2019-12-22 PROCEDURE — 86140 C-REACTIVE PROTEIN: CPT | Performed by: HOSPITALIST

## 2019-12-22 PROCEDURE — 93970 EXTREMITY STUDY: CPT

## 2019-12-22 PROCEDURE — 83036 HEMOGLOBIN GLYCOSYLATED A1C: CPT | Performed by: NURSE PRACTITIONER

## 2019-12-22 PROCEDURE — 94640 AIRWAY INHALATION TREATMENT: CPT

## 2019-12-22 PROCEDURE — 80053 COMPREHEN METABOLIC PANEL: CPT | Performed by: EMERGENCY MEDICINE

## 2019-12-22 PROCEDURE — 99222 1ST HOSP IP/OBS MODERATE 55: CPT | Performed by: ANESTHESIOLOGY

## 2019-12-22 PROCEDURE — 25010000002 MORPHINE PER 10 MG: Performed by: NURSE PRACTITIONER

## 2019-12-22 PROCEDURE — 25010000002 ENOXAPARIN PER 10 MG: Performed by: NURSE PRACTITIONER

## 2019-12-22 PROCEDURE — 83605 ASSAY OF LACTIC ACID: CPT | Performed by: EMERGENCY MEDICINE

## 2019-12-22 PROCEDURE — 85025 COMPLETE CBC W/AUTO DIFF WBC: CPT | Performed by: EMERGENCY MEDICINE

## 2019-12-22 PROCEDURE — 85610 PROTHROMBIN TIME: CPT | Performed by: EMERGENCY MEDICINE

## 2019-12-22 PROCEDURE — 25010000003 CEFAZOLIN 1-4 GM/50ML-% SOLUTION: Performed by: NURSE PRACTITIONER

## 2019-12-22 PROCEDURE — 36415 COLL VENOUS BLD VENIPUNCTURE: CPT | Performed by: NURSE PRACTITIONER

## 2019-12-22 RX ORDER — CEFAZOLIN SODIUM 1 G/50ML
1 INJECTION, SOLUTION INTRAVENOUS EVERY 8 HOURS
Status: DISCONTINUED | OUTPATIENT
Start: 2019-12-22 | End: 2019-12-24

## 2019-12-22 RX ORDER — MORPHINE SULFATE 2 MG/ML
4 INJECTION, SOLUTION INTRAMUSCULAR; INTRAVENOUS ONCE
Status: COMPLETED | OUTPATIENT
Start: 2019-12-22 | End: 2019-12-22

## 2019-12-22 RX ORDER — IPRATROPIUM BROMIDE AND ALBUTEROL SULFATE 2.5; .5 MG/3ML; MG/3ML
3 SOLUTION RESPIRATORY (INHALATION) EVERY 4 HOURS PRN
Status: DISCONTINUED | OUTPATIENT
Start: 2019-12-22 | End: 2019-12-24 | Stop reason: HOSPADM

## 2019-12-22 RX ORDER — IPRATROPIUM BROMIDE AND ALBUTEROL SULFATE 2.5; .5 MG/3ML; MG/3ML
3 SOLUTION RESPIRATORY (INHALATION)
Status: DISCONTINUED | OUTPATIENT
Start: 2019-12-22 | End: 2019-12-22

## 2019-12-22 RX ORDER — HYDROCODONE BITARTRATE AND ACETAMINOPHEN 10; 325 MG/1; MG/1
1 TABLET ORAL EVERY 6 HOURS PRN
Status: DISCONTINUED | OUTPATIENT
Start: 2019-12-22 | End: 2019-12-24 | Stop reason: HOSPADM

## 2019-12-22 RX ORDER — ONDANSETRON 2 MG/ML
4 INJECTION INTRAMUSCULAR; INTRAVENOUS ONCE
Status: COMPLETED | OUTPATIENT
Start: 2019-12-22 | End: 2019-12-22

## 2019-12-22 RX ORDER — POTASSIUM CHLORIDE 750 MG/1
40 CAPSULE, EXTENDED RELEASE ORAL AS NEEDED
Status: DISCONTINUED | OUTPATIENT
Start: 2019-12-22 | End: 2019-12-24 | Stop reason: HOSPADM

## 2019-12-22 RX ORDER — ACETAMINOPHEN 650 MG/1
650 SUPPOSITORY RECTAL EVERY 4 HOURS PRN
Status: DISCONTINUED | OUTPATIENT
Start: 2019-12-22 | End: 2019-12-24 | Stop reason: HOSPADM

## 2019-12-22 RX ORDER — MONTELUKAST SODIUM 10 MG/1
10 TABLET ORAL NIGHTLY
Status: DISCONTINUED | OUTPATIENT
Start: 2019-12-22 | End: 2019-12-24 | Stop reason: HOSPADM

## 2019-12-22 RX ORDER — TIZANIDINE 4 MG/1
4 TABLET ORAL EVERY 8 HOURS PRN
Status: DISCONTINUED | OUTPATIENT
Start: 2019-12-22 | End: 2019-12-24 | Stop reason: HOSPADM

## 2019-12-22 RX ORDER — ACETAMINOPHEN 160 MG/5ML
650 SOLUTION ORAL EVERY 4 HOURS PRN
Status: DISCONTINUED | OUTPATIENT
Start: 2019-12-22 | End: 2019-12-24 | Stop reason: HOSPADM

## 2019-12-22 RX ORDER — LEVETIRACETAM 500 MG/1
500 TABLET ORAL 3 TIMES DAILY
Status: DISCONTINUED | OUTPATIENT
Start: 2019-12-22 | End: 2019-12-24 | Stop reason: HOSPADM

## 2019-12-22 RX ORDER — SODIUM CHLORIDE 9 MG/ML
100 INJECTION, SOLUTION INTRAVENOUS CONTINUOUS
Status: DISCONTINUED | OUTPATIENT
Start: 2019-12-22 | End: 2019-12-24 | Stop reason: HOSPADM

## 2019-12-22 RX ORDER — SODIUM CHLORIDE 0.9 % (FLUSH) 0.9 %
10 SYRINGE (ML) INJECTION EVERY 12 HOURS SCHEDULED
Status: DISCONTINUED | OUTPATIENT
Start: 2019-12-22 | End: 2019-12-24 | Stop reason: HOSPADM

## 2019-12-22 RX ORDER — SODIUM CHLORIDE 0.9 % (FLUSH) 0.9 %
10 SYRINGE (ML) INJECTION AS NEEDED
Status: DISCONTINUED | OUTPATIENT
Start: 2019-12-22 | End: 2019-12-24 | Stop reason: HOSPADM

## 2019-12-22 RX ORDER — FLUTICASONE PROPIONATE 50 MCG
1 SPRAY, SUSPENSION (ML) NASAL DAILY
Status: DISCONTINUED | OUTPATIENT
Start: 2019-12-22 | End: 2019-12-24 | Stop reason: HOSPADM

## 2019-12-22 RX ORDER — NALOXONE HYDROCHLORIDE 1 MG/ML
1 INJECTION INTRAMUSCULAR; INTRAVENOUS; SUBCUTANEOUS AS NEEDED
Status: DISCONTINUED | OUTPATIENT
Start: 2019-12-22 | End: 2019-12-24 | Stop reason: HOSPADM

## 2019-12-22 RX ORDER — POTASSIUM CHLORIDE 7.45 MG/ML
10 INJECTION INTRAVENOUS
Status: DISCONTINUED | OUTPATIENT
Start: 2019-12-22 | End: 2019-12-24 | Stop reason: HOSPADM

## 2019-12-22 RX ORDER — PAROXETINE HYDROCHLORIDE 20 MG/1
40 TABLET, FILM COATED ORAL EVERY MORNING
Status: DISCONTINUED | OUTPATIENT
Start: 2019-12-22 | End: 2019-12-24 | Stop reason: HOSPADM

## 2019-12-22 RX ORDER — SODIUM CHLORIDE 9 MG/ML
125 INJECTION, SOLUTION INTRAVENOUS CONTINUOUS
Status: DISCONTINUED | OUTPATIENT
Start: 2019-12-22 | End: 2019-12-22

## 2019-12-22 RX ORDER — ALBUTEROL SULFATE 2.5 MG/3ML
2.5 SOLUTION RESPIRATORY (INHALATION) EVERY 6 HOURS PRN
Status: DISCONTINUED | OUTPATIENT
Start: 2019-12-22 | End: 2019-12-24 | Stop reason: HOSPADM

## 2019-12-22 RX ORDER — GABAPENTIN 400 MG/1
1200 CAPSULE ORAL 3 TIMES DAILY
Status: DISCONTINUED | OUTPATIENT
Start: 2019-12-22 | End: 2019-12-24 | Stop reason: HOSPADM

## 2019-12-22 RX ORDER — BUPROPION HYDROCHLORIDE 300 MG/1
300 TABLET ORAL DAILY
Status: DISCONTINUED | OUTPATIENT
Start: 2019-12-22 | End: 2019-12-24 | Stop reason: HOSPADM

## 2019-12-22 RX ORDER — MORPHINE SULFATE 2 MG/ML
2 INJECTION, SOLUTION INTRAMUSCULAR; INTRAVENOUS EVERY 4 HOURS PRN
Status: DISCONTINUED | OUTPATIENT
Start: 2019-12-22 | End: 2019-12-24 | Stop reason: HOSPADM

## 2019-12-22 RX ORDER — FUROSEMIDE 20 MG/1
20 TABLET ORAL DAILY
Status: DISCONTINUED | OUTPATIENT
Start: 2019-12-23 | End: 2019-12-24 | Stop reason: HOSPADM

## 2019-12-22 RX ORDER — POTASSIUM CHLORIDE 1.5 G/1.77G
40 POWDER, FOR SOLUTION ORAL AS NEEDED
Status: DISCONTINUED | OUTPATIENT
Start: 2019-12-22 | End: 2019-12-24 | Stop reason: HOSPADM

## 2019-12-22 RX ORDER — PANTOPRAZOLE SODIUM 40 MG/1
40 TABLET, DELAYED RELEASE ORAL DAILY
Status: DISCONTINUED | OUTPATIENT
Start: 2019-12-22 | End: 2019-12-24 | Stop reason: HOSPADM

## 2019-12-22 RX ORDER — ONDANSETRON 2 MG/ML
4 INJECTION INTRAMUSCULAR; INTRAVENOUS EVERY 6 HOURS PRN
Status: DISCONTINUED | OUTPATIENT
Start: 2019-12-22 | End: 2019-12-24 | Stop reason: HOSPADM

## 2019-12-22 RX ORDER — GABAPENTIN 300 MG/1
600 CAPSULE ORAL 3 TIMES DAILY
Status: DISCONTINUED | OUTPATIENT
Start: 2019-12-22 | End: 2019-12-22

## 2019-12-22 RX ORDER — CEFAZOLIN SODIUM 1 G/50ML
1 INJECTION, SOLUTION INTRAVENOUS ONCE
Status: COMPLETED | OUTPATIENT
Start: 2019-12-22 | End: 2019-12-22

## 2019-12-22 RX ORDER — BUDESONIDE AND FORMOTEROL FUMARATE DIHYDRATE 160; 4.5 UG/1; UG/1
2 AEROSOL RESPIRATORY (INHALATION)
Status: DISCONTINUED | OUTPATIENT
Start: 2019-12-22 | End: 2019-12-24 | Stop reason: HOSPADM

## 2019-12-22 RX ORDER — ACETAMINOPHEN 325 MG/1
650 TABLET ORAL EVERY 4 HOURS PRN
Status: DISCONTINUED | OUTPATIENT
Start: 2019-12-22 | End: 2019-12-24 | Stop reason: HOSPADM

## 2019-12-22 RX ADMIN — PANTOPRAZOLE SODIUM 40 MG: 40 TABLET, DELAYED RELEASE ORAL at 10:33

## 2019-12-22 RX ADMIN — MONTELUKAST SODIUM 10 MG: 10 TABLET, FILM COATED ORAL at 22:47

## 2019-12-22 RX ADMIN — GABAPENTIN 600 MG: 300 CAPSULE ORAL at 10:33

## 2019-12-22 RX ADMIN — CEFAZOLIN SODIUM 1 G: 1 INJECTION, SOLUTION INTRAVENOUS at 22:47

## 2019-12-22 RX ADMIN — MORPHINE SULFATE 2 MG: 2 INJECTION, SOLUTION INTRAMUSCULAR; INTRAVENOUS at 23:02

## 2019-12-22 RX ADMIN — BUDESONIDE AND FORMOTEROL FUMARATE DIHYDRATE 2 PUFF: 160; 4.5 AEROSOL RESPIRATORY (INHALATION) at 11:53

## 2019-12-22 RX ADMIN — BUDESONIDE AND FORMOTEROL FUMARATE DIHYDRATE 2 PUFF: 160; 4.5 AEROSOL RESPIRATORY (INHALATION) at 22:03

## 2019-12-22 RX ADMIN — MORPHINE SULFATE 2 MG: 2 INJECTION, SOLUTION INTRAMUSCULAR; INTRAVENOUS at 08:27

## 2019-12-22 RX ADMIN — LEVETIRACETAM 500 MG: 500 TABLET, FILM COATED ORAL at 17:18

## 2019-12-22 RX ADMIN — POTASSIUM CHLORIDE 40 MEQ: 750 CAPSULE, EXTENDED RELEASE ORAL at 11:26

## 2019-12-22 RX ADMIN — CEFAZOLIN SODIUM 1 G: 1 INJECTION, SOLUTION INTRAVENOUS at 14:08

## 2019-12-22 RX ADMIN — CEFAZOLIN SODIUM 1 G: 1 INJECTION, SOLUTION INTRAVENOUS at 05:17

## 2019-12-22 RX ADMIN — ENOXAPARIN SODIUM 40 MG: 40 INJECTION SUBCUTANEOUS at 10:33

## 2019-12-22 RX ADMIN — ONDANSETRON 4 MG: 2 INJECTION INTRAMUSCULAR; INTRAVENOUS at 03:12

## 2019-12-22 RX ADMIN — FLUTICASONE PROPIONATE 1 SPRAY: 50 SPRAY, METERED NASAL at 11:26

## 2019-12-22 RX ADMIN — MORPHINE SULFATE 2 MG: 2 INJECTION, SOLUTION INTRAMUSCULAR; INTRAVENOUS at 17:18

## 2019-12-22 RX ADMIN — LEVETIRACETAM 500 MG: 500 TABLET, FILM COATED ORAL at 22:47

## 2019-12-22 RX ADMIN — SODIUM CHLORIDE 125 ML/HR: 9 INJECTION, SOLUTION INTRAVENOUS at 03:54

## 2019-12-22 RX ADMIN — MORPHINE SULFATE 4 MG: 2 INJECTION, SOLUTION INTRAMUSCULAR; INTRAVENOUS at 03:12

## 2019-12-22 RX ADMIN — BUPROPION HYDROCHLORIDE 300 MG: 300 TABLET, EXTENDED RELEASE ORAL at 11:26

## 2019-12-22 RX ADMIN — HYDROCODONE BITARTRATE AND ACETAMINOPHEN 1 TABLET: 10; 325 TABLET ORAL at 10:35

## 2019-12-22 RX ADMIN — PAROXETINE HYDROCHLORIDE 40 MG: 20 TABLET, FILM COATED ORAL at 11:26

## 2019-12-22 RX ADMIN — GABAPENTIN 1200 MG: 400 CAPSULE ORAL at 22:47

## 2019-12-22 RX ADMIN — TIZANIDINE 4 MG: 4 TABLET ORAL at 17:18

## 2019-12-22 RX ADMIN — LEVETIRACETAM 500 MG: 500 TABLET, FILM COATED ORAL at 11:25

## 2019-12-22 RX ADMIN — TIOTROPIUM BROMIDE INHALATION SPRAY 2 PUFF: 3.12 SPRAY, METERED RESPIRATORY (INHALATION) at 11:53

## 2019-12-22 RX ADMIN — GABAPENTIN 1200 MG: 400 CAPSULE ORAL at 17:18

## 2019-12-23 ENCOUNTER — ANESTHESIA EVENT (OUTPATIENT)
Dept: PERIOP | Facility: HOSPITAL | Age: 54
End: 2019-12-23

## 2019-12-23 ENCOUNTER — ANESTHESIA (OUTPATIENT)
Dept: PERIOP | Facility: HOSPITAL | Age: 54
End: 2019-12-23

## 2019-12-23 ENCOUNTER — APPOINTMENT (OUTPATIENT)
Dept: CARDIOLOGY | Facility: HOSPITAL | Age: 54
End: 2019-12-23

## 2019-12-23 ENCOUNTER — APPOINTMENT (OUTPATIENT)
Dept: GENERAL RADIOLOGY | Facility: HOSPITAL | Age: 54
End: 2019-12-23

## 2019-12-23 PROBLEM — D64.9 ANEMIA: Status: ACTIVE | Noted: 2019-12-23

## 2019-12-23 PROBLEM — M48.02 DEGENERATIVE CERVICAL SPINAL STENOSIS: Status: ACTIVE | Noted: 2019-12-21

## 2019-12-23 PROBLEM — M54.12 RIGHT CERVICAL RADICULOPATHY: Status: ACTIVE | Noted: 2019-12-21

## 2019-12-23 LAB
ALBUMIN SERPL-MCNC: 3.3 G/DL (ref 3.5–5.2)
ALBUMIN/GLOB SERPL: 1.1 G/DL
ALP SERPL-CCNC: 42 U/L (ref 39–117)
ALT SERPL W P-5'-P-CCNC: 19 U/L (ref 1–33)
ANION GAP SERPL CALCULATED.3IONS-SCNC: 8.6 MMOL/L (ref 5–15)
AORTIC DIMENSIONLESS INDEX: 0.7 (DI)
AST SERPL-CCNC: 18 U/L (ref 1–32)
BH CV ECHO MEAS - ACS: 1.8 CM
BH CV ECHO MEAS - AO MAX PG: 13 MMHG
BH CV ECHO MEAS - AO MEAN PG (FULL): 2 MMHG
BH CV ECHO MEAS - AO MEAN PG: 6 MMHG
BH CV ECHO MEAS - AO ROOT AREA (BSA CORRECTED): 1.3
BH CV ECHO MEAS - AO ROOT AREA: 6.6 CM^2
BH CV ECHO MEAS - AO ROOT DIAM: 2.9 CM
BH CV ECHO MEAS - AO V2 MAX: 179 CM/SEC
BH CV ECHO MEAS - AO V2 MEAN: 114 CM/SEC
BH CV ECHO MEAS - AO V2 VTI: 32.8 CM
BH CV ECHO MEAS - AVA(I,A): 2.1 CM^2
BH CV ECHO MEAS - AVA(I,D): 2.1 CM^2
BH CV ECHO MEAS - BSA(HAYCOCK): 2.4 M^2
BH CV ECHO MEAS - BSA: 2.3 M^2
BH CV ECHO MEAS - BZI_BMI: 41.3 KILOGRAMS/M^2
BH CV ECHO MEAS - BZI_METRIC_HEIGHT: 170.2 CM
BH CV ECHO MEAS - BZI_METRIC_WEIGHT: 119.8 KG
BH CV ECHO MEAS - EDV(CUBED): 79.5 ML
BH CV ECHO MEAS - EDV(MOD-SP2): 111 ML
BH CV ECHO MEAS - EDV(MOD-SP4): 87 ML
BH CV ECHO MEAS - EDV(TEICH): 83.1 ML
BH CV ECHO MEAS - EF(CUBED): 62.5 %
BH CV ECHO MEAS - EF(MOD-BP): 66 %
BH CV ECHO MEAS - EF(MOD-SP2): 52.3 %
BH CV ECHO MEAS - EF(MOD-SP4): 72.4 %
BH CV ECHO MEAS - EF(TEICH): 54.4 %
BH CV ECHO MEAS - ESV(CUBED): 29.8 ML
BH CV ECHO MEAS - ESV(MOD-SP2): 53 ML
BH CV ECHO MEAS - ESV(MOD-SP4): 24 ML
BH CV ECHO MEAS - ESV(TEICH): 37.9 ML
BH CV ECHO MEAS - FS: 27.9 %
BH CV ECHO MEAS - IVS/LVPW: 1.3
BH CV ECHO MEAS - IVSD: 1.5 CM
BH CV ECHO MEAS - LAT PEAK E' VEL: 10 CM/SEC
BH CV ECHO MEAS - LV DIASTOLIC VOL/BSA (35-75): 38.2 ML/M^2
BH CV ECHO MEAS - LV MASS(C)D: 219.8 GRAMS
BH CV ECHO MEAS - LV MASS(C)DI: 96.6 GRAMS/M^2
BH CV ECHO MEAS - LV MAX PG: 7 MMHG
BH CV ECHO MEAS - LV MEAN PG: 4 MMHG
BH CV ECHO MEAS - LV SYSTOLIC VOL/BSA (12-30): 10.5 ML/M^2
BH CV ECHO MEAS - LV V1 MAX: 133 CM/SEC
BH CV ECHO MEAS - LV V1 MEAN: 90.7 CM/SEC
BH CV ECHO MEAS - LV V1 VTI: 23.8 CM
BH CV ECHO MEAS - LVIDD: 4.3 CM
BH CV ECHO MEAS - LVIDS: 3.1 CM
BH CV ECHO MEAS - LVLD AP2: 8.1 CM
BH CV ECHO MEAS - LVLD AP4: 7 CM
BH CV ECHO MEAS - LVLS AP2: 7.4 CM
BH CV ECHO MEAS - LVLS AP4: 6.9 CM
BH CV ECHO MEAS - LVOT AREA (M): 2.8 CM^2
BH CV ECHO MEAS - LVOT AREA: 2.8 CM^2
BH CV ECHO MEAS - LVOT DIAM: 1.9 CM
BH CV ECHO MEAS - LVPWD: 1.2 CM
BH CV ECHO MEAS - MED PEAK E' VEL: 10 CM/SEC
BH CV ECHO MEAS - MV A DUR: 0.13 SEC
BH CV ECHO MEAS - MV A MAX VEL: 106 CM/SEC
BH CV ECHO MEAS - MV DEC SLOPE: 427 CM/SEC^2
BH CV ECHO MEAS - MV DEC TIME: 220 SEC
BH CV ECHO MEAS - MV E MAX VEL: 103 CM/SEC
BH CV ECHO MEAS - MV E/A: 0.97
BH CV ECHO MEAS - MV MEAN PG: 3 MMHG
BH CV ECHO MEAS - MV P1/2T MAX VEL: 136 CM/SEC
BH CV ECHO MEAS - MV P1/2T: 93.3 MSEC
BH CV ECHO MEAS - MV V2 MEAN: 78.4 CM/SEC
BH CV ECHO MEAS - MV V2 VTI: 37.1 CM
BH CV ECHO MEAS - MVA P1/2T LCG: 1.6 CM^2
BH CV ECHO MEAS - MVA(P1/2T): 2.4 CM^2
BH CV ECHO MEAS - MVA(VTI): 1.8 CM^2
BH CV ECHO MEAS - PA ACC SLOPE: 1480 CM/SEC^2
BH CV ECHO MEAS - PA ACC TIME: 0.09 SEC
BH CV ECHO MEAS - PA MAX PG: 6.2 MMHG
BH CV ECHO MEAS - PA PR(ACCEL): 39.4 MMHG
BH CV ECHO MEAS - PA V2 MAX: 124 CM/SEC
BH CV ECHO MEAS - QP/QS: 0.53
BH CV ECHO MEAS - RAP SYSTOLE: 8 MMHG
BH CV ECHO MEAS - RV MEAN PG: 1 MMHG
BH CV ECHO MEAS - RV V1 MEAN: 47 CM/SEC
BH CV ECHO MEAS - RV V1 VTI: 12.5 CM
BH CV ECHO MEAS - RVOT AREA: 2.8 CM^2
BH CV ECHO MEAS - RVOT DIAM: 1.9 CM
BH CV ECHO MEAS - SI(AO): 95.2 ML/M^2
BH CV ECHO MEAS - SI(CUBED): 21.9 ML/M^2
BH CV ECHO MEAS - SI(LVOT): 29.7 ML/M^2
BH CV ECHO MEAS - SI(MOD-SP2): 25.5 ML/M^2
BH CV ECHO MEAS - SI(MOD-SP4): 27.7 ML/M^2
BH CV ECHO MEAS - SI(TEICH): 19.8 ML/M^2
BH CV ECHO MEAS - SV(AO): 216.7 ML
BH CV ECHO MEAS - SV(CUBED): 49.7 ML
BH CV ECHO MEAS - SV(LVOT): 67.5 ML
BH CV ECHO MEAS - SV(MOD-SP2): 58 ML
BH CV ECHO MEAS - SV(MOD-SP4): 63 ML
BH CV ECHO MEAS - SV(RVOT): 35.4 ML
BH CV ECHO MEAS - SV(TEICH): 45.2 ML
BH CV ECHO MEAS - TAPSE (>1.6): 2.5 CM2
BH CV ECHO MEASUREMENTS AVERAGE E/E' RATIO: 10.3
BH CV VAS BP RIGHT ARM: NORMAL MMHG
BH CV XLRA - RV BASE: 3.8 CM
BH CV XLRA - TDI S': 13 CM/SEC
BILIRUB SERPL-MCNC: 0.4 MG/DL (ref 0.2–1.2)
BUN BLD-MCNC: 14 MG/DL (ref 6–20)
BUN/CREAT SERPL: 17.5 (ref 7–25)
CALCIUM SPEC-SCNC: 8.2 MG/DL (ref 8.6–10.5)
CHLORIDE SERPL-SCNC: 111 MMOL/L (ref 98–107)
CO2 SERPL-SCNC: 24.4 MMOL/L (ref 22–29)
CREAT BLD-MCNC: 0.8 MG/DL (ref 0.57–1)
DEPRECATED RDW RBC AUTO: 45 FL (ref 37–54)
ERYTHROCYTE [DISTWIDTH] IN BLOOD BY AUTOMATED COUNT: 15 % (ref 12.3–15.4)
GFR SERPL CREATININE-BSD FRML MDRD: 74 ML/MIN/1.73
GLOBULIN UR ELPH-MCNC: 2.9 GM/DL
GLUCOSE BLD-MCNC: 101 MG/DL (ref 65–99)
HCT VFR BLD AUTO: 30.1 % (ref 34–46.6)
HGB BLD-MCNC: 9.6 G/DL (ref 12–15.9)
LEFT ATRIUM VOLUME INDEX: 18 ML/M2
LV EF 2D ECHO EST: 66 %
MAXIMAL PREDICTED HEART RATE: 164 BPM
MCH RBC QN AUTO: 26.6 PG (ref 26.6–33)
MCHC RBC AUTO-ENTMCNC: 31.9 G/DL (ref 31.5–35.7)
MCV RBC AUTO: 83.4 FL (ref 79–97)
PLATELET # BLD AUTO: 164 10*3/MM3 (ref 140–450)
PMV BLD AUTO: 12.8 FL (ref 6–12)
POTASSIUM BLD-SCNC: 4.2 MMOL/L (ref 3.5–5.2)
PROT SERPL-MCNC: 6.2 G/DL (ref 6–8.5)
RBC # BLD AUTO: 3.61 10*6/MM3 (ref 3.77–5.28)
SODIUM BLD-SCNC: 144 MMOL/L (ref 136–145)
STRESS TARGET HR: 139 BPM
WBC NRBC COR # BLD: 5.48 10*3/MM3 (ref 3.4–10.8)

## 2019-12-23 PROCEDURE — 25010000002 PERFLUTREN (DEFINITY) 8.476 MG IN SODIUM CHLORIDE 0.9 % 10 ML INJECTION: Performed by: NURSE PRACTITIONER

## 2019-12-23 PROCEDURE — 3E0R33Z INTRODUCTION OF ANTI-INFLAMMATORY INTO SPINAL CANAL, PERCUTANEOUS APPROACH: ICD-10-PCS | Performed by: ANESTHESIOLOGY

## 2019-12-23 PROCEDURE — 72020 X-RAY EXAM OF SPINE 1 VIEW: CPT

## 2019-12-23 PROCEDURE — 25010000003 CEFAZOLIN 1-4 GM/50ML-% SOLUTION: Performed by: NURSE PRACTITIONER

## 2019-12-23 PROCEDURE — 25010000002 METHYLPREDNISOLONE PER 40 MG: Performed by: ANESTHESIOLOGY

## 2019-12-23 PROCEDURE — 80053 COMPREHEN METABOLIC PANEL: CPT | Performed by: HOSPITALIST

## 2019-12-23 PROCEDURE — 25010000002 FENTANYL CITRATE (PF) 100 MCG/2ML SOLUTION: Performed by: ANESTHESIOLOGY

## 2019-12-23 PROCEDURE — 85027 COMPLETE CBC AUTOMATED: CPT | Performed by: HOSPITALIST

## 2019-12-23 PROCEDURE — 62321 NJX INTERLAMINAR CRV/THRC: CPT | Performed by: ANESTHESIOLOGY

## 2019-12-23 PROCEDURE — 0 IOTHALAMATE 60 % SOLUTION: Performed by: ANESTHESIOLOGY

## 2019-12-23 PROCEDURE — 25010000003 CEFAZOLIN 1-4 GM/50ML-% SOLUTION: Performed by: ANESTHESIOLOGY

## 2019-12-23 PROCEDURE — 76000 FLUOROSCOPY <1 HR PHYS/QHP: CPT

## 2019-12-23 PROCEDURE — 25010000002 DEXAMETHASONE SODIUM PHOSPHATE 20 MG/5ML SOLUTION: Performed by: ANESTHESIOLOGY

## 2019-12-23 PROCEDURE — 3E0R3BZ INTRODUCTION OF ANESTHETIC AGENT INTO SPINAL CANAL, PERCUTANEOUS APPROACH: ICD-10-PCS | Performed by: ANESTHESIOLOGY

## 2019-12-23 PROCEDURE — 25010000002 MIDAZOLAM PER 1 MG: Performed by: ANESTHESIOLOGY

## 2019-12-23 PROCEDURE — 93306 TTE W/DOPPLER COMPLETE: CPT

## 2019-12-23 PROCEDURE — 25010000002 MORPHINE PER 10 MG: Performed by: NURSE PRACTITIONER

## 2019-12-23 PROCEDURE — 93306 TTE W/DOPPLER COMPLETE: CPT | Performed by: INTERNAL MEDICINE

## 2019-12-23 RX ORDER — FAMOTIDINE 10 MG/ML
20 INJECTION, SOLUTION INTRAVENOUS ONCE
Status: DISCONTINUED | OUTPATIENT
Start: 2019-12-23 | End: 2019-12-23 | Stop reason: HOSPADM

## 2019-12-23 RX ORDER — SODIUM CHLORIDE 0.9 % (FLUSH) 0.9 %
3-10 SYRINGE (ML) INJECTION AS NEEDED
Status: DISCONTINUED | OUTPATIENT
Start: 2019-12-23 | End: 2019-12-23 | Stop reason: HOSPADM

## 2019-12-23 RX ORDER — PROMETHAZINE HYDROCHLORIDE 25 MG/ML
6.25 INJECTION, SOLUTION INTRAMUSCULAR; INTRAVENOUS
Status: DISCONTINUED | OUTPATIENT
Start: 2019-12-23 | End: 2019-12-23 | Stop reason: HOSPADM

## 2019-12-23 RX ORDER — NALOXONE HCL 0.4 MG/ML
0.2 VIAL (ML) INJECTION AS NEEDED
Status: DISCONTINUED | OUTPATIENT
Start: 2019-12-23 | End: 2019-12-23 | Stop reason: HOSPADM

## 2019-12-23 RX ORDER — PROMETHAZINE HYDROCHLORIDE 25 MG/1
25 SUPPOSITORY RECTAL ONCE AS NEEDED
Status: DISCONTINUED | OUTPATIENT
Start: 2019-12-23 | End: 2019-12-23 | Stop reason: HOSPADM

## 2019-12-23 RX ORDER — OXYCODONE AND ACETAMINOPHEN 7.5; 325 MG/1; MG/1
1 TABLET ORAL ONCE AS NEEDED
Status: DISCONTINUED | OUTPATIENT
Start: 2019-12-23 | End: 2019-12-23 | Stop reason: HOSPADM

## 2019-12-23 RX ORDER — MIDAZOLAM HYDROCHLORIDE 1 MG/ML
1 INJECTION INTRAMUSCULAR; INTRAVENOUS
Status: DISCONTINUED | OUTPATIENT
Start: 2019-12-23 | End: 2019-12-23 | Stop reason: HOSPADM

## 2019-12-23 RX ORDER — HYDROMORPHONE HYDROCHLORIDE 1 MG/ML
0.5 INJECTION, SOLUTION INTRAMUSCULAR; INTRAVENOUS; SUBCUTANEOUS
Status: DISCONTINUED | OUTPATIENT
Start: 2019-12-23 | End: 2019-12-23 | Stop reason: HOSPADM

## 2019-12-23 RX ORDER — EPHEDRINE SULFATE 50 MG/ML
5 INJECTION, SOLUTION INTRAVENOUS ONCE AS NEEDED
Status: DISCONTINUED | OUTPATIENT
Start: 2019-12-23 | End: 2019-12-23 | Stop reason: HOSPADM

## 2019-12-23 RX ORDER — LIDOCAINE HYDROCHLORIDE 10 MG/ML
INJECTION, SOLUTION EPIDURAL; INFILTRATION; INTRACAUDAL; PERINEURAL AS NEEDED
Status: DISCONTINUED | OUTPATIENT
Start: 2019-12-23 | End: 2019-12-23 | Stop reason: HOSPADM

## 2019-12-23 RX ORDER — SODIUM CHLORIDE 0.9 % (FLUSH) 0.9 %
3 SYRINGE (ML) INJECTION EVERY 12 HOURS SCHEDULED
Status: DISCONTINUED | OUTPATIENT
Start: 2019-12-23 | End: 2019-12-23 | Stop reason: HOSPADM

## 2019-12-23 RX ORDER — HYDROCODONE BITARTRATE AND ACETAMINOPHEN 7.5; 325 MG/1; MG/1
1 TABLET ORAL ONCE AS NEEDED
Status: DISCONTINUED | OUTPATIENT
Start: 2019-12-23 | End: 2019-12-23 | Stop reason: HOSPADM

## 2019-12-23 RX ORDER — LIDOCAINE HYDROCHLORIDE 10 MG/ML
0.5 INJECTION, SOLUTION EPIDURAL; INFILTRATION; INTRACAUDAL; PERINEURAL ONCE AS NEEDED
Status: DISCONTINUED | OUTPATIENT
Start: 2019-12-23 | End: 2019-12-23 | Stop reason: HOSPADM

## 2019-12-23 RX ORDER — PROMETHAZINE HYDROCHLORIDE 25 MG/ML
12.5 INJECTION, SOLUTION INTRAMUSCULAR; INTRAVENOUS ONCE AS NEEDED
Status: DISCONTINUED | OUTPATIENT
Start: 2019-12-23 | End: 2019-12-23 | Stop reason: HOSPADM

## 2019-12-23 RX ORDER — ACETAMINOPHEN 325 MG/1
650 TABLET ORAL ONCE AS NEEDED
Status: DISCONTINUED | OUTPATIENT
Start: 2019-12-23 | End: 2019-12-23 | Stop reason: HOSPADM

## 2019-12-23 RX ORDER — ONDANSETRON 2 MG/ML
4 INJECTION INTRAMUSCULAR; INTRAVENOUS ONCE AS NEEDED
Status: DISCONTINUED | OUTPATIENT
Start: 2019-12-23 | End: 2019-12-23 | Stop reason: HOSPADM

## 2019-12-23 RX ORDER — METHYLPREDNISOLONE ACETATE 40 MG/ML
INJECTION, SUSPENSION INTRA-ARTICULAR; INTRALESIONAL; INTRAMUSCULAR; SOFT TISSUE AS NEEDED
Status: DISCONTINUED | OUTPATIENT
Start: 2019-12-23 | End: 2019-12-23 | Stop reason: HOSPADM

## 2019-12-23 RX ORDER — DIPHENHYDRAMINE HYDROCHLORIDE 50 MG/ML
12.5 INJECTION INTRAMUSCULAR; INTRAVENOUS
Status: DISCONTINUED | OUTPATIENT
Start: 2019-12-23 | End: 2019-12-23 | Stop reason: HOSPADM

## 2019-12-23 RX ORDER — FENTANYL CITRATE 50 UG/ML
50 INJECTION, SOLUTION INTRAMUSCULAR; INTRAVENOUS
Status: DISCONTINUED | OUTPATIENT
Start: 2019-12-23 | End: 2019-12-23 | Stop reason: HOSPADM

## 2019-12-23 RX ORDER — FLUMAZENIL 0.1 MG/ML
0.2 INJECTION INTRAVENOUS AS NEEDED
Status: DISCONTINUED | OUTPATIENT
Start: 2019-12-23 | End: 2019-12-23 | Stop reason: HOSPADM

## 2019-12-23 RX ORDER — HYDRALAZINE HYDROCHLORIDE 20 MG/ML
5 INJECTION INTRAMUSCULAR; INTRAVENOUS
Status: DISCONTINUED | OUTPATIENT
Start: 2019-12-23 | End: 2019-12-23 | Stop reason: HOSPADM

## 2019-12-23 RX ORDER — BUPIVACAINE HYDROCHLORIDE 2.5 MG/ML
INJECTION, SOLUTION EPIDURAL; INFILTRATION; INTRACAUDAL AS NEEDED
Status: DISCONTINUED | OUTPATIENT
Start: 2019-12-23 | End: 2019-12-23 | Stop reason: HOSPADM

## 2019-12-23 RX ORDER — MIDAZOLAM HYDROCHLORIDE 1 MG/ML
2 INJECTION INTRAMUSCULAR; INTRAVENOUS
Status: DISCONTINUED | OUTPATIENT
Start: 2019-12-23 | End: 2019-12-23 | Stop reason: HOSPADM

## 2019-12-23 RX ORDER — PROMETHAZINE HYDROCHLORIDE 25 MG/1
25 TABLET ORAL ONCE AS NEEDED
Status: DISCONTINUED | OUTPATIENT
Start: 2019-12-23 | End: 2019-12-23 | Stop reason: HOSPADM

## 2019-12-23 RX ORDER — SODIUM CHLORIDE, SODIUM LACTATE, POTASSIUM CHLORIDE, CALCIUM CHLORIDE 600; 310; 30; 20 MG/100ML; MG/100ML; MG/100ML; MG/100ML
9 INJECTION, SOLUTION INTRAVENOUS CONTINUOUS
Status: DISCONTINUED | OUTPATIENT
Start: 2019-12-23 | End: 2019-12-24 | Stop reason: HOSPADM

## 2019-12-23 RX ORDER — DEXAMETHASONE SODIUM PHOSPHATE 4 MG/ML
INJECTION, SOLUTION INTRA-ARTICULAR; INTRALESIONAL; INTRAMUSCULAR; INTRAVENOUS; SOFT TISSUE AS NEEDED
Status: DISCONTINUED | OUTPATIENT
Start: 2019-12-23 | End: 2019-12-23 | Stop reason: HOSPADM

## 2019-12-23 RX ORDER — DIPHENHYDRAMINE HCL 25 MG
25 CAPSULE ORAL
Status: DISCONTINUED | OUTPATIENT
Start: 2019-12-23 | End: 2019-12-23 | Stop reason: HOSPADM

## 2019-12-23 RX ORDER — LABETALOL HYDROCHLORIDE 5 MG/ML
5 INJECTION, SOLUTION INTRAVENOUS
Status: DISCONTINUED | OUTPATIENT
Start: 2019-12-23 | End: 2019-12-23 | Stop reason: HOSPADM

## 2019-12-23 RX ADMIN — HYDROCODONE BITARTRATE AND ACETAMINOPHEN 1 TABLET: 10; 325 TABLET ORAL at 00:45

## 2019-12-23 RX ADMIN — FUROSEMIDE 20 MG: 20 TABLET ORAL at 09:12

## 2019-12-23 RX ADMIN — BUPROPION HYDROCHLORIDE 300 MG: 300 TABLET, EXTENDED RELEASE ORAL at 09:12

## 2019-12-23 RX ADMIN — PAROXETINE HYDROCHLORIDE 40 MG: 20 TABLET, FILM COATED ORAL at 06:33

## 2019-12-23 RX ADMIN — POTASSIUM CHLORIDE 40 MEQ: 750 CAPSULE, EXTENDED RELEASE ORAL at 00:38

## 2019-12-23 RX ADMIN — LEVETIRACETAM 500 MG: 500 TABLET, FILM COATED ORAL at 09:12

## 2019-12-23 RX ADMIN — FENTANYL CITRATE 50 MCG: 50 INJECTION INTRAMUSCULAR; INTRAVENOUS at 13:56

## 2019-12-23 RX ADMIN — HYDROCODONE BITARTRATE AND ACETAMINOPHEN 1 TABLET: 10; 325 TABLET ORAL at 09:12

## 2019-12-23 RX ADMIN — LEVETIRACETAM 500 MG: 500 TABLET, FILM COATED ORAL at 20:12

## 2019-12-23 RX ADMIN — CEFAZOLIN SODIUM 1 G: 1 INJECTION, SOLUTION INTRAVENOUS at 21:29

## 2019-12-23 RX ADMIN — TIZANIDINE 4 MG: 4 TABLET ORAL at 15:05

## 2019-12-23 RX ADMIN — CEFAZOLIN SODIUM 1 G: 1 INJECTION, SOLUTION INTRAVENOUS at 06:33

## 2019-12-23 RX ADMIN — MIDAZOLAM 2 MG: 1 INJECTION INTRAMUSCULAR; INTRAVENOUS at 13:56

## 2019-12-23 RX ADMIN — MORPHINE SULFATE 2 MG: 2 INJECTION, SOLUTION INTRAMUSCULAR; INTRAVENOUS at 06:33

## 2019-12-23 RX ADMIN — GABAPENTIN 1200 MG: 400 CAPSULE ORAL at 20:13

## 2019-12-23 RX ADMIN — LEVETIRACETAM 500 MG: 500 TABLET, FILM COATED ORAL at 16:18

## 2019-12-23 RX ADMIN — MONTELUKAST SODIUM 10 MG: 10 TABLET, FILM COATED ORAL at 20:13

## 2019-12-23 RX ADMIN — HYDROCODONE BITARTRATE AND ACETAMINOPHEN 1 TABLET: 10; 325 TABLET ORAL at 21:30

## 2019-12-23 RX ADMIN — PERFLUTREN 4 ML: 6.52 INJECTION, SUSPENSION INTRAVENOUS at 08:15

## 2019-12-23 RX ADMIN — SODIUM CHLORIDE, POTASSIUM CHLORIDE, SODIUM LACTATE AND CALCIUM CHLORIDE 9 ML/HR: 600; 310; 30; 20 INJECTION, SOLUTION INTRAVENOUS at 12:20

## 2019-12-23 RX ADMIN — FLUTICASONE PROPIONATE 1 SPRAY: 50 SPRAY, METERED NASAL at 09:13

## 2019-12-23 RX ADMIN — PANTOPRAZOLE SODIUM 40 MG: 40 TABLET, DELAYED RELEASE ORAL at 09:12

## 2019-12-23 RX ADMIN — FENTANYL CITRATE 50 MCG: 50 INJECTION INTRAMUSCULAR; INTRAVENOUS at 14:01

## 2019-12-23 RX ADMIN — TIZANIDINE 4 MG: 4 TABLET ORAL at 00:45

## 2019-12-23 RX ADMIN — GABAPENTIN 1200 MG: 400 CAPSULE ORAL at 09:12

## 2019-12-23 RX ADMIN — SODIUM CHLORIDE 100 ML/HR: 9 INJECTION, SOLUTION INTRAVENOUS at 00:38

## 2019-12-23 RX ADMIN — HYDROCODONE BITARTRATE AND ACETAMINOPHEN 1 TABLET: 10; 325 TABLET ORAL at 15:06

## 2019-12-23 RX ADMIN — GABAPENTIN 1200 MG: 400 CAPSULE ORAL at 16:18

## 2019-12-24 VITALS
OXYGEN SATURATION: 90 % | HEART RATE: 64 BPM | RESPIRATION RATE: 18 BRPM | WEIGHT: 264 LBS | DIASTOLIC BLOOD PRESSURE: 67 MMHG | HEIGHT: 67 IN | TEMPERATURE: 97.2 F | SYSTOLIC BLOOD PRESSURE: 128 MMHG | BODY MASS INDEX: 41.44 KG/M2

## 2019-12-24 PROBLEM — D50.9 IRON DEFICIENCY ANEMIA: Status: ACTIVE | Noted: 2019-12-24

## 2019-12-24 PROBLEM — E53.8 B12 DEFICIENCY: Status: ACTIVE | Noted: 2019-12-24

## 2019-12-24 LAB
ALBUMIN SERPL-MCNC: 3.2 G/DL (ref 3.5–5.2)
ALBUMIN/GLOB SERPL: 0.9 G/DL
ALP SERPL-CCNC: 47 U/L (ref 39–117)
ALT SERPL W P-5'-P-CCNC: 23 U/L (ref 1–33)
ANION GAP SERPL CALCULATED.3IONS-SCNC: 10.2 MMOL/L (ref 5–15)
AST SERPL-CCNC: 19 U/L (ref 1–32)
BASOPHILS # BLD AUTO: 0.01 10*3/MM3 (ref 0–0.2)
BASOPHILS NFR BLD AUTO: 0.2 % (ref 0–1.5)
BILIRUB SERPL-MCNC: 0.2 MG/DL (ref 0.2–1.2)
BUN BLD-MCNC: 13 MG/DL (ref 6–20)
BUN/CREAT SERPL: 20.3 (ref 7–25)
CALCIUM SPEC-SCNC: 8.7 MG/DL (ref 8.6–10.5)
CHLORIDE SERPL-SCNC: 103 MMOL/L (ref 98–107)
CO2 SERPL-SCNC: 25.8 MMOL/L (ref 22–29)
CREAT BLD-MCNC: 0.64 MG/DL (ref 0.57–1)
DEPRECATED RDW RBC AUTO: 45.4 FL (ref 37–54)
EOSINOPHIL # BLD AUTO: 0 10*3/MM3 (ref 0–0.4)
EOSINOPHIL NFR BLD AUTO: 0 % (ref 0.3–6.2)
ERYTHROCYTE [DISTWIDTH] IN BLOOD BY AUTOMATED COUNT: 15 % (ref 12.3–15.4)
FERRITIN SERPL-MCNC: 76.5 NG/ML (ref 13–150)
FOLATE SERPL-MCNC: 12.1 NG/ML (ref 4.78–24.2)
GFR SERPL CREATININE-BSD FRML MDRD: 96 ML/MIN/1.73
GLOBULIN UR ELPH-MCNC: 3.7 GM/DL
GLUCOSE BLD-MCNC: 126 MG/DL (ref 65–99)
HCT VFR BLD AUTO: 32.2 % (ref 34–46.6)
HGB BLD-MCNC: 10.1 G/DL (ref 12–15.9)
IMM GRANULOCYTES # BLD AUTO: 0.03 10*3/MM3 (ref 0–0.05)
IMM GRANULOCYTES NFR BLD AUTO: 0.6 % (ref 0–0.5)
IRON 24H UR-MRATE: 23 MCG/DL (ref 37–145)
IRON SATN MFR SERPL: 8 % (ref 20–50)
LYMPHOCYTES # BLD AUTO: 0.46 10*3/MM3 (ref 0.7–3.1)
LYMPHOCYTES NFR BLD AUTO: 9.1 % (ref 19.6–45.3)
MCH RBC QN AUTO: 26.4 PG (ref 26.6–33)
MCHC RBC AUTO-ENTMCNC: 31.4 G/DL (ref 31.5–35.7)
MCV RBC AUTO: 84.1 FL (ref 79–97)
MONOCYTES # BLD AUTO: 0.39 10*3/MM3 (ref 0.1–0.9)
MONOCYTES NFR BLD AUTO: 7.7 % (ref 5–12)
NEUTROPHILS # BLD AUTO: 4.17 10*3/MM3 (ref 1.7–7)
NEUTROPHILS NFR BLD AUTO: 82.4 % (ref 42.7–76)
NRBC BLD AUTO-RTO: 0 /100 WBC (ref 0–0.2)
PLATELET # BLD AUTO: 170 10*3/MM3 (ref 140–450)
PMV BLD AUTO: 12.4 FL (ref 6–12)
POTASSIUM BLD-SCNC: 4.1 MMOL/L (ref 3.5–5.2)
PROT SERPL-MCNC: 6.9 G/DL (ref 6–8.5)
RBC # BLD AUTO: 3.83 10*6/MM3 (ref 3.77–5.28)
SODIUM BLD-SCNC: 139 MMOL/L (ref 136–145)
TIBC SERPL-MCNC: 302 MCG/DL (ref 298–536)
TRANSFERRIN SERPL-MCNC: 203 MG/DL (ref 200–360)
VIT B12 BLD-MCNC: 304 PG/ML (ref 211–946)
WBC NRBC COR # BLD: 5.06 10*3/MM3 (ref 3.4–10.8)

## 2019-12-24 PROCEDURE — 84466 ASSAY OF TRANSFERRIN: CPT | Performed by: ANESTHESIOLOGY

## 2019-12-24 PROCEDURE — 25010000002 ENOXAPARIN PER 10 MG: Performed by: ANESTHESIOLOGY

## 2019-12-24 PROCEDURE — 82728 ASSAY OF FERRITIN: CPT | Performed by: ANESTHESIOLOGY

## 2019-12-24 PROCEDURE — 82607 VITAMIN B-12: CPT | Performed by: ANESTHESIOLOGY

## 2019-12-24 PROCEDURE — 25010000003 CEFAZOLIN 1-4 GM/50ML-% SOLUTION: Performed by: ANESTHESIOLOGY

## 2019-12-24 PROCEDURE — 82746 ASSAY OF FOLIC ACID SERUM: CPT | Performed by: ANESTHESIOLOGY

## 2019-12-24 PROCEDURE — 25010000002 CYANOCOBALAMIN PER 1000 MCG: Performed by: HOSPITALIST

## 2019-12-24 PROCEDURE — 94799 UNLISTED PULMONARY SVC/PX: CPT

## 2019-12-24 PROCEDURE — 83540 ASSAY OF IRON: CPT | Performed by: ANESTHESIOLOGY

## 2019-12-24 PROCEDURE — 80053 COMPREHEN METABOLIC PANEL: CPT | Performed by: ANESTHESIOLOGY

## 2019-12-24 PROCEDURE — 85025 COMPLETE CBC W/AUTO DIFF WBC: CPT | Performed by: ANESTHESIOLOGY

## 2019-12-24 RX ORDER — FERROUS SULFATE 325(65) MG
325 TABLET ORAL
Status: DISCONTINUED | OUTPATIENT
Start: 2019-12-24 | End: 2019-12-24 | Stop reason: HOSPADM

## 2019-12-24 RX ORDER — LANOLIN ALCOHOL/MO/W.PET/CERES
325 CREAM (GRAM) TOPICAL
Qty: 30 TABLET | Refills: 0 | Status: SHIPPED | OUTPATIENT
Start: 2019-12-24 | End: 2022-06-20 | Stop reason: HOSPADM

## 2019-12-24 RX ORDER — CEPHALEXIN 500 MG/1
500 CAPSULE ORAL EVERY 8 HOURS SCHEDULED
Status: DISCONTINUED | OUTPATIENT
Start: 2019-12-24 | End: 2019-12-24 | Stop reason: HOSPADM

## 2019-12-24 RX ORDER — CYANOCOBALAMIN 1000 UG/ML
1000 INJECTION, SOLUTION INTRAMUSCULAR; SUBCUTANEOUS DAILY
Status: DISCONTINUED | OUTPATIENT
Start: 2019-12-24 | End: 2019-12-24 | Stop reason: HOSPADM

## 2019-12-24 RX ORDER — CEPHALEXIN 500 MG/1
500 CAPSULE ORAL EVERY 8 HOURS SCHEDULED
Qty: 21 CAPSULE | Refills: 0 | Status: SHIPPED | OUTPATIENT
Start: 2019-12-24 | End: 2019-12-31

## 2019-12-24 RX ADMIN — TIOTROPIUM BROMIDE INHALATION SPRAY 2 PUFF: 3.12 SPRAY, METERED RESPIRATORY (INHALATION) at 08:36

## 2019-12-24 RX ADMIN — HYDROCODONE BITARTRATE AND ACETAMINOPHEN 1 TABLET: 10; 325 TABLET ORAL at 11:06

## 2019-12-24 RX ADMIN — PANTOPRAZOLE SODIUM 40 MG: 40 TABLET, DELAYED RELEASE ORAL at 08:59

## 2019-12-24 RX ADMIN — PAROXETINE HYDROCHLORIDE 40 MG: 20 TABLET, FILM COATED ORAL at 06:10

## 2019-12-24 RX ADMIN — FERROUS SULFATE TAB 325 MG (65 MG ELEMENTAL FE) 325 MG: 325 (65 FE) TAB at 10:53

## 2019-12-24 RX ADMIN — FLUTICASONE PROPIONATE 1 SPRAY: 50 SPRAY, METERED NASAL at 09:00

## 2019-12-24 RX ADMIN — CYANOCOBALAMIN 1000 MCG: 1000 INJECTION, SOLUTION INTRAMUSCULAR at 10:53

## 2019-12-24 RX ADMIN — LEVETIRACETAM 500 MG: 500 TABLET, FILM COATED ORAL at 08:59

## 2019-12-24 RX ADMIN — CEPHALEXIN 500 MG: 500 CAPSULE ORAL at 14:23

## 2019-12-24 RX ADMIN — GABAPENTIN 1200 MG: 400 CAPSULE ORAL at 08:59

## 2019-12-24 RX ADMIN — FUROSEMIDE 20 MG: 20 TABLET ORAL at 08:59

## 2019-12-24 RX ADMIN — SODIUM CHLORIDE, PRESERVATIVE FREE 10 ML: 5 INJECTION INTRAVENOUS at 09:04

## 2019-12-24 RX ADMIN — ENOXAPARIN SODIUM 40 MG: 40 INJECTION SUBCUTANEOUS at 11:06

## 2019-12-24 RX ADMIN — BUDESONIDE AND FORMOTEROL FUMARATE DIHYDRATE 2 PUFF: 160; 4.5 AEROSOL RESPIRATORY (INHALATION) at 08:36

## 2019-12-24 RX ADMIN — TIZANIDINE 4 MG: 4 TABLET ORAL at 04:04

## 2019-12-24 RX ADMIN — BUPROPION HYDROCHLORIDE 300 MG: 300 TABLET, EXTENDED RELEASE ORAL at 08:59

## 2019-12-24 RX ADMIN — CEFAZOLIN SODIUM 1 G: 1 INJECTION, SOLUTION INTRAVENOUS at 06:10

## 2019-12-24 RX ADMIN — HYDROCODONE BITARTRATE AND ACETAMINOPHEN 1 TABLET: 10; 325 TABLET ORAL at 04:02

## 2019-12-25 ENCOUNTER — READMISSION MANAGEMENT (OUTPATIENT)
Dept: CALL CENTER | Facility: HOSPITAL | Age: 54
End: 2019-12-25

## 2019-12-27 LAB
BACTERIA SPEC AEROBE CULT: ABNORMAL
BACTERIA SPEC AEROBE CULT: NORMAL
GRAM STN SPEC: ABNORMAL
ISOLATED FROM: ABNORMAL

## 2019-12-30 ENCOUNTER — READMISSION MANAGEMENT (OUTPATIENT)
Dept: CALL CENTER | Facility: HOSPITAL | Age: 54
End: 2019-12-30

## 2020-01-07 ENCOUNTER — READMISSION MANAGEMENT (OUTPATIENT)
Dept: CALL CENTER | Facility: HOSPITAL | Age: 55
End: 2020-01-07

## 2020-01-10 ENCOUNTER — READMISSION MANAGEMENT (OUTPATIENT)
Dept: CALL CENTER | Facility: HOSPITAL | Age: 55
End: 2020-01-10

## 2020-01-14 ENCOUNTER — OFFICE VISIT (OUTPATIENT)
Dept: PAIN MEDICINE | Facility: CLINIC | Age: 55
End: 2020-01-14

## 2020-01-14 VITALS
DIASTOLIC BLOOD PRESSURE: 80 MMHG | RESPIRATION RATE: 20 BRPM | SYSTOLIC BLOOD PRESSURE: 128 MMHG | BODY MASS INDEX: 43.88 KG/M2 | TEMPERATURE: 99.5 F | WEIGHT: 279.6 LBS | OXYGEN SATURATION: 95 % | HEIGHT: 67 IN | HEART RATE: 86 BPM

## 2020-01-14 DIAGNOSIS — M54.12 RIGHT CERVICAL RADICULOPATHY: ICD-10-CM

## 2020-01-14 DIAGNOSIS — M47.817 OSTEOARTHRITIS OF LUMBOSACRAL SPINE WITHOUT MYELOPATHY: ICD-10-CM

## 2020-01-14 DIAGNOSIS — M96.1 POSTLAMINECTOMY SYNDROME OF THORACIC REGION: ICD-10-CM

## 2020-01-14 DIAGNOSIS — G89.4 CHRONIC PAIN SYNDROME: Primary | ICD-10-CM

## 2020-01-14 DIAGNOSIS — Z87.39 HISTORY OF OSTEOMYELITIS: ICD-10-CM

## 2020-01-14 DIAGNOSIS — M54.6 PAIN IN THORACIC SPINE: ICD-10-CM

## 2020-01-14 DIAGNOSIS — M48.02 DEGENERATIVE CERVICAL SPINAL STENOSIS: ICD-10-CM

## 2020-01-14 DIAGNOSIS — Z79.899 ENCOUNTER FOR LONG-TERM (CURRENT) USE OF HIGH-RISK MEDICATION: ICD-10-CM

## 2020-01-14 DIAGNOSIS — G54.3 THORACIC ROOT LESION: ICD-10-CM

## 2020-01-14 PROCEDURE — 99214 OFFICE O/P EST MOD 30 MIN: CPT | Performed by: NURSE PRACTITIONER

## 2020-01-14 RX ORDER — ALBUTEROL SULFATE 90 UG/1
1-2 AEROSOL, METERED RESPIRATORY (INHALATION) AS NEEDED
COMMUNITY
Start: 2020-01-02 | End: 2021-05-08 | Stop reason: HOSPADM

## 2020-01-14 RX ORDER — HYDROCODONE BITARTRATE AND ACETAMINOPHEN 10; 325 MG/1; MG/1
1 TABLET ORAL EVERY 6 HOURS PRN
Qty: 120 TABLET | Refills: 0 | Status: SHIPPED | OUTPATIENT
Start: 2020-01-14 | End: 2020-02-11 | Stop reason: SDUPTHER

## 2020-01-20 ENCOUNTER — READMISSION MANAGEMENT (OUTPATIENT)
Dept: CALL CENTER | Facility: HOSPITAL | Age: 55
End: 2020-01-20

## 2020-01-24 ENCOUNTER — READMISSION MANAGEMENT (OUTPATIENT)
Dept: CALL CENTER | Facility: HOSPITAL | Age: 55
End: 2020-01-24

## 2020-01-31 ENCOUNTER — READMISSION MANAGEMENT (OUTPATIENT)
Dept: CALL CENTER | Facility: HOSPITAL | Age: 55
End: 2020-01-31

## 2020-02-11 ENCOUNTER — OFFICE VISIT (OUTPATIENT)
Dept: PAIN MEDICINE | Facility: CLINIC | Age: 55
End: 2020-02-11

## 2020-02-11 VITALS
OXYGEN SATURATION: 97 % | HEART RATE: 97 BPM | HEIGHT: 67 IN | TEMPERATURE: 98.9 F | RESPIRATION RATE: 18 BRPM | WEIGHT: 276.8 LBS | BODY MASS INDEX: 43.44 KG/M2 | DIASTOLIC BLOOD PRESSURE: 86 MMHG | SYSTOLIC BLOOD PRESSURE: 137 MMHG

## 2020-02-11 DIAGNOSIS — M47.817 OSTEOARTHRITIS OF LUMBOSACRAL SPINE WITHOUT MYELOPATHY: ICD-10-CM

## 2020-02-11 DIAGNOSIS — M54.6 PAIN IN THORACIC SPINE: ICD-10-CM

## 2020-02-11 DIAGNOSIS — G54.3 THORACIC ROOT LESION: ICD-10-CM

## 2020-02-11 DIAGNOSIS — Z79.899 ENCOUNTER FOR LONG-TERM (CURRENT) USE OF HIGH-RISK MEDICATION: ICD-10-CM

## 2020-02-11 DIAGNOSIS — M96.1 POSTLAMINECTOMY SYNDROME OF THORACIC REGION: ICD-10-CM

## 2020-02-11 DIAGNOSIS — M51.36 BULGE OF LUMBAR DISC WITHOUT MYELOPATHY: ICD-10-CM

## 2020-02-11 DIAGNOSIS — Z87.39 HISTORY OF OSTEOMYELITIS: ICD-10-CM

## 2020-02-11 DIAGNOSIS — G89.4 CHRONIC PAIN SYNDROME: Primary | ICD-10-CM

## 2020-02-11 PROCEDURE — 99214 OFFICE O/P EST MOD 30 MIN: CPT | Performed by: NURSE PRACTITIONER

## 2020-02-11 RX ORDER — HYDROCODONE BITARTRATE AND ACETAMINOPHEN 10; 325 MG/1; MG/1
1 TABLET ORAL EVERY 6 HOURS PRN
Qty: 120 TABLET | Refills: 0 | Status: SHIPPED | OUTPATIENT
Start: 2020-02-11 | End: 2020-03-18 | Stop reason: SDUPTHER

## 2020-03-18 DIAGNOSIS — M54.6 PAIN IN THORACIC SPINE: ICD-10-CM

## 2020-03-18 DIAGNOSIS — G89.4 CHRONIC PAIN SYNDROME: ICD-10-CM

## 2020-03-18 DIAGNOSIS — Z87.39 HISTORY OF OSTEOMYELITIS: ICD-10-CM

## 2020-03-18 RX ORDER — HYDROCODONE BITARTRATE AND ACETAMINOPHEN 10; 325 MG/1; MG/1
1 TABLET ORAL EVERY 6 HOURS PRN
Qty: 120 TABLET | Refills: 0 | Status: SHIPPED | OUTPATIENT
Start: 2020-03-18 | End: 2020-04-21 | Stop reason: SDUPTHER

## 2020-04-21 DIAGNOSIS — G89.4 CHRONIC PAIN SYNDROME: ICD-10-CM

## 2020-04-21 DIAGNOSIS — M54.6 PAIN IN THORACIC SPINE: ICD-10-CM

## 2020-04-21 DIAGNOSIS — Z87.39 HISTORY OF OSTEOMYELITIS: ICD-10-CM

## 2020-04-21 RX ORDER — HYDROCODONE BITARTRATE AND ACETAMINOPHEN 10; 325 MG/1; MG/1
1 TABLET ORAL EVERY 6 HOURS PRN
Qty: 120 TABLET | Refills: 0 | Status: SHIPPED | OUTPATIENT
Start: 2020-04-21 | End: 2020-05-19 | Stop reason: SDUPTHER

## 2020-04-21 RX ORDER — TIZANIDINE 4 MG/1
4 TABLET ORAL EVERY 8 HOURS PRN
Qty: 90 TABLET | Refills: 3 | Status: SHIPPED | OUTPATIENT
Start: 2020-04-21 | End: 2020-05-19 | Stop reason: SDUPTHER

## 2020-05-19 ENCOUNTER — OFFICE VISIT (OUTPATIENT)
Dept: PAIN MEDICINE | Facility: CLINIC | Age: 55
End: 2020-05-19

## 2020-05-19 VITALS
TEMPERATURE: 99.8 F | HEIGHT: 67 IN | BODY MASS INDEX: 44.73 KG/M2 | SYSTOLIC BLOOD PRESSURE: 150 MMHG | DIASTOLIC BLOOD PRESSURE: 78 MMHG | WEIGHT: 285 LBS | OXYGEN SATURATION: 95 % | RESPIRATION RATE: 20 BRPM | HEART RATE: 89 BPM

## 2020-05-19 DIAGNOSIS — M96.1 POSTLAMINECTOMY SYNDROME OF THORACIC REGION: ICD-10-CM

## 2020-05-19 DIAGNOSIS — Z87.39 HISTORY OF OSTEOMYELITIS: ICD-10-CM

## 2020-05-19 DIAGNOSIS — Z79.899 ENCOUNTER FOR LONG-TERM (CURRENT) USE OF HIGH-RISK MEDICATION: ICD-10-CM

## 2020-05-19 DIAGNOSIS — G89.4 CHRONIC PAIN SYNDROME: Primary | ICD-10-CM

## 2020-05-19 DIAGNOSIS — G54.3 THORACIC ROOT LESION: ICD-10-CM

## 2020-05-19 DIAGNOSIS — M48.02 DEGENERATIVE CERVICAL SPINAL STENOSIS: ICD-10-CM

## 2020-05-19 DIAGNOSIS — M54.6 PAIN IN THORACIC SPINE: ICD-10-CM

## 2020-05-19 PROCEDURE — 99214 OFFICE O/P EST MOD 30 MIN: CPT | Performed by: NURSE PRACTITIONER

## 2020-05-19 RX ORDER — HYDROCODONE BITARTRATE AND ACETAMINOPHEN 10; 325 MG/1; MG/1
1 TABLET ORAL EVERY 6 HOURS PRN
Qty: 120 TABLET | Refills: 0 | Status: SHIPPED | OUTPATIENT
Start: 2020-05-19 | End: 2020-06-17 | Stop reason: SDUPTHER

## 2020-05-19 RX ORDER — OLOPATADINE HYDROCHLORIDE 2 MG/ML
1 SOLUTION/ DROPS OPHTHALMIC DAILY PRN
Status: ON HOLD | COMMUNITY
Start: 2020-05-07 | End: 2021-05-05

## 2020-05-19 RX ORDER — TIZANIDINE 4 MG/1
4 TABLET ORAL EVERY 8 HOURS PRN
Qty: 90 TABLET | Refills: 3 | Status: SHIPPED | OUTPATIENT
Start: 2020-05-19 | End: 2020-09-29

## 2020-06-03 RX ORDER — GABAPENTIN 600 MG/1
TABLET ORAL
Qty: 180 TABLET | Refills: 2 | Status: SHIPPED | OUTPATIENT
Start: 2020-06-03 | End: 2020-09-01

## 2020-06-16 ENCOUNTER — OFFICE VISIT (OUTPATIENT)
Dept: NEUROLOGY | Facility: CLINIC | Age: 55
End: 2020-06-16

## 2020-06-16 ENCOUNTER — TELEPHONE (OUTPATIENT)
Dept: NEUROSURGERY | Facility: CLINIC | Age: 55
End: 2020-06-16

## 2020-06-16 VITALS
WEIGHT: 284 LBS | HEART RATE: 84 BPM | DIASTOLIC BLOOD PRESSURE: 90 MMHG | SYSTOLIC BLOOD PRESSURE: 152 MMHG | BODY MASS INDEX: 44.57 KG/M2 | OXYGEN SATURATION: 94 % | HEIGHT: 67 IN

## 2020-06-16 DIAGNOSIS — Z86.69 HISTORY OF MYASTHENIA GRAVIS: ICD-10-CM

## 2020-06-16 DIAGNOSIS — M54.12 C6 RADICULOPATHY: Primary | ICD-10-CM

## 2020-06-16 PROBLEM — R07.81 PLEURODYNIA: Status: ACTIVE | Noted: 2020-06-16

## 2020-06-16 PROCEDURE — 99204 OFFICE O/P NEW MOD 45 MIN: CPT | Performed by: PSYCHIATRY & NEUROLOGY

## 2020-06-16 RX ORDER — LORAZEPAM 0.5 MG/1
TABLET ORAL
Qty: 2 TABLET | Refills: 0 | Status: SHIPPED | OUTPATIENT
Start: 2020-06-16 | End: 2020-07-13

## 2020-06-17 ENCOUNTER — OFFICE VISIT (OUTPATIENT)
Dept: PAIN MEDICINE | Facility: CLINIC | Age: 55
End: 2020-06-17

## 2020-06-17 VITALS
DIASTOLIC BLOOD PRESSURE: 73 MMHG | TEMPERATURE: 98.6 F | WEIGHT: 286 LBS | SYSTOLIC BLOOD PRESSURE: 107 MMHG | RESPIRATION RATE: 20 BRPM | BODY MASS INDEX: 44.89 KG/M2 | OXYGEN SATURATION: 94 % | HEART RATE: 70 BPM | HEIGHT: 67 IN

## 2020-06-17 DIAGNOSIS — Z79.899 ENCOUNTER FOR LONG-TERM (CURRENT) USE OF HIGH-RISK MEDICATION: ICD-10-CM

## 2020-06-17 DIAGNOSIS — M96.1 POSTLAMINECTOMY SYNDROME OF THORACIC REGION: ICD-10-CM

## 2020-06-17 DIAGNOSIS — G89.4 CHRONIC PAIN SYNDROME: Primary | ICD-10-CM

## 2020-06-17 DIAGNOSIS — M54.6 PAIN IN THORACIC SPINE: ICD-10-CM

## 2020-06-17 DIAGNOSIS — Z87.39 HISTORY OF OSTEOMYELITIS: ICD-10-CM

## 2020-06-17 DIAGNOSIS — M54.12 RIGHT CERVICAL RADICULOPATHY: ICD-10-CM

## 2020-06-17 DIAGNOSIS — M51.36 DDD (DEGENERATIVE DISC DISEASE), LUMBAR: ICD-10-CM

## 2020-06-17 PROCEDURE — 99214 OFFICE O/P EST MOD 30 MIN: CPT | Performed by: NURSE PRACTITIONER

## 2020-06-17 RX ORDER — LEVETIRACETAM 500 MG/1
500 TABLET ORAL 3 TIMES DAILY
Qty: 90 TABLET | Refills: 2 | Status: SHIPPED | OUTPATIENT
Start: 2020-06-17 | End: 2020-09-29

## 2020-06-17 RX ORDER — HYDROCODONE BITARTRATE AND ACETAMINOPHEN 10; 325 MG/1; MG/1
1 TABLET ORAL EVERY 6 HOURS PRN
Qty: 120 TABLET | Refills: 0 | Status: SHIPPED | OUTPATIENT
Start: 2020-06-17 | End: 2020-07-20 | Stop reason: SDUPTHER

## 2020-06-26 ENCOUNTER — HOSPITAL ENCOUNTER (OUTPATIENT)
Dept: MRI IMAGING | Facility: HOSPITAL | Age: 55
Discharge: HOME | End: 2020-06-26
Admitting: PSYCHIATRY & NEUROLOGY

## 2020-06-26 DIAGNOSIS — M54.12 C6 RADICULOPATHY: ICD-10-CM

## 2020-06-26 PROCEDURE — 72141 MRI NECK SPINE W/O DYE: CPT

## 2020-07-06 ENCOUNTER — PREP FOR SURGERY (OUTPATIENT)
Dept: OTHER | Facility: HOSPITAL | Age: 55
End: 2020-07-06

## 2020-07-06 ENCOUNTER — OFFICE VISIT (OUTPATIENT)
Dept: NEUROSURGERY | Facility: CLINIC | Age: 55
End: 2020-07-06

## 2020-07-06 VITALS
DIASTOLIC BLOOD PRESSURE: 61 MMHG | BODY MASS INDEX: 44.26 KG/M2 | SYSTOLIC BLOOD PRESSURE: 113 MMHG | WEIGHT: 282 LBS | TEMPERATURE: 99 F | HEART RATE: 74 BPM | HEIGHT: 67 IN

## 2020-07-06 DIAGNOSIS — M50.122 CERVICAL DISC DISORDER AT C5-C6 LEVEL WITH RADICULOPATHY: Primary | ICD-10-CM

## 2020-07-06 DIAGNOSIS — M50.123 CERVICAL DISC DISORDER AT C6-C7 LEVEL WITH RADICULOPATHY: ICD-10-CM

## 2020-07-06 DIAGNOSIS — Z01.818 PRE-OP TESTING: ICD-10-CM

## 2020-07-06 PROCEDURE — 99204 OFFICE O/P NEW MOD 45 MIN: CPT | Performed by: NEUROLOGICAL SURGERY

## 2020-07-08 ENCOUNTER — TRANSCRIBE ORDERS (OUTPATIENT)
Dept: PREADMISSION TESTING | Facility: HOSPITAL | Age: 55
End: 2020-07-08

## 2020-07-08 DIAGNOSIS — Z01.818 OTHER SPECIFIED PRE-OPERATIVE EXAMINATION: Primary | ICD-10-CM

## 2020-07-13 ENCOUNTER — APPOINTMENT (OUTPATIENT)
Dept: PREADMISSION TESTING | Facility: HOSPITAL | Age: 55
End: 2020-07-13

## 2020-07-13 VITALS
SYSTOLIC BLOOD PRESSURE: 152 MMHG | DIASTOLIC BLOOD PRESSURE: 79 MMHG | WEIGHT: 281 LBS | HEART RATE: 91 BPM | HEIGHT: 67 IN | TEMPERATURE: 98.8 F | OXYGEN SATURATION: 95 % | RESPIRATION RATE: 16 BRPM | BODY MASS INDEX: 44.1 KG/M2

## 2020-07-13 DIAGNOSIS — Z01.818 PRE-OP TESTING: ICD-10-CM

## 2020-07-13 LAB
ABO GROUP BLD: NORMAL
ANION GAP SERPL CALCULATED.3IONS-SCNC: 12.2 MMOL/L (ref 5–15)
APTT PPP: 29.2 SECONDS (ref 22.7–35.4)
BACTERIA UR QL AUTO: ABNORMAL /HPF
BASOPHILS # BLD AUTO: 0.04 10*3/MM3 (ref 0–0.2)
BASOPHILS NFR BLD AUTO: 0.7 % (ref 0–1.5)
BILIRUB UR QL STRIP: NEGATIVE
BLD GP AB SCN SERPL QL: NEGATIVE
BUN SERPL-MCNC: 19 MG/DL (ref 6–20)
BUN/CREAT SERPL: 17.6 (ref 7–25)
CALCIUM SPEC-SCNC: 10 MG/DL (ref 8.6–10.5)
CHLORIDE SERPL-SCNC: 103 MMOL/L (ref 98–107)
CLARITY UR: CLEAR
CO2 SERPL-SCNC: 26.8 MMOL/L (ref 22–29)
COD CRY URNS QL: ABNORMAL /HPF
COLOR UR: ABNORMAL
CREAT SERPL-MCNC: 1.08 MG/DL (ref 0.57–1)
DEPRECATED RDW RBC AUTO: 45.6 FL (ref 37–54)
EOSINOPHIL # BLD AUTO: 0.19 10*3/MM3 (ref 0–0.4)
EOSINOPHIL NFR BLD AUTO: 3.2 % (ref 0.3–6.2)
ERYTHROCYTE [DISTWIDTH] IN BLOOD BY AUTOMATED COUNT: 14.8 % (ref 12.3–15.4)
GFR SERPL CREATININE-BSD FRML MDRD: 52 ML/MIN/1.73
GLUCOSE SERPL-MCNC: 86 MG/DL (ref 65–99)
GLUCOSE UR STRIP-MCNC: NEGATIVE MG/DL
HCT VFR BLD AUTO: 43 % (ref 34–46.6)
HGB BLD-MCNC: 14 G/DL (ref 12–15.9)
HGB UR QL STRIP.AUTO: NEGATIVE
HYALINE CASTS UR QL AUTO: ABNORMAL /LPF
IMM GRANULOCYTES # BLD AUTO: 0.04 10*3/MM3 (ref 0–0.05)
IMM GRANULOCYTES NFR BLD AUTO: 0.7 % (ref 0–0.5)
INR PPP: 0.96 (ref 0.9–1.1)
KETONES UR QL STRIP: ABNORMAL
LEUKOCYTE ESTERASE UR QL STRIP.AUTO: ABNORMAL
LYMPHOCYTES # BLD AUTO: 0.71 10*3/MM3 (ref 0.7–3.1)
LYMPHOCYTES NFR BLD AUTO: 12 % (ref 19.6–45.3)
MCH RBC QN AUTO: 27.5 PG (ref 26.6–33)
MCHC RBC AUTO-ENTMCNC: 32.6 G/DL (ref 31.5–35.7)
MCV RBC AUTO: 84.3 FL (ref 79–97)
MONOCYTES # BLD AUTO: 0.5 10*3/MM3 (ref 0.1–0.9)
MONOCYTES NFR BLD AUTO: 8.5 % (ref 5–12)
NEUTROPHILS NFR BLD AUTO: 4.42 10*3/MM3 (ref 1.7–7)
NEUTROPHILS NFR BLD AUTO: 74.9 % (ref 42.7–76)
NITRITE UR QL STRIP: NEGATIVE
NRBC BLD AUTO-RTO: 0 /100 WBC (ref 0–0.2)
PH UR STRIP.AUTO: <=5 [PH] (ref 5–8)
PLATELET # BLD AUTO: 186 10*3/MM3 (ref 140–450)
PMV BLD AUTO: 11.7 FL (ref 6–12)
POTASSIUM SERPL-SCNC: 4 MMOL/L (ref 3.5–5.2)
PROT UR QL STRIP: NEGATIVE
PROTHROMBIN TIME: 12.7 SECONDS (ref 11.7–14.2)
RBC # BLD AUTO: 5.1 10*6/MM3 (ref 3.77–5.28)
RBC # UR: ABNORMAL /HPF
REF LAB TEST METHOD: ABNORMAL
RH BLD: POSITIVE
SODIUM SERPL-SCNC: 142 MMOL/L (ref 136–145)
SP GR UR STRIP: >=1.03 (ref 1–1.03)
SQUAMOUS #/AREA URNS HPF: ABNORMAL /HPF
T&S EXPIRATION DATE: NORMAL
UROBILINOGEN UR QL STRIP: ABNORMAL
WBC # BLD AUTO: 5.9 10*3/MM3 (ref 3.4–10.8)
WBC UR QL AUTO: ABNORMAL /HPF

## 2020-07-13 PROCEDURE — 85610 PROTHROMBIN TIME: CPT | Performed by: NEUROLOGICAL SURGERY

## 2020-07-13 PROCEDURE — 93005 ELECTROCARDIOGRAM TRACING: CPT

## 2020-07-13 PROCEDURE — 86900 BLOOD TYPING SEROLOGIC ABO: CPT | Performed by: NEUROLOGICAL SURGERY

## 2020-07-13 PROCEDURE — 36415 COLL VENOUS BLD VENIPUNCTURE: CPT

## 2020-07-13 PROCEDURE — 86901 BLOOD TYPING SEROLOGIC RH(D): CPT | Performed by: NEUROLOGICAL SURGERY

## 2020-07-13 PROCEDURE — 80048 BASIC METABOLIC PNL TOTAL CA: CPT | Performed by: NEUROLOGICAL SURGERY

## 2020-07-13 PROCEDURE — 87077 CULTURE AEROBIC IDENTIFY: CPT | Performed by: NEUROLOGICAL SURGERY

## 2020-07-13 PROCEDURE — 87186 SC STD MICRODIL/AGAR DIL: CPT | Performed by: NEUROLOGICAL SURGERY

## 2020-07-13 PROCEDURE — 85730 THROMBOPLASTIN TIME PARTIAL: CPT | Performed by: NEUROLOGICAL SURGERY

## 2020-07-13 PROCEDURE — 85025 COMPLETE CBC W/AUTO DIFF WBC: CPT | Performed by: NEUROLOGICAL SURGERY

## 2020-07-13 PROCEDURE — 86850 RBC ANTIBODY SCREEN: CPT | Performed by: NEUROLOGICAL SURGERY

## 2020-07-13 PROCEDURE — 87086 URINE CULTURE/COLONY COUNT: CPT | Performed by: NEUROLOGICAL SURGERY

## 2020-07-13 PROCEDURE — 93010 ELECTROCARDIOGRAM REPORT: CPT | Performed by: INTERNAL MEDICINE

## 2020-07-13 PROCEDURE — 81001 URINALYSIS AUTO W/SCOPE: CPT | Performed by: NEUROLOGICAL SURGERY

## 2020-07-13 RX ORDER — IBUPROFEN 200 MG
200 TABLET ORAL EVERY 6 HOURS PRN
COMMUNITY
End: 2020-07-25 | Stop reason: HOSPADM

## 2020-07-13 RX ORDER — VITAMIN B COMPLEX
1 CAPSULE ORAL EVERY MORNING
COMMUNITY

## 2020-07-15 ENCOUNTER — TELEPHONE (OUTPATIENT)
Dept: NEUROSURGERY | Facility: CLINIC | Age: 55
End: 2020-07-15

## 2020-07-15 LAB — BACTERIA SPEC AEROBE CULT: ABNORMAL

## 2020-07-16 RX ORDER — SULFAMETHOXAZOLE AND TRIMETHOPRIM 800; 160 MG/1; MG/1
1 TABLET ORAL 2 TIMES DAILY
Qty: 14 TABLET | Refills: 0 | Status: SHIPPED | OUTPATIENT
Start: 2020-07-16 | End: 2020-11-18

## 2020-07-18 ENCOUNTER — LAB (OUTPATIENT)
Dept: LAB | Facility: HOSPITAL | Age: 55
End: 2020-07-18

## 2020-07-18 DIAGNOSIS — Z01.818 OTHER SPECIFIED PRE-OPERATIVE EXAMINATION: ICD-10-CM

## 2020-07-18 PROCEDURE — U0004 COV-19 TEST NON-CDC HGH THRU: HCPCS

## 2020-07-18 PROCEDURE — C9803 HOPD COVID-19 SPEC COLLECT: HCPCS

## 2020-07-20 ENCOUNTER — OFFICE VISIT (OUTPATIENT)
Dept: PAIN MEDICINE | Facility: CLINIC | Age: 55
End: 2020-07-20

## 2020-07-20 VITALS
HEART RATE: 84 BPM | TEMPERATURE: 99.1 F | HEIGHT: 67 IN | DIASTOLIC BLOOD PRESSURE: 81 MMHG | BODY MASS INDEX: 44.57 KG/M2 | OXYGEN SATURATION: 92 % | WEIGHT: 284 LBS | SYSTOLIC BLOOD PRESSURE: 126 MMHG | RESPIRATION RATE: 18 BRPM

## 2020-07-20 DIAGNOSIS — M48.02 DEGENERATIVE CERVICAL SPINAL STENOSIS: ICD-10-CM

## 2020-07-20 DIAGNOSIS — M54.50 LOW BACK PAIN, UNSPECIFIED BACK PAIN LATERALITY, UNSPECIFIED CHRONICITY, UNSPECIFIED WHETHER SCIATICA PRESENT: ICD-10-CM

## 2020-07-20 DIAGNOSIS — M54.6 PAIN IN THORACIC SPINE: ICD-10-CM

## 2020-07-20 DIAGNOSIS — G54.3 THORACIC ROOT LESION: ICD-10-CM

## 2020-07-20 DIAGNOSIS — M96.1 POSTLAMINECTOMY SYNDROME OF THORACIC REGION: ICD-10-CM

## 2020-07-20 DIAGNOSIS — G89.4 CHRONIC PAIN SYNDROME: Primary | ICD-10-CM

## 2020-07-20 DIAGNOSIS — Z79.899 ENCOUNTER FOR LONG-TERM (CURRENT) USE OF HIGH-RISK MEDICATION: ICD-10-CM

## 2020-07-20 DIAGNOSIS — M50.123 CERVICAL DISC DISORDER AT C6-C7 LEVEL WITH RADICULOPATHY: ICD-10-CM

## 2020-07-20 DIAGNOSIS — Z87.39 HISTORY OF OSTEOMYELITIS: ICD-10-CM

## 2020-07-20 LAB
REF LAB TEST METHOD: NORMAL
SARS-COV-2 RNA RESP QL NAA+PROBE: NOT DETECTED

## 2020-07-20 PROCEDURE — 99214 OFFICE O/P EST MOD 30 MIN: CPT | Performed by: NURSE PRACTITIONER

## 2020-07-20 RX ORDER — HYDROCODONE BITARTRATE AND ACETAMINOPHEN 10; 325 MG/1; MG/1
1 TABLET ORAL EVERY 6 HOURS PRN
Qty: 12 TABLET | Refills: 0 | Status: SHIPPED | OUTPATIENT
Start: 2020-07-20 | End: 2020-07-25 | Stop reason: HOSPADM

## 2020-07-21 ENCOUNTER — LAB (OUTPATIENT)
Dept: LAB | Facility: HOSPITAL | Age: 55
End: 2020-07-21

## 2020-07-21 ENCOUNTER — TRANSCRIBE ORDERS (OUTPATIENT)
Dept: SLEEP MEDICINE | Facility: HOSPITAL | Age: 55
End: 2020-07-21

## 2020-07-21 ENCOUNTER — TELEPHONE (OUTPATIENT)
Dept: NEUROSURGERY | Facility: CLINIC | Age: 55
End: 2020-07-21

## 2020-07-21 DIAGNOSIS — Z01.818 OTHER SPECIFIED PRE-OPERATIVE EXAMINATION: Primary | ICD-10-CM

## 2020-07-21 DIAGNOSIS — Z01.818 OTHER SPECIFIED PRE-OPERATIVE EXAMINATION: ICD-10-CM

## 2020-07-21 PROCEDURE — U0004 COV-19 TEST NON-CDC HGH THRU: HCPCS

## 2020-07-21 PROCEDURE — C9803 HOPD COVID-19 SPEC COLLECT: HCPCS

## 2020-07-22 LAB
REF LAB TEST METHOD: NORMAL
SARS-COV-2 RNA RESP QL NAA+PROBE: NOT DETECTED

## 2020-07-23 ENCOUNTER — APPOINTMENT (OUTPATIENT)
Dept: GENERAL RADIOLOGY | Facility: HOSPITAL | Age: 55
End: 2020-07-23

## 2020-07-23 ENCOUNTER — ANESTHESIA (OUTPATIENT)
Dept: PERIOP | Facility: HOSPITAL | Age: 55
End: 2020-07-23

## 2020-07-23 ENCOUNTER — ANESTHESIA EVENT (OUTPATIENT)
Dept: PERIOP | Facility: HOSPITAL | Age: 55
End: 2020-07-23

## 2020-07-23 ENCOUNTER — HOSPITAL ENCOUNTER (OUTPATIENT)
Facility: HOSPITAL | Age: 55
Discharge: HOME HEALTH | End: 2020-07-25
Attending: NEUROLOGICAL SURGERY | Admitting: NEUROLOGICAL SURGERY

## 2020-07-23 DIAGNOSIS — M50.122 CERVICAL DISC DISORDER AT C5-C6 LEVEL WITH RADICULOPATHY: ICD-10-CM

## 2020-07-23 DIAGNOSIS — M54.12 RIGHT CERVICAL RADICULOPATHY: ICD-10-CM

## 2020-07-23 DIAGNOSIS — M46.20 OSTEOMYELITIS OF SPINE (HCC): ICD-10-CM

## 2020-07-23 DIAGNOSIS — M50.123 CERVICAL DISC DISORDER AT C6-C7 LEVEL WITH RADICULOPATHY: ICD-10-CM

## 2020-07-23 DIAGNOSIS — Z74.09 IMPAIRED MOBILITY: Primary | ICD-10-CM

## 2020-07-23 DIAGNOSIS — E66.01 MORBID OBESITY WITH BMI OF 40.0-44.9, ADULT (HCC): ICD-10-CM

## 2020-07-23 DIAGNOSIS — Z87.39 HISTORY OF OSTEOMYELITIS: ICD-10-CM

## 2020-07-23 DIAGNOSIS — M54.6 PAIN IN THORACIC SPINE: ICD-10-CM

## 2020-07-23 DIAGNOSIS — G89.4 CHRONIC PAIN SYNDROME: ICD-10-CM

## 2020-07-23 PROBLEM — M50.20 CERVICAL DISC HERNIATION: Status: ACTIVE | Noted: 2020-07-23

## 2020-07-23 PROCEDURE — 22853 INSJ BIOMECHANICAL DEVICE: CPT | Performed by: NEUROLOGICAL SURGERY

## 2020-07-23 PROCEDURE — 63710000001 OXYCODONE-ACETAMINOPHEN 10-325 MG TABLET: Performed by: NEUROLOGICAL SURGERY

## 2020-07-23 PROCEDURE — A9270 NON-COVERED ITEM OR SERVICE: HCPCS | Performed by: NEUROLOGICAL SURGERY

## 2020-07-23 PROCEDURE — 25010000002 ONDANSETRON PER 1 MG: Performed by: NURSE ANESTHETIST, CERTIFIED REGISTERED

## 2020-07-23 PROCEDURE — 25010000002 METHOCARBAMOL 1000 MG/10ML SOLUTION: Performed by: NEUROLOGICAL SURGERY

## 2020-07-23 PROCEDURE — 76000 FLUOROSCOPY <1 HR PHYS/QHP: CPT

## 2020-07-23 PROCEDURE — C1713 ANCHOR/SCREW BN/BN,TIS/BN: HCPCS | Performed by: NEUROLOGICAL SURGERY

## 2020-07-23 PROCEDURE — 25010000002 HYDROMORPHONE PER 4 MG: Performed by: NURSE ANESTHETIST, CERTIFIED REGISTERED

## 2020-07-23 PROCEDURE — 22845 INSERT SPINE FIXATION DEVICE: CPT | Performed by: NEUROLOGICAL SURGERY

## 2020-07-23 PROCEDURE — 63710000001 MONTELUKAST 10 MG TABLET: Performed by: NEUROLOGICAL SURGERY

## 2020-07-23 PROCEDURE — 22551 ARTHRD ANT NTRBDY CERVICAL: CPT | Performed by: NEUROLOGICAL SURGERY

## 2020-07-23 PROCEDURE — 63710000001 LEVETIRACETAM 500 MG TABLET: Performed by: NEUROLOGICAL SURGERY

## 2020-07-23 PROCEDURE — 25010000002 FENTANYL CITRATE (PF) 100 MCG/2ML SOLUTION: Performed by: NURSE ANESTHETIST, CERTIFIED REGISTERED

## 2020-07-23 PROCEDURE — 25010000002 ONDANSETRON PER 1 MG: Performed by: NEUROLOGICAL SURGERY

## 2020-07-23 PROCEDURE — L0120 CERV FLEX N/ADJ FOAM PRE OTS: HCPCS | Performed by: NEUROLOGICAL SURGERY

## 2020-07-23 PROCEDURE — 25010000002 FENTANYL CITRATE (PF) 100 MCG/2ML SOLUTION: Performed by: ANESTHESIOLOGY

## 2020-07-23 PROCEDURE — 25010000002 MIDAZOLAM PER 1 MG: Performed by: NURSE ANESTHETIST, CERTIFIED REGISTERED

## 2020-07-23 PROCEDURE — 94799 UNLISTED PULMONARY SVC/PX: CPT

## 2020-07-23 PROCEDURE — 22552 ARTHRD ANT NTRBD CERVICAL EA: CPT | Performed by: NEUROLOGICAL SURGERY

## 2020-07-23 PROCEDURE — 63710000001 ACETAMINOPHEN 325 MG TABLET: Performed by: NEUROLOGICAL SURGERY

## 2020-07-23 PROCEDURE — 25010000002 PROPOFOL 10 MG/ML EMULSION: Performed by: NURSE ANESTHETIST, CERTIFIED REGISTERED

## 2020-07-23 PROCEDURE — 25010000002 CEFAZOLIN PER 500 MG: Performed by: NEUROLOGICAL SURGERY

## 2020-07-23 PROCEDURE — 63710000001 CYCLOBENZAPRINE 10 MG TABLET: Performed by: NEUROLOGICAL SURGERY

## 2020-07-23 PROCEDURE — 25010000002 DEXAMETHASONE PER 1 MG: Performed by: NEUROLOGICAL SURGERY

## 2020-07-23 PROCEDURE — 25010000003 CEFAZOLIN PER 500 MG: Performed by: NEUROLOGICAL SURGERY

## 2020-07-23 PROCEDURE — 25010000002 DEXAMETHASONE PER 1 MG: Performed by: NURSE ANESTHETIST, CERTIFIED REGISTERED

## 2020-07-23 PROCEDURE — 63710000001 GABAPENTIN 300 MG CAPSULE: Performed by: NEUROLOGICAL SURGERY

## 2020-07-23 PROCEDURE — 63710000001 DEXAMETHASONE PER 0.25 MG: Performed by: NEUROLOGICAL SURGERY

## 2020-07-23 PROCEDURE — 63710000001 SULFAMETHOXAZOLE-TRIMETHOPRIM 800-160 MG TABLET: Performed by: NEUROLOGICAL SURGERY

## 2020-07-23 PROCEDURE — 72040 X-RAY EXAM NECK SPINE 2-3 VW: CPT

## 2020-07-23 PROCEDURE — 63710000001 TIZANIDINE 4 MG TABLET: Performed by: NEUROLOGICAL SURGERY

## 2020-07-23 DEVICE — PLATE 7200045 ATL VISION ELITE 45MM
Type: IMPLANTABLE DEVICE | Site: SPINE CERVICAL | Status: FUNCTIONAL
Brand: ATLANTIS® ANTERIOR CERVICAL PLATE SYSTEM

## 2020-07-23 DEVICE — PUTTY DBF GRAFTON 3CC: Type: IMPLANTABLE DEVICE | Site: SPINE CERVICAL | Status: FUNCTIONAL

## 2020-07-23 DEVICE — FLOSEAL HEMOSTATIC MATRIX, 10ML
Type: IMPLANTABLE DEVICE | Site: SPINE CERVICAL | Status: FUNCTIONAL
Brand: FLOSEAL HEMOSTATIC MATRIX

## 2020-07-23 DEVICE — INTERBODY FUSION DEVICE NANOLOCK SURFACE TECHNOLOGY 6 DEGREE MEDIUM 8MM
Type: IMPLANTABLE DEVICE | Site: SPINE CERVICAL | Status: FUNCTIONAL
Brand: ENDOSKELETON TC

## 2020-07-23 DEVICE — WAX BONE HEMO NAT 2.5G: Type: IMPLANTABLE DEVICE | Site: SPINE CERVICAL | Status: FUNCTIONAL

## 2020-07-23 DEVICE — INTERBODY FUSION DEVICE NANOLOCK SURFACE TECHNOLOGY 6 DEGREE MEDIUM 7MM
Type: IMPLANTABLE DEVICE | Site: SPINE CERVICAL | Status: FUNCTIONAL
Brand: ENDOSKELETON TC

## 2020-07-23 RX ORDER — CEFAZOLIN SODIUM IN 0.9 % NACL 3 G/100 ML
3 INTRAVENOUS SOLUTION, PIGGYBACK (ML) INTRAVENOUS EVERY 8 HOURS
Status: DISCONTINUED | OUTPATIENT
Start: 2020-07-23 | End: 2020-07-24

## 2020-07-23 RX ORDER — GABAPENTIN 300 MG/1
600 CAPSULE ORAL EVERY 8 HOURS SCHEDULED
Status: DISCONTINUED | OUTPATIENT
Start: 2020-07-23 | End: 2020-07-25 | Stop reason: HOSPADM

## 2020-07-23 RX ORDER — PROMETHAZINE HYDROCHLORIDE 25 MG/ML
12.5 INJECTION, SOLUTION INTRAMUSCULAR; INTRAVENOUS ONCE AS NEEDED
Status: DISCONTINUED | OUTPATIENT
Start: 2020-07-23 | End: 2020-07-23 | Stop reason: HOSPADM

## 2020-07-23 RX ORDER — DIPHENHYDRAMINE HYDROCHLORIDE 50 MG/ML
12.5 INJECTION INTRAMUSCULAR; INTRAVENOUS
Status: DISCONTINUED | OUTPATIENT
Start: 2020-07-23 | End: 2020-07-23 | Stop reason: HOSPADM

## 2020-07-23 RX ORDER — NALOXONE HCL 0.4 MG/ML
0.4 VIAL (ML) INJECTION
Status: DISCONTINUED | OUTPATIENT
Start: 2020-07-23 | End: 2020-07-25 | Stop reason: HOSPADM

## 2020-07-23 RX ORDER — OXYCODONE AND ACETAMINOPHEN 10; 325 MG/1; MG/1
1 TABLET ORAL EVERY 4 HOURS PRN
Status: DISCONTINUED | OUTPATIENT
Start: 2020-07-23 | End: 2020-07-25 | Stop reason: HOSPADM

## 2020-07-23 RX ORDER — AMOXICILLIN 250 MG
1 CAPSULE ORAL NIGHTLY PRN
Status: DISCONTINUED | OUTPATIENT
Start: 2020-07-23 | End: 2020-07-24

## 2020-07-23 RX ORDER — HYDROMORPHONE HYDROCHLORIDE 1 MG/ML
0.5 INJECTION, SOLUTION INTRAMUSCULAR; INTRAVENOUS; SUBCUTANEOUS
Status: DISCONTINUED | OUTPATIENT
Start: 2020-07-23 | End: 2020-07-25 | Stop reason: HOSPADM

## 2020-07-23 RX ORDER — PANTOPRAZOLE SODIUM 40 MG/1
40 TABLET, DELAYED RELEASE ORAL EVERY MORNING
Status: DISCONTINUED | OUTPATIENT
Start: 2020-07-24 | End: 2020-07-25 | Stop reason: HOSPADM

## 2020-07-23 RX ORDER — SODIUM CHLORIDE 0.9 % (FLUSH) 0.9 %
3 SYRINGE (ML) INJECTION EVERY 12 HOURS SCHEDULED
Status: DISCONTINUED | OUTPATIENT
Start: 2020-07-23 | End: 2020-07-23 | Stop reason: HOSPADM

## 2020-07-23 RX ORDER — PROMETHAZINE HYDROCHLORIDE 25 MG/1
25 TABLET ORAL ONCE AS NEEDED
Status: DISCONTINUED | OUTPATIENT
Start: 2020-07-23 | End: 2020-07-23 | Stop reason: HOSPADM

## 2020-07-23 RX ORDER — ROCURONIUM BROMIDE 10 MG/ML
INJECTION, SOLUTION INTRAVENOUS AS NEEDED
Status: DISCONTINUED | OUTPATIENT
Start: 2020-07-23 | End: 2020-07-23 | Stop reason: SURG

## 2020-07-23 RX ORDER — KETOTIFEN FUMARATE 0.35 MG/ML
1 SOLUTION/ DROPS OPHTHALMIC DAILY PRN
Status: DISCONTINUED | OUTPATIENT
Start: 2020-07-23 | End: 2020-07-25 | Stop reason: HOSPADM

## 2020-07-23 RX ORDER — SODIUM CHLORIDE 0.9 % (FLUSH) 0.9 %
10 SYRINGE (ML) INJECTION AS NEEDED
Status: DISCONTINUED | OUTPATIENT
Start: 2020-07-23 | End: 2020-07-25 | Stop reason: HOSPADM

## 2020-07-23 RX ORDER — ONDANSETRON 2 MG/ML
4 INJECTION INTRAMUSCULAR; INTRAVENOUS ONCE AS NEEDED
Status: DISCONTINUED | OUTPATIENT
Start: 2020-07-23 | End: 2020-07-23 | Stop reason: HOSPADM

## 2020-07-23 RX ORDER — MIDAZOLAM HYDROCHLORIDE 1 MG/ML
1 INJECTION INTRAMUSCULAR; INTRAVENOUS
Status: DISCONTINUED | OUTPATIENT
Start: 2020-07-23 | End: 2020-07-23 | Stop reason: HOSPADM

## 2020-07-23 RX ORDER — DEXAMETHASONE SODIUM PHOSPHATE 10 MG/ML
10 INJECTION INTRAMUSCULAR; INTRAVENOUS ONCE
Status: COMPLETED | OUTPATIENT
Start: 2020-07-23 | End: 2020-07-23

## 2020-07-23 RX ORDER — LABETALOL HYDROCHLORIDE 5 MG/ML
5 INJECTION, SOLUTION INTRAVENOUS
Status: DISCONTINUED | OUTPATIENT
Start: 2020-07-23 | End: 2020-07-23 | Stop reason: HOSPADM

## 2020-07-23 RX ORDER — BUDESONIDE AND FORMOTEROL FUMARATE DIHYDRATE 80; 4.5 UG/1; UG/1
2 AEROSOL RESPIRATORY (INHALATION)
Status: DISCONTINUED | OUTPATIENT
Start: 2020-07-23 | End: 2020-07-25 | Stop reason: HOSPADM

## 2020-07-23 RX ORDER — FAMOTIDINE 10 MG/ML
20 INJECTION, SOLUTION INTRAVENOUS ONCE
Status: COMPLETED | OUTPATIENT
Start: 2020-07-23 | End: 2020-07-23

## 2020-07-23 RX ORDER — DEXAMETHASONE SODIUM PHOSPHATE 4 MG/ML
4 INJECTION, SOLUTION INTRA-ARTICULAR; INTRALESIONAL; INTRAMUSCULAR; INTRAVENOUS; SOFT TISSUE EVERY 6 HOURS SCHEDULED
Status: COMPLETED | OUTPATIENT
Start: 2020-07-23 | End: 2020-07-24

## 2020-07-23 RX ORDER — LIDOCAINE HYDROCHLORIDE 20 MG/ML
INJECTION, SOLUTION INFILTRATION; PERINEURAL AS NEEDED
Status: DISCONTINUED | OUTPATIENT
Start: 2020-07-23 | End: 2020-07-23 | Stop reason: SURG

## 2020-07-23 RX ORDER — LIDOCAINE HYDROCHLORIDE 10 MG/ML
0.5 INJECTION, SOLUTION EPIDURAL; INFILTRATION; INTRACAUDAL; PERINEURAL ONCE AS NEEDED
Status: DISCONTINUED | OUTPATIENT
Start: 2020-07-23 | End: 2020-07-23 | Stop reason: HOSPADM

## 2020-07-23 RX ORDER — SODIUM CHLORIDE, SODIUM LACTATE, POTASSIUM CHLORIDE, CALCIUM CHLORIDE 600; 310; 30; 20 MG/100ML; MG/100ML; MG/100ML; MG/100ML
9 INJECTION, SOLUTION INTRAVENOUS CONTINUOUS
Status: DISCONTINUED | OUTPATIENT
Start: 2020-07-23 | End: 2020-07-23

## 2020-07-23 RX ORDER — ACETAMINOPHEN 325 MG/1
650 TABLET ORAL EVERY 4 HOURS PRN
Status: DISCONTINUED | OUTPATIENT
Start: 2020-07-23 | End: 2020-07-25 | Stop reason: HOSPADM

## 2020-07-23 RX ORDER — NALOXONE HCL 0.4 MG/ML
0.2 VIAL (ML) INJECTION AS NEEDED
Status: DISCONTINUED | OUTPATIENT
Start: 2020-07-23 | End: 2020-07-23 | Stop reason: HOSPADM

## 2020-07-23 RX ORDER — PAROXETINE HYDROCHLORIDE 20 MG/1
40 TABLET, FILM COATED ORAL EVERY MORNING
Status: DISCONTINUED | OUTPATIENT
Start: 2020-07-24 | End: 2020-07-25 | Stop reason: HOSPADM

## 2020-07-23 RX ORDER — PROMETHAZINE HYDROCHLORIDE 25 MG/1
25 SUPPOSITORY RECTAL ONCE AS NEEDED
Status: DISCONTINUED | OUTPATIENT
Start: 2020-07-23 | End: 2020-07-23 | Stop reason: HOSPADM

## 2020-07-23 RX ORDER — MAGNESIUM HYDROXIDE 1200 MG/15ML
LIQUID ORAL AS NEEDED
Status: DISCONTINUED | OUTPATIENT
Start: 2020-07-23 | End: 2020-07-23 | Stop reason: HOSPADM

## 2020-07-23 RX ORDER — HYDROMORPHONE HYDROCHLORIDE 1 MG/ML
0.5 INJECTION, SOLUTION INTRAMUSCULAR; INTRAVENOUS; SUBCUTANEOUS
Status: DISCONTINUED | OUTPATIENT
Start: 2020-07-23 | End: 2020-07-23 | Stop reason: HOSPADM

## 2020-07-23 RX ORDER — SODIUM CHLORIDE 0.9 % (FLUSH) 0.9 %
3-10 SYRINGE (ML) INJECTION AS NEEDED
Status: DISCONTINUED | OUTPATIENT
Start: 2020-07-23 | End: 2020-07-23 | Stop reason: HOSPADM

## 2020-07-23 RX ORDER — CETIRIZINE HYDROCHLORIDE 10 MG/1
10 TABLET ORAL EVERY MORNING
Status: DISCONTINUED | OUTPATIENT
Start: 2020-07-24 | End: 2020-07-25 | Stop reason: HOSPADM

## 2020-07-23 RX ORDER — MIDAZOLAM HYDROCHLORIDE 1 MG/ML
INJECTION INTRAMUSCULAR; INTRAVENOUS AS NEEDED
Status: DISCONTINUED | OUTPATIENT
Start: 2020-07-23 | End: 2020-07-23 | Stop reason: SURG

## 2020-07-23 RX ORDER — ONDANSETRON 4 MG/1
4 TABLET, FILM COATED ORAL EVERY 6 HOURS PRN
Status: DISCONTINUED | OUTPATIENT
Start: 2020-07-23 | End: 2020-07-25 | Stop reason: HOSPADM

## 2020-07-23 RX ORDER — SODIUM CHLORIDE, SODIUM LACTATE, POTASSIUM CHLORIDE, CALCIUM CHLORIDE 600; 310; 30; 20 MG/100ML; MG/100ML; MG/100ML; MG/100ML
100 INJECTION, SOLUTION INTRAVENOUS CONTINUOUS
Status: DISCONTINUED | OUTPATIENT
Start: 2020-07-23 | End: 2020-07-25 | Stop reason: HOSPADM

## 2020-07-23 RX ORDER — CEFAZOLIN SODIUM IN 0.9 % NACL 3 G/100 ML
3 INTRAVENOUS SOLUTION, PIGGYBACK (ML) INTRAVENOUS ONCE
Status: COMPLETED | OUTPATIENT
Start: 2020-07-23 | End: 2020-07-23

## 2020-07-23 RX ORDER — TIZANIDINE 4 MG/1
4 TABLET ORAL EVERY 8 HOURS PRN
Status: DISCONTINUED | OUTPATIENT
Start: 2020-07-23 | End: 2020-07-25 | Stop reason: HOSPADM

## 2020-07-23 RX ORDER — DIPHENHYDRAMINE HCL 25 MG
25 CAPSULE ORAL
Status: DISCONTINUED | OUTPATIENT
Start: 2020-07-23 | End: 2020-07-23 | Stop reason: HOSPADM

## 2020-07-23 RX ORDER — PROPOFOL 10 MG/ML
VIAL (ML) INTRAVENOUS AS NEEDED
Status: DISCONTINUED | OUTPATIENT
Start: 2020-07-23 | End: 2020-07-23 | Stop reason: SURG

## 2020-07-23 RX ORDER — SODIUM CHLORIDE 0.9 % (FLUSH) 0.9 %
3 SYRINGE (ML) INJECTION EVERY 12 HOURS SCHEDULED
Status: DISCONTINUED | OUTPATIENT
Start: 2020-07-23 | End: 2020-07-25 | Stop reason: HOSPADM

## 2020-07-23 RX ORDER — CYCLOBENZAPRINE HCL 10 MG
10 TABLET ORAL 3 TIMES DAILY PRN
Status: DISCONTINUED | OUTPATIENT
Start: 2020-07-23 | End: 2020-07-25 | Stop reason: HOSPADM

## 2020-07-23 RX ORDER — HYDRALAZINE HYDROCHLORIDE 20 MG/ML
5 INJECTION INTRAMUSCULAR; INTRAVENOUS
Status: DISCONTINUED | OUTPATIENT
Start: 2020-07-23 | End: 2020-07-23 | Stop reason: HOSPADM

## 2020-07-23 RX ORDER — SULFAMETHOXAZOLE AND TRIMETHOPRIM 800; 160 MG/1; MG/1
1 TABLET ORAL 2 TIMES DAILY
Status: DISCONTINUED | OUTPATIENT
Start: 2020-07-23 | End: 2020-07-25 | Stop reason: HOSPADM

## 2020-07-23 RX ORDER — PROMETHAZINE HYDROCHLORIDE 25 MG/ML
6.25 INJECTION, SOLUTION INTRAMUSCULAR; INTRAVENOUS
Status: DISCONTINUED | OUTPATIENT
Start: 2020-07-23 | End: 2020-07-23 | Stop reason: HOSPADM

## 2020-07-23 RX ORDER — FENTANYL CITRATE 50 UG/ML
INJECTION, SOLUTION INTRAMUSCULAR; INTRAVENOUS AS NEEDED
Status: DISCONTINUED | OUTPATIENT
Start: 2020-07-23 | End: 2020-07-23 | Stop reason: SURG

## 2020-07-23 RX ORDER — DEXAMETHASONE SODIUM PHOSPHATE 10 MG/ML
INJECTION INTRAMUSCULAR; INTRAVENOUS AS NEEDED
Status: DISCONTINUED | OUTPATIENT
Start: 2020-07-23 | End: 2020-07-23 | Stop reason: SURG

## 2020-07-23 RX ORDER — ALBUTEROL SULFATE 2.5 MG/3ML
2.5 SOLUTION RESPIRATORY (INHALATION) AS NEEDED
Status: DISCONTINUED | OUTPATIENT
Start: 2020-07-23 | End: 2020-07-25 | Stop reason: HOSPADM

## 2020-07-23 RX ORDER — FLUMAZENIL 0.1 MG/ML
0.2 INJECTION INTRAVENOUS AS NEEDED
Status: DISCONTINUED | OUTPATIENT
Start: 2020-07-23 | End: 2020-07-23 | Stop reason: HOSPADM

## 2020-07-23 RX ORDER — MONTELUKAST SODIUM 10 MG/1
10 TABLET ORAL NIGHTLY
Status: DISCONTINUED | OUTPATIENT
Start: 2020-07-23 | End: 2020-07-25 | Stop reason: HOSPADM

## 2020-07-23 RX ORDER — BUPROPION HYDROCHLORIDE 300 MG/1
300 TABLET ORAL EVERY MORNING
Status: DISCONTINUED | OUTPATIENT
Start: 2020-07-24 | End: 2020-07-25 | Stop reason: HOSPADM

## 2020-07-23 RX ORDER — FUROSEMIDE 20 MG/1
20 TABLET ORAL DAILY
Status: DISCONTINUED | OUTPATIENT
Start: 2020-07-23 | End: 2020-07-25 | Stop reason: HOSPADM

## 2020-07-23 RX ORDER — FENTANYL CITRATE 50 UG/ML
50 INJECTION, SOLUTION INTRAMUSCULAR; INTRAVENOUS
Status: DISCONTINUED | OUTPATIENT
Start: 2020-07-23 | End: 2020-07-23 | Stop reason: HOSPADM

## 2020-07-23 RX ORDER — FLUTICASONE PROPIONATE 50 MCG
1 SPRAY, SUSPENSION (ML) NASAL EVERY MORNING
Status: DISCONTINUED | OUTPATIENT
Start: 2020-07-24 | End: 2020-07-25 | Stop reason: HOSPADM

## 2020-07-23 RX ORDER — FERROUS SULFATE 325(65) MG
325 TABLET ORAL
Status: DISCONTINUED | OUTPATIENT
Start: 2020-07-24 | End: 2020-07-25 | Stop reason: HOSPADM

## 2020-07-23 RX ORDER — FENTANYL CITRATE 50 UG/ML
50 INJECTION, SOLUTION INTRAMUSCULAR; INTRAVENOUS
Status: COMPLETED | OUTPATIENT
Start: 2020-07-23 | End: 2020-07-23

## 2020-07-23 RX ORDER — METHOCARBAMOL 100 MG/ML
1000 INJECTION, SOLUTION INTRAMUSCULAR; INTRAVENOUS ONCE
Status: COMPLETED | OUTPATIENT
Start: 2020-07-23 | End: 2020-07-23

## 2020-07-23 RX ORDER — ONDANSETRON 2 MG/ML
INJECTION INTRAMUSCULAR; INTRAVENOUS AS NEEDED
Status: DISCONTINUED | OUTPATIENT
Start: 2020-07-23 | End: 2020-07-23 | Stop reason: SURG

## 2020-07-23 RX ORDER — LEVETIRACETAM 500 MG/1
500 TABLET ORAL 3 TIMES DAILY
Status: DISCONTINUED | OUTPATIENT
Start: 2020-07-23 | End: 2020-07-25 | Stop reason: HOSPADM

## 2020-07-23 RX ORDER — DEXAMETHASONE 4 MG/1
4 TABLET ORAL EVERY 6 HOURS SCHEDULED
Status: DISCONTINUED | OUTPATIENT
Start: 2020-07-23 | End: 2020-07-24

## 2020-07-23 RX ORDER — ALBUTEROL SULFATE 2.5 MG/3ML
2.5 SOLUTION RESPIRATORY (INHALATION) EVERY 6 HOURS PRN
Status: DISCONTINUED | OUTPATIENT
Start: 2020-07-23 | End: 2020-07-25 | Stop reason: HOSPADM

## 2020-07-23 RX ORDER — HYDROMORPHONE HCL 110MG/55ML
PATIENT CONTROLLED ANALGESIA SYRINGE INTRAVENOUS AS NEEDED
Status: DISCONTINUED | OUTPATIENT
Start: 2020-07-23 | End: 2020-07-23 | Stop reason: SURG

## 2020-07-23 RX ORDER — EPHEDRINE SULFATE 50 MG/ML
5 INJECTION, SOLUTION INTRAVENOUS ONCE AS NEEDED
Status: DISCONTINUED | OUTPATIENT
Start: 2020-07-23 | End: 2020-07-23 | Stop reason: HOSPADM

## 2020-07-23 RX ORDER — KETAMINE HYDROCHLORIDE 10 MG/ML
INJECTION INTRAMUSCULAR; INTRAVENOUS AS NEEDED
Status: DISCONTINUED | OUTPATIENT
Start: 2020-07-23 | End: 2020-07-23 | Stop reason: SURG

## 2020-07-23 RX ORDER — ONDANSETRON 2 MG/ML
4 INJECTION INTRAMUSCULAR; INTRAVENOUS EVERY 6 HOURS PRN
Status: DISCONTINUED | OUTPATIENT
Start: 2020-07-23 | End: 2020-07-25 | Stop reason: HOSPADM

## 2020-07-23 RX ORDER — DOCUSATE SODIUM 100 MG/1
100 CAPSULE, LIQUID FILLED ORAL 2 TIMES DAILY PRN
Status: DISCONTINUED | OUTPATIENT
Start: 2020-07-23 | End: 2020-07-24

## 2020-07-23 RX ADMIN — HYDROMORPHONE HYDROCHLORIDE 0.5 MG: 2 INJECTION, SOLUTION INTRAMUSCULAR; INTRAVENOUS; SUBCUTANEOUS at 13:18

## 2020-07-23 RX ADMIN — KETAMINE HYDROCHLORIDE 10 MG: 10 INJECTION INTRAMUSCULAR; INTRAVENOUS at 12:58

## 2020-07-23 RX ADMIN — ROCURONIUM BROMIDE 20 MG: 10 INJECTION INTRAVENOUS at 11:14

## 2020-07-23 RX ADMIN — SODIUM CHLORIDE, POTASSIUM CHLORIDE, SODIUM LACTATE AND CALCIUM CHLORIDE 100 ML/HR: 600; 310; 30; 20 INJECTION, SOLUTION INTRAVENOUS at 15:45

## 2020-07-23 RX ADMIN — PROPOFOL 30 MG: 10 INJECTION, EMULSION INTRAVENOUS at 11:14

## 2020-07-23 RX ADMIN — HYDROMORPHONE HYDROCHLORIDE 1 MG: 2 INJECTION, SOLUTION INTRAMUSCULAR; INTRAVENOUS; SUBCUTANEOUS at 13:26

## 2020-07-23 RX ADMIN — LEVETIRACETAM 500 MG: 500 TABLET, FILM COATED ORAL at 21:55

## 2020-07-23 RX ADMIN — DEXAMETHASONE SODIUM PHOSPHATE 4 MG: 4 INJECTION, SOLUTION INTRAMUSCULAR; INTRAVENOUS at 23:43

## 2020-07-23 RX ADMIN — MIDAZOLAM 2 MG: 1 INJECTION INTRAMUSCULAR; INTRAVENOUS at 11:10

## 2020-07-23 RX ADMIN — ONDANSETRON HYDROCHLORIDE 4 MG: 2 SOLUTION INTRAMUSCULAR; INTRAVENOUS at 12:36

## 2020-07-23 RX ADMIN — HYDROMORPHONE HYDROCHLORIDE 0.5 MG: 1 INJECTION, SOLUTION INTRAMUSCULAR; INTRAVENOUS; SUBCUTANEOUS at 15:10

## 2020-07-23 RX ADMIN — CEFAZOLIN 3 G: 1 INJECTION, POWDER, FOR SOLUTION INTRAMUSCULAR; INTRAVENOUS; PARENTERAL at 10:41

## 2020-07-23 RX ADMIN — FENTANYL CITRATE 50 MCG: 50 INJECTION, SOLUTION INTRAMUSCULAR; INTRAVENOUS at 13:55

## 2020-07-23 RX ADMIN — ONDANSETRON 4 MG: 2 INJECTION INTRAMUSCULAR; INTRAVENOUS at 23:43

## 2020-07-23 RX ADMIN — FENTANYL CITRATE 50 MCG: 50 INJECTION, SOLUTION INTRAMUSCULAR; INTRAVENOUS at 09:57

## 2020-07-23 RX ADMIN — METHOCARBAMOL 1000 MG: 100 INJECTION, SOLUTION INTRAMUSCULAR; INTRAVENOUS at 13:51

## 2020-07-23 RX ADMIN — SODIUM CHLORIDE, POTASSIUM CHLORIDE, SODIUM LACTATE AND CALCIUM CHLORIDE: 600; 310; 30; 20 INJECTION, SOLUTION INTRAVENOUS at 13:13

## 2020-07-23 RX ADMIN — HYDROMORPHONE HYDROCHLORIDE 0.5 MG: 1 INJECTION, SOLUTION INTRAMUSCULAR; INTRAVENOUS; SUBCUTANEOUS at 14:40

## 2020-07-23 RX ADMIN — GABAPENTIN 600 MG: 300 CAPSULE ORAL at 17:51

## 2020-07-23 RX ADMIN — FENTANYL CITRATE 100 MCG: 50 INJECTION INTRAMUSCULAR; INTRAVENOUS at 10:48

## 2020-07-23 RX ADMIN — FENTANYL CITRATE 50 MCG: 50 INJECTION, SOLUTION INTRAMUSCULAR; INTRAVENOUS at 10:20

## 2020-07-23 RX ADMIN — SULFAMETHOXAZOLE AND TRIMETHOPRIM 160 MG: 800; 160 TABLET ORAL at 21:55

## 2020-07-23 RX ADMIN — OXYCODONE HYDROCHLORIDE AND ACETAMINOPHEN 1 TABLET: 10; 325 TABLET ORAL at 23:43

## 2020-07-23 RX ADMIN — SODIUM CHLORIDE, POTASSIUM CHLORIDE, SODIUM LACTATE AND CALCIUM CHLORIDE 100 ML/HR: 600; 310; 30; 20 INJECTION, SOLUTION INTRAVENOUS at 19:34

## 2020-07-23 RX ADMIN — OXYCODONE HYDROCHLORIDE AND ACETAMINOPHEN 1 TABLET: 10; 325 TABLET ORAL at 19:38

## 2020-07-23 RX ADMIN — FAMOTIDINE 20 MG: 10 INJECTION INTRAVENOUS at 09:57

## 2020-07-23 RX ADMIN — ACETAMINOPHEN 650 MG: 325 TABLET, FILM COATED ORAL at 23:43

## 2020-07-23 RX ADMIN — DEXAMETHASONE 4 MG: 4 TABLET ORAL at 17:51

## 2020-07-23 RX ADMIN — DEXAMETHASONE SODIUM PHOSPHATE 10 MG: 10 INJECTION INTRAMUSCULAR; INTRAVENOUS at 14:43

## 2020-07-23 RX ADMIN — DEXAMETHASONE SODIUM PHOSPHATE 10 MG: 10 INJECTION INTRAMUSCULAR; INTRAVENOUS at 11:00

## 2020-07-23 RX ADMIN — LEVETIRACETAM 500 MG: 500 TABLET, FILM COATED ORAL at 17:51

## 2020-07-23 RX ADMIN — SODIUM CHLORIDE 3 G: 9 INJECTION, SOLUTION INTRAVENOUS at 17:49

## 2020-07-23 RX ADMIN — LIDOCAINE HYDROCHLORIDE 100 MG: 20 INJECTION, SOLUTION INFILTRATION; PERINEURAL at 10:48

## 2020-07-23 RX ADMIN — TIZANIDINE 4 MG: 4 TABLET ORAL at 21:55

## 2020-07-23 RX ADMIN — MONTELUKAST SODIUM 10 MG: 10 TABLET, FILM COATED ORAL at 21:55

## 2020-07-23 RX ADMIN — FENTANYL CITRATE 50 MCG: 50 INJECTION, SOLUTION INTRAMUSCULAR; INTRAVENOUS at 14:05

## 2020-07-23 RX ADMIN — SODIUM CHLORIDE, PRESERVATIVE FREE 3 ML: 5 INJECTION INTRAVENOUS at 21:55

## 2020-07-23 RX ADMIN — PROPOFOL 170 MG: 10 INJECTION, EMULSION INTRAVENOUS at 10:48

## 2020-07-23 RX ADMIN — SUGAMMADEX 300 MG: 100 INJECTION, SOLUTION INTRAVENOUS at 13:06

## 2020-07-23 RX ADMIN — OXYCODONE HYDROCHLORIDE AND ACETAMINOPHEN 1 TABLET: 10; 325 TABLET ORAL at 15:05

## 2020-07-23 RX ADMIN — SODIUM CHLORIDE, POTASSIUM CHLORIDE, SODIUM LACTATE AND CALCIUM CHLORIDE 9 ML/HR: 600; 310; 30; 20 INJECTION, SOLUTION INTRAVENOUS at 09:57

## 2020-07-23 RX ADMIN — CYCLOBENZAPRINE 10 MG: 10 TABLET, FILM COATED ORAL at 17:54

## 2020-07-23 RX ADMIN — HYDROMORPHONE HYDROCHLORIDE 0.5 MG: 2 INJECTION, SOLUTION INTRAMUSCULAR; INTRAVENOUS; SUBCUTANEOUS at 13:20

## 2020-07-23 RX ADMIN — KETAMINE HYDROCHLORIDE 40 MG: 10 INJECTION INTRAMUSCULAR; INTRAVENOUS at 11:10

## 2020-07-23 RX ADMIN — ROCURONIUM BROMIDE 50 MG: 10 INJECTION INTRAVENOUS at 10:48

## 2020-07-24 PROBLEM — Z74.09 IMPAIRED MOBILITY: Status: ACTIVE | Noted: 2020-07-24

## 2020-07-24 LAB
ANION GAP SERPL CALCULATED.3IONS-SCNC: 7.4 MMOL/L (ref 5–15)
BUN SERPL-MCNC: 14 MG/DL (ref 6–20)
BUN/CREAT SERPL: 15.6 (ref 7–25)
CALCIUM SPEC-SCNC: 8.8 MG/DL (ref 8.6–10.5)
CHLORIDE SERPL-SCNC: 102 MMOL/L (ref 98–107)
CO2 SERPL-SCNC: 27.6 MMOL/L (ref 22–29)
CREAT SERPL-MCNC: 0.9 MG/DL (ref 0.57–1)
GFR SERPL CREATININE-BSD FRML MDRD: 65 ML/MIN/1.73
GLUCOSE SERPL-MCNC: 163 MG/DL (ref 65–99)
POTASSIUM SERPL-SCNC: 4.3 MMOL/L (ref 3.5–5.2)
SODIUM SERPL-SCNC: 137 MMOL/L (ref 136–145)

## 2020-07-24 PROCEDURE — G0378 HOSPITAL OBSERVATION PER HR: HCPCS

## 2020-07-24 PROCEDURE — A9270 NON-COVERED ITEM OR SERVICE: HCPCS | Performed by: NEUROLOGICAL SURGERY

## 2020-07-24 PROCEDURE — 99024 POSTOP FOLLOW-UP VISIT: CPT | Performed by: NURSE PRACTITIONER

## 2020-07-24 PROCEDURE — 97161 PT EVAL LOW COMPLEX 20 MIN: CPT

## 2020-07-24 PROCEDURE — 63710000001 PAROXETINE 20 MG TABLET: Performed by: NEUROLOGICAL SURGERY

## 2020-07-24 PROCEDURE — 63710000001 FUROSEMIDE 20 MG TABLET: Performed by: NEUROLOGICAL SURGERY

## 2020-07-24 PROCEDURE — 94799 UNLISTED PULMONARY SVC/PX: CPT

## 2020-07-24 PROCEDURE — 80048 BASIC METABOLIC PNL TOTAL CA: CPT | Performed by: INTERNAL MEDICINE

## 2020-07-24 PROCEDURE — 63710000001 MONTELUKAST 10 MG TABLET: Performed by: NEUROLOGICAL SURGERY

## 2020-07-24 PROCEDURE — 63710000001 PANTOPRAZOLE 40 MG TABLET DELAYED-RELEASE: Performed by: NEUROLOGICAL SURGERY

## 2020-07-24 PROCEDURE — 25010000002 CEFAZOLIN PER 500 MG: Performed by: NEUROLOGICAL SURGERY

## 2020-07-24 PROCEDURE — 63710000001 CYCLOBENZAPRINE 10 MG TABLET: Performed by: NEUROLOGICAL SURGERY

## 2020-07-24 PROCEDURE — 63710000001 BUDESONIDE-FORMOTEROL 80-4.5 MCG/ACT AEROSOL 6.9 G INHALER: Performed by: NEUROLOGICAL SURGERY

## 2020-07-24 PROCEDURE — 63710000001 CETIRIZINE 10 MG TABLET: Performed by: NEUROLOGICAL SURGERY

## 2020-07-24 PROCEDURE — 63710000001 FLUTICASONE 50 MCG/ACT SUSPENSION 16 G BOTTLE: Performed by: NEUROLOGICAL SURGERY

## 2020-07-24 PROCEDURE — 94640 AIRWAY INHALATION TREATMENT: CPT

## 2020-07-24 PROCEDURE — 63710000001 LEVETIRACETAM 500 MG TABLET: Performed by: NEUROLOGICAL SURGERY

## 2020-07-24 PROCEDURE — 63710000001 SULFAMETHOXAZOLE-TRIMETHOPRIM 800-160 MG TABLET: Performed by: NEUROLOGICAL SURGERY

## 2020-07-24 PROCEDURE — 63710000001 OXYCODONE-ACETAMINOPHEN 10-325 MG TABLET: Performed by: NEUROLOGICAL SURGERY

## 2020-07-24 PROCEDURE — 25010000002 DEXAMETHASONE PER 1 MG: Performed by: NEUROLOGICAL SURGERY

## 2020-07-24 PROCEDURE — 63710000001 GABAPENTIN 300 MG CAPSULE: Performed by: NEUROLOGICAL SURGERY

## 2020-07-24 PROCEDURE — 63710000001 BUPROPION XL 300 MG TABLET SUSTAINED-RELEASE 24 HOUR: Performed by: NEUROLOGICAL SURGERY

## 2020-07-24 PROCEDURE — 97110 THERAPEUTIC EXERCISES: CPT

## 2020-07-24 PROCEDURE — 63710000001 FERROUS SULFATE 325 (65 FE) MG TABLET: Performed by: NEUROLOGICAL SURGERY

## 2020-07-24 RX ORDER — AMOXICILLIN 250 MG
2 CAPSULE ORAL NIGHTLY
Status: DISCONTINUED | OUTPATIENT
Start: 2020-07-24 | End: 2020-07-25 | Stop reason: HOSPADM

## 2020-07-24 RX ORDER — DOCUSATE SODIUM 100 MG/1
100 CAPSULE, LIQUID FILLED ORAL DAILY
Status: DISCONTINUED | OUTPATIENT
Start: 2020-07-24 | End: 2020-07-25 | Stop reason: HOSPADM

## 2020-07-24 RX ADMIN — BUDESONIDE AND FORMOTEROL FUMARATE DIHYDRATE 2 PUFF: 80; 4.5 AEROSOL RESPIRATORY (INHALATION) at 07:22

## 2020-07-24 RX ADMIN — SODIUM CHLORIDE, PRESERVATIVE FREE 3 ML: 5 INJECTION INTRAVENOUS at 20:04

## 2020-07-24 RX ADMIN — OXYCODONE HYDROCHLORIDE AND ACETAMINOPHEN 1 TABLET: 10; 325 TABLET ORAL at 19:57

## 2020-07-24 RX ADMIN — DEXAMETHASONE SODIUM PHOSPHATE 4 MG: 4 INJECTION, SOLUTION INTRAMUSCULAR; INTRAVENOUS at 12:22

## 2020-07-24 RX ADMIN — BUDESONIDE AND FORMOTEROL FUMARATE DIHYDRATE 2 PUFF: 80; 4.5 AEROSOL RESPIRATORY (INHALATION) at 22:19

## 2020-07-24 RX ADMIN — GABAPENTIN 600 MG: 300 CAPSULE ORAL at 22:27

## 2020-07-24 RX ADMIN — PAROXETINE HYDROCHLORIDE HEMIHYDRATE 40 MG: 20 TABLET, FILM COATED ORAL at 09:22

## 2020-07-24 RX ADMIN — OXYCODONE HYDROCHLORIDE AND ACETAMINOPHEN 1 TABLET: 10; 325 TABLET ORAL at 04:53

## 2020-07-24 RX ADMIN — DEXAMETHASONE SODIUM PHOSPHATE 4 MG: 4 INJECTION, SOLUTION INTRAMUSCULAR; INTRAVENOUS at 04:52

## 2020-07-24 RX ADMIN — SULFAMETHOXAZOLE AND TRIMETHOPRIM 160 MG: 800; 160 TABLET ORAL at 20:02

## 2020-07-24 RX ADMIN — SODIUM CHLORIDE 3 G: 9 INJECTION, SOLUTION INTRAVENOUS at 11:23

## 2020-07-24 RX ADMIN — FERROUS SULFATE TAB 325 MG (65 MG ELEMENTAL FE) 325 MG: 325 (65 FE) TAB at 09:22

## 2020-07-24 RX ADMIN — GABAPENTIN 600 MG: 300 CAPSULE ORAL at 15:07

## 2020-07-24 RX ADMIN — PANTOPRAZOLE SODIUM 40 MG: 40 TABLET, DELAYED RELEASE ORAL at 04:53

## 2020-07-24 RX ADMIN — SODIUM CHLORIDE, PRESERVATIVE FREE 3 ML: 5 INJECTION INTRAVENOUS at 09:23

## 2020-07-24 RX ADMIN — BUPROPION HYDROCHLORIDE 300 MG: 300 TABLET, EXTENDED RELEASE ORAL at 09:22

## 2020-07-24 RX ADMIN — LEVETIRACETAM 500 MG: 500 TABLET, FILM COATED ORAL at 20:02

## 2020-07-24 RX ADMIN — SULFAMETHOXAZOLE AND TRIMETHOPRIM 160 MG: 800; 160 TABLET ORAL at 09:22

## 2020-07-24 RX ADMIN — FLUTICASONE PROPIONATE 1 SPRAY: 50 SPRAY, METERED NASAL at 09:22

## 2020-07-24 RX ADMIN — CETIRIZINE HYDROCHLORIDE 10 MG: 10 TABLET, FILM COATED ORAL at 09:22

## 2020-07-24 RX ADMIN — CYCLOBENZAPRINE 10 MG: 10 TABLET, FILM COATED ORAL at 09:22

## 2020-07-24 RX ADMIN — GABAPENTIN 600 MG: 300 CAPSULE ORAL at 04:52

## 2020-07-24 RX ADMIN — LEVETIRACETAM 500 MG: 500 TABLET, FILM COATED ORAL at 15:07

## 2020-07-24 RX ADMIN — FUROSEMIDE 20 MG: 20 TABLET ORAL at 09:22

## 2020-07-24 RX ADMIN — OXYCODONE HYDROCHLORIDE AND ACETAMINOPHEN 1 TABLET: 10; 325 TABLET ORAL at 09:22

## 2020-07-24 RX ADMIN — OXYCODONE HYDROCHLORIDE AND ACETAMINOPHEN 1 TABLET: 10; 325 TABLET ORAL at 15:07

## 2020-07-24 RX ADMIN — LEVETIRACETAM 500 MG: 500 TABLET, FILM COATED ORAL at 09:22

## 2020-07-24 RX ADMIN — SODIUM CHLORIDE 3 G: 9 INJECTION, SOLUTION INTRAVENOUS at 02:37

## 2020-07-24 RX ADMIN — MONTELUKAST SODIUM 10 MG: 10 TABLET, FILM COATED ORAL at 20:02

## 2020-07-25 VITALS
SYSTOLIC BLOOD PRESSURE: 131 MMHG | DIASTOLIC BLOOD PRESSURE: 69 MMHG | HEART RATE: 71 BPM | BODY MASS INDEX: 44.26 KG/M2 | RESPIRATION RATE: 18 BRPM | OXYGEN SATURATION: 92 % | TEMPERATURE: 97.5 F | HEIGHT: 67 IN | WEIGHT: 282 LBS

## 2020-07-25 LAB
ANION GAP SERPL CALCULATED.3IONS-SCNC: 4.8 MMOL/L (ref 5–15)
BASOPHILS # BLD AUTO: 0.01 10*3/MM3 (ref 0–0.2)
BASOPHILS NFR BLD AUTO: 0.1 % (ref 0–1.5)
BUN SERPL-MCNC: 19 MG/DL (ref 6–20)
BUN/CREAT SERPL: 19.6 (ref 7–25)
CALCIUM SPEC-SCNC: 8.7 MG/DL (ref 8.6–10.5)
CHLORIDE SERPL-SCNC: 102 MMOL/L (ref 98–107)
CO2 SERPL-SCNC: 29.2 MMOL/L (ref 22–29)
CREAT SERPL-MCNC: 0.97 MG/DL (ref 0.57–1)
DEPRECATED RDW RBC AUTO: 47.4 FL (ref 37–54)
EOSINOPHIL # BLD AUTO: 0 10*3/MM3 (ref 0–0.4)
EOSINOPHIL NFR BLD AUTO: 0 % (ref 0.3–6.2)
ERYTHROCYTE [DISTWIDTH] IN BLOOD BY AUTOMATED COUNT: 14.9 % (ref 12.3–15.4)
GFR SERPL CREATININE-BSD FRML MDRD: 59 ML/MIN/1.73
GLUCOSE SERPL-MCNC: 106 MG/DL (ref 65–99)
HBA1C MFR BLD: 5.71 % (ref 4.8–5.6)
HCT VFR BLD AUTO: 36.5 % (ref 34–46.6)
HGB BLD-MCNC: 11.5 G/DL (ref 12–15.9)
IMM GRANULOCYTES # BLD AUTO: 0.06 10*3/MM3 (ref 0–0.05)
IMM GRANULOCYTES NFR BLD AUTO: 0.6 % (ref 0–0.5)
LYMPHOCYTES # BLD AUTO: 0.82 10*3/MM3 (ref 0.7–3.1)
LYMPHOCYTES NFR BLD AUTO: 7.9 % (ref 19.6–45.3)
MCH RBC QN AUTO: 27.2 PG (ref 26.6–33)
MCHC RBC AUTO-ENTMCNC: 31.5 G/DL (ref 31.5–35.7)
MCV RBC AUTO: 86.3 FL (ref 79–97)
MONOCYTES # BLD AUTO: 0.81 10*3/MM3 (ref 0.1–0.9)
MONOCYTES NFR BLD AUTO: 7.8 % (ref 5–12)
NEUTROPHILS NFR BLD AUTO: 8.73 10*3/MM3 (ref 1.7–7)
NEUTROPHILS NFR BLD AUTO: 83.6 % (ref 42.7–76)
NRBC BLD AUTO-RTO: 0 /100 WBC (ref 0–0.2)
PLATELET # BLD AUTO: 195 10*3/MM3 (ref 140–450)
PMV BLD AUTO: 11.2 FL (ref 6–12)
POTASSIUM SERPL-SCNC: 4.3 MMOL/L (ref 3.5–5.2)
RBC # BLD AUTO: 4.23 10*6/MM3 (ref 3.77–5.28)
SODIUM SERPL-SCNC: 136 MMOL/L (ref 136–145)
WBC # BLD AUTO: 10.43 10*3/MM3 (ref 3.4–10.8)

## 2020-07-25 PROCEDURE — 63710000001 SULFAMETHOXAZOLE-TRIMETHOPRIM 800-160 MG TABLET: Performed by: NEUROLOGICAL SURGERY

## 2020-07-25 PROCEDURE — A9270 NON-COVERED ITEM OR SERVICE: HCPCS | Performed by: NEUROLOGICAL SURGERY

## 2020-07-25 PROCEDURE — 99024 POSTOP FOLLOW-UP VISIT: CPT | Performed by: NURSE PRACTITIONER

## 2020-07-25 PROCEDURE — 63710000001 LEVETIRACETAM 500 MG TABLET: Performed by: NEUROLOGICAL SURGERY

## 2020-07-25 PROCEDURE — G0378 HOSPITAL OBSERVATION PER HR: HCPCS

## 2020-07-25 PROCEDURE — 63710000001 FERROUS SULFATE 325 (65 FE) MG TABLET: Performed by: NEUROLOGICAL SURGERY

## 2020-07-25 PROCEDURE — 63710000001 BUPROPION XL 300 MG TABLET SUSTAINED-RELEASE 24 HOUR: Performed by: NEUROLOGICAL SURGERY

## 2020-07-25 PROCEDURE — 97116 GAIT TRAINING THERAPY: CPT

## 2020-07-25 PROCEDURE — 63710000001 TIZANIDINE 4 MG TABLET: Performed by: NEUROLOGICAL SURGERY

## 2020-07-25 PROCEDURE — 94799 UNLISTED PULMONARY SVC/PX: CPT

## 2020-07-25 PROCEDURE — 63710000001 GABAPENTIN 300 MG CAPSULE: Performed by: NEUROLOGICAL SURGERY

## 2020-07-25 PROCEDURE — 63710000001 PAROXETINE 20 MG TABLET: Performed by: NEUROLOGICAL SURGERY

## 2020-07-25 PROCEDURE — 63710000001 CETIRIZINE 10 MG TABLET: Performed by: NEUROLOGICAL SURGERY

## 2020-07-25 PROCEDURE — 85025 COMPLETE CBC W/AUTO DIFF WBC: CPT | Performed by: HOSPITALIST

## 2020-07-25 PROCEDURE — 63710000001 FUROSEMIDE 20 MG TABLET: Performed by: NEUROLOGICAL SURGERY

## 2020-07-25 PROCEDURE — 80048 BASIC METABOLIC PNL TOTAL CA: CPT | Performed by: HOSPITALIST

## 2020-07-25 PROCEDURE — 83036 HEMOGLOBIN GLYCOSYLATED A1C: CPT | Performed by: HOSPITALIST

## 2020-07-25 PROCEDURE — 63710000001 PANTOPRAZOLE 40 MG TABLET DELAYED-RELEASE: Performed by: NEUROLOGICAL SURGERY

## 2020-07-25 PROCEDURE — 63710000001 OXYCODONE-ACETAMINOPHEN 10-325 MG TABLET: Performed by: NEUROLOGICAL SURGERY

## 2020-07-25 RX ORDER — PSEUDOEPHEDRINE HCL 30 MG
100 TABLET ORAL DAILY
COMMUNITY
Start: 2020-07-26

## 2020-07-25 RX ORDER — AMOXICILLIN 250 MG
2 CAPSULE ORAL NIGHTLY
Qty: 30 TABLET | Refills: 0 | Status: SHIPPED | OUTPATIENT
Start: 2020-07-25

## 2020-07-25 RX ORDER — OXYCODONE AND ACETAMINOPHEN 10; 325 MG/1; MG/1
1 TABLET ORAL EVERY 4 HOURS PRN
Qty: 30 TABLET | Refills: 0 | Status: SHIPPED | OUTPATIENT
Start: 2020-07-25 | End: 2020-07-30 | Stop reason: SDUPTHER

## 2020-07-25 RX ORDER — ACETAMINOPHEN 325 MG/1
650 TABLET ORAL EVERY 4 HOURS PRN
COMMUNITY
Start: 2020-07-25

## 2020-07-25 RX ADMIN — CETIRIZINE HYDROCHLORIDE 10 MG: 10 TABLET, FILM COATED ORAL at 06:30

## 2020-07-25 RX ADMIN — OXYCODONE HYDROCHLORIDE AND ACETAMINOPHEN 1 TABLET: 10; 325 TABLET ORAL at 00:13

## 2020-07-25 RX ADMIN — BUDESONIDE AND FORMOTEROL FUMARATE DIHYDRATE 2 PUFF: 80; 4.5 AEROSOL RESPIRATORY (INHALATION) at 07:54

## 2020-07-25 RX ADMIN — FERROUS SULFATE TAB 325 MG (65 MG ELEMENTAL FE) 325 MG: 325 (65 FE) TAB at 10:00

## 2020-07-25 RX ADMIN — OXYCODONE HYDROCHLORIDE AND ACETAMINOPHEN 1 TABLET: 10; 325 TABLET ORAL at 06:30

## 2020-07-25 RX ADMIN — TIZANIDINE 4 MG: 4 TABLET ORAL at 00:20

## 2020-07-25 RX ADMIN — GABAPENTIN 600 MG: 300 CAPSULE ORAL at 06:29

## 2020-07-25 RX ADMIN — SULFAMETHOXAZOLE AND TRIMETHOPRIM 160 MG: 800; 160 TABLET ORAL at 10:00

## 2020-07-25 RX ADMIN — FUROSEMIDE 20 MG: 20 TABLET ORAL at 10:02

## 2020-07-25 RX ADMIN — BUPROPION HYDROCHLORIDE 300 MG: 300 TABLET, EXTENDED RELEASE ORAL at 10:02

## 2020-07-25 RX ADMIN — PANTOPRAZOLE SODIUM 40 MG: 40 TABLET, DELAYED RELEASE ORAL at 06:29

## 2020-07-25 RX ADMIN — OXYCODONE HYDROCHLORIDE AND ACETAMINOPHEN 1 TABLET: 10; 325 TABLET ORAL at 10:54

## 2020-07-25 RX ADMIN — FLUTICASONE PROPIONATE 1 SPRAY: 50 SPRAY, METERED NASAL at 10:05

## 2020-07-25 RX ADMIN — TIZANIDINE 4 MG: 4 TABLET ORAL at 10:05

## 2020-07-25 RX ADMIN — PAROXETINE HYDROCHLORIDE HEMIHYDRATE 40 MG: 20 TABLET, FILM COATED ORAL at 06:30

## 2020-07-25 RX ADMIN — SODIUM CHLORIDE, PRESERVATIVE FREE 3 ML: 5 INJECTION INTRAVENOUS at 10:01

## 2020-07-25 RX ADMIN — LEVETIRACETAM 500 MG: 500 TABLET, FILM COATED ORAL at 10:03

## 2020-07-30 ENCOUNTER — TELEPHONE (OUTPATIENT)
Dept: NEUROSURGERY | Facility: CLINIC | Age: 55
End: 2020-07-30

## 2020-07-30 DIAGNOSIS — G89.4 CHRONIC PAIN SYNDROME: ICD-10-CM

## 2020-07-30 DIAGNOSIS — M50.123 CERVICAL DISC DISORDER AT C6-C7 LEVEL WITH RADICULOPATHY: ICD-10-CM

## 2020-07-30 DIAGNOSIS — M50.122 CERVICAL DISC DISORDER AT C5-C6 LEVEL WITH RADICULOPATHY: ICD-10-CM

## 2020-07-30 DIAGNOSIS — Z74.09 IMPAIRED MOBILITY: ICD-10-CM

## 2020-07-30 RX ORDER — OXYCODONE AND ACETAMINOPHEN 10; 325 MG/1; MG/1
1 TABLET ORAL EVERY 4 HOURS PRN
Qty: 30 TABLET | Refills: 0 | Status: SHIPPED | OUTPATIENT
Start: 2020-07-30 | End: 2020-08-07 | Stop reason: SDUPTHER

## 2020-08-07 ENCOUNTER — TELEPHONE (OUTPATIENT)
Dept: NEUROSURGERY | Facility: CLINIC | Age: 55
End: 2020-08-07

## 2020-08-07 DIAGNOSIS — Z74.09 IMPAIRED MOBILITY: ICD-10-CM

## 2020-08-07 DIAGNOSIS — M50.122 CERVICAL DISC DISORDER AT C5-C6 LEVEL WITH RADICULOPATHY: ICD-10-CM

## 2020-08-07 DIAGNOSIS — G89.4 CHRONIC PAIN SYNDROME: ICD-10-CM

## 2020-08-07 DIAGNOSIS — M50.123 CERVICAL DISC DISORDER AT C6-C7 LEVEL WITH RADICULOPATHY: ICD-10-CM

## 2020-08-07 RX ORDER — OXYCODONE AND ACETAMINOPHEN 10; 325 MG/1; MG/1
1 TABLET ORAL EVERY 4 HOURS PRN
Qty: 30 TABLET | Refills: 0 | Status: SHIPPED | OUTPATIENT
Start: 2020-08-07 | End: 2020-08-13 | Stop reason: SDUPTHER

## 2020-08-10 ENCOUNTER — OFFICE VISIT (OUTPATIENT)
Dept: NEUROSURGERY | Facility: CLINIC | Age: 55
End: 2020-08-10

## 2020-08-10 VITALS — HEIGHT: 67 IN | TEMPERATURE: 99.2 F | BODY MASS INDEX: 44.16 KG/M2

## 2020-08-10 DIAGNOSIS — Z48.89 POSTOPERATIVE VISIT: Primary | ICD-10-CM

## 2020-08-10 PROCEDURE — 99024 POSTOP FOLLOW-UP VISIT: CPT | Performed by: NEUROLOGICAL SURGERY

## 2020-08-10 RX ORDER — METHYLPREDNISOLONE 4 MG/1
TABLET ORAL
Qty: 21 TABLET | Refills: 0 | Status: SHIPPED | OUTPATIENT
Start: 2020-08-10 | End: 2020-11-18

## 2020-08-13 DIAGNOSIS — Z74.09 IMPAIRED MOBILITY: ICD-10-CM

## 2020-08-13 DIAGNOSIS — G89.4 CHRONIC PAIN SYNDROME: ICD-10-CM

## 2020-08-13 DIAGNOSIS — M50.123 CERVICAL DISC DISORDER AT C6-C7 LEVEL WITH RADICULOPATHY: ICD-10-CM

## 2020-08-13 DIAGNOSIS — M50.122 CERVICAL DISC DISORDER AT C5-C6 LEVEL WITH RADICULOPATHY: ICD-10-CM

## 2020-08-13 RX ORDER — OXYCODONE AND ACETAMINOPHEN 10; 325 MG/1; MG/1
1 TABLET ORAL EVERY 4 HOURS PRN
Qty: 30 TABLET | Refills: 0 | Status: SHIPPED | OUTPATIENT
Start: 2020-08-13 | End: 2020-08-19 | Stop reason: SDUPTHER

## 2020-08-17 ENCOUNTER — TELEPHONE (OUTPATIENT)
Dept: NEUROSURGERY | Facility: CLINIC | Age: 55
End: 2020-08-17

## 2020-08-17 ENCOUNTER — HOSPITAL ENCOUNTER (OUTPATIENT)
Dept: GENERAL RADIOLOGY | Facility: HOSPITAL | Age: 55
Discharge: HOME | End: 2020-08-17
Admitting: NEUROLOGICAL SURGERY

## 2020-08-17 ENCOUNTER — OFFICE VISIT (OUTPATIENT)
Dept: NEUROSURGERY | Facility: CLINIC | Age: 55
End: 2020-08-17

## 2020-08-17 VITALS — TEMPERATURE: 98.2 F

## 2020-08-17 DIAGNOSIS — M50.123 CERVICAL DISC DISORDER AT C6-C7 LEVEL WITH RADICULOPATHY: Primary | ICD-10-CM

## 2020-08-17 DIAGNOSIS — M50.20 CERVICAL DISC HERNIATION: ICD-10-CM

## 2020-08-17 DIAGNOSIS — Z48.89 POSTOPERATIVE VISIT: ICD-10-CM

## 2020-08-17 DIAGNOSIS — Z48.89 POSTOPERATIVE VISIT: Primary | ICD-10-CM

## 2020-08-17 DIAGNOSIS — M50.122 CERVICAL DISC DISORDER AT C5-C6 LEVEL WITH RADICULOPATHY: ICD-10-CM

## 2020-08-17 PROCEDURE — 72050 X-RAY EXAM NECK SPINE 4/5VWS: CPT

## 2020-08-17 PROCEDURE — 99024 POSTOP FOLLOW-UP VISIT: CPT | Performed by: NEUROLOGICAL SURGERY

## 2020-08-17 RX ORDER — DIAZEPAM 5 MG/1
TABLET ORAL
Qty: 2 TABLET | Refills: 0 | Status: SHIPPED | OUTPATIENT
Start: 2020-08-17 | End: 2020-11-18

## 2020-08-18 ENCOUNTER — HOSPITAL ENCOUNTER (OUTPATIENT)
Dept: MRI IMAGING | Facility: HOSPITAL | Age: 55
Discharge: HOME | End: 2020-08-18
Admitting: NEUROLOGICAL SURGERY

## 2020-08-18 ENCOUNTER — TRANSCRIBE ORDERS (OUTPATIENT)
Dept: ADMINISTRATIVE | Facility: HOSPITAL | Age: 55
End: 2020-08-18

## 2020-08-18 DIAGNOSIS — M50.20 DISPLACEMENT OF CERVICAL INTERVERTEBRAL DISC: ICD-10-CM

## 2020-08-18 DIAGNOSIS — M50.123 DISORDER OF INTERVERTEBRAL DISC AT C6-C7 LEVEL WITH RADICULOPATHY: Primary | ICD-10-CM

## 2020-08-18 DIAGNOSIS — M50.122 DISORDER OF INTERVERTEBRAL DISC AT C5-C6 LEVEL WITH RADICULOPATHY: ICD-10-CM

## 2020-08-18 DIAGNOSIS — M50.123 DISORDER OF INTERVERTEBRAL DISC AT C6-C7 LEVEL WITH RADICULOPATHY: ICD-10-CM

## 2020-08-18 PROCEDURE — 82565 ASSAY OF CREATININE: CPT

## 2020-08-18 PROCEDURE — A9575 INJ GADOTERATE MEGLUMI 0.1ML: HCPCS | Performed by: NEUROLOGICAL SURGERY

## 2020-08-18 PROCEDURE — 25010000002 GADOTERATE MEGLUMINE 10 MMOL/20ML SOLUTION: Performed by: NEUROLOGICAL SURGERY

## 2020-08-18 PROCEDURE — 72156 MRI NECK SPINE W/O & W/DYE: CPT

## 2020-08-18 RX ORDER — GADOTERATE MEGLUMINE 376.9 MG/ML
20 INJECTION INTRAVENOUS
Status: COMPLETED | OUTPATIENT
Start: 2020-08-18 | End: 2020-08-18

## 2020-08-18 RX ADMIN — GADOTERATE MEGLUMINE 20 ML: 376.9 INJECTION, SOLUTION INTRAVENOUS at 20:08

## 2020-08-19 ENCOUNTER — OFFICE VISIT (OUTPATIENT)
Dept: PAIN MEDICINE | Facility: CLINIC | Age: 55
End: 2020-08-19

## 2020-08-19 ENCOUNTER — OFFICE VISIT (OUTPATIENT)
Dept: NEUROSURGERY | Facility: CLINIC | Age: 55
End: 2020-08-19

## 2020-08-19 VITALS
RESPIRATION RATE: 20 BRPM | BODY MASS INDEX: 43.82 KG/M2 | DIASTOLIC BLOOD PRESSURE: 65 MMHG | SYSTOLIC BLOOD PRESSURE: 122 MMHG | HEART RATE: 95 BPM | HEIGHT: 67 IN | OXYGEN SATURATION: 90 % | TEMPERATURE: 98.8 F | WEIGHT: 279.2 LBS

## 2020-08-19 VITALS — TEMPERATURE: 97.2 F

## 2020-08-19 DIAGNOSIS — G89.18 ACUTE POST-OPERATIVE PAIN: ICD-10-CM

## 2020-08-19 DIAGNOSIS — Z74.09 IMPAIRED MOBILITY: ICD-10-CM

## 2020-08-19 DIAGNOSIS — Z48.89 POSTOPERATIVE VISIT: Primary | ICD-10-CM

## 2020-08-19 DIAGNOSIS — M50.122 CERVICAL DISC DISORDER AT C5-C6 LEVEL WITH RADICULOPATHY: ICD-10-CM

## 2020-08-19 DIAGNOSIS — M96.1 POSTLAMINECTOMY SYNDROME OF THORACIC REGION: ICD-10-CM

## 2020-08-19 DIAGNOSIS — M51.36 DDD (DEGENERATIVE DISC DISEASE), LUMBAR: ICD-10-CM

## 2020-08-19 DIAGNOSIS — G89.4 CHRONIC PAIN SYNDROME: ICD-10-CM

## 2020-08-19 DIAGNOSIS — G54.3 THORACIC ROOT LESION: ICD-10-CM

## 2020-08-19 DIAGNOSIS — Z79.899 ENCOUNTER FOR LONG-TERM (CURRENT) USE OF HIGH-RISK MEDICATION: ICD-10-CM

## 2020-08-19 DIAGNOSIS — M50.123 CERVICAL DISC DISORDER AT C6-C7 LEVEL WITH RADICULOPATHY: ICD-10-CM

## 2020-08-19 DIAGNOSIS — G89.4 CHRONIC PAIN SYNDROME: Primary | ICD-10-CM

## 2020-08-19 LAB — CREAT BLDA-MCNC: 1.2 MG/DL (ref 0.6–1.3)

## 2020-08-19 PROCEDURE — 99214 OFFICE O/P EST MOD 30 MIN: CPT | Performed by: NURSE PRACTITIONER

## 2020-08-19 PROCEDURE — 99024 POSTOP FOLLOW-UP VISIT: CPT | Performed by: NEUROLOGICAL SURGERY

## 2020-08-19 RX ORDER — OXYCODONE AND ACETAMINOPHEN 10; 325 MG/1; MG/1
1 TABLET ORAL EVERY 4 HOURS PRN
Qty: 90 TABLET | Refills: 0 | Status: SHIPPED | OUTPATIENT
Start: 2020-08-19 | End: 2020-09-03 | Stop reason: SDUPTHER

## 2020-08-26 ENCOUNTER — OFFICE VISIT (OUTPATIENT)
Dept: NEUROSURGERY | Facility: CLINIC | Age: 55
End: 2020-08-26

## 2020-08-26 DIAGNOSIS — Z48.89 POSTOPERATIVE VISIT: Primary | ICD-10-CM

## 2020-08-26 PROCEDURE — 99024 POSTOP FOLLOW-UP VISIT: CPT | Performed by: NEUROLOGICAL SURGERY

## 2020-09-01 RX ORDER — GABAPENTIN 600 MG/1
TABLET ORAL
Qty: 180 TABLET | Refills: 2 | Status: SHIPPED | OUTPATIENT
Start: 2020-09-01 | End: 2020-11-18 | Stop reason: SDUPTHER

## 2020-09-03 ENCOUNTER — OFFICE VISIT (OUTPATIENT)
Dept: PAIN MEDICINE | Facility: CLINIC | Age: 55
End: 2020-09-03

## 2020-09-03 VITALS
DIASTOLIC BLOOD PRESSURE: 81 MMHG | TEMPERATURE: 96.4 F | SYSTOLIC BLOOD PRESSURE: 129 MMHG | OXYGEN SATURATION: 95 % | HEIGHT: 67 IN | HEART RATE: 73 BPM | RESPIRATION RATE: 20 BRPM | WEIGHT: 276.6 LBS | BODY MASS INDEX: 43.41 KG/M2

## 2020-09-03 DIAGNOSIS — Z79.899 ENCOUNTER FOR LONG-TERM (CURRENT) USE OF HIGH-RISK MEDICATION: ICD-10-CM

## 2020-09-03 DIAGNOSIS — M50.123 CERVICAL DISC DISORDER AT C6-C7 LEVEL WITH RADICULOPATHY: ICD-10-CM

## 2020-09-03 DIAGNOSIS — G89.4 CHRONIC PAIN SYNDROME: Primary | ICD-10-CM

## 2020-09-03 DIAGNOSIS — Z74.09 IMPAIRED MOBILITY: ICD-10-CM

## 2020-09-03 DIAGNOSIS — M96.1 POSTLAMINECTOMY SYNDROME OF THORACIC REGION: ICD-10-CM

## 2020-09-03 DIAGNOSIS — M50.122 CERVICAL DISC DISORDER AT C5-C6 LEVEL WITH RADICULOPATHY: ICD-10-CM

## 2020-09-03 DIAGNOSIS — G89.4 CHRONIC PAIN SYNDROME: ICD-10-CM

## 2020-09-03 DIAGNOSIS — M51.36 DDD (DEGENERATIVE DISC DISEASE), LUMBAR: ICD-10-CM

## 2020-09-03 PROCEDURE — 99214 OFFICE O/P EST MOD 30 MIN: CPT | Performed by: NURSE PRACTITIONER

## 2020-09-03 RX ORDER — OXYCODONE AND ACETAMINOPHEN 10; 325 MG/1; MG/1
1 TABLET ORAL EVERY 4 HOURS PRN
Qty: 180 TABLET | Refills: 0 | Status: SHIPPED | OUTPATIENT
Start: 2020-09-03 | End: 2020-09-29 | Stop reason: SDUPTHER

## 2020-09-11 ENCOUNTER — OFFICE VISIT (OUTPATIENT)
Dept: NEUROSURGERY | Facility: CLINIC | Age: 55
End: 2020-09-11

## 2020-09-11 DIAGNOSIS — Z48.89 POSTOPERATIVE VISIT: Primary | ICD-10-CM

## 2020-09-11 PROCEDURE — 99024 POSTOP FOLLOW-UP VISIT: CPT | Performed by: NEUROLOGICAL SURGERY

## 2020-09-29 ENCOUNTER — RESULTS ENCOUNTER (OUTPATIENT)
Dept: PAIN MEDICINE | Facility: CLINIC | Age: 55
End: 2020-09-29

## 2020-09-29 ENCOUNTER — OFFICE VISIT (OUTPATIENT)
Dept: PAIN MEDICINE | Facility: CLINIC | Age: 55
End: 2020-09-29

## 2020-09-29 VITALS
SYSTOLIC BLOOD PRESSURE: 133 MMHG | DIASTOLIC BLOOD PRESSURE: 85 MMHG | RESPIRATION RATE: 20 BRPM | HEART RATE: 74 BPM | HEIGHT: 67 IN | OXYGEN SATURATION: 96 % | BODY MASS INDEX: 43.82 KG/M2 | TEMPERATURE: 96.7 F | WEIGHT: 279.2 LBS

## 2020-09-29 DIAGNOSIS — M96.1 POSTLAMINECTOMY SYNDROME OF THORACIC REGION: ICD-10-CM

## 2020-09-29 DIAGNOSIS — M50.123 CERVICAL DISC DISORDER AT C6-C7 LEVEL WITH RADICULOPATHY: ICD-10-CM

## 2020-09-29 DIAGNOSIS — G89.4 CHRONIC PAIN SYNDROME: Primary | ICD-10-CM

## 2020-09-29 DIAGNOSIS — M48.02 DEGENERATIVE CERVICAL SPINAL STENOSIS: ICD-10-CM

## 2020-09-29 DIAGNOSIS — G89.4 CHRONIC PAIN SYNDROME: ICD-10-CM

## 2020-09-29 DIAGNOSIS — M79.18 CERVICAL MYOFASCIAL PAIN SYNDROME: ICD-10-CM

## 2020-09-29 DIAGNOSIS — M50.122 CERVICAL DISC DISORDER AT C5-C6 LEVEL WITH RADICULOPATHY: ICD-10-CM

## 2020-09-29 DIAGNOSIS — Z74.09 IMPAIRED MOBILITY: ICD-10-CM

## 2020-09-29 DIAGNOSIS — G54.3 THORACIC ROOT LESION: ICD-10-CM

## 2020-09-29 DIAGNOSIS — F11.20 OPIATE DEPENDENCE, CONTINUOUS (HCC): ICD-10-CM

## 2020-09-29 PROCEDURE — 99214 OFFICE O/P EST MOD 30 MIN: CPT | Performed by: NURSE PRACTITIONER

## 2020-09-29 RX ORDER — TIZANIDINE 4 MG/1
TABLET ORAL
Qty: 90 TABLET | Refills: 2 | Status: SHIPPED | OUTPATIENT
Start: 2020-09-29 | End: 2020-11-18 | Stop reason: SDUPTHER

## 2020-09-29 RX ORDER — LEVETIRACETAM 500 MG/1
TABLET ORAL
Qty: 90 TABLET | Refills: 2 | Status: SHIPPED | OUTPATIENT
Start: 2020-09-29 | End: 2020-11-18 | Stop reason: SDUPTHER

## 2020-09-29 RX ORDER — OXYCODONE AND ACETAMINOPHEN 10; 325 MG/1; MG/1
1 TABLET ORAL EVERY 4 HOURS PRN
Qty: 180 TABLET | Refills: 0 | Status: SHIPPED | OUTPATIENT
Start: 2020-09-29 | End: 2020-10-28 | Stop reason: SDUPTHER

## 2020-10-26 ENCOUNTER — OFFICE VISIT (OUTPATIENT)
Dept: NEUROSURGERY | Facility: CLINIC | Age: 55
End: 2020-10-26

## 2020-10-26 DIAGNOSIS — Z98.1 HISTORY OF SPINAL FUSION: ICD-10-CM

## 2020-10-26 DIAGNOSIS — G89.29 CHRONIC NECK PAIN: Primary | ICD-10-CM

## 2020-10-26 DIAGNOSIS — M54.2 CHRONIC NECK PAIN: Primary | ICD-10-CM

## 2020-10-26 PROCEDURE — 99213 OFFICE O/P EST LOW 20 MIN: CPT | Performed by: NEUROLOGICAL SURGERY

## 2020-10-28 ENCOUNTER — OFFICE VISIT (OUTPATIENT)
Dept: PAIN MEDICINE | Facility: CLINIC | Age: 55
End: 2020-10-28

## 2020-10-28 VITALS
DIASTOLIC BLOOD PRESSURE: 68 MMHG | TEMPERATURE: 97.1 F | HEIGHT: 67 IN | OXYGEN SATURATION: 94 % | SYSTOLIC BLOOD PRESSURE: 109 MMHG | HEART RATE: 78 BPM | WEIGHT: 271 LBS | BODY MASS INDEX: 42.53 KG/M2 | RESPIRATION RATE: 16 BRPM

## 2020-10-28 DIAGNOSIS — M50.123 CERVICAL DISC DISORDER AT C6-C7 LEVEL WITH RADICULOPATHY: ICD-10-CM

## 2020-10-28 DIAGNOSIS — M50.122 CERVICAL DISC DISORDER AT C5-C6 LEVEL WITH RADICULOPATHY: ICD-10-CM

## 2020-10-28 DIAGNOSIS — M48.02 DEGENERATIVE CERVICAL SPINAL STENOSIS: ICD-10-CM

## 2020-10-28 DIAGNOSIS — M96.1 POSTLAMINECTOMY SYNDROME OF THORACIC REGION: ICD-10-CM

## 2020-10-28 DIAGNOSIS — Z79.899 ENCOUNTER FOR LONG-TERM (CURRENT) USE OF HIGH-RISK MEDICATION: ICD-10-CM

## 2020-10-28 DIAGNOSIS — G89.29 OTHER CHRONIC PAIN: Primary | ICD-10-CM

## 2020-10-28 DIAGNOSIS — Z74.09 IMPAIRED MOBILITY: ICD-10-CM

## 2020-10-28 DIAGNOSIS — G89.4 CHRONIC PAIN SYNDROME: ICD-10-CM

## 2020-10-28 PROBLEM — M79.18 CERVICAL MYOFASCIAL PAIN SYNDROME: Status: RESOLVED | Noted: 2017-09-27 | Resolved: 2020-10-28

## 2020-10-28 PROCEDURE — 99214 OFFICE O/P EST MOD 30 MIN: CPT | Performed by: NURSE PRACTITIONER

## 2020-10-28 RX ORDER — OXYCODONE AND ACETAMINOPHEN 10; 325 MG/1; MG/1
1 TABLET ORAL EVERY 6 HOURS PRN
Qty: 120 TABLET | Refills: 0 | Status: SHIPPED | OUTPATIENT
Start: 2020-10-28 | End: 2020-11-18 | Stop reason: SDUPTHER

## 2020-11-18 ENCOUNTER — OFFICE VISIT (OUTPATIENT)
Dept: PAIN MEDICINE | Facility: CLINIC | Age: 55
End: 2020-11-18

## 2020-11-18 DIAGNOSIS — M96.1 POSTLAMINECTOMY SYNDROME OF THORACIC REGION: ICD-10-CM

## 2020-11-18 DIAGNOSIS — G89.29 OTHER CHRONIC PAIN: Primary | ICD-10-CM

## 2020-11-18 DIAGNOSIS — M50.122 CERVICAL DISC DISORDER AT C5-C6 LEVEL WITH RADICULOPATHY: ICD-10-CM

## 2020-11-18 DIAGNOSIS — Z74.09 IMPAIRED MOBILITY: ICD-10-CM

## 2020-11-18 DIAGNOSIS — Z79.899 ENCOUNTER FOR LONG-TERM (CURRENT) USE OF HIGH-RISK MEDICATION: ICD-10-CM

## 2020-11-18 DIAGNOSIS — M50.123 CERVICAL DISC DISORDER AT C6-C7 LEVEL WITH RADICULOPATHY: ICD-10-CM

## 2020-11-18 DIAGNOSIS — G89.4 CHRONIC PAIN SYNDROME: ICD-10-CM

## 2020-11-18 DIAGNOSIS — M51.36 DDD (DEGENERATIVE DISC DISEASE), LUMBAR: ICD-10-CM

## 2020-11-18 PROCEDURE — 99213 OFFICE O/P EST LOW 20 MIN: CPT | Performed by: NURSE PRACTITIONER

## 2020-11-18 RX ORDER — TIZANIDINE 4 MG/1
4 TABLET ORAL EVERY 8 HOURS PRN
Qty: 90 TABLET | Refills: 2 | Status: SHIPPED | OUTPATIENT
Start: 2020-11-18 | End: 2021-02-25

## 2020-11-18 RX ORDER — GABAPENTIN 600 MG/1
1200 TABLET ORAL 3 TIMES DAILY
Qty: 180 TABLET | Refills: 2 | Status: SHIPPED | OUTPATIENT
Start: 2020-11-18 | End: 2020-12-29

## 2020-11-18 RX ORDER — LEVETIRACETAM 500 MG/1
500 TABLET ORAL 3 TIMES DAILY
Qty: 90 TABLET | Refills: 2 | Status: SHIPPED | OUTPATIENT
Start: 2020-11-18 | End: 2020-12-29

## 2020-11-22 RX ORDER — OXYCODONE AND ACETAMINOPHEN 10; 325 MG/1; MG/1
1 TABLET ORAL EVERY 6 HOURS PRN
Qty: 120 TABLET | Refills: 0 | Status: SHIPPED | OUTPATIENT
Start: 2020-11-22 | End: 2020-12-23 | Stop reason: SDUPTHER

## 2020-12-23 ENCOUNTER — OFFICE VISIT (OUTPATIENT)
Dept: PAIN MEDICINE | Facility: CLINIC | Age: 55
End: 2020-12-23

## 2020-12-23 VITALS
HEART RATE: 77 BPM | RESPIRATION RATE: 18 BRPM | TEMPERATURE: 97.1 F | SYSTOLIC BLOOD PRESSURE: 140 MMHG | OXYGEN SATURATION: 93 % | DIASTOLIC BLOOD PRESSURE: 82 MMHG | HEIGHT: 67 IN | BODY MASS INDEX: 42.66 KG/M2 | WEIGHT: 271.8 LBS

## 2020-12-23 DIAGNOSIS — Z98.1 HISTORY OF SPINAL FUSION: ICD-10-CM

## 2020-12-23 DIAGNOSIS — Z74.09 IMPAIRED MOBILITY: ICD-10-CM

## 2020-12-23 DIAGNOSIS — G54.3 THORACIC ROOT LESION: ICD-10-CM

## 2020-12-23 DIAGNOSIS — M50.123 CERVICAL DISC DISORDER AT C6-C7 LEVEL WITH RADICULOPATHY: ICD-10-CM

## 2020-12-23 DIAGNOSIS — G89.29 OTHER CHRONIC PAIN: Primary | ICD-10-CM

## 2020-12-23 DIAGNOSIS — Z79.899 ENCOUNTER FOR LONG-TERM (CURRENT) USE OF HIGH-RISK MEDICATION: ICD-10-CM

## 2020-12-23 DIAGNOSIS — Z87.39 HISTORY OF OSTEOMYELITIS: ICD-10-CM

## 2020-12-23 DIAGNOSIS — G89.4 CHRONIC PAIN SYNDROME: ICD-10-CM

## 2020-12-23 DIAGNOSIS — M50.122 CERVICAL DISC DISORDER AT C5-C6 LEVEL WITH RADICULOPATHY: ICD-10-CM

## 2020-12-23 PROCEDURE — 99214 OFFICE O/P EST MOD 30 MIN: CPT | Performed by: NURSE PRACTITIONER

## 2020-12-23 RX ORDER — OXYCODONE AND ACETAMINOPHEN 10; 325 MG/1; MG/1
1 TABLET ORAL EVERY 6 HOURS PRN
Qty: 120 TABLET | Refills: 0 | Status: SHIPPED | OUTPATIENT
Start: 2020-12-23 | End: 2021-01-21 | Stop reason: SDUPTHER

## 2020-12-23 RX ORDER — ATORVASTATIN CALCIUM 10 MG/1
10 TABLET, FILM COATED ORAL DAILY
COMMUNITY
Start: 2020-12-18

## 2020-12-23 RX ORDER — VENLAFAXINE 50 MG/1
TABLET ORAL
Status: ON HOLD | COMMUNITY
Start: 2020-12-08 | End: 2021-05-05

## 2020-12-29 RX ORDER — GABAPENTIN 600 MG/1
TABLET ORAL
Qty: 180 TABLET | Refills: 0 | Status: SHIPPED | OUTPATIENT
Start: 2020-12-29 | End: 2021-01-21 | Stop reason: SDUPTHER

## 2020-12-29 RX ORDER — LEVETIRACETAM 500 MG/1
TABLET ORAL
Qty: 90 TABLET | Refills: 0 | Status: SHIPPED | OUTPATIENT
Start: 2020-12-29 | End: 2021-01-21 | Stop reason: SDUPTHER

## 2021-01-20 ENCOUNTER — HOSPITAL ENCOUNTER (OUTPATIENT)
Dept: GENERAL RADIOLOGY | Facility: HOSPITAL | Age: 56
Discharge: HOME | End: 2021-01-20
Admitting: NEUROLOGICAL SURGERY

## 2021-01-20 DIAGNOSIS — G89.29 CHRONIC NECK PAIN: ICD-10-CM

## 2021-01-20 DIAGNOSIS — M54.2 CHRONIC NECK PAIN: ICD-10-CM

## 2021-01-20 DIAGNOSIS — Z98.1 HISTORY OF SPINAL FUSION: ICD-10-CM

## 2021-01-20 PROCEDURE — 72050 X-RAY EXAM NECK SPINE 4/5VWS: CPT

## 2021-01-21 ENCOUNTER — OFFICE VISIT (OUTPATIENT)
Dept: PAIN MEDICINE | Facility: CLINIC | Age: 56
End: 2021-01-21

## 2021-01-21 DIAGNOSIS — M51.36 DDD (DEGENERATIVE DISC DISEASE), LUMBAR: ICD-10-CM

## 2021-01-21 DIAGNOSIS — M50.122 CERVICAL DISC DISORDER AT C5-C6 LEVEL WITH RADICULOPATHY: ICD-10-CM

## 2021-01-21 DIAGNOSIS — M48.02 DEGENERATIVE CERVICAL SPINAL STENOSIS: ICD-10-CM

## 2021-01-21 DIAGNOSIS — M54.16 LUMBAR RADICULOPATHY: ICD-10-CM

## 2021-01-21 DIAGNOSIS — G89.4 CHRONIC PAIN SYNDROME: ICD-10-CM

## 2021-01-21 DIAGNOSIS — Z79.899 ENCOUNTER FOR LONG-TERM (CURRENT) USE OF HIGH-RISK MEDICATION: ICD-10-CM

## 2021-01-21 DIAGNOSIS — M50.123 CERVICAL DISC DISORDER AT C6-C7 LEVEL WITH RADICULOPATHY: ICD-10-CM

## 2021-01-21 DIAGNOSIS — G89.29 OTHER CHRONIC PAIN: Primary | ICD-10-CM

## 2021-01-21 DIAGNOSIS — M96.1 POSTLAMINECTOMY SYNDROME OF THORACIC REGION: ICD-10-CM

## 2021-01-21 DIAGNOSIS — Z74.09 IMPAIRED MOBILITY: ICD-10-CM

## 2021-01-21 PROCEDURE — 99442 PR PHYS/QHP TELEPHONE EVALUATION 11-20 MIN: CPT | Performed by: NURSE PRACTITIONER

## 2021-01-21 RX ORDER — OXYCODONE AND ACETAMINOPHEN 10; 325 MG/1; MG/1
1 TABLET ORAL EVERY 6 HOURS PRN
Qty: 120 TABLET | Refills: 0 | Status: SHIPPED | OUTPATIENT
Start: 2021-01-21 | End: 2021-02-22 | Stop reason: SDUPTHER

## 2021-01-21 RX ORDER — GABAPENTIN 600 MG/1
1200 TABLET ORAL 3 TIMES DAILY
Qty: 180 TABLET | Refills: 2 | Status: SHIPPED | OUTPATIENT
Start: 2021-01-21 | End: 2021-04-26

## 2021-01-21 RX ORDER — LEVETIRACETAM 500 MG/1
500 TABLET ORAL 3 TIMES DAILY
Qty: 90 TABLET | Refills: 2 | Status: ON HOLD | OUTPATIENT
Start: 2021-01-21 | End: 2021-05-08 | Stop reason: SDUPTHER

## 2021-01-25 ENCOUNTER — OFFICE VISIT (OUTPATIENT)
Dept: NEUROSURGERY | Facility: CLINIC | Age: 56
End: 2021-01-25

## 2021-01-25 DIAGNOSIS — M54.2 CHRONIC NECK PAIN: Primary | ICD-10-CM

## 2021-01-25 DIAGNOSIS — Z98.1 HISTORY OF SPINAL FUSION: ICD-10-CM

## 2021-01-25 DIAGNOSIS — G89.29 CHRONIC NECK PAIN: Primary | ICD-10-CM

## 2021-01-25 PROCEDURE — 99441 PR PHYS/QHP TELEPHONE EVALUATION 5-10 MIN: CPT | Performed by: NEUROLOGICAL SURGERY

## 2021-01-25 RX ORDER — LOPERAMIDE HYDROCHLORIDE 2 MG/1
CAPSULE ORAL
COMMUNITY
Start: 2021-01-11 | End: 2022-01-26 | Stop reason: SDUPTHER

## 2021-01-25 RX ORDER — BUSPIRONE HYDROCHLORIDE 5 MG/1
TABLET ORAL
COMMUNITY
Start: 2021-01-11 | End: 2021-05-08 | Stop reason: HOSPADM

## 2021-02-22 ENCOUNTER — OFFICE VISIT (OUTPATIENT)
Dept: PAIN MEDICINE | Facility: CLINIC | Age: 56
End: 2021-02-22

## 2021-02-22 VITALS
BODY MASS INDEX: 41.75 KG/M2 | HEIGHT: 67 IN | SYSTOLIC BLOOD PRESSURE: 118 MMHG | WEIGHT: 266 LBS | TEMPERATURE: 96.8 F | OXYGEN SATURATION: 94 % | DIASTOLIC BLOOD PRESSURE: 66 MMHG | HEART RATE: 72 BPM | RESPIRATION RATE: 20 BRPM

## 2021-02-22 DIAGNOSIS — Z74.09 IMPAIRED MOBILITY: ICD-10-CM

## 2021-02-22 DIAGNOSIS — M96.1 POSTLAMINECTOMY SYNDROME OF THORACIC REGION: ICD-10-CM

## 2021-02-22 DIAGNOSIS — G89.29 OTHER CHRONIC PAIN: Primary | ICD-10-CM

## 2021-02-22 DIAGNOSIS — Z79.899 ENCOUNTER FOR LONG-TERM (CURRENT) USE OF HIGH-RISK MEDICATION: ICD-10-CM

## 2021-02-22 DIAGNOSIS — M50.123 CERVICAL DISC DISORDER AT C6-C7 LEVEL WITH RADICULOPATHY: ICD-10-CM

## 2021-02-22 DIAGNOSIS — M48.02 DEGENERATIVE CERVICAL SPINAL STENOSIS: ICD-10-CM

## 2021-02-22 DIAGNOSIS — M50.122 CERVICAL DISC DISORDER AT C5-C6 LEVEL WITH RADICULOPATHY: ICD-10-CM

## 2021-02-22 DIAGNOSIS — M51.36 DDD (DEGENERATIVE DISC DISEASE), LUMBAR: ICD-10-CM

## 2021-02-22 DIAGNOSIS — G89.4 CHRONIC PAIN SYNDROME: ICD-10-CM

## 2021-02-22 PROCEDURE — 99214 OFFICE O/P EST MOD 30 MIN: CPT | Performed by: NURSE PRACTITIONER

## 2021-02-22 RX ORDER — VENLAFAXINE HYDROCHLORIDE 75 MG/1
150 TABLET, EXTENDED RELEASE ORAL DAILY
COMMUNITY
Start: 2021-02-11 | End: 2021-05-08 | Stop reason: HOSPADM

## 2021-02-22 RX ORDER — OXYCODONE AND ACETAMINOPHEN 10; 325 MG/1; MG/1
1 TABLET ORAL EVERY 6 HOURS PRN
Qty: 120 TABLET | Refills: 0 | Status: SHIPPED | OUTPATIENT
Start: 2021-02-22 | End: 2021-03-24 | Stop reason: SDUPTHER

## 2021-02-22 RX ORDER — BUPROPION HYDROCHLORIDE 300 MG/1
300 TABLET ORAL DAILY
Status: ON HOLD | COMMUNITY
Start: 2020-11-30 | End: 2021-05-06

## 2021-02-22 RX ORDER — PAROXETINE HYDROCHLORIDE 40 MG/1
40 TABLET, FILM COATED ORAL DAILY
COMMUNITY
Start: 2020-10-25

## 2021-02-22 RX ORDER — TIZANIDINE 4 MG/1
TABLET ORAL
Status: ON HOLD | COMMUNITY
End: 2021-05-05

## 2021-02-22 RX ORDER — INFLUENZA A VIRUS A/MICHIGAN/45/2015 X-275 (H1N1) ANTIGEN (FORMALDEHYDE INACTIVATED), INFLUENZA A VIRUS A/SINGAPORE/INFIMH-16-0019/2016 IVR-186 (H3N2) ANTIGEN (FORMALDEHYDE INACTIVATED), INFLUENZA B VIRUS B/PHUKET/3073/2013 ANTIGEN (FORMALDEHYDE INACTIVATED), AND INFLUENZA B VIRUS B/MARYLAND/15/2016 BX-69A ANTIGEN (FORMALDEHYDE INACTIVATED) 15; 15; 15; 15 UG/.5ML; UG/.5ML; UG/.5ML; UG/.5ML
INJECTION, SUSPENSION INTRAMUSCULAR
Status: ON HOLD | COMMUNITY
Start: 2020-09-03 | End: 2021-05-05

## 2021-02-22 RX ORDER — PANTOPRAZOLE SODIUM 40 MG/1
40 TABLET, DELAYED RELEASE ORAL DAILY
Status: ON HOLD | COMMUNITY
Start: 2020-11-30 | End: 2021-05-05

## 2021-02-22 RX ORDER — LEVETIRACETAM 500 MG/1
1 TABLET ORAL 3 TIMES DAILY
Status: ON HOLD | COMMUNITY
Start: 2020-11-29 | End: 2021-05-05

## 2021-02-22 RX ORDER — SULFAMETHOXAZOLE AND TRIMETHOPRIM 800; 160 MG/1; MG/1
TABLET ORAL
COMMUNITY
Start: 2021-01-25 | End: 2021-05-08 | Stop reason: HOSPADM

## 2021-02-22 RX ORDER — FUROSEMIDE 20 MG/1
20 TABLET ORAL
Status: ON HOLD | COMMUNITY
Start: 2020-11-29 | End: 2021-05-05

## 2021-02-22 RX ORDER — GABAPENTIN 600 MG/1
1200 TABLET ORAL 3 TIMES DAILY
Status: ON HOLD | COMMUNITY
Start: 2020-11-30 | End: 2021-05-08 | Stop reason: SDUPTHER

## 2021-02-22 RX ORDER — MONTELUKAST SODIUM 10 MG/1
10 TABLET ORAL
Status: ON HOLD | COMMUNITY
Start: 2020-11-30 | End: 2021-05-06

## 2021-02-22 RX ORDER — OXYCODONE AND ACETAMINOPHEN 10; 325 MG/1; MG/1
TABLET ORAL
COMMUNITY
Start: 2020-11-30 | End: 2021-04-26 | Stop reason: SDUPTHER

## 2021-02-22 RX ORDER — CETIRIZINE HYDROCHLORIDE 10 MG/1
10 TABLET ORAL DAILY
COMMUNITY
Start: 2020-10-28

## 2021-02-22 RX ORDER — FLUTICASONE PROPIONATE 50 MCG
SPRAY, SUSPENSION (ML) NASAL
COMMUNITY
Start: 2020-11-29 | End: 2021-05-08 | Stop reason: HOSPADM

## 2021-02-22 RX ORDER — OLOPATADINE HYDROCHLORIDE 2 MG/ML
SOLUTION/ DROPS OPHTHALMIC
COMMUNITY
Start: 2020-11-30 | End: 2022-01-26 | Stop reason: SDUPTHER

## 2021-02-22 RX ORDER — ALBUTEROL SULFATE 2.5 MG/3ML
SOLUTION RESPIRATORY (INHALATION)
COMMUNITY
Start: 2020-12-16 | End: 2021-05-08 | Stop reason: HOSPADM

## 2021-02-25 RX ORDER — TIZANIDINE 4 MG/1
TABLET ORAL
Qty: 90 TABLET | Refills: 1 | Status: SHIPPED | OUTPATIENT
Start: 2021-02-25 | End: 2021-05-24

## 2021-03-24 ENCOUNTER — OFFICE VISIT (OUTPATIENT)
Dept: PAIN MEDICINE | Facility: CLINIC | Age: 56
End: 2021-03-24

## 2021-03-24 DIAGNOSIS — G89.29 OTHER CHRONIC PAIN: Primary | ICD-10-CM

## 2021-03-24 DIAGNOSIS — Z79.899 ENCOUNTER FOR LONG-TERM (CURRENT) USE OF HIGH-RISK MEDICATION: ICD-10-CM

## 2021-03-24 DIAGNOSIS — M48.02 DEGENERATIVE CERVICAL SPINAL STENOSIS: ICD-10-CM

## 2021-03-24 DIAGNOSIS — Z74.09 IMPAIRED MOBILITY: ICD-10-CM

## 2021-03-24 DIAGNOSIS — M51.36 DDD (DEGENERATIVE DISC DISEASE), LUMBAR: ICD-10-CM

## 2021-03-24 DIAGNOSIS — M50.122 CERVICAL DISC DISORDER AT C5-C6 LEVEL WITH RADICULOPATHY: ICD-10-CM

## 2021-03-24 DIAGNOSIS — G89.4 CHRONIC PAIN SYNDROME: ICD-10-CM

## 2021-03-24 DIAGNOSIS — M50.123 CERVICAL DISC DISORDER AT C6-C7 LEVEL WITH RADICULOPATHY: ICD-10-CM

## 2021-03-24 DIAGNOSIS — M96.1 POSTLAMINECTOMY SYNDROME OF THORACIC REGION: ICD-10-CM

## 2021-03-24 PROCEDURE — 99442 PR PHYS/QHP TELEPHONE EVALUATION 11-20 MIN: CPT | Performed by: NURSE PRACTITIONER

## 2021-03-24 RX ORDER — OXYCODONE AND ACETAMINOPHEN 10; 325 MG/1; MG/1
1 TABLET ORAL EVERY 6 HOURS PRN
Qty: 120 TABLET | Refills: 0 | Status: SHIPPED | OUTPATIENT
Start: 2021-03-24 | End: 2021-04-26 | Stop reason: SDUPTHER

## 2021-04-26 ENCOUNTER — OFFICE VISIT (OUTPATIENT)
Dept: PAIN MEDICINE | Facility: CLINIC | Age: 56
End: 2021-04-26

## 2021-04-26 DIAGNOSIS — M48.02 DEGENERATIVE CERVICAL SPINAL STENOSIS: ICD-10-CM

## 2021-04-26 DIAGNOSIS — M51.36 DDD (DEGENERATIVE DISC DISEASE), LUMBAR: ICD-10-CM

## 2021-04-26 DIAGNOSIS — Z74.09 IMPAIRED MOBILITY: ICD-10-CM

## 2021-04-26 DIAGNOSIS — G89.4 CHRONIC PAIN SYNDROME: ICD-10-CM

## 2021-04-26 DIAGNOSIS — Z87.39 HISTORY OF OSTEOMYELITIS: ICD-10-CM

## 2021-04-26 DIAGNOSIS — G54.3 THORACIC ROOT LESION: Primary | ICD-10-CM

## 2021-04-26 DIAGNOSIS — M50.123 CERVICAL DISC DISORDER AT C6-C7 LEVEL WITH RADICULOPATHY: ICD-10-CM

## 2021-04-26 DIAGNOSIS — M96.1 POSTLAMINECTOMY SYNDROME OF THORACIC REGION: ICD-10-CM

## 2021-04-26 DIAGNOSIS — M50.122 CERVICAL DISC DISORDER AT C5-C6 LEVEL WITH RADICULOPATHY: ICD-10-CM

## 2021-04-26 DIAGNOSIS — Z79.899 ENCOUNTER FOR LONG-TERM (CURRENT) USE OF HIGH-RISK MEDICATION: ICD-10-CM

## 2021-04-26 DIAGNOSIS — G89.29 OTHER CHRONIC PAIN: ICD-10-CM

## 2021-04-26 PROCEDURE — 99442 PR PHYS/QHP TELEPHONE EVALUATION 11-20 MIN: CPT | Performed by: NURSE PRACTITIONER

## 2021-04-26 RX ORDER — OXYCODONE AND ACETAMINOPHEN 10; 325 MG/1; MG/1
1 TABLET ORAL EVERY 6 HOURS PRN
Qty: 120 TABLET | Refills: 0 | Status: SHIPPED | OUTPATIENT
Start: 2021-04-26 | End: 2021-05-24 | Stop reason: SDUPTHER

## 2021-04-26 RX ORDER — GABAPENTIN 600 MG/1
TABLET ORAL
Qty: 180 TABLET | Refills: 3 | Status: ON HOLD | OUTPATIENT
Start: 2021-04-26 | End: 2021-05-05

## 2021-05-04 ENCOUNTER — HOSPITAL ENCOUNTER (INPATIENT)
Facility: HOSPITAL | Age: 56
LOS: 3 days | Discharge: HOME | End: 2021-05-08
Attending: EMERGENCY MEDICINE | Admitting: INTERNAL MEDICINE

## 2021-05-04 ENCOUNTER — APPOINTMENT (OUTPATIENT)
Dept: CT IMAGING | Facility: HOSPITAL | Age: 56
End: 2021-05-04

## 2021-05-04 ENCOUNTER — APPOINTMENT (OUTPATIENT)
Dept: GENERAL RADIOLOGY | Facility: HOSPITAL | Age: 56
End: 2021-05-04

## 2021-05-04 DIAGNOSIS — K81.0 ACUTE CHOLECYSTITIS: ICD-10-CM

## 2021-05-04 DIAGNOSIS — G89.29 OTHER CHRONIC PAIN: ICD-10-CM

## 2021-05-04 DIAGNOSIS — K80.00 ACUTE CALCULOUS CHOLECYSTITIS: Primary | ICD-10-CM

## 2021-05-04 DIAGNOSIS — K80.20 CHOLECYSTOLITHIASIS: ICD-10-CM

## 2021-05-04 DIAGNOSIS — K80.50 CALCULUS OF BILE DUCT WITHOUT CHOLECYSTITIS AND WITHOUT OBSTRUCTION: ICD-10-CM

## 2021-05-04 DIAGNOSIS — K81.9 CHOLECYSTITIS: ICD-10-CM

## 2021-05-04 LAB
ALBUMIN SERPL-MCNC: 4.2 G/DL (ref 3.5–5.2)
ALBUMIN/GLOB SERPL: 1.3 G/DL
ALP SERPL-CCNC: 81 U/L (ref 39–117)
ALT SERPL W P-5'-P-CCNC: 34 U/L (ref 1–33)
ANION GAP SERPL CALCULATED.3IONS-SCNC: 11 MMOL/L (ref 5–15)
AST SERPL-CCNC: 18 U/L (ref 1–32)
BASOPHILS # BLD AUTO: 0.06 10*3/MM3 (ref 0–0.2)
BASOPHILS NFR BLD AUTO: 0.4 % (ref 0–1.5)
BILIRUB SERPL-MCNC: 0.7 MG/DL (ref 0–1.2)
BUN SERPL-MCNC: 12 MG/DL (ref 6–20)
BUN/CREAT SERPL: 13.2 (ref 7–25)
CALCIUM SPEC-SCNC: 9.2 MG/DL (ref 8.6–10.5)
CHLORIDE SERPL-SCNC: 100 MMOL/L (ref 98–107)
CO2 SERPL-SCNC: 28 MMOL/L (ref 22–29)
CREAT SERPL-MCNC: 0.91 MG/DL (ref 0.57–1)
DEPRECATED RDW RBC AUTO: 46 FL (ref 37–54)
EOSINOPHIL # BLD AUTO: 0.02 10*3/MM3 (ref 0–0.4)
EOSINOPHIL NFR BLD AUTO: 0.1 % (ref 0.3–6.2)
ERYTHROCYTE [DISTWIDTH] IN BLOOD BY AUTOMATED COUNT: 15.1 % (ref 12.3–15.4)
GFR SERPL CREATININE-BSD FRML MDRD: 64 ML/MIN/1.73
GLOBULIN UR ELPH-MCNC: 3.3 GM/DL
GLUCOSE SERPL-MCNC: 112 MG/DL (ref 65–99)
HCT VFR BLD AUTO: 40.8 % (ref 34–46.6)
HGB BLD-MCNC: 13.2 G/DL (ref 12–15.9)
IMM GRANULOCYTES # BLD AUTO: 0.09 10*3/MM3 (ref 0–0.05)
IMM GRANULOCYTES NFR BLD AUTO: 0.6 % (ref 0–0.5)
LYMPHOCYTES # BLD AUTO: 0.53 10*3/MM3 (ref 0.7–3.1)
LYMPHOCYTES NFR BLD AUTO: 3.5 % (ref 19.6–45.3)
MCH RBC QN AUTO: 27.3 PG (ref 26.6–33)
MCHC RBC AUTO-ENTMCNC: 32.4 G/DL (ref 31.5–35.7)
MCV RBC AUTO: 84.5 FL (ref 79–97)
MONOCYTES # BLD AUTO: 1.03 10*3/MM3 (ref 0.1–0.9)
MONOCYTES NFR BLD AUTO: 6.9 % (ref 5–12)
NEUTROPHILS NFR BLD AUTO: 13.21 10*3/MM3 (ref 1.7–7)
NEUTROPHILS NFR BLD AUTO: 88.5 % (ref 42.7–76)
NRBC BLD AUTO-RTO: 0 /100 WBC (ref 0–0.2)
NT-PROBNP SERPL-MCNC: 61.2 PG/ML (ref 0–900)
PLATELET # BLD AUTO: 210 10*3/MM3 (ref 140–450)
PMV BLD AUTO: 10.9 FL (ref 6–12)
POTASSIUM SERPL-SCNC: 4.4 MMOL/L (ref 3.5–5.2)
PROT SERPL-MCNC: 7.5 G/DL (ref 6–8.5)
RBC # BLD AUTO: 4.83 10*6/MM3 (ref 3.77–5.28)
SODIUM SERPL-SCNC: 139 MMOL/L (ref 136–145)
TROPONIN T SERPL-MCNC: <0.01 NG/ML (ref 0–0.03)
WBC # BLD AUTO: 14.94 10*3/MM3 (ref 3.4–10.8)

## 2021-05-04 PROCEDURE — 74177 CT ABD & PELVIS W/CONTRAST: CPT

## 2021-05-04 PROCEDURE — 83880 ASSAY OF NATRIURETIC PEPTIDE: CPT | Performed by: EMERGENCY MEDICINE

## 2021-05-04 PROCEDURE — 93005 ELECTROCARDIOGRAM TRACING: CPT

## 2021-05-04 PROCEDURE — 83690 ASSAY OF LIPASE: CPT | Performed by: EMERGENCY MEDICINE

## 2021-05-04 PROCEDURE — 25010000002 ONDANSETRON PER 1 MG: Performed by: EMERGENCY MEDICINE

## 2021-05-04 PROCEDURE — 99284 EMERGENCY DEPT VISIT MOD MDM: CPT

## 2021-05-04 PROCEDURE — 71046 X-RAY EXAM CHEST 2 VIEWS: CPT

## 2021-05-04 PROCEDURE — 0 IOPAMIDOL PER 1 ML: Performed by: EMERGENCY MEDICINE

## 2021-05-04 PROCEDURE — 85025 COMPLETE CBC W/AUTO DIFF WBC: CPT | Performed by: EMERGENCY MEDICINE

## 2021-05-04 PROCEDURE — 84484 ASSAY OF TROPONIN QUANT: CPT | Performed by: EMERGENCY MEDICINE

## 2021-05-04 PROCEDURE — 93005 ELECTROCARDIOGRAM TRACING: CPT | Performed by: EMERGENCY MEDICINE

## 2021-05-04 PROCEDURE — 71275 CT ANGIOGRAPHY CHEST: CPT

## 2021-05-04 PROCEDURE — 80053 COMPREHEN METABOLIC PANEL: CPT | Performed by: EMERGENCY MEDICINE

## 2021-05-04 PROCEDURE — 25010000002 MORPHINE PER 10 MG: Performed by: EMERGENCY MEDICINE

## 2021-05-04 PROCEDURE — 93010 ELECTROCARDIOGRAM REPORT: CPT | Performed by: INTERNAL MEDICINE

## 2021-05-04 RX ORDER — MORPHINE SULFATE 2 MG/ML
4 INJECTION, SOLUTION INTRAMUSCULAR; INTRAVENOUS ONCE
Status: COMPLETED | OUTPATIENT
Start: 2021-05-04 | End: 2021-05-04

## 2021-05-04 RX ORDER — ONDANSETRON 2 MG/ML
4 INJECTION INTRAMUSCULAR; INTRAVENOUS ONCE
Status: COMPLETED | OUTPATIENT
Start: 2021-05-04 | End: 2021-05-04

## 2021-05-04 RX ORDER — SODIUM CHLORIDE 0.9 % (FLUSH) 0.9 %
10 SYRINGE (ML) INJECTION AS NEEDED
Status: DISCONTINUED | OUTPATIENT
Start: 2021-05-04 | End: 2021-05-08 | Stop reason: HOSPADM

## 2021-05-04 RX ADMIN — MORPHINE SULFATE 4 MG: 2 INJECTION, SOLUTION INTRAMUSCULAR; INTRAVENOUS at 23:15

## 2021-05-04 RX ADMIN — ONDANSETRON 4 MG: 2 INJECTION INTRAMUSCULAR; INTRAVENOUS at 23:14

## 2021-05-04 RX ADMIN — IOPAMIDOL 100 ML: 755 INJECTION, SOLUTION INTRAVENOUS at 23:53

## 2021-05-05 ENCOUNTER — ANESTHESIA (OUTPATIENT)
Dept: PERIOP | Facility: HOSPITAL | Age: 56
End: 2021-05-05

## 2021-05-05 ENCOUNTER — APPOINTMENT (OUTPATIENT)
Dept: GENERAL RADIOLOGY | Facility: HOSPITAL | Age: 56
End: 2021-05-05

## 2021-05-05 ENCOUNTER — ANESTHESIA EVENT (OUTPATIENT)
Dept: PERIOP | Facility: HOSPITAL | Age: 56
End: 2021-05-05

## 2021-05-05 PROBLEM — K21.9 GERD WITHOUT ESOPHAGITIS: Status: ACTIVE | Noted: 2021-05-05

## 2021-05-05 PROBLEM — K81.0 ACUTE CHOLECYSTITIS: Status: RESOLVED | Noted: 2021-05-05 | Resolved: 2021-05-05

## 2021-05-05 PROBLEM — K81.0 ACUTE CHOLECYSTITIS: Status: ACTIVE | Noted: 2021-05-05

## 2021-05-05 PROBLEM — F32.A DEPRESSION: Status: ACTIVE | Noted: 2021-05-05

## 2021-05-05 PROBLEM — K80.00 ACUTE CALCULOUS CHOLECYSTITIS: Status: RESOLVED | Noted: 2021-05-05 | Resolved: 2021-05-05

## 2021-05-05 PROBLEM — K80.00 ACUTE CALCULOUS CHOLECYSTITIS: Status: ACTIVE | Noted: 2021-05-05

## 2021-05-05 LAB
ANION GAP SERPL CALCULATED.3IONS-SCNC: 9.8 MMOL/L (ref 5–15)
BUN SERPL-MCNC: 11 MG/DL (ref 6–20)
BUN/CREAT SERPL: 12.2 (ref 7–25)
CALCIUM SPEC-SCNC: 9.1 MG/DL (ref 8.6–10.5)
CHLORIDE SERPL-SCNC: 100 MMOL/L (ref 98–107)
CO2 SERPL-SCNC: 27.2 MMOL/L (ref 22–29)
CREAT SERPL-MCNC: 0.9 MG/DL (ref 0.57–1)
DEPRECATED RDW RBC AUTO: 48.2 FL (ref 37–54)
ERYTHROCYTE [DISTWIDTH] IN BLOOD BY AUTOMATED COUNT: 15.1 % (ref 12.3–15.4)
GFR SERPL CREATININE-BSD FRML MDRD: 65 ML/MIN/1.73
GLUCOSE SERPL-MCNC: 112 MG/DL (ref 65–99)
HCT VFR BLD AUTO: 42.4 % (ref 34–46.6)
HGB BLD-MCNC: 13.6 G/DL (ref 12–15.9)
HOLD SPECIMEN: NORMAL
HOLD SPECIMEN: NORMAL
LIPASE SERPL-CCNC: 12 U/L (ref 13–60)
MCH RBC QN AUTO: 28.2 PG (ref 26.6–33)
MCHC RBC AUTO-ENTMCNC: 32.1 G/DL (ref 31.5–35.7)
MCV RBC AUTO: 88 FL (ref 79–97)
PLATELET # BLD AUTO: 218 10*3/MM3 (ref 140–450)
PMV BLD AUTO: 11.7 FL (ref 6–12)
POTASSIUM SERPL-SCNC: 4.7 MMOL/L (ref 3.5–5.2)
QT INTERVAL: 372 MS
RBC # BLD AUTO: 4.82 10*6/MM3 (ref 3.77–5.28)
SARS-COV-2 RNA RESP QL NAA+PROBE: NOT DETECTED
SODIUM SERPL-SCNC: 137 MMOL/L (ref 136–145)
WBC # BLD AUTO: 17.45 10*3/MM3 (ref 3.4–10.8)
WHOLE BLOOD HOLD SPECIMEN: NORMAL
WHOLE BLOOD HOLD SPECIMEN: NORMAL

## 2021-05-05 PROCEDURE — C1889 IMPLANT/INSERT DEVICE, NOC: HCPCS | Performed by: SURGERY

## 2021-05-05 PROCEDURE — G0378 HOSPITAL OBSERVATION PER HR: HCPCS

## 2021-05-05 PROCEDURE — 25010000002 SUCCINYLCHOLINE PER 20 MG: Performed by: NURSE ANESTHETIST, CERTIFIED REGISTERED

## 2021-05-05 PROCEDURE — 25010000002 ONDANSETRON PER 1 MG: Performed by: NURSE ANESTHETIST, CERTIFIED REGISTERED

## 2021-05-05 PROCEDURE — 25010000002 PIPERACILLIN SOD-TAZOBACTAM PER 1 G: Performed by: INTERNAL MEDICINE

## 2021-05-05 PROCEDURE — 74300 X-RAY BILE DUCTS/PANCREAS: CPT

## 2021-05-05 PROCEDURE — 25010000002 KETOROLAC TROMETHAMINE PER 15 MG: Performed by: SURGERY

## 2021-05-05 PROCEDURE — 85027 COMPLETE CBC AUTOMATED: CPT | Performed by: NURSE PRACTITIONER

## 2021-05-05 PROCEDURE — 25010000002 MORPHINE PER 10 MG: Performed by: NURSE PRACTITIONER

## 2021-05-05 PROCEDURE — 87186 SC STD MICRODIL/AGAR DIL: CPT | Performed by: SURGERY

## 2021-05-05 PROCEDURE — 25010000002 KETOROLAC TROMETHAMINE PER 15 MG: Performed by: INTERNAL MEDICINE

## 2021-05-05 PROCEDURE — 87015 SPECIMEN INFECT AGNT CONCNTJ: CPT | Performed by: SURGERY

## 2021-05-05 PROCEDURE — 25010000002 PIPERACILLIN SOD-TAZOBACTAM PER 1 G: Performed by: NURSE PRACTITIONER

## 2021-05-05 PROCEDURE — 25010000002 HYDROMORPHONE PER 4 MG: Performed by: NURSE ANESTHETIST, CERTIFIED REGISTERED

## 2021-05-05 PROCEDURE — 87205 SMEAR GRAM STAIN: CPT | Performed by: SURGERY

## 2021-05-05 PROCEDURE — 25010000002 PROPOFOL 10 MG/ML EMULSION: Performed by: NURSE ANESTHETIST, CERTIFIED REGISTERED

## 2021-05-05 PROCEDURE — BF131ZZ FLUOROSCOPY OF GALLBLADDER AND BILE DUCTS USING LOW OSMOLAR CONTRAST: ICD-10-PCS | Performed by: SURGERY

## 2021-05-05 PROCEDURE — 25010000002 HYDROMORPHONE PER 4 MG: Performed by: NURSE PRACTITIONER

## 2021-05-05 PROCEDURE — 25010000002 NEOSTIGMINE 5 MG/10ML SOLUTION: Performed by: ANESTHESIOLOGY

## 2021-05-05 PROCEDURE — 25010000002 FENTANYL CITRATE (PF) 100 MCG/2ML SOLUTION: Performed by: NURSE ANESTHETIST, CERTIFIED REGISTERED

## 2021-05-05 PROCEDURE — 36415 COLL VENOUS BLD VENIPUNCTURE: CPT | Performed by: NURSE PRACTITIONER

## 2021-05-05 PROCEDURE — 25010000002 PIPERACILLIN SOD-TAZOBACTAM PER 1 G: Performed by: SURGERY

## 2021-05-05 PROCEDURE — 0 IOTHALAMATE 60 % SOLUTION: Performed by: SURGERY

## 2021-05-05 PROCEDURE — 99222 1ST HOSP IP/OBS MODERATE 55: CPT | Performed by: SURGERY

## 2021-05-05 PROCEDURE — 88304 TISSUE EXAM BY PATHOLOGIST: CPT | Performed by: SURGERY

## 2021-05-05 PROCEDURE — 25010000002 MORPHINE PER 10 MG: Performed by: EMERGENCY MEDICINE

## 2021-05-05 PROCEDURE — 87070 CULTURE OTHR SPECIMN AEROBIC: CPT | Performed by: SURGERY

## 2021-05-05 PROCEDURE — 25010000002 HYDROMORPHONE PER 4 MG: Performed by: INTERNAL MEDICINE

## 2021-05-05 PROCEDURE — U0003 INFECTIOUS AGENT DETECTION BY NUCLEIC ACID (DNA OR RNA); SEVERE ACUTE RESPIRATORY SYNDROME CORONAVIRUS 2 (SARS-COV-2) (CORONAVIRUS DISEASE [COVID-19]), AMPLIFIED PROBE TECHNIQUE, MAKING USE OF HIGH THROUGHPUT TECHNOLOGIES AS DESCRIBED BY CMS-2020-01-R: HCPCS | Performed by: EMERGENCY MEDICINE

## 2021-05-05 PROCEDURE — 47563 LAPARO CHOLECYSTECTOMY/GRAPH: CPT | Performed by: SURGERY

## 2021-05-05 PROCEDURE — 80048 BASIC METABOLIC PNL TOTAL CA: CPT | Performed by: NURSE PRACTITIONER

## 2021-05-05 PROCEDURE — 25010000002 MIDAZOLAM PER 1 MG: Performed by: ANESTHESIOLOGY

## 2021-05-05 PROCEDURE — 47563 LAPARO CHOLECYSTECTOMY/GRAPH: CPT | Performed by: SPECIALIST/TECHNOLOGIST, OTHER

## 2021-05-05 PROCEDURE — 25010000002 DEXAMETHASONE PER 1 MG: Performed by: NURSE ANESTHETIST, CERTIFIED REGISTERED

## 2021-05-05 PROCEDURE — 87075 CULTR BACTERIA EXCEPT BLOOD: CPT | Performed by: SURGERY

## 2021-05-05 PROCEDURE — 0FT44ZZ RESECTION OF GALLBLADDER, PERCUTANEOUS ENDOSCOPIC APPROACH: ICD-10-PCS | Performed by: SURGERY

## 2021-05-05 PROCEDURE — 25010000002 PIPERACILLIN SOD-TAZOBACTAM PER 1 G: Performed by: EMERGENCY MEDICINE

## 2021-05-05 PROCEDURE — 25010000002 PHENYLEPHRINE PER 1 ML: Performed by: NURSE ANESTHETIST, CERTIFIED REGISTERED

## 2021-05-05 DEVICE — ARISTA AH ABSORBABLE HEMOSTATIC PARTICLES
Type: IMPLANTABLE DEVICE | Site: ABDOMEN | Status: FUNCTIONAL
Brand: ARISTA™ AH

## 2021-05-05 DEVICE — ABSORBABLE HEMOSTAT (OXIDIZED REGENERATED CELLULOSE, U.S.P.)
Type: IMPLANTABLE DEVICE | Site: ABDOMEN | Status: FUNCTIONAL
Brand: SURGICEL

## 2021-05-05 DEVICE — HEMOLOK L 6 CLIPS/CART
Type: IMPLANTABLE DEVICE | Site: ABDOMEN | Status: FUNCTIONAL
Brand: WECK

## 2021-05-05 DEVICE — HEMOLOK ML 6 CLIPS/CART
Type: IMPLANTABLE DEVICE | Site: ABDOMEN | Status: FUNCTIONAL
Brand: WECK

## 2021-05-05 RX ORDER — HYDROMORPHONE HYDROCHLORIDE 1 MG/ML
0.5 INJECTION, SOLUTION INTRAMUSCULAR; INTRAVENOUS; SUBCUTANEOUS
Status: DISCONTINUED | OUTPATIENT
Start: 2021-05-05 | End: 2021-05-08 | Stop reason: HOSPADM

## 2021-05-05 RX ORDER — GLYCOPYRROLATE 0.2 MG/ML
INJECTION INTRAMUSCULAR; INTRAVENOUS AS NEEDED
Status: DISCONTINUED | OUTPATIENT
Start: 2021-05-05 | End: 2021-05-05 | Stop reason: SURG

## 2021-05-05 RX ORDER — NALOXONE HCL 0.4 MG/ML
0.2 VIAL (ML) INJECTION AS NEEDED
Status: DISCONTINUED | OUTPATIENT
Start: 2021-05-05 | End: 2021-05-05 | Stop reason: HOSPADM

## 2021-05-05 RX ORDER — ONDANSETRON 2 MG/ML
4 INJECTION INTRAMUSCULAR; INTRAVENOUS EVERY 6 HOURS PRN
Status: DISCONTINUED | OUTPATIENT
Start: 2021-05-05 | End: 2021-05-08 | Stop reason: HOSPADM

## 2021-05-05 RX ORDER — ALBUTEROL SULFATE 2.5 MG/3ML
2.5 SOLUTION RESPIRATORY (INHALATION) EVERY 6 HOURS PRN
Status: DISCONTINUED | OUTPATIENT
Start: 2021-05-05 | End: 2021-05-08 | Stop reason: HOSPADM

## 2021-05-05 RX ORDER — MIDAZOLAM HYDROCHLORIDE 1 MG/ML
1 INJECTION INTRAMUSCULAR; INTRAVENOUS
Status: COMPLETED | OUTPATIENT
Start: 2021-05-05 | End: 2021-05-05

## 2021-05-05 RX ORDER — PROMETHAZINE HYDROCHLORIDE 25 MG/1
25 SUPPOSITORY RECTAL ONCE AS NEEDED
Status: DISCONTINUED | OUTPATIENT
Start: 2021-05-05 | End: 2021-05-05 | Stop reason: HOSPADM

## 2021-05-05 RX ORDER — MORPHINE SULFATE 2 MG/ML
2 INJECTION, SOLUTION INTRAMUSCULAR; INTRAVENOUS
Status: DISCONTINUED | OUTPATIENT
Start: 2021-05-05 | End: 2021-05-05

## 2021-05-05 RX ORDER — BUPIVACAINE HYDROCHLORIDE AND EPINEPHRINE 5; 5 MG/ML; UG/ML
INJECTION, SOLUTION EPIDURAL; INTRACAUDAL; PERINEURAL AS NEEDED
Status: DISCONTINUED | OUTPATIENT
Start: 2021-05-05 | End: 2021-05-05 | Stop reason: HOSPADM

## 2021-05-05 RX ORDER — SODIUM CHLORIDE, SODIUM LACTATE, POTASSIUM CHLORIDE, CALCIUM CHLORIDE 600; 310; 30; 20 MG/100ML; MG/100ML; MG/100ML; MG/100ML
9 INJECTION, SOLUTION INTRAVENOUS CONTINUOUS
Status: DISCONTINUED | OUTPATIENT
Start: 2021-05-05 | End: 2021-05-06

## 2021-05-05 RX ORDER — ACETAMINOPHEN 650 MG/1
650 SUPPOSITORY RECTAL EVERY 4 HOURS PRN
Status: DISCONTINUED | OUTPATIENT
Start: 2021-05-05 | End: 2021-05-08 | Stop reason: HOSPADM

## 2021-05-05 RX ORDER — LIDOCAINE HYDROCHLORIDE 10 MG/ML
0.5 INJECTION, SOLUTION EPIDURAL; INFILTRATION; INTRACAUDAL; PERINEURAL ONCE AS NEEDED
Status: DISCONTINUED | OUTPATIENT
Start: 2021-05-05 | End: 2021-05-05 | Stop reason: HOSPADM

## 2021-05-05 RX ORDER — ONDANSETRON 4 MG/1
4 TABLET, FILM COATED ORAL EVERY 6 HOURS PRN
Status: DISCONTINUED | OUTPATIENT
Start: 2021-05-05 | End: 2021-05-08 | Stop reason: HOSPADM

## 2021-05-05 RX ORDER — HYDROMORPHONE HYDROCHLORIDE 1 MG/ML
0.5 INJECTION, SOLUTION INTRAMUSCULAR; INTRAVENOUS; SUBCUTANEOUS
Status: DISCONTINUED | OUTPATIENT
Start: 2021-05-05 | End: 2021-05-05 | Stop reason: HOSPADM

## 2021-05-05 RX ORDER — NEOSTIGMINE METHYLSULFATE 0.5 MG/ML
INJECTION, SOLUTION INTRAVENOUS AS NEEDED
Status: DISCONTINUED | OUTPATIENT
Start: 2021-05-05 | End: 2021-05-05 | Stop reason: SURG

## 2021-05-05 RX ORDER — CALCIUM CARBONATE 200(500)MG
2 TABLET,CHEWABLE ORAL 2 TIMES DAILY PRN
Status: DISCONTINUED | OUTPATIENT
Start: 2021-05-05 | End: 2021-05-08 | Stop reason: HOSPADM

## 2021-05-05 RX ORDER — EPHEDRINE SULFATE 50 MG/ML
5 INJECTION, SOLUTION INTRAVENOUS ONCE AS NEEDED
Status: DISCONTINUED | OUTPATIENT
Start: 2021-05-05 | End: 2021-05-05 | Stop reason: HOSPADM

## 2021-05-05 RX ORDER — FAMOTIDINE 10 MG/ML
20 INJECTION, SOLUTION INTRAVENOUS ONCE
Status: DISCONTINUED | OUTPATIENT
Start: 2021-05-05 | End: 2021-05-05 | Stop reason: HOSPADM

## 2021-05-05 RX ORDER — ACETAMINOPHEN 160 MG/5ML
650 SOLUTION ORAL EVERY 4 HOURS PRN
Status: DISCONTINUED | OUTPATIENT
Start: 2021-05-05 | End: 2021-05-08 | Stop reason: HOSPADM

## 2021-05-05 RX ORDER — OXYCODONE AND ACETAMINOPHEN 7.5; 325 MG/1; MG/1
1 TABLET ORAL ONCE AS NEEDED
Status: DISCONTINUED | OUTPATIENT
Start: 2021-05-05 | End: 2021-05-05 | Stop reason: HOSPADM

## 2021-05-05 RX ORDER — SODIUM CHLORIDE 0.9 % (FLUSH) 0.9 %
3 SYRINGE (ML) INJECTION EVERY 12 HOURS SCHEDULED
Status: DISCONTINUED | OUTPATIENT
Start: 2021-05-05 | End: 2021-05-05 | Stop reason: HOSPADM

## 2021-05-05 RX ORDER — DIPHENHYDRAMINE HCL 25 MG
25 CAPSULE ORAL
Status: DISCONTINUED | OUTPATIENT
Start: 2021-05-05 | End: 2021-05-05 | Stop reason: HOSPADM

## 2021-05-05 RX ORDER — DIPHENHYDRAMINE HYDROCHLORIDE 50 MG/ML
12.5 INJECTION INTRAMUSCULAR; INTRAVENOUS
Status: DISCONTINUED | OUTPATIENT
Start: 2021-05-05 | End: 2021-05-05 | Stop reason: HOSPADM

## 2021-05-05 RX ORDER — SODIUM CHLORIDE 0.9 % (FLUSH) 0.9 %
3-10 SYRINGE (ML) INJECTION AS NEEDED
Status: DISCONTINUED | OUTPATIENT
Start: 2021-05-05 | End: 2021-05-05 | Stop reason: HOSPADM

## 2021-05-05 RX ORDER — ONDANSETRON 2 MG/ML
4 INJECTION INTRAMUSCULAR; INTRAVENOUS ONCE AS NEEDED
Status: DISCONTINUED | OUTPATIENT
Start: 2021-05-05 | End: 2021-05-05 | Stop reason: HOSPADM

## 2021-05-05 RX ORDER — SODIUM CHLORIDE 9 MG/ML
100 INJECTION, SOLUTION INTRAVENOUS CONTINUOUS
Status: DISCONTINUED | OUTPATIENT
Start: 2021-05-05 | End: 2021-05-08 | Stop reason: HOSPADM

## 2021-05-05 RX ORDER — ROCURONIUM BROMIDE 10 MG/ML
INJECTION, SOLUTION INTRAVENOUS AS NEEDED
Status: DISCONTINUED | OUTPATIENT
Start: 2021-05-05 | End: 2021-05-05 | Stop reason: SURG

## 2021-05-05 RX ORDER — HYDROCODONE BITARTRATE AND ACETAMINOPHEN 7.5; 325 MG/1; MG/1
1 TABLET ORAL ONCE AS NEEDED
Status: DISCONTINUED | OUTPATIENT
Start: 2021-05-05 | End: 2021-05-05 | Stop reason: HOSPADM

## 2021-05-05 RX ORDER — ONDANSETRON 2 MG/ML
INJECTION INTRAMUSCULAR; INTRAVENOUS AS NEEDED
Status: DISCONTINUED | OUTPATIENT
Start: 2021-05-05 | End: 2021-05-05 | Stop reason: SURG

## 2021-05-05 RX ORDER — FLUTICASONE PROPIONATE 50 MCG
1 SPRAY, SUSPENSION (ML) NASAL DAILY
Status: DISCONTINUED | OUTPATIENT
Start: 2021-05-05 | End: 2021-05-08 | Stop reason: HOSPADM

## 2021-05-05 RX ORDER — SUCCINYLCHOLINE CHLORIDE 20 MG/ML
INJECTION INTRAMUSCULAR; INTRAVENOUS AS NEEDED
Status: DISCONTINUED | OUTPATIENT
Start: 2021-05-05 | End: 2021-05-05 | Stop reason: SURG

## 2021-05-05 RX ORDER — MAGNESIUM HYDROXIDE 1200 MG/15ML
LIQUID ORAL AS NEEDED
Status: DISCONTINUED | OUTPATIENT
Start: 2021-05-05 | End: 2021-05-05 | Stop reason: HOSPADM

## 2021-05-05 RX ORDER — FAMOTIDINE 10 MG/ML
20 INJECTION, SOLUTION INTRAVENOUS EVERY 12 HOURS SCHEDULED
Status: DISCONTINUED | OUTPATIENT
Start: 2021-05-05 | End: 2021-05-07

## 2021-05-05 RX ORDER — PROPOFOL 10 MG/ML
VIAL (ML) INTRAVENOUS AS NEEDED
Status: DISCONTINUED | OUTPATIENT
Start: 2021-05-05 | End: 2021-05-05 | Stop reason: SURG

## 2021-05-05 RX ORDER — LIDOCAINE HYDROCHLORIDE 20 MG/ML
INJECTION, SOLUTION INFILTRATION; PERINEURAL AS NEEDED
Status: DISCONTINUED | OUTPATIENT
Start: 2021-05-05 | End: 2021-05-05 | Stop reason: SURG

## 2021-05-05 RX ORDER — SODIUM CHLORIDE 9 MG/ML
INJECTION, SOLUTION INTRAVENOUS AS NEEDED
Status: DISCONTINUED | OUTPATIENT
Start: 2021-05-05 | End: 2021-05-05 | Stop reason: HOSPADM

## 2021-05-05 RX ORDER — ACETAMINOPHEN 325 MG/1
650 TABLET ORAL EVERY 4 HOURS PRN
Status: DISCONTINUED | OUTPATIENT
Start: 2021-05-05 | End: 2021-05-08 | Stop reason: HOSPADM

## 2021-05-05 RX ORDER — DEXAMETHASONE SODIUM PHOSPHATE 10 MG/ML
INJECTION INTRAMUSCULAR; INTRAVENOUS AS NEEDED
Status: DISCONTINUED | OUTPATIENT
Start: 2021-05-05 | End: 2021-05-05 | Stop reason: SURG

## 2021-05-05 RX ORDER — KETOTIFEN FUMARATE 0.35 MG/ML
1 SOLUTION/ DROPS OPHTHALMIC 2 TIMES DAILY
Status: DISCONTINUED | OUTPATIENT
Start: 2021-05-05 | End: 2021-05-08 | Stop reason: HOSPADM

## 2021-05-05 RX ORDER — SODIUM CHLORIDE 0.9 % (FLUSH) 0.9 %
10 SYRINGE (ML) INJECTION EVERY 12 HOURS SCHEDULED
Status: DISCONTINUED | OUTPATIENT
Start: 2021-05-05 | End: 2021-05-08 | Stop reason: HOSPADM

## 2021-05-05 RX ORDER — TRAMADOL HYDROCHLORIDE 50 MG/1
50 TABLET ORAL EVERY 6 HOURS PRN
Status: DISCONTINUED | OUTPATIENT
Start: 2021-05-05 | End: 2021-05-08 | Stop reason: HOSPADM

## 2021-05-05 RX ORDER — PROMETHAZINE HYDROCHLORIDE 25 MG/1
25 TABLET ORAL ONCE AS NEEDED
Status: DISCONTINUED | OUTPATIENT
Start: 2021-05-05 | End: 2021-05-05 | Stop reason: HOSPADM

## 2021-05-05 RX ORDER — FENTANYL CITRATE 50 UG/ML
INJECTION, SOLUTION INTRAMUSCULAR; INTRAVENOUS AS NEEDED
Status: DISCONTINUED | OUTPATIENT
Start: 2021-05-05 | End: 2021-05-05 | Stop reason: SURG

## 2021-05-05 RX ORDER — FLUMAZENIL 0.1 MG/ML
0.2 INJECTION INTRAVENOUS AS NEEDED
Status: DISCONTINUED | OUTPATIENT
Start: 2021-05-05 | End: 2021-05-05 | Stop reason: HOSPADM

## 2021-05-05 RX ORDER — MORPHINE SULFATE 2 MG/ML
4 INJECTION, SOLUTION INTRAMUSCULAR; INTRAVENOUS ONCE
Status: COMPLETED | OUTPATIENT
Start: 2021-05-05 | End: 2021-05-05

## 2021-05-05 RX ORDER — SODIUM CHLORIDE 0.9 % (FLUSH) 0.9 %
10 SYRINGE (ML) INJECTION AS NEEDED
Status: DISCONTINUED | OUTPATIENT
Start: 2021-05-05 | End: 2021-05-08 | Stop reason: HOSPADM

## 2021-05-05 RX ORDER — LABETALOL HYDROCHLORIDE 5 MG/ML
5 INJECTION, SOLUTION INTRAVENOUS
Status: DISCONTINUED | OUTPATIENT
Start: 2021-05-05 | End: 2021-05-05 | Stop reason: HOSPADM

## 2021-05-05 RX ORDER — FENTANYL CITRATE 50 UG/ML
50 INJECTION, SOLUTION INTRAMUSCULAR; INTRAVENOUS
Status: DISCONTINUED | OUTPATIENT
Start: 2021-05-05 | End: 2021-05-05 | Stop reason: HOSPADM

## 2021-05-05 RX ORDER — KETOROLAC TROMETHAMINE 30 MG/ML
30 INJECTION, SOLUTION INTRAMUSCULAR; INTRAVENOUS EVERY 6 HOURS PRN
Status: DISCONTINUED | OUTPATIENT
Start: 2021-05-05 | End: 2021-05-08 | Stop reason: HOSPADM

## 2021-05-05 RX ADMIN — MORPHINE SULFATE 2 MG: 2 INJECTION, SOLUTION INTRAMUSCULAR; INTRAVENOUS at 04:17

## 2021-05-05 RX ADMIN — SUCCINYLCHOLINE CHLORIDE 120 MG: 20 INJECTION, SOLUTION INTRAMUSCULAR; INTRAVENOUS; PARENTERAL at 15:15

## 2021-05-05 RX ADMIN — TRAMADOL HYDROCHLORIDE 50 MG: 50 TABLET, FILM COATED ORAL at 21:58

## 2021-05-05 RX ADMIN — ROCURONIUM BROMIDE 40 MG: 50 INJECTION INTRAVENOUS at 15:23

## 2021-05-05 RX ADMIN — FAMOTIDINE 20 MG: 10 INJECTION INTRAVENOUS at 09:10

## 2021-05-05 RX ADMIN — FENTANYL CITRATE 50 MCG: 50 INJECTION, SOLUTION INTRAMUSCULAR; INTRAVENOUS at 18:44

## 2021-05-05 RX ADMIN — TAZOBACTAM SODIUM AND PIPERACILLIN SODIUM 4.5 G: 500; 4 INJECTION, SOLUTION INTRAVENOUS at 09:11

## 2021-05-05 RX ADMIN — GLYCOPYRROLATE 0.5 MG: 0.2 INJECTION INTRAMUSCULAR; INTRAVENOUS at 17:44

## 2021-05-05 RX ADMIN — NEOSTIGMINE METHYLSULFATE 3 MG: 0.5 INJECTION INTRAVENOUS at 17:44

## 2021-05-05 RX ADMIN — HYDROMORPHONE HYDROCHLORIDE 0.5 MG: 1 INJECTION, SOLUTION INTRAMUSCULAR; INTRAVENOUS; SUBCUTANEOUS at 04:51

## 2021-05-05 RX ADMIN — DEXAMETHASONE SODIUM PHOSPHATE 8 MG: 10 INJECTION INTRAMUSCULAR; INTRAVENOUS at 15:23

## 2021-05-05 RX ADMIN — FAMOTIDINE 20 MG: 10 INJECTION INTRAVENOUS at 21:58

## 2021-05-05 RX ADMIN — SODIUM CHLORIDE, POTASSIUM CHLORIDE, SODIUM LACTATE AND CALCIUM CHLORIDE 9 ML/HR: 600; 310; 30; 20 INJECTION, SOLUTION INTRAVENOUS at 12:19

## 2021-05-05 RX ADMIN — SODIUM CHLORIDE, PRESERVATIVE FREE 10 ML: 5 INJECTION INTRAVENOUS at 03:01

## 2021-05-05 RX ADMIN — KETOROLAC TROMETHAMINE 30 MG: 30 INJECTION, SOLUTION INTRAMUSCULAR; INTRAVENOUS at 20:48

## 2021-05-05 RX ADMIN — TAZOBACTAM SODIUM AND PIPERACILLIN SODIUM 3.38 G: 375; 3 INJECTION, SOLUTION INTRAVENOUS at 01:13

## 2021-05-05 RX ADMIN — PHENYLEPHRINE HYDROCHLORIDE 100 MCG: 10 INJECTION INTRAVENOUS at 16:39

## 2021-05-05 RX ADMIN — LIDOCAINE HYDROCHLORIDE 100 MG: 20 INJECTION, SOLUTION INFILTRATION; PERINEURAL at 15:15

## 2021-05-05 RX ADMIN — MIDAZOLAM 1 MG: 1 INJECTION INTRAMUSCULAR; INTRAVENOUS at 12:19

## 2021-05-05 RX ADMIN — TAZOBACTAM SODIUM AND PIPERACILLIN SODIUM 4.5 G: 500; 4 INJECTION, SOLUTION INTRAVENOUS at 20:48

## 2021-05-05 RX ADMIN — ONDANSETRON 4 MG: 2 INJECTION INTRAMUSCULAR; INTRAVENOUS at 17:24

## 2021-05-05 RX ADMIN — SODIUM CHLORIDE, PRESERVATIVE FREE 10 ML: 5 INJECTION INTRAVENOUS at 09:07

## 2021-05-05 RX ADMIN — PHENYLEPHRINE HYDROCHLORIDE 100 MCG: 10 INJECTION INTRAVENOUS at 17:03

## 2021-05-05 RX ADMIN — PROPOFOL 200 MG: 10 INJECTION, EMULSION INTRAVENOUS at 15:15

## 2021-05-05 RX ADMIN — SODIUM CHLORIDE 100 ML/HR: 9 INJECTION, SOLUTION INTRAVENOUS at 03:01

## 2021-05-05 RX ADMIN — ACETAMINOPHEN 650 MG: 325 TABLET, FILM COATED ORAL at 03:21

## 2021-05-05 RX ADMIN — ROCURONIUM BROMIDE 10 MG: 50 INJECTION INTRAVENOUS at 16:29

## 2021-05-05 RX ADMIN — FENTANYL CITRATE 50 MCG: 50 INJECTION, SOLUTION INTRAMUSCULAR; INTRAVENOUS at 18:51

## 2021-05-05 RX ADMIN — HYDROMORPHONE HYDROCHLORIDE 0.5 MG: 1 INJECTION, SOLUTION INTRAMUSCULAR; INTRAVENOUS; SUBCUTANEOUS at 18:46

## 2021-05-05 RX ADMIN — HYDROMORPHONE HYDROCHLORIDE 0.5 MG: 1 INJECTION, SOLUTION INTRAMUSCULAR; INTRAVENOUS; SUBCUTANEOUS at 06:48

## 2021-05-05 RX ADMIN — FENTANYL CITRATE 50 MCG: 50 INJECTION INTRAMUSCULAR; INTRAVENOUS at 16:08

## 2021-05-05 RX ADMIN — MIDAZOLAM 1 MG: 1 INJECTION INTRAMUSCULAR; INTRAVENOUS at 14:50

## 2021-05-05 RX ADMIN — FENTANYL CITRATE 50 MCG: 50 INJECTION INTRAMUSCULAR; INTRAVENOUS at 15:15

## 2021-05-05 RX ADMIN — SODIUM CHLORIDE, PRESERVATIVE FREE 10 ML: 5 INJECTION INTRAVENOUS at 20:50

## 2021-05-05 RX ADMIN — HYDROMORPHONE HYDROCHLORIDE 0.5 MG: 1 INJECTION, SOLUTION INTRAMUSCULAR; INTRAVENOUS; SUBCUTANEOUS at 11:24

## 2021-05-05 RX ADMIN — HYDROMORPHONE HYDROCHLORIDE 0.5 MG: 1 INJECTION, SOLUTION INTRAMUSCULAR; INTRAVENOUS; SUBCUTANEOUS at 09:07

## 2021-05-05 RX ADMIN — MORPHINE SULFATE 4 MG: 2 INJECTION, SOLUTION INTRAMUSCULAR; INTRAVENOUS at 01:04

## 2021-05-05 RX ADMIN — PHENYLEPHRINE HYDROCHLORIDE 100 MCG: 10 INJECTION INTRAVENOUS at 17:19

## 2021-05-06 ENCOUNTER — ANESTHESIA (OUTPATIENT)
Dept: GASTROENTEROLOGY | Facility: HOSPITAL | Age: 56
End: 2021-05-06

## 2021-05-06 ENCOUNTER — ANESTHESIA EVENT (OUTPATIENT)
Dept: GASTROENTEROLOGY | Facility: HOSPITAL | Age: 56
End: 2021-05-06

## 2021-05-06 ENCOUNTER — APPOINTMENT (OUTPATIENT)
Dept: GENERAL RADIOLOGY | Facility: HOSPITAL | Age: 56
End: 2021-05-06

## 2021-05-06 PROBLEM — K80.50 CALCULUS OF BILE DUCT WITHOUT CHOLECYSTITIS AND WITHOUT OBSTRUCTION: Status: ACTIVE | Noted: 2021-05-04

## 2021-05-06 PROBLEM — K81.0 ACUTE CHOLECYSTITIS: Status: ACTIVE | Noted: 2021-05-06

## 2021-05-06 LAB
ALBUMIN SERPL-MCNC: 3.2 G/DL (ref 3.5–5.2)
ALBUMIN/GLOB SERPL: 0.9 G/DL
ALP SERPL-CCNC: 67 U/L (ref 39–117)
ALT SERPL W P-5'-P-CCNC: 69 U/L (ref 1–33)
ANION GAP SERPL CALCULATED.3IONS-SCNC: 8.9 MMOL/L (ref 5–15)
AST SERPL-CCNC: 49 U/L (ref 1–32)
BASOPHILS # BLD AUTO: 0.03 10*3/MM3 (ref 0–0.2)
BASOPHILS NFR BLD AUTO: 0.2 % (ref 0–1.5)
BILIRUB CONJ SERPL-MCNC: 0.4 MG/DL (ref 0–0.3)
BILIRUB SERPL-MCNC: 0.7 MG/DL (ref 0–1.2)
BUN SERPL-MCNC: 16 MG/DL (ref 6–20)
BUN/CREAT SERPL: 19.8 (ref 7–25)
CALCIUM SPEC-SCNC: 8.9 MG/DL (ref 8.6–10.5)
CHLORIDE SERPL-SCNC: 102 MMOL/L (ref 98–107)
CO2 SERPL-SCNC: 28.1 MMOL/L (ref 22–29)
CREAT SERPL-MCNC: 0.81 MG/DL (ref 0.57–1)
DEPRECATED RDW RBC AUTO: 48.7 FL (ref 37–54)
EOSINOPHIL # BLD AUTO: 0 10*3/MM3 (ref 0–0.4)
EOSINOPHIL NFR BLD AUTO: 0 % (ref 0.3–6.2)
ERYTHROCYTE [DISTWIDTH] IN BLOOD BY AUTOMATED COUNT: 15.1 % (ref 12.3–15.4)
GFR SERPL CREATININE-BSD FRML MDRD: 73 ML/MIN/1.73
GLOBULIN UR ELPH-MCNC: 3.4 GM/DL
GLUCOSE SERPL-MCNC: 104 MG/DL (ref 65–99)
HCT VFR BLD AUTO: 39.4 % (ref 34–46.6)
HGB BLD-MCNC: 12.6 G/DL (ref 12–15.9)
IMM GRANULOCYTES # BLD AUTO: 0.11 10*3/MM3 (ref 0–0.05)
IMM GRANULOCYTES NFR BLD AUTO: 0.6 % (ref 0–0.5)
LYMPHOCYTES # BLD AUTO: 0.56 10*3/MM3 (ref 0.7–3.1)
LYMPHOCYTES NFR BLD AUTO: 3 % (ref 19.6–45.3)
MCH RBC QN AUTO: 28 PG (ref 26.6–33)
MCHC RBC AUTO-ENTMCNC: 32 G/DL (ref 31.5–35.7)
MCV RBC AUTO: 87.6 FL (ref 79–97)
MONOCYTES # BLD AUTO: 1.81 10*3/MM3 (ref 0.1–0.9)
MONOCYTES NFR BLD AUTO: 9.6 % (ref 5–12)
NEUTROPHILS NFR BLD AUTO: 16.26 10*3/MM3 (ref 1.7–7)
NEUTROPHILS NFR BLD AUTO: 86.6 % (ref 42.7–76)
NRBC BLD AUTO-RTO: 0 /100 WBC (ref 0–0.2)
PLATELET # BLD AUTO: 201 10*3/MM3 (ref 140–450)
PMV BLD AUTO: 11.3 FL (ref 6–12)
POTASSIUM SERPL-SCNC: 4 MMOL/L (ref 3.5–5.2)
PROT SERPL-MCNC: 6.6 G/DL (ref 6–8.5)
RBC # BLD AUTO: 4.5 10*6/MM3 (ref 3.77–5.28)
SODIUM SERPL-SCNC: 139 MMOL/L (ref 136–145)
WBC # BLD AUTO: 18.77 10*3/MM3 (ref 3.4–10.8)

## 2021-05-06 PROCEDURE — C1769 GUIDE WIRE: HCPCS | Performed by: INTERNAL MEDICINE

## 2021-05-06 PROCEDURE — 25010000002 PIPERACILLIN SOD-TAZOBACTAM PER 1 G: Performed by: SURGERY

## 2021-05-06 PROCEDURE — 25010000002 HYDROMORPHONE PER 4 MG: Performed by: SURGERY

## 2021-05-06 PROCEDURE — 85025 COMPLETE CBC W/AUTO DIFF WBC: CPT | Performed by: SURGERY

## 2021-05-06 PROCEDURE — 25010000002 PROPOFOL 10 MG/ML EMULSION: Performed by: ANESTHESIOLOGY

## 2021-05-06 PROCEDURE — 99024 POSTOP FOLLOW-UP VISIT: CPT | Performed by: SURGERY

## 2021-05-06 PROCEDURE — 80053 COMPREHEN METABOLIC PANEL: CPT | Performed by: SURGERY

## 2021-05-06 PROCEDURE — 25010000002 HYDROMORPHONE PER 4 MG: Performed by: INTERNAL MEDICINE

## 2021-05-06 PROCEDURE — 99222 1ST HOSP IP/OBS MODERATE 55: CPT | Performed by: INTERNAL MEDICINE

## 2021-05-06 PROCEDURE — 25010000002 PIPERACILLIN SOD-TAZOBACTAM PER 1 G: Performed by: INTERNAL MEDICINE

## 2021-05-06 PROCEDURE — 25010000002 IOPAMIDOL 61 % SOLUTION: Performed by: INTERNAL MEDICINE

## 2021-05-06 PROCEDURE — 0FC98ZZ EXTIRPATION OF MATTER FROM COMMON BILE DUCT, VIA NATURAL OR ARTIFICIAL OPENING ENDOSCOPIC: ICD-10-PCS | Performed by: INTERNAL MEDICINE

## 2021-05-06 PROCEDURE — 43264 ERCP REMOVE DUCT CALCULI: CPT | Performed by: INTERNAL MEDICINE

## 2021-05-06 PROCEDURE — 74328 X-RAY BILE DUCT ENDOSCOPY: CPT

## 2021-05-06 PROCEDURE — 25010000002 KETOROLAC TROMETHAMINE PER 15 MG: Performed by: SURGERY

## 2021-05-06 PROCEDURE — 82248 BILIRUBIN DIRECT: CPT | Performed by: SURGERY

## 2021-05-06 PROCEDURE — 25010000002 KETOROLAC TROMETHAMINE PER 15 MG: Performed by: INTERNAL MEDICINE

## 2021-05-06 PROCEDURE — 43262 ENDO CHOLANGIOPANCREATOGRAPH: CPT | Performed by: INTERNAL MEDICINE

## 2021-05-06 RX ORDER — CETIRIZINE HYDROCHLORIDE 10 MG/1
10 TABLET ORAL DAILY
Status: DISCONTINUED | OUTPATIENT
Start: 2021-05-06 | End: 2021-05-08 | Stop reason: HOSPADM

## 2021-05-06 RX ORDER — SODIUM CHLORIDE 9 MG/ML
1000 INJECTION, SOLUTION INTRAVENOUS CONTINUOUS
Status: ACTIVE | OUTPATIENT
Start: 2021-05-06 | End: 2021-05-08

## 2021-05-06 RX ORDER — LIDOCAINE HYDROCHLORIDE 20 MG/ML
INJECTION, SOLUTION INFILTRATION; PERINEURAL AS NEEDED
Status: DISCONTINUED | OUTPATIENT
Start: 2021-05-06 | End: 2021-05-06 | Stop reason: SURG

## 2021-05-06 RX ORDER — MONTELUKAST SODIUM 10 MG/1
10 TABLET ORAL NIGHTLY
Status: DISCONTINUED | OUTPATIENT
Start: 2021-05-06 | End: 2021-05-08 | Stop reason: HOSPADM

## 2021-05-06 RX ORDER — PROPOFOL 10 MG/ML
VIAL (ML) INTRAVENOUS AS NEEDED
Status: DISCONTINUED | OUTPATIENT
Start: 2021-05-06 | End: 2021-05-06 | Stop reason: SURG

## 2021-05-06 RX ORDER — BUPROPION HYDROCHLORIDE 300 MG/1
300 TABLET ORAL EVERY MORNING
Status: DISCONTINUED | OUTPATIENT
Start: 2021-05-07 | End: 2021-05-08 | Stop reason: HOSPADM

## 2021-05-06 RX ORDER — PAROXETINE HYDROCHLORIDE 20 MG/1
40 TABLET, FILM COATED ORAL DAILY
Status: DISCONTINUED | OUTPATIENT
Start: 2021-05-06 | End: 2021-05-08 | Stop reason: HOSPADM

## 2021-05-06 RX ORDER — LEVETIRACETAM 500 MG/1
500 TABLET ORAL EVERY 12 HOURS SCHEDULED
Status: DISCONTINUED | OUTPATIENT
Start: 2021-05-06 | End: 2021-05-08 | Stop reason: HOSPADM

## 2021-05-06 RX ADMIN — HYDROMORPHONE HYDROCHLORIDE 0.5 MG: 1 INJECTION, SOLUTION INTRAMUSCULAR; INTRAVENOUS; SUBCUTANEOUS at 02:50

## 2021-05-06 RX ADMIN — PROPOFOL 50 MG: 10 INJECTION, EMULSION INTRAVENOUS at 17:44

## 2021-05-06 RX ADMIN — LEVETIRACETAM 500 MG: 500 TABLET, FILM COATED ORAL at 20:14

## 2021-05-06 RX ADMIN — LIDOCAINE HYDROCHLORIDE 100 MG: 20 INJECTION, SOLUTION INFILTRATION; PERINEURAL at 17:40

## 2021-05-06 RX ADMIN — KETOTIFEN FUMARATE 1 DROP: 0.35 SOLUTION/ DROPS OPHTHALMIC at 08:22

## 2021-05-06 RX ADMIN — HYDROMORPHONE HYDROCHLORIDE 0.5 MG: 1 INJECTION, SOLUTION INTRAMUSCULAR; INTRAVENOUS; SUBCUTANEOUS at 08:17

## 2021-05-06 RX ADMIN — MONTELUKAST SODIUM 10 MG: 10 TABLET, FILM COATED ORAL at 20:14

## 2021-05-06 RX ADMIN — FAMOTIDINE 20 MG: 10 INJECTION INTRAVENOUS at 20:08

## 2021-05-06 RX ADMIN — PROPOFOL 160 MCG/KG/MIN: 10 INJECTION, EMULSION INTRAVENOUS at 17:44

## 2021-05-06 RX ADMIN — HYDROMORPHONE HYDROCHLORIDE 0.5 MG: 1 INJECTION, SOLUTION INTRAMUSCULAR; INTRAVENOUS; SUBCUTANEOUS at 16:35

## 2021-05-06 RX ADMIN — TAZOBACTAM SODIUM AND PIPERACILLIN SODIUM 4.5 G: 500; 4 INJECTION, SOLUTION INTRAVENOUS at 12:41

## 2021-05-06 RX ADMIN — SODIUM CHLORIDE 100 ML/HR: 9 INJECTION, SOLUTION INTRAVENOUS at 12:40

## 2021-05-06 RX ADMIN — FLUTICASONE PROPIONATE 1 SPRAY: 50 SPRAY, METERED NASAL at 08:10

## 2021-05-06 RX ADMIN — SODIUM CHLORIDE 100 ML/HR: 9 INJECTION, SOLUTION INTRAVENOUS at 02:44

## 2021-05-06 RX ADMIN — HYDROMORPHONE HYDROCHLORIDE 0.5 MG: 1 INJECTION, SOLUTION INTRAMUSCULAR; INTRAVENOUS; SUBCUTANEOUS at 20:12

## 2021-05-06 RX ADMIN — HYDROMORPHONE HYDROCHLORIDE 0.5 MG: 1 INJECTION, SOLUTION INTRAMUSCULAR; INTRAVENOUS; SUBCUTANEOUS at 01:01

## 2021-05-06 RX ADMIN — SODIUM CHLORIDE 1000 ML: 9 INJECTION, SOLUTION INTRAVENOUS at 16:38

## 2021-05-06 RX ADMIN — ACETAMINOPHEN 650 MG: 325 TABLET, FILM COATED ORAL at 20:23

## 2021-05-06 RX ADMIN — SODIUM CHLORIDE, PRESERVATIVE FREE 10 ML: 5 INJECTION INTRAVENOUS at 20:13

## 2021-05-06 RX ADMIN — KETOROLAC TROMETHAMINE 30 MG: 30 INJECTION, SOLUTION INTRAMUSCULAR; INTRAVENOUS at 05:53

## 2021-05-06 RX ADMIN — HYDROMORPHONE HYDROCHLORIDE 0.5 MG: 1 INJECTION, SOLUTION INTRAMUSCULAR; INTRAVENOUS; SUBCUTANEOUS at 13:59

## 2021-05-06 RX ADMIN — PROPOFOL 100 MG: 10 INJECTION, EMULSION INTRAVENOUS at 17:42

## 2021-05-06 RX ADMIN — ACETAMINOPHEN 650 MG: 325 TABLET, FILM COATED ORAL at 08:17

## 2021-05-06 RX ADMIN — TAZOBACTAM SODIUM AND PIPERACILLIN SODIUM 4.5 G: 500; 4 INJECTION, SOLUTION INTRAVENOUS at 05:53

## 2021-05-06 RX ADMIN — TAZOBACTAM SODIUM AND PIPERACILLIN SODIUM 4.5 G: 500; 4 INJECTION, SOLUTION INTRAVENOUS at 20:09

## 2021-05-06 RX ADMIN — FAMOTIDINE 20 MG: 10 INJECTION INTRAVENOUS at 08:10

## 2021-05-07 LAB
ALBUMIN SERPL-MCNC: 3.1 G/DL (ref 3.5–5.2)
ALP SERPL-CCNC: 67 U/L (ref 39–117)
ALT SERPL W P-5'-P-CCNC: 55 U/L (ref 1–33)
AMYLASE SERPL-CCNC: 19 U/L (ref 28–100)
ANION GAP SERPL CALCULATED.3IONS-SCNC: 7.2 MMOL/L (ref 5–15)
AST SERPL-CCNC: 30 U/L (ref 1–32)
BASOPHILS # BLD AUTO: 0.01 10*3/MM3 (ref 0–0.2)
BASOPHILS NFR BLD AUTO: 0.1 % (ref 0–1.5)
BILIRUB CONJ SERPL-MCNC: 0.2 MG/DL (ref 0–0.3)
BILIRUB INDIRECT SERPL-MCNC: 0.2 MG/DL
BILIRUB SERPL-MCNC: 0.4 MG/DL (ref 0–1.2)
BUN SERPL-MCNC: 15 MG/DL (ref 6–20)
BUN/CREAT SERPL: 16.9 (ref 7–25)
CALCIUM SPEC-SCNC: 8.4 MG/DL (ref 8.6–10.5)
CHLORIDE SERPL-SCNC: 104 MMOL/L (ref 98–107)
CO2 SERPL-SCNC: 29.8 MMOL/L (ref 22–29)
CREAT SERPL-MCNC: 0.89 MG/DL (ref 0.57–1)
DEPRECATED RDW RBC AUTO: 45.2 FL (ref 37–54)
EOSINOPHIL # BLD AUTO: 0.01 10*3/MM3 (ref 0–0.4)
EOSINOPHIL NFR BLD AUTO: 0.1 % (ref 0.3–6.2)
ERYTHROCYTE [DISTWIDTH] IN BLOOD BY AUTOMATED COUNT: 14.7 % (ref 12.3–15.4)
GFR SERPL CREATININE-BSD FRML MDRD: 65 ML/MIN/1.73
GLUCOSE SERPL-MCNC: 83 MG/DL (ref 65–99)
HCT VFR BLD AUTO: 33.3 % (ref 34–46.6)
HGB BLD-MCNC: 10.9 G/DL (ref 12–15.9)
IMM GRANULOCYTES # BLD AUTO: 0.06 10*3/MM3 (ref 0–0.05)
IMM GRANULOCYTES NFR BLD AUTO: 0.8 % (ref 0–0.5)
LAB AP CASE REPORT: NORMAL
LIPASE SERPL-CCNC: 17 U/L (ref 13–60)
LYMPHOCYTES # BLD AUTO: 0.75 10*3/MM3 (ref 0.7–3.1)
LYMPHOCYTES NFR BLD AUTO: 10.2 % (ref 19.6–45.3)
MCH RBC QN AUTO: 27.8 PG (ref 26.6–33)
MCHC RBC AUTO-ENTMCNC: 32.7 G/DL (ref 31.5–35.7)
MCV RBC AUTO: 84.9 FL (ref 79–97)
MONOCYTES # BLD AUTO: 0.91 10*3/MM3 (ref 0.1–0.9)
MONOCYTES NFR BLD AUTO: 12.4 % (ref 5–12)
NEUTROPHILS NFR BLD AUTO: 5.59 10*3/MM3 (ref 1.7–7)
NEUTROPHILS NFR BLD AUTO: 76.4 % (ref 42.7–76)
NRBC BLD AUTO-RTO: 0 /100 WBC (ref 0–0.2)
PATH REPORT.FINAL DX SPEC: NORMAL
PATH REPORT.GROSS SPEC: NORMAL
PLATELET # BLD AUTO: 198 10*3/MM3 (ref 140–450)
PMV BLD AUTO: 11.7 FL (ref 6–12)
POTASSIUM SERPL-SCNC: 3.9 MMOL/L (ref 3.5–5.2)
PROT SERPL-MCNC: 6.1 G/DL (ref 6–8.5)
RBC # BLD AUTO: 3.92 10*6/MM3 (ref 3.77–5.28)
SODIUM SERPL-SCNC: 141 MMOL/L (ref 136–145)
WBC # BLD AUTO: 7.33 10*3/MM3 (ref 3.4–10.8)

## 2021-05-07 PROCEDURE — 25010000002 HYDROMORPHONE PER 4 MG: Performed by: INTERNAL MEDICINE

## 2021-05-07 PROCEDURE — 80048 BASIC METABOLIC PNL TOTAL CA: CPT | Performed by: INTERNAL MEDICINE

## 2021-05-07 PROCEDURE — 25010000002 PIPERACILLIN SOD-TAZOBACTAM PER 1 G: Performed by: INTERNAL MEDICINE

## 2021-05-07 PROCEDURE — 83690 ASSAY OF LIPASE: CPT | Performed by: SURGERY

## 2021-05-07 PROCEDURE — 85025 COMPLETE CBC W/AUTO DIFF WBC: CPT | Performed by: INTERNAL MEDICINE

## 2021-05-07 PROCEDURE — 99024 POSTOP FOLLOW-UP VISIT: CPT | Performed by: SURGERY

## 2021-05-07 PROCEDURE — 80076 HEPATIC FUNCTION PANEL: CPT | Performed by: SURGERY

## 2021-05-07 PROCEDURE — 82150 ASSAY OF AMYLASE: CPT | Performed by: SURGERY

## 2021-05-07 RX ORDER — GABAPENTIN 300 MG/1
600 CAPSULE ORAL EVERY 8 HOURS SCHEDULED
Status: DISCONTINUED | OUTPATIENT
Start: 2021-05-07 | End: 2021-05-08 | Stop reason: HOSPADM

## 2021-05-07 RX ORDER — PANTOPRAZOLE SODIUM 40 MG/1
40 TABLET, DELAYED RELEASE ORAL EVERY MORNING
Status: DISCONTINUED | OUTPATIENT
Start: 2021-05-08 | End: 2021-05-08 | Stop reason: HOSPADM

## 2021-05-07 RX ORDER — OXYCODONE AND ACETAMINOPHEN 7.5; 325 MG/1; MG/1
1 TABLET ORAL EVERY 6 HOURS PRN
Status: DISCONTINUED | OUTPATIENT
Start: 2021-05-07 | End: 2021-05-08 | Stop reason: HOSPADM

## 2021-05-07 RX ORDER — AMOXICILLIN 250 MG
2 CAPSULE ORAL NIGHTLY
Status: DISCONTINUED | OUTPATIENT
Start: 2021-05-07 | End: 2021-05-08 | Stop reason: HOSPADM

## 2021-05-07 RX ADMIN — FAMOTIDINE 20 MG: 10 INJECTION INTRAVENOUS at 08:58

## 2021-05-07 RX ADMIN — HYDROMORPHONE HYDROCHLORIDE 0.5 MG: 1 INJECTION, SOLUTION INTRAMUSCULAR; INTRAVENOUS; SUBCUTANEOUS at 08:57

## 2021-05-07 RX ADMIN — TAZOBACTAM SODIUM AND PIPERACILLIN SODIUM 4.5 G: 500; 4 INJECTION, SOLUTION INTRAVENOUS at 13:54

## 2021-05-07 RX ADMIN — TAZOBACTAM SODIUM AND PIPERACILLIN SODIUM 4.5 G: 500; 4 INJECTION, SOLUTION INTRAVENOUS at 04:03

## 2021-05-07 RX ADMIN — LEVETIRACETAM 500 MG: 500 TABLET, FILM COATED ORAL at 08:58

## 2021-05-07 RX ADMIN — LEVETIRACETAM 500 MG: 500 TABLET, FILM COATED ORAL at 20:34

## 2021-05-07 RX ADMIN — MONTELUKAST SODIUM 10 MG: 10 TABLET, FILM COATED ORAL at 20:34

## 2021-05-07 RX ADMIN — GABAPENTIN 600 MG: 300 CAPSULE ORAL at 13:54

## 2021-05-07 RX ADMIN — KETOTIFEN FUMARATE 1 DROP: 0.35 SOLUTION/ DROPS OPHTHALMIC at 20:34

## 2021-05-07 RX ADMIN — FLUTICASONE PROPIONATE 1 SPRAY: 50 SPRAY, METERED NASAL at 08:59

## 2021-05-07 RX ADMIN — SODIUM CHLORIDE 100 ML/HR: 9 INJECTION, SOLUTION INTRAVENOUS at 22:20

## 2021-05-07 RX ADMIN — CETIRIZINE HYDROCHLORIDE ALLERGY 10 MG: 10 TABLET ORAL at 08:58

## 2021-05-07 RX ADMIN — OXYCODONE HYDROCHLORIDE AND ACETAMINOPHEN 1 TABLET: 7.5; 325 TABLET ORAL at 15:36

## 2021-05-07 RX ADMIN — HYDROMORPHONE HYDROCHLORIDE 0.5 MG: 1 INJECTION, SOLUTION INTRAMUSCULAR; INTRAVENOUS; SUBCUTANEOUS at 01:33

## 2021-05-07 RX ADMIN — BUPROPION HYDROCHLORIDE 300 MG: 300 TABLET, EXTENDED RELEASE ORAL at 08:58

## 2021-05-07 RX ADMIN — KETOTIFEN FUMARATE 1 DROP: 0.35 SOLUTION/ DROPS OPHTHALMIC at 08:59

## 2021-05-07 RX ADMIN — SODIUM CHLORIDE 100 ML/HR: 9 INJECTION, SOLUTION INTRAVENOUS at 02:16

## 2021-05-07 RX ADMIN — TRAMADOL HYDROCHLORIDE 50 MG: 50 TABLET, FILM COATED ORAL at 10:33

## 2021-05-07 RX ADMIN — HYDROMORPHONE HYDROCHLORIDE 0.5 MG: 1 INJECTION, SOLUTION INTRAMUSCULAR; INTRAVENOUS; SUBCUTANEOUS at 04:00

## 2021-05-07 RX ADMIN — SODIUM CHLORIDE 100 ML/HR: 9 INJECTION, SOLUTION INTRAVENOUS at 12:37

## 2021-05-07 RX ADMIN — PAROXETINE HYDROCHLORIDE HEMIHYDRATE 40 MG: 20 TABLET, FILM COATED ORAL at 08:58

## 2021-05-07 RX ADMIN — TAZOBACTAM SODIUM AND PIPERACILLIN SODIUM 4.5 G: 500; 4 INJECTION, SOLUTION INTRAVENOUS at 20:33

## 2021-05-08 VITALS
DIASTOLIC BLOOD PRESSURE: 74 MMHG | HEIGHT: 67 IN | WEIGHT: 267 LBS | HEART RATE: 68 BPM | TEMPERATURE: 97 F | SYSTOLIC BLOOD PRESSURE: 140 MMHG | BODY MASS INDEX: 41.91 KG/M2 | RESPIRATION RATE: 18 BRPM | OXYGEN SATURATION: 93 %

## 2021-05-08 PROBLEM — K80.50 CALCULUS OF BILE DUCT WITHOUT CHOLECYSTITIS AND WITHOUT OBSTRUCTION: Status: RESOLVED | Noted: 2021-05-04 | Resolved: 2021-05-08

## 2021-05-08 PROBLEM — K81.0 ACUTE CHOLECYSTITIS: Status: RESOLVED | Noted: 2021-05-06 | Resolved: 2021-05-08

## 2021-05-08 LAB
ANION GAP SERPL CALCULATED.3IONS-SCNC: 12 MMOL/L (ref 5–15)
ANISOCYTOSIS BLD QL: NORMAL
BACTERIA SPEC AEROBE CULT: ABNORMAL
BUN SERPL-MCNC: 12 MG/DL (ref 6–20)
BUN/CREAT SERPL: 15.2 (ref 7–25)
CALCIUM SPEC-SCNC: 8.5 MG/DL (ref 8.6–10.5)
CHLORIDE SERPL-SCNC: 105 MMOL/L (ref 98–107)
CO2 SERPL-SCNC: 26 MMOL/L (ref 22–29)
CREAT SERPL-MCNC: 0.79 MG/DL (ref 0.57–1)
DEPRECATED RDW RBC AUTO: 45.7 FL (ref 37–54)
ERYTHROCYTE [DISTWIDTH] IN BLOOD BY AUTOMATED COUNT: 15 % (ref 12.3–15.4)
GFR SERPL CREATININE-BSD FRML MDRD: 75 ML/MIN/1.73
GLUCOSE SERPL-MCNC: 90 MG/DL (ref 65–99)
GRAM STN SPEC: ABNORMAL
HCT VFR BLD AUTO: 33.5 % (ref 34–46.6)
HGB BLD-MCNC: 11 G/DL (ref 12–15.9)
LYMPHOCYTES # BLD MANUAL: 1.32 10*3/MM3 (ref 0.7–3.1)
LYMPHOCYTES NFR BLD MANUAL: 11 % (ref 5–12)
LYMPHOCYTES NFR BLD MANUAL: 23 % (ref 19.6–45.3)
MCH RBC QN AUTO: 27.8 PG (ref 26.6–33)
MCHC RBC AUTO-ENTMCNC: 32.8 G/DL (ref 31.5–35.7)
MCV RBC AUTO: 84.8 FL (ref 79–97)
MICROCYTES BLD QL: NORMAL
MONOCYTES # BLD AUTO: 0.63 10*3/MM3 (ref 0.1–0.9)
NEUTROPHILS # BLD AUTO: 3.66 10*3/MM3 (ref 1.7–7)
NEUTROPHILS NFR BLD MANUAL: 64 % (ref 42.7–76)
PLAT MORPH BLD: NORMAL
PLATELET # BLD AUTO: 153 10*3/MM3 (ref 140–450)
PMV BLD AUTO: 11.8 FL (ref 6–12)
POTASSIUM SERPL-SCNC: 3.9 MMOL/L (ref 3.5–5.2)
RBC # BLD AUTO: 3.95 10*6/MM3 (ref 3.77–5.28)
SODIUM SERPL-SCNC: 143 MMOL/L (ref 136–145)
WBC # BLD AUTO: 5.72 10*3/MM3 (ref 3.4–10.8)
WBC MORPH BLD: NORMAL

## 2021-05-08 PROCEDURE — 94799 UNLISTED PULMONARY SVC/PX: CPT

## 2021-05-08 PROCEDURE — 94640 AIRWAY INHALATION TREATMENT: CPT

## 2021-05-08 PROCEDURE — 80048 BASIC METABOLIC PNL TOTAL CA: CPT | Performed by: INTERNAL MEDICINE

## 2021-05-08 PROCEDURE — 85007 BL SMEAR W/DIFF WBC COUNT: CPT | Performed by: INTERNAL MEDICINE

## 2021-05-08 PROCEDURE — 25010000002 HYDROMORPHONE PER 4 MG: Performed by: INTERNAL MEDICINE

## 2021-05-08 PROCEDURE — 25010000002 PIPERACILLIN SOD-TAZOBACTAM PER 1 G: Performed by: INTERNAL MEDICINE

## 2021-05-08 PROCEDURE — 85025 COMPLETE CBC W/AUTO DIFF WBC: CPT | Performed by: INTERNAL MEDICINE

## 2021-05-08 RX ORDER — LEVETIRACETAM 500 MG/1
500 TABLET ORAL 2 TIMES DAILY
Qty: 90 TABLET | Refills: 2
Start: 2021-05-08 | End: 2021-05-24 | Stop reason: SDUPTHER

## 2021-05-08 RX ORDER — AMOXICILLIN AND CLAVULANATE POTASSIUM 875; 125 MG/1; MG/1
1 TABLET, FILM COATED ORAL 2 TIMES DAILY
Qty: 5 TABLET | Refills: 0 | Status: SHIPPED | OUTPATIENT
Start: 2021-05-08 | End: 2021-05-11

## 2021-05-08 RX ORDER — GABAPENTIN 600 MG/1
600 TABLET ORAL 3 TIMES DAILY
Start: 2021-05-08 | End: 2021-08-24

## 2021-05-08 RX ADMIN — SODIUM CHLORIDE, PRESERVATIVE FREE 10 ML: 5 INJECTION INTRAVENOUS at 08:10

## 2021-05-08 RX ADMIN — CETIRIZINE HYDROCHLORIDE ALLERGY 10 MG: 10 TABLET ORAL at 08:11

## 2021-05-08 RX ADMIN — OXYCODONE HYDROCHLORIDE AND ACETAMINOPHEN 1 TABLET: 7.5; 325 TABLET ORAL at 00:40

## 2021-05-08 RX ADMIN — TAZOBACTAM SODIUM AND PIPERACILLIN SODIUM 4.5 G: 500; 4 INJECTION, SOLUTION INTRAVENOUS at 14:52

## 2021-05-08 RX ADMIN — OXYCODONE HYDROCHLORIDE AND ACETAMINOPHEN 1 TABLET: 7.5; 325 TABLET ORAL at 06:55

## 2021-05-08 RX ADMIN — GABAPENTIN 600 MG: 300 CAPSULE ORAL at 08:10

## 2021-05-08 RX ADMIN — BUPROPION HYDROCHLORIDE 300 MG: 300 TABLET, EXTENDED RELEASE ORAL at 08:10

## 2021-05-08 RX ADMIN — KETOTIFEN FUMARATE 1 DROP: 0.35 SOLUTION/ DROPS OPHTHALMIC at 08:11

## 2021-05-08 RX ADMIN — PANTOPRAZOLE SODIUM 40 MG: 40 TABLET, DELAYED RELEASE ORAL at 06:55

## 2021-05-08 RX ADMIN — SODIUM CHLORIDE 100 ML/HR: 9 INJECTION, SOLUTION INTRAVENOUS at 08:09

## 2021-05-08 RX ADMIN — ALBUTEROL SULFATE 2.5 MG: 2.5 SOLUTION RESPIRATORY (INHALATION) at 02:23

## 2021-05-08 RX ADMIN — ALBUTEROL SULFATE 2.5 MG: 2.5 SOLUTION RESPIRATORY (INHALATION) at 15:13

## 2021-05-08 RX ADMIN — ALBUTEROL SULFATE 2.5 MG: 2.5 SOLUTION RESPIRATORY (INHALATION) at 09:30

## 2021-05-08 RX ADMIN — TAZOBACTAM SODIUM AND PIPERACILLIN SODIUM 4.5 G: 500; 4 INJECTION, SOLUTION INTRAVENOUS at 04:33

## 2021-05-08 RX ADMIN — FLUTICASONE PROPIONATE 1 SPRAY: 50 SPRAY, METERED NASAL at 08:11

## 2021-05-08 RX ADMIN — PAROXETINE HYDROCHLORIDE HEMIHYDRATE 40 MG: 20 TABLET, FILM COATED ORAL at 08:11

## 2021-05-08 RX ADMIN — HYDROMORPHONE HYDROCHLORIDE 0.5 MG: 1 INJECTION, SOLUTION INTRAMUSCULAR; INTRAVENOUS; SUBCUTANEOUS at 12:43

## 2021-05-08 RX ADMIN — LEVETIRACETAM 500 MG: 500 TABLET, FILM COATED ORAL at 08:10

## 2021-05-09 ENCOUNTER — READMISSION MANAGEMENT (OUTPATIENT)
Dept: CALL CENTER | Facility: HOSPITAL | Age: 56
End: 2021-05-09

## 2021-05-10 LAB — BACTERIA SPEC ANAEROBE CULT: NORMAL

## 2021-05-12 ENCOUNTER — READMISSION MANAGEMENT (OUTPATIENT)
Dept: CALL CENTER | Facility: HOSPITAL | Age: 56
End: 2021-05-12

## 2021-05-13 ENCOUNTER — READMISSION MANAGEMENT (OUTPATIENT)
Dept: CALL CENTER | Facility: HOSPITAL | Age: 56
End: 2021-05-13

## 2021-05-24 ENCOUNTER — OFFICE VISIT (OUTPATIENT)
Dept: PAIN MEDICINE | Facility: CLINIC | Age: 56
End: 2021-05-24

## 2021-05-24 ENCOUNTER — READMISSION MANAGEMENT (OUTPATIENT)
Dept: CALL CENTER | Facility: HOSPITAL | Age: 56
End: 2021-05-24

## 2021-05-24 VITALS
TEMPERATURE: 96.3 F | HEART RATE: 77 BPM | HEIGHT: 67 IN | BODY MASS INDEX: 43.22 KG/M2 | DIASTOLIC BLOOD PRESSURE: 78 MMHG | WEIGHT: 275.4 LBS | SYSTOLIC BLOOD PRESSURE: 134 MMHG | OXYGEN SATURATION: 97 % | RESPIRATION RATE: 18 BRPM

## 2021-05-24 DIAGNOSIS — M96.1 POSTLAMINECTOMY SYNDROME OF THORACIC REGION: ICD-10-CM

## 2021-05-24 DIAGNOSIS — G89.4 CHRONIC PAIN SYNDROME: ICD-10-CM

## 2021-05-24 DIAGNOSIS — M48.02 DEGENERATIVE CERVICAL SPINAL STENOSIS: ICD-10-CM

## 2021-05-24 DIAGNOSIS — G89.29 OTHER CHRONIC PAIN: Primary | ICD-10-CM

## 2021-05-24 DIAGNOSIS — M50.122 CERVICAL DISC DISORDER AT C5-C6 LEVEL WITH RADICULOPATHY: ICD-10-CM

## 2021-05-24 DIAGNOSIS — M51.36 DDD (DEGENERATIVE DISC DISEASE), LUMBAR: ICD-10-CM

## 2021-05-24 DIAGNOSIS — Z79.899 ENCOUNTER FOR LONG-TERM (CURRENT) USE OF HIGH-RISK MEDICATION: ICD-10-CM

## 2021-05-24 DIAGNOSIS — Z74.09 IMPAIRED MOBILITY: ICD-10-CM

## 2021-05-24 DIAGNOSIS — M50.123 CERVICAL DISC DISORDER AT C6-C7 LEVEL WITH RADICULOPATHY: ICD-10-CM

## 2021-05-24 PROBLEM — M25.571 ACUTE RIGHT ANKLE PAIN: Status: RESOLVED | Noted: 2019-04-22 | Resolved: 2021-05-24

## 2021-05-24 PROBLEM — G89.18 ACUTE POST-OPERATIVE PAIN: Status: RESOLVED | Noted: 2020-08-19 | Resolved: 2021-05-24

## 2021-05-24 PROCEDURE — 99214 OFFICE O/P EST MOD 30 MIN: CPT | Performed by: NURSE PRACTITIONER

## 2021-05-24 RX ORDER — LEVETIRACETAM 500 MG/1
500 TABLET ORAL 2 TIMES DAILY
Qty: 90 TABLET | Refills: 2 | Status: SHIPPED | OUTPATIENT
Start: 2021-05-24 | End: 2021-10-27 | Stop reason: SDUPTHER

## 2021-05-24 RX ORDER — TIZANIDINE 4 MG/1
TABLET ORAL
Qty: 90 TABLET | Refills: 2 | Status: SHIPPED | OUTPATIENT
Start: 2021-05-24 | End: 2021-05-24 | Stop reason: SDUPTHER

## 2021-05-24 RX ORDER — OXYCODONE AND ACETAMINOPHEN 10; 325 MG/1; MG/1
1 TABLET ORAL EVERY 6 HOURS PRN
Qty: 120 TABLET | Refills: 0 | Status: SHIPPED | OUTPATIENT
Start: 2021-05-24 | End: 2021-06-28 | Stop reason: SDUPTHER

## 2021-05-24 RX ORDER — BUSPIRONE HYDROCHLORIDE 5 MG/1
TABLET ORAL
COMMUNITY
Start: 2021-05-19 | End: 2022-12-30

## 2021-05-24 RX ORDER — TIZANIDINE 4 MG/1
4 TABLET ORAL EVERY 8 HOURS PRN
Qty: 90 TABLET | Refills: 2 | Status: SHIPPED | OUTPATIENT
Start: 2021-05-24 | End: 2021-12-22 | Stop reason: SDUPTHER

## 2021-05-26 ENCOUNTER — OFFICE VISIT (OUTPATIENT)
Dept: SURGERY | Facility: CLINIC | Age: 56
End: 2021-05-26

## 2021-05-26 VITALS — BODY MASS INDEX: 43.16 KG/M2 | WEIGHT: 275 LBS | HEIGHT: 67 IN

## 2021-05-26 DIAGNOSIS — Z12.11 SCREENING FOR COLON CANCER: Primary | ICD-10-CM

## 2021-05-26 PROCEDURE — 99024 POSTOP FOLLOW-UP VISIT: CPT | Performed by: SURGERY

## 2021-06-28 ENCOUNTER — TELEPHONE (OUTPATIENT)
Dept: PAIN MEDICINE | Facility: CLINIC | Age: 56
End: 2021-06-28

## 2021-06-28 ENCOUNTER — OFFICE VISIT (OUTPATIENT)
Dept: PAIN MEDICINE | Facility: CLINIC | Age: 56
End: 2021-06-28

## 2021-06-28 VITALS
OXYGEN SATURATION: 96 % | WEIGHT: 270 LBS | TEMPERATURE: 96 F | DIASTOLIC BLOOD PRESSURE: 88 MMHG | RESPIRATION RATE: 20 BRPM | HEIGHT: 67 IN | BODY MASS INDEX: 42.38 KG/M2 | SYSTOLIC BLOOD PRESSURE: 127 MMHG | HEART RATE: 86 BPM

## 2021-06-28 DIAGNOSIS — Z79.899 ENCOUNTER FOR LONG-TERM (CURRENT) USE OF HIGH-RISK MEDICATION: ICD-10-CM

## 2021-06-28 DIAGNOSIS — M51.36 DDD (DEGENERATIVE DISC DISEASE), LUMBAR: ICD-10-CM

## 2021-06-28 DIAGNOSIS — M96.1 POSTLAMINECTOMY SYNDROME OF THORACIC REGION: ICD-10-CM

## 2021-06-28 DIAGNOSIS — M50.123 CERVICAL DISC DISORDER AT C6-C7 LEVEL WITH RADICULOPATHY: ICD-10-CM

## 2021-06-28 DIAGNOSIS — Z74.09 IMPAIRED MOBILITY: ICD-10-CM

## 2021-06-28 DIAGNOSIS — M50.122 CERVICAL DISC DISORDER AT C5-C6 LEVEL WITH RADICULOPATHY: ICD-10-CM

## 2021-06-28 DIAGNOSIS — G89.4 CHRONIC PAIN SYNDROME: ICD-10-CM

## 2021-06-28 DIAGNOSIS — G89.29 OTHER CHRONIC PAIN: Primary | ICD-10-CM

## 2021-06-28 DIAGNOSIS — M48.02 DEGENERATIVE CERVICAL SPINAL STENOSIS: ICD-10-CM

## 2021-06-28 PROCEDURE — 99214 OFFICE O/P EST MOD 30 MIN: CPT | Performed by: NURSE PRACTITIONER

## 2021-06-28 RX ORDER — OXYCODONE AND ACETAMINOPHEN 10; 325 MG/1; MG/1
1 TABLET ORAL EVERY 6 HOURS PRN
Qty: 120 TABLET | Refills: 0 | Status: SHIPPED | OUTPATIENT
Start: 2021-06-28 | End: 2021-07-22 | Stop reason: SDUPTHER

## 2021-06-28 RX ORDER — ALBUTEROL SULFATE 90 UG/1
1-2 AEROSOL, METERED RESPIRATORY (INHALATION) AS NEEDED
COMMUNITY
Start: 2021-06-16 | End: 2022-09-28

## 2021-06-28 RX ORDER — FLUTICASONE PROPIONATE 50 MCG
2 SPRAY, SUSPENSION (ML) NASAL DAILY
COMMUNITY
Start: 2021-06-16 | End: 2021-07-16

## 2021-06-28 RX ORDER — MONTELUKAST SODIUM 10 MG/1
10 TABLET ORAL
COMMUNITY
Start: 2021-06-16 | End: 2021-07-16

## 2021-07-22 ENCOUNTER — OFFICE VISIT (OUTPATIENT)
Dept: PAIN MEDICINE | Facility: CLINIC | Age: 56
End: 2021-07-22

## 2021-07-22 VITALS
RESPIRATION RATE: 18 BRPM | TEMPERATURE: 96 F | DIASTOLIC BLOOD PRESSURE: 78 MMHG | OXYGEN SATURATION: 95 % | HEIGHT: 67 IN | SYSTOLIC BLOOD PRESSURE: 128 MMHG | BODY MASS INDEX: 44.26 KG/M2 | WEIGHT: 282 LBS | HEART RATE: 73 BPM

## 2021-07-22 DIAGNOSIS — M96.1 POSTLAMINECTOMY SYNDROME OF THORACIC REGION: ICD-10-CM

## 2021-07-22 DIAGNOSIS — G89.4 CHRONIC PAIN SYNDROME: Primary | ICD-10-CM

## 2021-07-22 DIAGNOSIS — M50.122 CERVICAL DISC DISORDER AT C5-C6 LEVEL WITH RADICULOPATHY: ICD-10-CM

## 2021-07-22 DIAGNOSIS — M48.02 DEGENERATIVE CERVICAL SPINAL STENOSIS: ICD-10-CM

## 2021-07-22 DIAGNOSIS — M50.123 CERVICAL DISC DISORDER AT C6-C7 LEVEL WITH RADICULOPATHY: ICD-10-CM

## 2021-07-22 DIAGNOSIS — G54.3 THORACIC ROOT LESION: ICD-10-CM

## 2021-07-22 DIAGNOSIS — M51.36 DDD (DEGENERATIVE DISC DISEASE), LUMBAR: ICD-10-CM

## 2021-07-22 DIAGNOSIS — Z74.09 IMPAIRED MOBILITY: ICD-10-CM

## 2021-07-22 DIAGNOSIS — Z87.39 HISTORY OF OSTEOMYELITIS: ICD-10-CM

## 2021-07-22 DIAGNOSIS — G89.4 CHRONIC PAIN SYNDROME: ICD-10-CM

## 2021-07-22 DIAGNOSIS — Z79.899 ENCOUNTER FOR LONG-TERM (CURRENT) USE OF HIGH-RISK MEDICATION: ICD-10-CM

## 2021-07-22 PROCEDURE — 99214 OFFICE O/P EST MOD 30 MIN: CPT | Performed by: NURSE PRACTITIONER

## 2021-07-23 RX ORDER — OXYCODONE AND ACETAMINOPHEN 10; 325 MG/1; MG/1
1 TABLET ORAL EVERY 6 HOURS PRN
Qty: 120 TABLET | Refills: 0 | Status: SHIPPED | OUTPATIENT
Start: 2021-07-23 | End: 2021-08-25 | Stop reason: SDUPTHER

## 2021-07-29 ENCOUNTER — ANESTHESIA (OUTPATIENT)
Dept: GASTROENTEROLOGY | Facility: HOSPITAL | Age: 56
End: 2021-07-29

## 2021-07-29 ENCOUNTER — HOSPITAL ENCOUNTER (OUTPATIENT)
Facility: HOSPITAL | Age: 56
Setting detail: HOSPITAL OUTPATIENT SURGERY
Discharge: HOME | End: 2021-07-29
Attending: SURGERY | Admitting: SURGERY

## 2021-07-29 ENCOUNTER — ANESTHESIA EVENT (OUTPATIENT)
Dept: GASTROENTEROLOGY | Facility: HOSPITAL | Age: 56
End: 2021-07-29

## 2021-07-29 VITALS
RESPIRATION RATE: 18 BRPM | TEMPERATURE: 98.4 F | WEIGHT: 271 LBS | BODY MASS INDEX: 42.43 KG/M2 | SYSTOLIC BLOOD PRESSURE: 123 MMHG | HEART RATE: 67 BPM | DIASTOLIC BLOOD PRESSURE: 83 MMHG | OXYGEN SATURATION: 94 %

## 2021-07-29 DIAGNOSIS — Z12.11 SCREENING FOR COLON CANCER: ICD-10-CM

## 2021-07-29 PROBLEM — K63.5 COLON POLYPS: Status: ACTIVE | Noted: 2021-07-29

## 2021-07-29 PROCEDURE — 88305 TISSUE EXAM BY PATHOLOGIST: CPT | Performed by: SURGERY

## 2021-07-29 PROCEDURE — 45385 COLONOSCOPY W/LESION REMOVAL: CPT | Performed by: SURGERY

## 2021-07-29 PROCEDURE — 25010000002 PROPOFOL 10 MG/ML EMULSION: Performed by: ANESTHESIOLOGY

## 2021-07-29 RX ORDER — PROPOFOL 10 MG/ML
VIAL (ML) INTRAVENOUS CONTINUOUS PRN
Status: DISCONTINUED | OUTPATIENT
Start: 2021-07-29 | End: 2021-07-29 | Stop reason: SURG

## 2021-07-29 RX ORDER — PROPOFOL 10 MG/ML
VIAL (ML) INTRAVENOUS AS NEEDED
Status: DISCONTINUED | OUTPATIENT
Start: 2021-07-29 | End: 2021-07-29 | Stop reason: SURG

## 2021-07-29 RX ORDER — LIDOCAINE HYDROCHLORIDE 20 MG/ML
INJECTION, SOLUTION INFILTRATION; PERINEURAL AS NEEDED
Status: DISCONTINUED | OUTPATIENT
Start: 2021-07-29 | End: 2021-07-29 | Stop reason: SURG

## 2021-07-29 RX ORDER — VENLAFAXINE HYDROCHLORIDE 75 MG/1
75 CAPSULE, EXTENDED RELEASE ORAL DAILY
COMMUNITY

## 2021-07-29 RX ORDER — SODIUM CHLORIDE, SODIUM LACTATE, POTASSIUM CHLORIDE, CALCIUM CHLORIDE 600; 310; 30; 20 MG/100ML; MG/100ML; MG/100ML; MG/100ML
30 INJECTION, SOLUTION INTRAVENOUS CONTINUOUS PRN
Status: DISCONTINUED | OUTPATIENT
Start: 2021-07-29 | End: 2021-07-29 | Stop reason: HOSPADM

## 2021-07-29 RX ADMIN — SODIUM CHLORIDE, POTASSIUM CHLORIDE, SODIUM LACTATE AND CALCIUM CHLORIDE 30 ML/HR: 600; 310; 30; 20 INJECTION, SOLUTION INTRAVENOUS at 08:42

## 2021-07-29 RX ADMIN — LIDOCAINE HYDROCHLORIDE 60 MG: 20 INJECTION, SOLUTION INFILTRATION; PERINEURAL at 09:34

## 2021-07-29 RX ADMIN — PROPOFOL 200 MG: 10 INJECTION, EMULSION INTRAVENOUS at 09:34

## 2021-07-29 RX ADMIN — Medication 240 MCG/KG/MIN: at 09:34

## 2021-07-30 LAB
CYTO UR: NORMAL
LAB AP CASE REPORT: NORMAL
PATH REPORT.FINAL DX SPEC: NORMAL
PATH REPORT.GROSS SPEC: NORMAL

## 2021-08-02 ENCOUNTER — TELEPHONE (OUTPATIENT)
Dept: SURGERY | Facility: CLINIC | Age: 56
End: 2021-08-02

## 2021-08-24 DIAGNOSIS — G89.29 OTHER CHRONIC PAIN: ICD-10-CM

## 2021-08-24 RX ORDER — GABAPENTIN 600 MG/1
TABLET ORAL
Qty: 180 TABLET | Refills: 2 | Status: SHIPPED | OUTPATIENT
Start: 2021-08-24 | End: 2021-10-27 | Stop reason: SDUPTHER

## 2021-08-25 ENCOUNTER — OFFICE VISIT (OUTPATIENT)
Dept: PAIN MEDICINE | Facility: CLINIC | Age: 56
End: 2021-08-25

## 2021-08-25 VITALS
HEIGHT: 55 IN | HEART RATE: 90 BPM | OXYGEN SATURATION: 92 % | WEIGHT: 277.6 LBS | SYSTOLIC BLOOD PRESSURE: 147 MMHG | RESPIRATION RATE: 16 BRPM | BODY MASS INDEX: 64.24 KG/M2 | TEMPERATURE: 96.9 F | DIASTOLIC BLOOD PRESSURE: 88 MMHG

## 2021-08-25 DIAGNOSIS — Z87.39 HISTORY OF OSTEOMYELITIS: ICD-10-CM

## 2021-08-25 DIAGNOSIS — G89.4 CHRONIC PAIN SYNDROME: ICD-10-CM

## 2021-08-25 DIAGNOSIS — M96.1 POSTLAMINECTOMY SYNDROME OF THORACIC REGION: ICD-10-CM

## 2021-08-25 DIAGNOSIS — M50.122 CERVICAL DISC DISORDER AT C5-C6 LEVEL WITH RADICULOPATHY: ICD-10-CM

## 2021-08-25 DIAGNOSIS — M51.36 DDD (DEGENERATIVE DISC DISEASE), LUMBAR: ICD-10-CM

## 2021-08-25 DIAGNOSIS — M50.123 CERVICAL DISC DISORDER AT C6-C7 LEVEL WITH RADICULOPATHY: ICD-10-CM

## 2021-08-25 DIAGNOSIS — Z79.899 ENCOUNTER FOR LONG-TERM (CURRENT) USE OF HIGH-RISK MEDICATION: ICD-10-CM

## 2021-08-25 DIAGNOSIS — G89.29 OTHER CHRONIC PAIN: Primary | ICD-10-CM

## 2021-08-25 DIAGNOSIS — Z74.09 IMPAIRED MOBILITY: ICD-10-CM

## 2021-08-25 PROCEDURE — 99214 OFFICE O/P EST MOD 30 MIN: CPT | Performed by: NURSE PRACTITIONER

## 2021-08-26 RX ORDER — OXYCODONE AND ACETAMINOPHEN 10; 325 MG/1; MG/1
1 TABLET ORAL EVERY 6 HOURS PRN
Qty: 120 TABLET | Refills: 0 | Status: SHIPPED | OUTPATIENT
Start: 2021-08-26 | End: 2021-09-23 | Stop reason: SDUPTHER

## 2021-09-23 ENCOUNTER — OFFICE VISIT (OUTPATIENT)
Dept: PAIN MEDICINE | Facility: CLINIC | Age: 56
End: 2021-09-23

## 2021-09-23 VITALS
DIASTOLIC BLOOD PRESSURE: 90 MMHG | TEMPERATURE: 96.4 F | OXYGEN SATURATION: 96 % | HEIGHT: 55 IN | SYSTOLIC BLOOD PRESSURE: 138 MMHG | BODY MASS INDEX: 64.8 KG/M2 | HEART RATE: 96 BPM | RESPIRATION RATE: 20 BRPM | WEIGHT: 280 LBS

## 2021-09-23 DIAGNOSIS — Z79.899 ENCOUNTER FOR LONG-TERM (CURRENT) USE OF HIGH-RISK MEDICATION: ICD-10-CM

## 2021-09-23 DIAGNOSIS — M50.122 CERVICAL DISC DISORDER AT C5-C6 LEVEL WITH RADICULOPATHY: ICD-10-CM

## 2021-09-23 DIAGNOSIS — M96.1 POSTLAMINECTOMY SYNDROME OF THORACIC REGION: ICD-10-CM

## 2021-09-23 DIAGNOSIS — G89.29 OTHER CHRONIC PAIN: Primary | ICD-10-CM

## 2021-09-23 DIAGNOSIS — G89.4 CHRONIC PAIN SYNDROME: ICD-10-CM

## 2021-09-23 DIAGNOSIS — Z74.09 IMPAIRED MOBILITY: ICD-10-CM

## 2021-09-23 DIAGNOSIS — M51.36 DDD (DEGENERATIVE DISC DISEASE), LUMBAR: ICD-10-CM

## 2021-09-23 DIAGNOSIS — M50.123 CERVICAL DISC DISORDER AT C6-C7 LEVEL WITH RADICULOPATHY: ICD-10-CM

## 2021-09-23 PROCEDURE — 99214 OFFICE O/P EST MOD 30 MIN: CPT | Performed by: NURSE PRACTITIONER

## 2021-09-23 RX ORDER — OXYCODONE AND ACETAMINOPHEN 10; 325 MG/1; MG/1
1 TABLET ORAL EVERY 6 HOURS PRN
Qty: 120 TABLET | Refills: 0 | Status: SHIPPED | OUTPATIENT
Start: 2021-09-23 | End: 2021-10-27 | Stop reason: SDUPTHER

## 2021-10-27 ENCOUNTER — OFFICE VISIT (OUTPATIENT)
Dept: PAIN MEDICINE | Facility: CLINIC | Age: 56
End: 2021-10-27

## 2021-10-27 ENCOUNTER — TELEPHONE (OUTPATIENT)
Dept: PAIN MEDICINE | Facility: CLINIC | Age: 56
End: 2021-10-27

## 2021-10-27 VITALS
BODY MASS INDEX: 42.06 KG/M2 | SYSTOLIC BLOOD PRESSURE: 132 MMHG | HEART RATE: 87 BPM | TEMPERATURE: 97.1 F | RESPIRATION RATE: 20 BRPM | HEIGHT: 67 IN | DIASTOLIC BLOOD PRESSURE: 89 MMHG | OXYGEN SATURATION: 95 % | WEIGHT: 268 LBS

## 2021-10-27 DIAGNOSIS — M96.1 POSTLAMINECTOMY SYNDROME OF THORACIC REGION: ICD-10-CM

## 2021-10-27 DIAGNOSIS — Z74.09 IMPAIRED MOBILITY: ICD-10-CM

## 2021-10-27 DIAGNOSIS — M50.123 CERVICAL DISC DISORDER AT C6-C7 LEVEL WITH RADICULOPATHY: ICD-10-CM

## 2021-10-27 DIAGNOSIS — M50.122 CERVICAL DISC DISORDER AT C5-C6 LEVEL WITH RADICULOPATHY: ICD-10-CM

## 2021-10-27 DIAGNOSIS — M51.36 DDD (DEGENERATIVE DISC DISEASE), LUMBAR: ICD-10-CM

## 2021-10-27 DIAGNOSIS — Z87.39 HISTORY OF OSTEOMYELITIS: ICD-10-CM

## 2021-10-27 DIAGNOSIS — Z79.899 ENCOUNTER FOR LONG-TERM (CURRENT) USE OF HIGH-RISK MEDICATION: ICD-10-CM

## 2021-10-27 DIAGNOSIS — G89.29 OTHER CHRONIC PAIN: Primary | ICD-10-CM

## 2021-10-27 DIAGNOSIS — G89.4 CHRONIC PAIN SYNDROME: ICD-10-CM

## 2021-10-27 PROCEDURE — 99214 OFFICE O/P EST MOD 30 MIN: CPT | Performed by: NURSE PRACTITIONER

## 2021-10-27 RX ORDER — BUSPIRONE HYDROCHLORIDE 5 MG/1
15 TABLET ORAL
COMMUNITY
Start: 2021-08-24 | End: 2022-01-26 | Stop reason: SDUPTHER

## 2021-10-27 RX ORDER — GABAPENTIN 600 MG/1
1200 TABLET ORAL 3 TIMES DAILY
Qty: 180 TABLET | Refills: 2 | Status: SHIPPED | OUTPATIENT
Start: 2021-10-27 | End: 2022-03-03

## 2021-10-27 RX ORDER — LOPERAMIDE HYDROCHLORIDE 2 MG/1
CAPSULE ORAL
COMMUNITY
Start: 2021-09-21

## 2021-10-27 RX ORDER — FLUTICASONE PROPIONATE 50 MCG
1 SPRAY, SUSPENSION (ML) NASAL DAILY
COMMUNITY
Start: 2021-07-26 | End: 2022-01-26 | Stop reason: SDUPTHER

## 2021-10-27 RX ORDER — LEVETIRACETAM 500 MG/1
500 TABLET ORAL 3 TIMES DAILY
Qty: 60 TABLET | Refills: 2 | Status: SHIPPED | OUTPATIENT
Start: 2021-10-27 | End: 2022-01-26 | Stop reason: SDUPTHER

## 2021-10-27 RX ORDER — OLOPATADINE HYDROCHLORIDE 2 MG/ML
SOLUTION/ DROPS OPHTHALMIC
COMMUNITY
Start: 2021-08-25

## 2021-10-28 RX ORDER — OXYCODONE AND ACETAMINOPHEN 10; 325 MG/1; MG/1
1 TABLET ORAL EVERY 6 HOURS PRN
Qty: 120 TABLET | Refills: 0 | Status: SHIPPED | OUTPATIENT
Start: 2021-10-28 | End: 2021-11-30 | Stop reason: SDUPTHER

## 2021-11-30 ENCOUNTER — OFFICE VISIT (OUTPATIENT)
Dept: PAIN MEDICINE | Facility: CLINIC | Age: 56
End: 2021-11-30

## 2021-11-30 ENCOUNTER — HOSPITAL ENCOUNTER (OUTPATIENT)
Dept: GENERAL RADIOLOGY | Facility: HOSPITAL | Age: 56
Discharge: HOME | End: 2021-11-30
Admitting: NURSE PRACTITIONER

## 2021-11-30 VITALS
OXYGEN SATURATION: 97 % | SYSTOLIC BLOOD PRESSURE: 138 MMHG | HEIGHT: 67 IN | DIASTOLIC BLOOD PRESSURE: 89 MMHG | RESPIRATION RATE: 20 BRPM | WEIGHT: 268 LBS | HEART RATE: 82 BPM | TEMPERATURE: 97.1 F | BODY MASS INDEX: 42.06 KG/M2

## 2021-11-30 DIAGNOSIS — M17.11 OSTEOARTHRITIS OF RIGHT KNEE, UNSPECIFIED OSTEOARTHRITIS TYPE: ICD-10-CM

## 2021-11-30 DIAGNOSIS — M96.1 POSTLAMINECTOMY SYNDROME OF THORACIC REGION: ICD-10-CM

## 2021-11-30 DIAGNOSIS — G89.29 CHRONIC PAIN OF RIGHT KNEE: ICD-10-CM

## 2021-11-30 DIAGNOSIS — Z74.09 IMPAIRED MOBILITY: ICD-10-CM

## 2021-11-30 DIAGNOSIS — M25.561 CHRONIC PAIN OF RIGHT KNEE: ICD-10-CM

## 2021-11-30 DIAGNOSIS — M50.123 CERVICAL DISC DISORDER AT C6-C7 LEVEL WITH RADICULOPATHY: ICD-10-CM

## 2021-11-30 DIAGNOSIS — M51.36 DDD (DEGENERATIVE DISC DISEASE), LUMBAR: ICD-10-CM

## 2021-11-30 DIAGNOSIS — G89.29 CHRONIC PAIN OF RIGHT KNEE: Primary | ICD-10-CM

## 2021-11-30 DIAGNOSIS — M25.561 CHRONIC PAIN OF RIGHT KNEE: Primary | ICD-10-CM

## 2021-11-30 DIAGNOSIS — Z79.899 ENCOUNTER FOR LONG-TERM (CURRENT) USE OF HIGH-RISK MEDICATION: ICD-10-CM

## 2021-11-30 DIAGNOSIS — G89.4 CHRONIC PAIN SYNDROME: Primary | ICD-10-CM

## 2021-11-30 DIAGNOSIS — Z87.39 HISTORY OF OSTEOMYELITIS: ICD-10-CM

## 2021-11-30 DIAGNOSIS — G89.4 CHRONIC PAIN SYNDROME: ICD-10-CM

## 2021-11-30 DIAGNOSIS — M50.122 CERVICAL DISC DISORDER AT C5-C6 LEVEL WITH RADICULOPATHY: ICD-10-CM

## 2021-11-30 LAB
POC AMPHETAMINES: NEGATIVE
POC BARBITURATES: NEGATIVE
POC BENZODIAZEPHINES: NEGATIVE
POC COCAINE: NEGATIVE
POC METHADONE: NEGATIVE
POC METHAMPHETAMINE SCREEN URINE: NEGATIVE
POC OPIATES: NEGATIVE
POC OXYCODONE: POSITIVE
POC PHENCYCLIDINE: NEGATIVE
POC PROPOXYPHENE: NEGATIVE
POC THC: NEGATIVE
POC TRICYCLIC ANTIDEPRESSANTS: NEGATIVE

## 2021-11-30 PROCEDURE — 73562 X-RAY EXAM OF KNEE 3: CPT

## 2021-11-30 PROCEDURE — 80305 DRUG TEST PRSMV DIR OPT OBS: CPT | Performed by: NURSE PRACTITIONER

## 2021-11-30 PROCEDURE — 99214 OFFICE O/P EST MOD 30 MIN: CPT | Performed by: NURSE PRACTITIONER

## 2021-11-30 RX ORDER — BUPROPION HYDROCHLORIDE 300 MG/1
1 TABLET ORAL DAILY
COMMUNITY
Start: 2021-11-19 | End: 2022-01-26 | Stop reason: SDUPTHER

## 2021-11-30 RX ORDER — OXYCODONE AND ACETAMINOPHEN 10; 325 MG/1; MG/1
1 TABLET ORAL EVERY 6 HOURS PRN
Qty: 120 TABLET | Refills: 0 | Status: SHIPPED | OUTPATIENT
Start: 2021-11-30 | End: 2021-12-22 | Stop reason: SDUPTHER

## 2021-11-30 RX ORDER — VENLAFAXINE HYDROCHLORIDE 75 MG/1
1 CAPSULE, EXTENDED RELEASE ORAL DAILY
COMMUNITY
Start: 2021-11-19 | End: 2022-01-26 | Stop reason: SDUPTHER

## 2021-12-06 ENCOUNTER — OFFICE VISIT (OUTPATIENT)
Dept: ORTHOPEDIC SURGERY | Facility: CLINIC | Age: 56
End: 2021-12-06

## 2021-12-06 VITALS — HEIGHT: 67 IN | WEIGHT: 265 LBS | TEMPERATURE: 97.2 F | BODY MASS INDEX: 41.59 KG/M2

## 2021-12-06 DIAGNOSIS — M17.11 ARTHRITIS OF RIGHT KNEE: Primary | ICD-10-CM

## 2021-12-06 PROCEDURE — 99204 OFFICE O/P NEW MOD 45 MIN: CPT | Performed by: ORTHOPAEDIC SURGERY

## 2021-12-06 PROCEDURE — 20610 DRAIN/INJ JOINT/BURSA W/O US: CPT | Performed by: ORTHOPAEDIC SURGERY

## 2021-12-06 RX ORDER — METHYLPREDNISOLONE ACETATE 80 MG/ML
80 INJECTION, SUSPENSION INTRA-ARTICULAR; INTRALESIONAL; INTRAMUSCULAR; SOFT TISSUE
Status: COMPLETED | OUTPATIENT
Start: 2021-12-06 | End: 2021-12-06

## 2021-12-06 RX ADMIN — METHYLPREDNISOLONE ACETATE 80 MG: 80 INJECTION, SUSPENSION INTRA-ARTICULAR; INTRALESIONAL; INTRAMUSCULAR; SOFT TISSUE at 09:57

## 2021-12-09 ENCOUNTER — PATIENT ROUNDING (BHMG ONLY) (OUTPATIENT)
Dept: ORTHOPEDIC SURGERY | Facility: CLINIC | Age: 56
End: 2021-12-09

## 2021-12-22 ENCOUNTER — OFFICE VISIT (OUTPATIENT)
Dept: PAIN MEDICINE | Facility: CLINIC | Age: 56
End: 2021-12-22

## 2021-12-22 VITALS
SYSTOLIC BLOOD PRESSURE: 125 MMHG | TEMPERATURE: 96.9 F | WEIGHT: 270.6 LBS | RESPIRATION RATE: 16 BRPM | BODY MASS INDEX: 42.47 KG/M2 | OXYGEN SATURATION: 95 % | HEART RATE: 78 BPM | DIASTOLIC BLOOD PRESSURE: 76 MMHG | HEIGHT: 67 IN

## 2021-12-22 DIAGNOSIS — M50.123 CERVICAL DISC DISORDER AT C6-C7 LEVEL WITH RADICULOPATHY: ICD-10-CM

## 2021-12-22 DIAGNOSIS — M51.36 DDD (DEGENERATIVE DISC DISEASE), LUMBAR: ICD-10-CM

## 2021-12-22 DIAGNOSIS — Z87.39 HISTORY OF OSTEOMYELITIS: ICD-10-CM

## 2021-12-22 DIAGNOSIS — G89.4 CHRONIC PAIN SYNDROME: ICD-10-CM

## 2021-12-22 DIAGNOSIS — G89.4 CHRONIC PAIN SYNDROME: Primary | ICD-10-CM

## 2021-12-22 DIAGNOSIS — Z74.09 IMPAIRED MOBILITY: ICD-10-CM

## 2021-12-22 DIAGNOSIS — M50.122 CERVICAL DISC DISORDER AT C5-C6 LEVEL WITH RADICULOPATHY: ICD-10-CM

## 2021-12-22 DIAGNOSIS — M96.1 POSTLAMINECTOMY SYNDROME OF THORACIC REGION: ICD-10-CM

## 2021-12-22 DIAGNOSIS — Z79.899 ENCOUNTER FOR LONG-TERM (CURRENT) USE OF HIGH-RISK MEDICATION: ICD-10-CM

## 2021-12-22 PROCEDURE — 99214 OFFICE O/P EST MOD 30 MIN: CPT | Performed by: NURSE PRACTITIONER

## 2021-12-22 RX ORDER — TIZANIDINE 4 MG/1
TABLET ORAL
Qty: 90 TABLET | Refills: 0 | Status: SHIPPED | OUTPATIENT
Start: 2021-12-22 | End: 2022-04-21

## 2021-12-22 RX ORDER — TIZANIDINE 4 MG/1
4 TABLET ORAL EVERY 8 HOURS PRN
Qty: 90 TABLET | Refills: 2 | Status: SHIPPED | OUTPATIENT
Start: 2021-12-22 | End: 2022-01-26 | Stop reason: SDUPTHER

## 2021-12-22 RX ORDER — OXYCODONE AND ACETAMINOPHEN 10; 325 MG/1; MG/1
1 TABLET ORAL EVERY 6 HOURS PRN
Qty: 120 TABLET | Refills: 0 | Status: SHIPPED | OUTPATIENT
Start: 2021-12-22 | End: 2022-01-26 | Stop reason: SDUPTHER

## 2022-01-26 ENCOUNTER — OFFICE VISIT (OUTPATIENT)
Dept: PAIN MEDICINE | Facility: CLINIC | Age: 57
End: 2022-01-26

## 2022-01-26 VITALS
TEMPERATURE: 96.3 F | BODY MASS INDEX: 42.69 KG/M2 | SYSTOLIC BLOOD PRESSURE: 131 MMHG | WEIGHT: 272 LBS | HEART RATE: 84 BPM | HEIGHT: 67 IN | DIASTOLIC BLOOD PRESSURE: 81 MMHG | OXYGEN SATURATION: 99 %

## 2022-01-26 DIAGNOSIS — Z74.09 IMPAIRED MOBILITY: ICD-10-CM

## 2022-01-26 DIAGNOSIS — M51.36 DDD (DEGENERATIVE DISC DISEASE), LUMBAR: ICD-10-CM

## 2022-01-26 DIAGNOSIS — Z87.39 HISTORY OF OSTEOMYELITIS: ICD-10-CM

## 2022-01-26 DIAGNOSIS — G89.4 CHRONIC PAIN SYNDROME: Primary | ICD-10-CM

## 2022-01-26 DIAGNOSIS — M50.123 CERVICAL DISC DISORDER AT C6-C7 LEVEL WITH RADICULOPATHY: ICD-10-CM

## 2022-01-26 DIAGNOSIS — G89.4 CHRONIC PAIN SYNDROME: ICD-10-CM

## 2022-01-26 DIAGNOSIS — M96.1 POSTLAMINECTOMY SYNDROME OF THORACIC REGION: ICD-10-CM

## 2022-01-26 DIAGNOSIS — M50.122 CERVICAL DISC DISORDER AT C5-C6 LEVEL WITH RADICULOPATHY: ICD-10-CM

## 2022-01-26 PROCEDURE — 99214 OFFICE O/P EST MOD 30 MIN: CPT | Performed by: NURSE PRACTITIONER

## 2022-01-26 RX ORDER — NALOXONE HYDROCHLORIDE 4 MG/.1ML
SPRAY NASAL
COMMUNITY
Start: 2021-11-30

## 2022-01-26 RX ORDER — OXYCODONE AND ACETAMINOPHEN 10; 325 MG/1; MG/1
1 TABLET ORAL EVERY 6 HOURS PRN
Qty: 120 TABLET | Refills: 0 | Status: SHIPPED | OUTPATIENT
Start: 2022-01-26 | End: 2022-03-01 | Stop reason: SDUPTHER

## 2022-01-26 RX ORDER — LEVETIRACETAM 500 MG/1
500 TABLET ORAL 3 TIMES DAILY
Qty: 90 TABLET | Refills: 2 | Status: SHIPPED | OUTPATIENT
Start: 2022-01-26 | End: 2022-04-29

## 2022-03-01 ENCOUNTER — HOSPITAL ENCOUNTER (OUTPATIENT)
Dept: MRI IMAGING | Facility: HOSPITAL | Age: 57
End: 2022-03-01

## 2022-03-01 ENCOUNTER — OFFICE VISIT (OUTPATIENT)
Dept: PAIN MEDICINE | Facility: CLINIC | Age: 57
End: 2022-03-01

## 2022-03-01 VITALS
DIASTOLIC BLOOD PRESSURE: 67 MMHG | TEMPERATURE: 96.9 F | SYSTOLIC BLOOD PRESSURE: 112 MMHG | BODY MASS INDEX: 42.53 KG/M2 | HEIGHT: 67 IN | OXYGEN SATURATION: 97 % | HEART RATE: 87 BPM | RESPIRATION RATE: 16 BRPM | WEIGHT: 271 LBS

## 2022-03-01 DIAGNOSIS — Z79.899 ENCOUNTER FOR LONG-TERM (CURRENT) USE OF HIGH-RISK MEDICATION: ICD-10-CM

## 2022-03-01 DIAGNOSIS — Z74.09 IMPAIRED MOBILITY: ICD-10-CM

## 2022-03-01 DIAGNOSIS — Z87.39 HISTORY OF OSTEOMYELITIS: ICD-10-CM

## 2022-03-01 DIAGNOSIS — M51.36 DDD (DEGENERATIVE DISC DISEASE), LUMBAR: ICD-10-CM

## 2022-03-01 DIAGNOSIS — G89.29 OTHER CHRONIC PAIN: Primary | ICD-10-CM

## 2022-03-01 DIAGNOSIS — G89.4 CHRONIC PAIN SYNDROME: ICD-10-CM

## 2022-03-01 DIAGNOSIS — M96.1 POSTLAMINECTOMY SYNDROME OF THORACIC REGION: ICD-10-CM

## 2022-03-01 DIAGNOSIS — M50.122 CERVICAL DISC DISORDER AT C5-C6 LEVEL WITH RADICULOPATHY: ICD-10-CM

## 2022-03-01 DIAGNOSIS — M50.123 CERVICAL DISC DISORDER AT C6-C7 LEVEL WITH RADICULOPATHY: ICD-10-CM

## 2022-03-01 PROCEDURE — 99214 OFFICE O/P EST MOD 30 MIN: CPT | Performed by: NURSE PRACTITIONER

## 2022-03-01 RX ORDER — OXYCODONE AND ACETAMINOPHEN 10; 325 MG/1; MG/1
1 TABLET ORAL EVERY 6 HOURS PRN
Qty: 120 TABLET | Refills: 0 | Status: SHIPPED | OUTPATIENT
Start: 2022-03-01 | End: 2022-03-28 | Stop reason: SDUPTHER

## 2022-03-02 DIAGNOSIS — G89.29 OTHER CHRONIC PAIN: ICD-10-CM

## 2022-03-03 RX ORDER — GABAPENTIN 600 MG/1
TABLET ORAL
Qty: 180 TABLET | Refills: 2 | Status: SHIPPED | OUTPATIENT
Start: 2022-03-03 | End: 2022-05-23 | Stop reason: SDUPTHER

## 2022-03-24 ENCOUNTER — TELEPHONE (OUTPATIENT)
Dept: PAIN MEDICINE | Facility: CLINIC | Age: 57
End: 2022-03-24

## 2022-03-28 ENCOUNTER — OFFICE VISIT (OUTPATIENT)
Dept: ORTHOPEDIC SURGERY | Facility: CLINIC | Age: 57
End: 2022-03-28

## 2022-03-28 VITALS — WEIGHT: 270 LBS | TEMPERATURE: 96.2 F | HEIGHT: 67 IN | BODY MASS INDEX: 42.38 KG/M2

## 2022-03-28 DIAGNOSIS — M17.0 PRIMARY OSTEOARTHRITIS OF BOTH KNEES: Primary | ICD-10-CM

## 2022-03-28 DIAGNOSIS — E66.01 OBESITY, MORBID, BMI 40.0-49.9: ICD-10-CM

## 2022-03-28 DIAGNOSIS — M50.123 CERVICAL DISC DISORDER AT C6-C7 LEVEL WITH RADICULOPATHY: ICD-10-CM

## 2022-03-28 DIAGNOSIS — M50.122 CERVICAL DISC DISORDER AT C5-C6 LEVEL WITH RADICULOPATHY: ICD-10-CM

## 2022-03-28 DIAGNOSIS — Z74.09 IMPAIRED MOBILITY: ICD-10-CM

## 2022-03-28 DIAGNOSIS — G89.4 CHRONIC PAIN SYNDROME: ICD-10-CM

## 2022-03-28 PROCEDURE — 20610 DRAIN/INJ JOINT/BURSA W/O US: CPT | Performed by: ORTHOPAEDIC SURGERY

## 2022-03-28 PROCEDURE — 99214 OFFICE O/P EST MOD 30 MIN: CPT | Performed by: ORTHOPAEDIC SURGERY

## 2022-03-28 RX ORDER — METHYLPREDNISOLONE ACETATE 80 MG/ML
80 INJECTION, SUSPENSION INTRA-ARTICULAR; INTRALESIONAL; INTRAMUSCULAR; SOFT TISSUE
Status: COMPLETED | OUTPATIENT
Start: 2022-03-28 | End: 2022-03-28

## 2022-03-28 RX ADMIN — METHYLPREDNISOLONE ACETATE 80 MG: 80 INJECTION, SUSPENSION INTRA-ARTICULAR; INTRALESIONAL; INTRAMUSCULAR; SOFT TISSUE at 11:28

## 2022-03-28 RX ADMIN — METHYLPREDNISOLONE ACETATE 80 MG: 80 INJECTION, SUSPENSION INTRA-ARTICULAR; INTRALESIONAL; INTRAMUSCULAR; SOFT TISSUE at 11:29

## 2022-03-29 RX ORDER — OXYCODONE AND ACETAMINOPHEN 10; 325 MG/1; MG/1
1 TABLET ORAL EVERY 6 HOURS PRN
Qty: 120 TABLET | Refills: 0 | Status: SHIPPED | OUTPATIENT
Start: 2022-03-29 | End: 2022-04-29 | Stop reason: SDUPTHER

## 2022-04-11 ENCOUNTER — OFFICE VISIT (OUTPATIENT)
Dept: PAIN MEDICINE | Facility: CLINIC | Age: 57
End: 2022-04-11

## 2022-04-11 VITALS
RESPIRATION RATE: 20 BRPM | TEMPERATURE: 96.6 F | HEIGHT: 67 IN | SYSTOLIC BLOOD PRESSURE: 144 MMHG | HEART RATE: 89 BPM | DIASTOLIC BLOOD PRESSURE: 88 MMHG | WEIGHT: 268 LBS | OXYGEN SATURATION: 95 % | BODY MASS INDEX: 42.06 KG/M2

## 2022-04-11 DIAGNOSIS — G89.4 CHRONIC PAIN SYNDROME: Primary | ICD-10-CM

## 2022-04-11 DIAGNOSIS — M51.36 DDD (DEGENERATIVE DISC DISEASE), LUMBAR: ICD-10-CM

## 2022-04-11 DIAGNOSIS — M50.122 CERVICAL DISC DISORDER AT C5-C6 LEVEL WITH RADICULOPATHY: ICD-10-CM

## 2022-04-11 DIAGNOSIS — M96.1 POSTLAMINECTOMY SYNDROME OF THORACIC REGION: ICD-10-CM

## 2022-04-11 DIAGNOSIS — Z87.39 HISTORY OF OSTEOMYELITIS: ICD-10-CM

## 2022-04-11 DIAGNOSIS — Z79.899 ENCOUNTER FOR LONG-TERM (CURRENT) USE OF HIGH-RISK MEDICATION: ICD-10-CM

## 2022-04-11 PROCEDURE — 99214 OFFICE O/P EST MOD 30 MIN: CPT | Performed by: NURSE PRACTITIONER

## 2022-04-21 RX ORDER — TIZANIDINE 4 MG/1
TABLET ORAL
Qty: 90 TABLET | Refills: 0 | Status: SHIPPED | OUTPATIENT
Start: 2022-04-21 | End: 2022-05-19

## 2022-04-25 ENCOUNTER — HOSPITAL ENCOUNTER (OUTPATIENT)
Dept: MRI IMAGING | Facility: HOSPITAL | Age: 57
Discharge: HOME | End: 2022-04-25

## 2022-04-25 DIAGNOSIS — M96.1 POSTLAMINECTOMY SYNDROME OF THORACIC REGION: ICD-10-CM

## 2022-04-25 DIAGNOSIS — Z87.39 HISTORY OF OSTEOMYELITIS: ICD-10-CM

## 2022-04-29 DIAGNOSIS — M50.123 CERVICAL DISC DISORDER AT C6-C7 LEVEL WITH RADICULOPATHY: ICD-10-CM

## 2022-04-29 DIAGNOSIS — G89.4 CHRONIC PAIN SYNDROME: ICD-10-CM

## 2022-04-29 DIAGNOSIS — Z74.09 IMPAIRED MOBILITY: ICD-10-CM

## 2022-04-29 DIAGNOSIS — M50.122 CERVICAL DISC DISORDER AT C5-C6 LEVEL WITH RADICULOPATHY: ICD-10-CM

## 2022-04-29 RX ORDER — LEVETIRACETAM 500 MG/1
TABLET ORAL
Qty: 90 TABLET | Refills: 0 | Status: SHIPPED | OUTPATIENT
Start: 2022-04-29 | End: 2022-05-23 | Stop reason: SDUPTHER

## 2022-04-29 RX ORDER — OXYCODONE AND ACETAMINOPHEN 10; 325 MG/1; MG/1
1 TABLET ORAL EVERY 6 HOURS PRN
Qty: 120 TABLET | Refills: 0 | Status: SHIPPED | OUTPATIENT
Start: 2022-04-29 | End: 2022-05-23 | Stop reason: SDUPTHER

## 2022-05-13 ENCOUNTER — HOSPITAL ENCOUNTER (OUTPATIENT)
Dept: MRI IMAGING | Facility: HOSPITAL | Age: 57
Discharge: HOME | End: 2022-05-13
Admitting: NURSE PRACTITIONER

## 2022-05-13 PROCEDURE — 0 GADOBENATE DIMEGLUMINE 529 MG/ML SOLUTION: Performed by: NURSE PRACTITIONER

## 2022-05-13 PROCEDURE — 72157 MRI CHEST SPINE W/O & W/DYE: CPT

## 2022-05-13 PROCEDURE — A9577 INJ MULTIHANCE: HCPCS | Performed by: NURSE PRACTITIONER

## 2022-05-13 RX ADMIN — GADOBENATE DIMEGLUMINE 20 ML: 529 INJECTION, SOLUTION INTRAVENOUS at 11:53

## 2022-05-19 RX ORDER — TIZANIDINE 4 MG/1
TABLET ORAL
Qty: 90 TABLET | Refills: 0 | Status: SHIPPED | OUTPATIENT
Start: 2022-05-19 | End: 2022-06-15

## 2022-05-20 ENCOUNTER — TELEPHONE (OUTPATIENT)
Dept: PAIN MEDICINE | Facility: CLINIC | Age: 57
End: 2022-05-20

## 2022-05-23 ENCOUNTER — OFFICE VISIT (OUTPATIENT)
Dept: PAIN MEDICINE | Facility: CLINIC | Age: 57
End: 2022-05-23

## 2022-05-23 VITALS
HEIGHT: 67 IN | SYSTOLIC BLOOD PRESSURE: 155 MMHG | TEMPERATURE: 96.4 F | OXYGEN SATURATION: 95 % | WEIGHT: 276 LBS | RESPIRATION RATE: 20 BRPM | BODY MASS INDEX: 43.32 KG/M2 | HEART RATE: 96 BPM | DIASTOLIC BLOOD PRESSURE: 93 MMHG

## 2022-05-23 DIAGNOSIS — M50.123 CERVICAL DISC DISORDER AT C6-C7 LEVEL WITH RADICULOPATHY: ICD-10-CM

## 2022-05-23 DIAGNOSIS — Z87.39 HISTORY OF OSTEOMYELITIS: ICD-10-CM

## 2022-05-23 DIAGNOSIS — G89.4 CHRONIC PAIN SYNDROME: Primary | ICD-10-CM

## 2022-05-23 DIAGNOSIS — Z79.899 ENCOUNTER FOR LONG-TERM (CURRENT) USE OF HIGH-RISK MEDICATION: ICD-10-CM

## 2022-05-23 DIAGNOSIS — G89.29 OTHER CHRONIC PAIN: ICD-10-CM

## 2022-05-23 DIAGNOSIS — M51.36 DDD (DEGENERATIVE DISC DISEASE), LUMBAR: ICD-10-CM

## 2022-05-23 DIAGNOSIS — M50.122 CERVICAL DISC DISORDER AT C5-C6 LEVEL WITH RADICULOPATHY: ICD-10-CM

## 2022-05-23 DIAGNOSIS — Z74.09 IMPAIRED MOBILITY: ICD-10-CM

## 2022-05-23 DIAGNOSIS — M96.1 POSTLAMINECTOMY SYNDROME OF THORACIC REGION: ICD-10-CM

## 2022-05-23 DIAGNOSIS — G89.4 CHRONIC PAIN SYNDROME: ICD-10-CM

## 2022-05-23 PROCEDURE — 80305 DRUG TEST PRSMV DIR OPT OBS: CPT | Performed by: NURSE PRACTITIONER

## 2022-05-23 PROCEDURE — 99214 OFFICE O/P EST MOD 30 MIN: CPT | Performed by: NURSE PRACTITIONER

## 2022-05-23 RX ORDER — OXYCODONE AND ACETAMINOPHEN 10; 325 MG/1; MG/1
1 TABLET ORAL EVERY 6 HOURS PRN
Qty: 120 TABLET | Refills: 0 | Status: SHIPPED | OUTPATIENT
Start: 2022-05-23 | End: 2022-06-23 | Stop reason: SDUPTHER

## 2022-05-23 RX ORDER — BUSPIRONE HYDROCHLORIDE 10 MG/1
10 TABLET ORAL 3 TIMES DAILY
COMMUNITY
Start: 2022-05-06 | End: 2022-12-30

## 2022-05-23 RX ORDER — MONTELUKAST SODIUM 10 MG/1
1 TABLET ORAL EVERY EVENING
COMMUNITY
Start: 2022-05-18

## 2022-05-23 RX ORDER — LEVETIRACETAM 500 MG/1
500 TABLET ORAL 3 TIMES DAILY
Qty: 90 TABLET | Refills: 2 | Status: SHIPPED | OUTPATIENT
Start: 2022-05-23 | End: 2022-08-18

## 2022-05-23 RX ORDER — GABAPENTIN 600 MG/1
1200 TABLET ORAL 3 TIMES DAILY
Qty: 180 TABLET | Refills: 2 | Status: SHIPPED | OUTPATIENT
Start: 2022-05-23 | End: 2022-08-31

## 2022-06-15 RX ORDER — TIZANIDINE 4 MG/1
TABLET ORAL
Qty: 90 TABLET | Refills: 0 | Status: SHIPPED | OUTPATIENT
Start: 2022-06-15 | End: 2022-07-18

## 2022-06-17 ENCOUNTER — APPOINTMENT (OUTPATIENT)
Dept: CARDIOLOGY | Facility: HOSPITAL | Age: 57
End: 2022-06-17

## 2022-06-17 ENCOUNTER — HOSPITAL ENCOUNTER (OUTPATIENT)
Facility: HOSPITAL | Age: 57
Setting detail: OBSERVATION
Discharge: HOME | End: 2022-06-20
Attending: EMERGENCY MEDICINE | Admitting: HOSPITALIST

## 2022-06-17 DIAGNOSIS — Z78.9 FAILURE OF OUTPATIENT TREATMENT: ICD-10-CM

## 2022-06-17 DIAGNOSIS — L03.115 CELLULITIS OF RIGHT LOWER EXTREMITY: Primary | ICD-10-CM

## 2022-06-17 LAB
ALBUMIN SERPL-MCNC: 4.2 G/DL (ref 3.5–5.2)
ALBUMIN/GLOB SERPL: 1.4 G/DL
ALP SERPL-CCNC: 64 U/L (ref 39–117)
ALT SERPL W P-5'-P-CCNC: 34 U/L (ref 1–33)
ANION GAP SERPL CALCULATED.3IONS-SCNC: 12 MMOL/L (ref 5–15)
AST SERPL-CCNC: 35 U/L (ref 1–32)
BASOPHILS # BLD AUTO: 0.03 10*3/MM3 (ref 0–0.2)
BASOPHILS NFR BLD AUTO: 0.6 % (ref 0–1.5)
BILIRUB SERPL-MCNC: 0.4 MG/DL (ref 0–1.2)
BUN SERPL-MCNC: 19 MG/DL (ref 6–20)
BUN/CREAT SERPL: 17.3 (ref 7–25)
CALCIUM SPEC-SCNC: 9.1 MG/DL (ref 8.6–10.5)
CHLORIDE SERPL-SCNC: 104 MMOL/L (ref 98–107)
CO2 SERPL-SCNC: 22 MMOL/L (ref 22–29)
CREAT SERPL-MCNC: 1.1 MG/DL (ref 0.57–1)
D-LACTATE SERPL-SCNC: 1.5 MMOL/L (ref 0.5–2)
DEPRECATED RDW RBC AUTO: 42.2 FL (ref 37–54)
EGFRCR SERPLBLD CKD-EPI 2021: 58.4 ML/MIN/1.73
EOSINOPHIL # BLD AUTO: 0.17 10*3/MM3 (ref 0–0.4)
EOSINOPHIL NFR BLD AUTO: 3.5 % (ref 0.3–6.2)
ERYTHROCYTE [DISTWIDTH] IN BLOOD BY AUTOMATED COUNT: 14.2 % (ref 12.3–15.4)
GLOBULIN UR ELPH-MCNC: 2.9 GM/DL
GLUCOSE SERPL-MCNC: 97 MG/DL (ref 65–99)
HCT VFR BLD AUTO: 36.9 % (ref 34–46.6)
HGB BLD-MCNC: 12.6 G/DL (ref 12–15.9)
IMM GRANULOCYTES # BLD AUTO: 0.03 10*3/MM3 (ref 0–0.05)
IMM GRANULOCYTES NFR BLD AUTO: 0.6 % (ref 0–0.5)
LYMPHOCYTES # BLD AUTO: 0.52 10*3/MM3 (ref 0.7–3.1)
LYMPHOCYTES NFR BLD AUTO: 10.9 % (ref 19.6–45.3)
MCH RBC QN AUTO: 29 PG (ref 26.6–33)
MCHC RBC AUTO-ENTMCNC: 34.1 G/DL (ref 31.5–35.7)
MCV RBC AUTO: 84.8 FL (ref 79–97)
MONOCYTES # BLD AUTO: 0.53 10*3/MM3 (ref 0.1–0.9)
MONOCYTES NFR BLD AUTO: 11.1 % (ref 5–12)
NEUTROPHILS NFR BLD AUTO: 3.51 10*3/MM3 (ref 1.7–7)
NEUTROPHILS NFR BLD AUTO: 73.3 % (ref 42.7–76)
NRBC BLD AUTO-RTO: 0 /100 WBC (ref 0–0.2)
PLATELET # BLD AUTO: 195 10*3/MM3 (ref 140–450)
PMV BLD AUTO: 11.6 FL (ref 6–12)
POTASSIUM SERPL-SCNC: 5.4 MMOL/L (ref 3.5–5.2)
PROT SERPL-MCNC: 7.1 G/DL (ref 6–8.5)
RBC # BLD AUTO: 4.35 10*6/MM3 (ref 3.77–5.28)
SARS-COV-2 ORF1AB RESP QL NAA+PROBE: NOT DETECTED
SODIUM SERPL-SCNC: 138 MMOL/L (ref 136–145)
WBC NRBC COR # BLD: 4.79 10*3/MM3 (ref 3.4–10.8)

## 2022-06-17 PROCEDURE — 25010000002 ENOXAPARIN PER 10 MG: Performed by: STUDENT IN AN ORGANIZED HEALTH CARE EDUCATION/TRAINING PROGRAM

## 2022-06-17 PROCEDURE — G0378 HOSPITAL OBSERVATION PER HR: HCPCS

## 2022-06-17 PROCEDURE — 99283 EMERGENCY DEPT VISIT LOW MDM: CPT

## 2022-06-17 PROCEDURE — 25010000002 CEFAZOLIN IN DEXTROSE 2-4 GM/100ML-% SOLUTION: Performed by: NURSE PRACTITIONER

## 2022-06-17 PROCEDURE — 96372 THER/PROPH/DIAG INJ SC/IM: CPT

## 2022-06-17 PROCEDURE — 85025 COMPLETE CBC W/AUTO DIFF WBC: CPT | Performed by: NURSE PRACTITIONER

## 2022-06-17 PROCEDURE — 87040 BLOOD CULTURE FOR BACTERIA: CPT | Performed by: NURSE PRACTITIONER

## 2022-06-17 PROCEDURE — 80053 COMPREHEN METABOLIC PANEL: CPT | Performed by: NURSE PRACTITIONER

## 2022-06-17 PROCEDURE — C9803 HOPD COVID-19 SPEC COLLECT: HCPCS

## 2022-06-17 PROCEDURE — 83605 ASSAY OF LACTIC ACID: CPT | Performed by: NURSE PRACTITIONER

## 2022-06-17 PROCEDURE — U0004 COV-19 TEST NON-CDC HGH THRU: HCPCS | Performed by: NURSE PRACTITIONER

## 2022-06-17 PROCEDURE — 93971 EXTREMITY STUDY: CPT

## 2022-06-17 RX ORDER — SODIUM CHLORIDE 9 MG/ML
100 INJECTION, SOLUTION INTRAVENOUS CONTINUOUS
Status: DISCONTINUED | OUTPATIENT
Start: 2022-06-18 | End: 2022-06-20 | Stop reason: HOSPADM

## 2022-06-17 RX ORDER — CEFAZOLIN SODIUM 1 G/50ML
1 INJECTION, SOLUTION INTRAVENOUS ONCE
Status: DISCONTINUED | OUTPATIENT
Start: 2022-06-17 | End: 2022-06-17

## 2022-06-17 RX ORDER — GABAPENTIN 300 MG/1
600 CAPSULE ORAL EVERY 8 HOURS SCHEDULED
Refills: 2 | Status: DISCONTINUED | OUTPATIENT
Start: 2022-06-17 | End: 2022-06-20 | Stop reason: HOSPADM

## 2022-06-17 RX ORDER — BUDESONIDE AND FORMOTEROL FUMARATE DIHYDRATE 160; 4.5 UG/1; UG/1
2 AEROSOL RESPIRATORY (INHALATION)
Status: DISCONTINUED | OUTPATIENT
Start: 2022-06-18 | End: 2022-06-20 | Stop reason: HOSPADM

## 2022-06-17 RX ORDER — ONDANSETRON 2 MG/ML
4 INJECTION INTRAMUSCULAR; INTRAVENOUS EVERY 6 HOURS PRN
Status: DISCONTINUED | OUTPATIENT
Start: 2022-06-17 | End: 2022-06-20 | Stop reason: HOSPADM

## 2022-06-17 RX ORDER — CEFAZOLIN SODIUM 2 G/100ML
2 INJECTION, SOLUTION INTRAVENOUS EVERY 8 HOURS
Status: DISCONTINUED | OUTPATIENT
Start: 2022-06-18 | End: 2022-06-20 | Stop reason: HOSPADM

## 2022-06-17 RX ORDER — CEFAZOLIN SODIUM 2 G/100ML
2 INJECTION, SOLUTION INTRAVENOUS ONCE
Status: COMPLETED | OUTPATIENT
Start: 2022-06-17 | End: 2022-06-17

## 2022-06-17 RX ORDER — ALBUTEROL SULFATE 2.5 MG/3ML
2.5 SOLUTION RESPIRATORY (INHALATION) EVERY 6 HOURS PRN
Status: DISCONTINUED | OUTPATIENT
Start: 2022-06-17 | End: 2022-06-20 | Stop reason: HOSPADM

## 2022-06-17 RX ORDER — TIZANIDINE 4 MG/1
4 TABLET ORAL EVERY 8 HOURS PRN
Status: DISCONTINUED | OUTPATIENT
Start: 2022-06-17 | End: 2022-06-20 | Stop reason: HOSPADM

## 2022-06-17 RX ORDER — ENOXAPARIN SODIUM 100 MG/ML
40 INJECTION SUBCUTANEOUS EVERY 12 HOURS
Status: DISCONTINUED | OUTPATIENT
Start: 2022-06-17 | End: 2022-06-20 | Stop reason: HOSPADM

## 2022-06-17 RX ORDER — LEVETIRACETAM 500 MG/1
500 TABLET ORAL 3 TIMES DAILY
Status: DISCONTINUED | OUTPATIENT
Start: 2022-06-18 | End: 2022-06-20 | Stop reason: HOSPADM

## 2022-06-17 RX ORDER — CETIRIZINE HYDROCHLORIDE 10 MG/1
10 TABLET ORAL DAILY
Status: DISCONTINUED | OUTPATIENT
Start: 2022-06-18 | End: 2022-06-20 | Stop reason: HOSPADM

## 2022-06-17 RX ORDER — OXYCODONE AND ACETAMINOPHEN 10; 325 MG/1; MG/1
1 TABLET ORAL EVERY 6 HOURS PRN
Status: DISCONTINUED | OUTPATIENT
Start: 2022-06-17 | End: 2022-06-20 | Stop reason: HOSPADM

## 2022-06-17 RX ORDER — SODIUM CHLORIDE 0.9 % (FLUSH) 0.9 %
10 SYRINGE (ML) INJECTION AS NEEDED
Status: DISCONTINUED | OUTPATIENT
Start: 2022-06-17 | End: 2022-06-20 | Stop reason: HOSPADM

## 2022-06-17 RX ORDER — BUSPIRONE HYDROCHLORIDE 10 MG/1
10 TABLET ORAL 3 TIMES DAILY
Status: DISCONTINUED | OUTPATIENT
Start: 2022-06-18 | End: 2022-06-20 | Stop reason: HOSPADM

## 2022-06-17 RX ORDER — PANTOPRAZOLE SODIUM 40 MG/1
40 TABLET, DELAYED RELEASE ORAL EVERY MORNING
Status: DISCONTINUED | OUTPATIENT
Start: 2022-06-18 | End: 2022-06-20 | Stop reason: HOSPADM

## 2022-06-17 RX ORDER — BUPROPION HYDROCHLORIDE 300 MG/1
300 TABLET ORAL EVERY MORNING
Status: DISCONTINUED | OUTPATIENT
Start: 2022-06-18 | End: 2022-06-20 | Stop reason: HOSPADM

## 2022-06-17 RX ORDER — FLUTICASONE PROPIONATE 50 MCG
1 SPRAY, SUSPENSION (ML) NASAL EVERY MORNING
Status: DISCONTINUED | OUTPATIENT
Start: 2022-06-18 | End: 2022-06-20 | Stop reason: HOSPADM

## 2022-06-17 RX ORDER — MONTELUKAST SODIUM 10 MG/1
10 TABLET ORAL NIGHTLY
Status: DISCONTINUED | OUTPATIENT
Start: 2022-06-18 | End: 2022-06-20 | Stop reason: HOSPADM

## 2022-06-17 RX ORDER — VENLAFAXINE HYDROCHLORIDE 75 MG/1
75 CAPSULE, EXTENDED RELEASE ORAL DAILY
Status: DISCONTINUED | OUTPATIENT
Start: 2022-06-18 | End: 2022-06-20 | Stop reason: HOSPADM

## 2022-06-17 RX ORDER — ONDANSETRON 4 MG/1
4 TABLET, FILM COATED ORAL EVERY 6 HOURS PRN
Status: DISCONTINUED | OUTPATIENT
Start: 2022-06-17 | End: 2022-06-20 | Stop reason: HOSPADM

## 2022-06-17 RX ORDER — UREA 10 %
3 LOTION (ML) TOPICAL NIGHTLY PRN
Status: DISCONTINUED | OUTPATIENT
Start: 2022-06-17 | End: 2022-06-20 | Stop reason: HOSPADM

## 2022-06-17 RX ORDER — ACETAMINOPHEN 325 MG/1
650 TABLET ORAL EVERY 4 HOURS PRN
Status: DISCONTINUED | OUTPATIENT
Start: 2022-06-17 | End: 2022-06-20 | Stop reason: HOSPADM

## 2022-06-17 RX ADMIN — SODIUM CHLORIDE 100 ML/HR: 9 INJECTION, SOLUTION INTRAVENOUS at 23:52

## 2022-06-17 RX ADMIN — OXYCODONE AND ACETAMINOPHEN 1 TABLET: 325; 10 TABLET ORAL at 23:47

## 2022-06-17 RX ADMIN — GABAPENTIN 600 MG: 300 CAPSULE ORAL at 23:47

## 2022-06-17 RX ADMIN — ENOXAPARIN SODIUM 40 MG: 100 INJECTION SUBCUTANEOUS at 20:48

## 2022-06-17 RX ADMIN — CEFAZOLIN SODIUM 2 G: 2 INJECTION, SOLUTION INTRAVENOUS at 20:48

## 2022-06-18 LAB
ALBUMIN SERPL-MCNC: 3.9 G/DL (ref 3.5–5.2)
ALBUMIN/GLOB SERPL: 1.5 G/DL
ALP SERPL-CCNC: 60 U/L (ref 39–117)
ALT SERPL W P-5'-P-CCNC: 30 U/L (ref 1–33)
ANION GAP SERPL CALCULATED.3IONS-SCNC: 11.5 MMOL/L (ref 5–15)
AST SERPL-CCNC: 24 U/L (ref 1–32)
BH CV LOWER VASCULAR LEFT COMMON FEMORAL AUGMENT: NORMAL
BH CV LOWER VASCULAR LEFT COMMON FEMORAL COMPETENT: NORMAL
BH CV LOWER VASCULAR LEFT COMMON FEMORAL COMPRESS: NORMAL
BH CV LOWER VASCULAR LEFT COMMON FEMORAL PHASIC: NORMAL
BH CV LOWER VASCULAR LEFT COMMON FEMORAL SPONT: NORMAL
BH CV LOWER VASCULAR RIGHT COMMON FEMORAL AUGMENT: NORMAL
BH CV LOWER VASCULAR RIGHT COMMON FEMORAL COMPETENT: NORMAL
BH CV LOWER VASCULAR RIGHT COMMON FEMORAL COMPRESS: NORMAL
BH CV LOWER VASCULAR RIGHT COMMON FEMORAL PHASIC: NORMAL
BH CV LOWER VASCULAR RIGHT COMMON FEMORAL SPONT: NORMAL
BH CV LOWER VASCULAR RIGHT DISTAL FEMORAL COMPRESS: NORMAL
BH CV LOWER VASCULAR RIGHT GASTRONEMIUS COMPRESS: NORMAL
BH CV LOWER VASCULAR RIGHT GREATER SAPH AK COMPRESS: NORMAL
BH CV LOWER VASCULAR RIGHT GREATER SAPH BK COMPRESS: NORMAL
BH CV LOWER VASCULAR RIGHT LESSER SAPH COMPRESS: NORMAL
BH CV LOWER VASCULAR RIGHT MID FEMORAL AUGMENT: NORMAL
BH CV LOWER VASCULAR RIGHT MID FEMORAL COMPETENT: NORMAL
BH CV LOWER VASCULAR RIGHT MID FEMORAL COMPRESS: NORMAL
BH CV LOWER VASCULAR RIGHT MID FEMORAL PHASIC: NORMAL
BH CV LOWER VASCULAR RIGHT MID FEMORAL SPONT: NORMAL
BH CV LOWER VASCULAR RIGHT PERONEAL COMPRESS: NORMAL
BH CV LOWER VASCULAR RIGHT POPLITEAL AUGMENT: NORMAL
BH CV LOWER VASCULAR RIGHT POPLITEAL COMPETENT: NORMAL
BH CV LOWER VASCULAR RIGHT POPLITEAL COMPRESS: NORMAL
BH CV LOWER VASCULAR RIGHT POPLITEAL PHASIC: NORMAL
BH CV LOWER VASCULAR RIGHT POPLITEAL SPONT: NORMAL
BH CV LOWER VASCULAR RIGHT POSTERIOR TIBIAL COMPRESS: NORMAL
BH CV LOWER VASCULAR RIGHT PROFUNDA FEMORAL COMPRESS: NORMAL
BH CV LOWER VASCULAR RIGHT PROXIMAL FEMORAL COMPRESS: NORMAL
BH CV LOWER VASCULAR RIGHT SAPHENOFEMORAL JUNCTION COMPRESS: NORMAL
BH CV LOWER VASCULAR RIGHT SOLEAL COMPRESS: NORMAL
BILIRUB SERPL-MCNC: 0.2 MG/DL (ref 0–1.2)
BUN SERPL-MCNC: 17 MG/DL (ref 6–20)
BUN/CREAT SERPL: 14 (ref 7–25)
CALCIUM SPEC-SCNC: 8.5 MG/DL (ref 8.6–10.5)
CHLORIDE SERPL-SCNC: 104 MMOL/L (ref 98–107)
CO2 SERPL-SCNC: 24.5 MMOL/L (ref 22–29)
CREAT SERPL-MCNC: 1.21 MG/DL (ref 0.57–1)
DEPRECATED RDW RBC AUTO: 45.4 FL (ref 37–54)
EGFRCR SERPLBLD CKD-EPI 2021: 52.1 ML/MIN/1.73
ERYTHROCYTE [DISTWIDTH] IN BLOOD BY AUTOMATED COUNT: 14 % (ref 12.3–15.4)
GLOBULIN UR ELPH-MCNC: 2.6 GM/DL
GLUCOSE SERPL-MCNC: 93 MG/DL (ref 65–99)
HCT VFR BLD AUTO: 36.9 % (ref 34–46.6)
HGB BLD-MCNC: 11.9 G/DL (ref 12–15.9)
MAXIMAL PREDICTED HEART RATE: 162 BPM
MCH RBC QN AUTO: 28.4 PG (ref 26.6–33)
MCHC RBC AUTO-ENTMCNC: 32.2 G/DL (ref 31.5–35.7)
MCV RBC AUTO: 88.1 FL (ref 79–97)
MRSA DNA SPEC QL NAA+PROBE: NORMAL
PLATELET # BLD AUTO: 190 10*3/MM3 (ref 140–450)
PMV BLD AUTO: 11.6 FL (ref 6–12)
POTASSIUM SERPL-SCNC: 4.1 MMOL/L (ref 3.5–5.2)
PROT SERPL-MCNC: 6.5 G/DL (ref 6–8.5)
RBC # BLD AUTO: 4.19 10*6/MM3 (ref 3.77–5.28)
SODIUM SERPL-SCNC: 140 MMOL/L (ref 136–145)
STRESS TARGET HR: 138 BPM
WBC NRBC COR # BLD: 4.53 10*3/MM3 (ref 3.4–10.8)

## 2022-06-18 PROCEDURE — 94799 UNLISTED PULMONARY SVC/PX: CPT

## 2022-06-18 PROCEDURE — 25010000002 ENOXAPARIN PER 10 MG: Performed by: STUDENT IN AN ORGANIZED HEALTH CARE EDUCATION/TRAINING PROGRAM

## 2022-06-18 PROCEDURE — G0378 HOSPITAL OBSERVATION PER HR: HCPCS

## 2022-06-18 PROCEDURE — 80053 COMPREHEN METABOLIC PANEL: CPT | Performed by: STUDENT IN AN ORGANIZED HEALTH CARE EDUCATION/TRAINING PROGRAM

## 2022-06-18 PROCEDURE — 25010000002 ONDANSETRON PER 1 MG: Performed by: STUDENT IN AN ORGANIZED HEALTH CARE EDUCATION/TRAINING PROGRAM

## 2022-06-18 PROCEDURE — 87641 MR-STAPH DNA AMP PROBE: CPT | Performed by: HOSPITALIST

## 2022-06-18 PROCEDURE — 96375 TX/PRO/DX INJ NEW DRUG ADDON: CPT

## 2022-06-18 PROCEDURE — 25010000002 CEFAZOLIN IN DEXTROSE 2-4 GM/100ML-% SOLUTION: Performed by: STUDENT IN AN ORGANIZED HEALTH CARE EDUCATION/TRAINING PROGRAM

## 2022-06-18 PROCEDURE — 96372 THER/PROPH/DIAG INJ SC/IM: CPT

## 2022-06-18 PROCEDURE — 36415 COLL VENOUS BLD VENIPUNCTURE: CPT | Performed by: STUDENT IN AN ORGANIZED HEALTH CARE EDUCATION/TRAINING PROGRAM

## 2022-06-18 PROCEDURE — 85027 COMPLETE CBC AUTOMATED: CPT | Performed by: STUDENT IN AN ORGANIZED HEALTH CARE EDUCATION/TRAINING PROGRAM

## 2022-06-18 PROCEDURE — 94664 DEMO&/EVAL PT USE INHALER: CPT

## 2022-06-18 RX ADMIN — GABAPENTIN 600 MG: 300 CAPSULE ORAL at 05:13

## 2022-06-18 RX ADMIN — BUDESONIDE AND FORMOTEROL FUMARATE DIHYDRATE 2 PUFF: 160; 4.5 AEROSOL RESPIRATORY (INHALATION) at 07:37

## 2022-06-18 RX ADMIN — OXYCODONE AND ACETAMINOPHEN 1 TABLET: 325; 10 TABLET ORAL at 11:45

## 2022-06-18 RX ADMIN — MONTELUKAST SODIUM 10 MG: 10 TABLET, FILM COATED ORAL at 20:13

## 2022-06-18 RX ADMIN — PANTOPRAZOLE SODIUM 40 MG: 40 TABLET, DELAYED RELEASE ORAL at 06:00

## 2022-06-18 RX ADMIN — BUPROPION HYDROCHLORIDE 300 MG: 300 TABLET, EXTENDED RELEASE ORAL at 06:02

## 2022-06-18 RX ADMIN — ENOXAPARIN SODIUM 40 MG: 100 INJECTION SUBCUTANEOUS at 20:13

## 2022-06-18 RX ADMIN — LEVETIRACETAM 500 MG: 500 TABLET, FILM COATED ORAL at 08:11

## 2022-06-18 RX ADMIN — BUSPIRONE HYDROCHLORIDE 10 MG: 10 TABLET ORAL at 20:13

## 2022-06-18 RX ADMIN — OXYCODONE AND ACETAMINOPHEN 1 TABLET: 325; 10 TABLET ORAL at 05:38

## 2022-06-18 RX ADMIN — ONDANSETRON 4 MG: 2 INJECTION INTRAMUSCULAR; INTRAVENOUS at 14:21

## 2022-06-18 RX ADMIN — FLUTICASONE PROPIONATE 1 SPRAY: 50 SPRAY, METERED NASAL at 06:00

## 2022-06-18 RX ADMIN — BUSPIRONE HYDROCHLORIDE 10 MG: 10 TABLET ORAL at 08:11

## 2022-06-18 RX ADMIN — CEFAZOLIN SODIUM 2 G: 2 INJECTION, SOLUTION INTRAVENOUS at 05:13

## 2022-06-18 RX ADMIN — ENOXAPARIN SODIUM 40 MG: 100 INJECTION SUBCUTANEOUS at 08:11

## 2022-06-18 RX ADMIN — ACETAMINOPHEN 650 MG: 325 TABLET ORAL at 15:51

## 2022-06-18 RX ADMIN — LEVETIRACETAM 500 MG: 500 TABLET, FILM COATED ORAL at 20:13

## 2022-06-18 RX ADMIN — GABAPENTIN 600 MG: 300 CAPSULE ORAL at 14:14

## 2022-06-18 RX ADMIN — VENLAFAXINE HYDROCHLORIDE 75 MG: 75 CAPSULE, EXTENDED RELEASE ORAL at 08:11

## 2022-06-18 RX ADMIN — CETIRIZINE HYDROCHLORIDE 10 MG: 10 TABLET ORAL at 08:11

## 2022-06-18 RX ADMIN — TIZANIDINE 4 MG: 4 TABLET ORAL at 05:13

## 2022-06-18 RX ADMIN — LEVETIRACETAM 500 MG: 500 TABLET, FILM COATED ORAL at 15:48

## 2022-06-18 RX ADMIN — CEFAZOLIN SODIUM 2 G: 2 INJECTION, SOLUTION INTRAVENOUS at 20:13

## 2022-06-18 RX ADMIN — CEFAZOLIN SODIUM 2 G: 2 INJECTION, SOLUTION INTRAVENOUS at 12:23

## 2022-06-18 RX ADMIN — BUDESONIDE AND FORMOTEROL FUMARATE DIHYDRATE 2 PUFF: 160; 4.5 AEROSOL RESPIRATORY (INHALATION) at 20:44

## 2022-06-18 RX ADMIN — TIZANIDINE 4 MG: 4 TABLET ORAL at 22:18

## 2022-06-18 RX ADMIN — BUSPIRONE HYDROCHLORIDE 10 MG: 10 TABLET ORAL at 15:48

## 2022-06-18 RX ADMIN — MONTELUKAST SODIUM 10 MG: 10 TABLET, FILM COATED ORAL at 02:00

## 2022-06-18 RX ADMIN — GABAPENTIN 600 MG: 300 CAPSULE ORAL at 22:18

## 2022-06-18 RX ADMIN — OXYCODONE AND ACETAMINOPHEN 1 TABLET: 325; 10 TABLET ORAL at 17:28

## 2022-06-18 RX ADMIN — TIZANIDINE 4 MG: 4 TABLET ORAL at 11:45

## 2022-06-19 PROCEDURE — 94664 DEMO&/EVAL PT USE INHALER: CPT

## 2022-06-19 PROCEDURE — 96376 TX/PRO/DX INJ SAME DRUG ADON: CPT

## 2022-06-19 PROCEDURE — 25010000002 ENOXAPARIN PER 10 MG: Performed by: STUDENT IN AN ORGANIZED HEALTH CARE EDUCATION/TRAINING PROGRAM

## 2022-06-19 PROCEDURE — 94799 UNLISTED PULMONARY SVC/PX: CPT

## 2022-06-19 PROCEDURE — 25010000002 ONDANSETRON PER 1 MG: Performed by: STUDENT IN AN ORGANIZED HEALTH CARE EDUCATION/TRAINING PROGRAM

## 2022-06-19 PROCEDURE — 25010000002 CEFAZOLIN IN DEXTROSE 2-4 GM/100ML-% SOLUTION: Performed by: STUDENT IN AN ORGANIZED HEALTH CARE EDUCATION/TRAINING PROGRAM

## 2022-06-19 PROCEDURE — 96372 THER/PROPH/DIAG INJ SC/IM: CPT

## 2022-06-19 PROCEDURE — G0378 HOSPITAL OBSERVATION PER HR: HCPCS

## 2022-06-19 RX ORDER — CEPHALEXIN 500 MG/1
500 CAPSULE ORAL 3 TIMES DAILY
Qty: 15 CAPSULE | Refills: 0 | Status: SHIPPED | OUTPATIENT
Start: 2022-06-19 | End: 2022-06-24

## 2022-06-19 RX ADMIN — OXYCODONE AND ACETAMINOPHEN 1 TABLET: 325; 10 TABLET ORAL at 20:30

## 2022-06-19 RX ADMIN — BUSPIRONE HYDROCHLORIDE 10 MG: 10 TABLET ORAL at 20:30

## 2022-06-19 RX ADMIN — TIZANIDINE 4 MG: 4 TABLET ORAL at 22:59

## 2022-06-19 RX ADMIN — ONDANSETRON 4 MG: 2 INJECTION INTRAMUSCULAR; INTRAVENOUS at 03:17

## 2022-06-19 RX ADMIN — LEVETIRACETAM 500 MG: 500 TABLET, FILM COATED ORAL at 20:30

## 2022-06-19 RX ADMIN — GABAPENTIN 600 MG: 300 CAPSULE ORAL at 05:00

## 2022-06-19 RX ADMIN — BUSPIRONE HYDROCHLORIDE 10 MG: 10 TABLET ORAL at 07:54

## 2022-06-19 RX ADMIN — PANTOPRAZOLE SODIUM 40 MG: 40 TABLET, DELAYED RELEASE ORAL at 06:19

## 2022-06-19 RX ADMIN — LEVETIRACETAM 500 MG: 500 TABLET, FILM COATED ORAL at 16:20

## 2022-06-19 RX ADMIN — LEVETIRACETAM 500 MG: 500 TABLET, FILM COATED ORAL at 07:54

## 2022-06-19 RX ADMIN — GABAPENTIN 600 MG: 300 CAPSULE ORAL at 13:59

## 2022-06-19 RX ADMIN — CETIRIZINE HYDROCHLORIDE 10 MG: 10 TABLET ORAL at 07:54

## 2022-06-19 RX ADMIN — OXYCODONE AND ACETAMINOPHEN 1 TABLET: 325; 10 TABLET ORAL at 13:59

## 2022-06-19 RX ADMIN — TIZANIDINE 4 MG: 4 TABLET ORAL at 06:19

## 2022-06-19 RX ADMIN — MONTELUKAST SODIUM 10 MG: 10 TABLET, FILM COATED ORAL at 20:30

## 2022-06-19 RX ADMIN — GABAPENTIN 600 MG: 300 CAPSULE ORAL at 22:59

## 2022-06-19 RX ADMIN — TIZANIDINE 4 MG: 4 TABLET ORAL at 13:59

## 2022-06-19 RX ADMIN — CEFAZOLIN SODIUM 2 G: 2 INJECTION, SOLUTION INTRAVENOUS at 04:59

## 2022-06-19 RX ADMIN — CEFAZOLIN SODIUM 2 G: 2 INJECTION, SOLUTION INTRAVENOUS at 13:53

## 2022-06-19 RX ADMIN — BUPROPION HYDROCHLORIDE 300 MG: 300 TABLET, EXTENDED RELEASE ORAL at 06:19

## 2022-06-19 RX ADMIN — ACETAMINOPHEN 650 MG: 325 TABLET ORAL at 03:12

## 2022-06-19 RX ADMIN — CEFAZOLIN SODIUM 2 G: 2 INJECTION, SOLUTION INTRAVENOUS at 20:30

## 2022-06-19 RX ADMIN — OXYCODONE AND ACETAMINOPHEN 1 TABLET: 325; 10 TABLET ORAL at 07:54

## 2022-06-19 RX ADMIN — ENOXAPARIN SODIUM 40 MG: 100 INJECTION SUBCUTANEOUS at 07:54

## 2022-06-19 RX ADMIN — BUSPIRONE HYDROCHLORIDE 10 MG: 10 TABLET ORAL at 16:20

## 2022-06-19 RX ADMIN — FLUTICASONE PROPIONATE 1 SPRAY: 50 SPRAY, METERED NASAL at 06:19

## 2022-06-19 RX ADMIN — VENLAFAXINE HYDROCHLORIDE 75 MG: 75 CAPSULE, EXTENDED RELEASE ORAL at 07:54

## 2022-06-19 RX ADMIN — BUDESONIDE AND FORMOTEROL FUMARATE DIHYDRATE 2 PUFF: 160; 4.5 AEROSOL RESPIRATORY (INHALATION) at 08:28

## 2022-06-19 RX ADMIN — BUDESONIDE AND FORMOTEROL FUMARATE DIHYDRATE 2 PUFF: 160; 4.5 AEROSOL RESPIRATORY (INHALATION) at 22:45

## 2022-06-19 RX ADMIN — OXYCODONE AND ACETAMINOPHEN 1 TABLET: 325; 10 TABLET ORAL at 00:46

## 2022-06-19 RX ADMIN — ENOXAPARIN SODIUM 40 MG: 100 INJECTION SUBCUTANEOUS at 20:30

## 2022-06-20 VITALS
BODY MASS INDEX: 44.95 KG/M2 | WEIGHT: 286.38 LBS | HEART RATE: 72 BPM | SYSTOLIC BLOOD PRESSURE: 134 MMHG | OXYGEN SATURATION: 92 % | TEMPERATURE: 98.6 F | HEIGHT: 67 IN | DIASTOLIC BLOOD PRESSURE: 77 MMHG | RESPIRATION RATE: 18 BRPM

## 2022-06-20 PROCEDURE — 96372 THER/PROPH/DIAG INJ SC/IM: CPT

## 2022-06-20 PROCEDURE — 94799 UNLISTED PULMONARY SVC/PX: CPT

## 2022-06-20 PROCEDURE — 25010000002 ENOXAPARIN PER 10 MG: Performed by: STUDENT IN AN ORGANIZED HEALTH CARE EDUCATION/TRAINING PROGRAM

## 2022-06-20 PROCEDURE — 96376 TX/PRO/DX INJ SAME DRUG ADON: CPT

## 2022-06-20 PROCEDURE — G0378 HOSPITAL OBSERVATION PER HR: HCPCS

## 2022-06-20 PROCEDURE — 25010000002 CEFAZOLIN IN DEXTROSE 2-4 GM/100ML-% SOLUTION: Performed by: STUDENT IN AN ORGANIZED HEALTH CARE EDUCATION/TRAINING PROGRAM

## 2022-06-20 PROCEDURE — 63710000001 ONDANSETRON PER 8 MG: Performed by: STUDENT IN AN ORGANIZED HEALTH CARE EDUCATION/TRAINING PROGRAM

## 2022-06-20 PROCEDURE — 25010000002 ONDANSETRON PER 1 MG: Performed by: STUDENT IN AN ORGANIZED HEALTH CARE EDUCATION/TRAINING PROGRAM

## 2022-06-20 PROCEDURE — 96365 THER/PROPH/DIAG IV INF INIT: CPT

## 2022-06-20 PROCEDURE — 94664 DEMO&/EVAL PT USE INHALER: CPT

## 2022-06-20 RX ADMIN — ONDANSETRON 4 MG: 2 INJECTION INTRAMUSCULAR; INTRAVENOUS at 01:09

## 2022-06-20 RX ADMIN — LEVETIRACETAM 500 MG: 500 TABLET, FILM COATED ORAL at 08:56

## 2022-06-20 RX ADMIN — CEFAZOLIN SODIUM 2 G: 2 INJECTION, SOLUTION INTRAVENOUS at 06:03

## 2022-06-20 RX ADMIN — CETIRIZINE HYDROCHLORIDE 10 MG: 10 TABLET ORAL at 08:56

## 2022-06-20 RX ADMIN — ONDANSETRON HYDROCHLORIDE 4 MG: 4 TABLET, FILM COATED ORAL at 10:29

## 2022-06-20 RX ADMIN — BUDESONIDE AND FORMOTEROL FUMARATE DIHYDRATE 2 PUFF: 160; 4.5 AEROSOL RESPIRATORY (INHALATION) at 11:07

## 2022-06-20 RX ADMIN — OXYCODONE AND ACETAMINOPHEN 1 TABLET: 325; 10 TABLET ORAL at 06:03

## 2022-06-20 RX ADMIN — ENOXAPARIN SODIUM 40 MG: 100 INJECTION SUBCUTANEOUS at 08:55

## 2022-06-20 RX ADMIN — OXYCODONE AND ACETAMINOPHEN 1 TABLET: 325; 10 TABLET ORAL at 13:01

## 2022-06-20 RX ADMIN — BUSPIRONE HYDROCHLORIDE 10 MG: 10 TABLET ORAL at 08:56

## 2022-06-20 RX ADMIN — LEVETIRACETAM 500 MG: 500 TABLET, FILM COATED ORAL at 16:28

## 2022-06-20 RX ADMIN — GABAPENTIN 600 MG: 300 CAPSULE ORAL at 13:04

## 2022-06-20 RX ADMIN — FLUTICASONE PROPIONATE 1 SPRAY: 50 SPRAY, METERED NASAL at 06:04

## 2022-06-20 RX ADMIN — PANTOPRAZOLE SODIUM 40 MG: 40 TABLET, DELAYED RELEASE ORAL at 06:03

## 2022-06-20 RX ADMIN — BUPROPION HYDROCHLORIDE 300 MG: 300 TABLET, EXTENDED RELEASE ORAL at 06:03

## 2022-06-20 RX ADMIN — CEFAZOLIN SODIUM 2 G: 2 INJECTION, SOLUTION INTRAVENOUS at 14:58

## 2022-06-20 RX ADMIN — BUSPIRONE HYDROCHLORIDE 10 MG: 10 TABLET ORAL at 16:28

## 2022-06-20 RX ADMIN — VENLAFAXINE HYDROCHLORIDE 75 MG: 75 CAPSULE, EXTENDED RELEASE ORAL at 08:56

## 2022-06-20 RX ADMIN — GABAPENTIN 600 MG: 300 CAPSULE ORAL at 06:03

## 2022-06-22 LAB
BACTERIA SPEC AEROBE CULT: NORMAL
BACTERIA SPEC AEROBE CULT: NORMAL

## 2022-06-23 ENCOUNTER — OFFICE VISIT (OUTPATIENT)
Dept: PAIN MEDICINE | Facility: CLINIC | Age: 57
End: 2022-06-23

## 2022-06-23 VITALS
OXYGEN SATURATION: 96 % | SYSTOLIC BLOOD PRESSURE: 126 MMHG | BODY MASS INDEX: 43.32 KG/M2 | TEMPERATURE: 97.5 F | DIASTOLIC BLOOD PRESSURE: 75 MMHG | RESPIRATION RATE: 18 BRPM | WEIGHT: 276 LBS | HEART RATE: 72 BPM | HEIGHT: 67 IN

## 2022-06-23 DIAGNOSIS — Z74.09 IMPAIRED MOBILITY: ICD-10-CM

## 2022-06-23 DIAGNOSIS — M50.122 CERVICAL DISC DISORDER AT C5-C6 LEVEL WITH RADICULOPATHY: ICD-10-CM

## 2022-06-23 DIAGNOSIS — Z79.899 ENCOUNTER FOR LONG-TERM (CURRENT) USE OF HIGH-RISK MEDICATION: ICD-10-CM

## 2022-06-23 DIAGNOSIS — G89.4 CHRONIC PAIN SYNDROME: Primary | ICD-10-CM

## 2022-06-23 DIAGNOSIS — M96.1 POSTLAMINECTOMY SYNDROME OF THORACIC REGION: ICD-10-CM

## 2022-06-23 DIAGNOSIS — M50.123 CERVICAL DISC DISORDER AT C6-C7 LEVEL WITH RADICULOPATHY: ICD-10-CM

## 2022-06-23 DIAGNOSIS — M51.36 DDD (DEGENERATIVE DISC DISEASE), LUMBAR: ICD-10-CM

## 2022-06-23 DIAGNOSIS — Z87.39 HISTORY OF OSTEOMYELITIS: ICD-10-CM

## 2022-06-23 DIAGNOSIS — G89.4 CHRONIC PAIN SYNDROME: ICD-10-CM

## 2022-06-23 PROCEDURE — 99214 OFFICE O/P EST MOD 30 MIN: CPT | Performed by: NURSE PRACTITIONER

## 2022-06-23 RX ORDER — OXYCODONE AND ACETAMINOPHEN 10; 325 MG/1; MG/1
1 TABLET ORAL EVERY 6 HOURS PRN
Qty: 120 TABLET | Refills: 0 | Status: SHIPPED | OUTPATIENT
Start: 2022-06-23 | End: 2022-07-26 | Stop reason: SDUPTHER

## 2022-07-18 RX ORDER — TIZANIDINE 4 MG/1
4 TABLET ORAL NIGHTLY PRN
Qty: 90 TABLET | Refills: 0 | Status: SHIPPED | OUTPATIENT
Start: 2022-07-18 | End: 2022-08-18

## 2022-07-26 DIAGNOSIS — M50.122 CERVICAL DISC DISORDER AT C5-C6 LEVEL WITH RADICULOPATHY: ICD-10-CM

## 2022-07-26 DIAGNOSIS — Z74.09 IMPAIRED MOBILITY: ICD-10-CM

## 2022-07-26 DIAGNOSIS — G89.4 CHRONIC PAIN SYNDROME: ICD-10-CM

## 2022-07-26 DIAGNOSIS — M50.123 CERVICAL DISC DISORDER AT C6-C7 LEVEL WITH RADICULOPATHY: ICD-10-CM

## 2022-07-26 RX ORDER — OXYCODONE AND ACETAMINOPHEN 10; 325 MG/1; MG/1
1 TABLET ORAL EVERY 6 HOURS PRN
Qty: 120 TABLET | Refills: 0 | Status: SHIPPED | OUTPATIENT
Start: 2022-07-26 | End: 2022-08-31 | Stop reason: SDUPTHER

## 2022-07-29 ENCOUNTER — OFFICE VISIT (OUTPATIENT)
Dept: PAIN MEDICINE | Facility: CLINIC | Age: 57
End: 2022-07-29

## 2022-07-29 VITALS
BODY MASS INDEX: 45.11 KG/M2 | OXYGEN SATURATION: 95 % | TEMPERATURE: 96.5 F | DIASTOLIC BLOOD PRESSURE: 86 MMHG | HEART RATE: 75 BPM | SYSTOLIC BLOOD PRESSURE: 149 MMHG | HEIGHT: 67 IN | RESPIRATION RATE: 20 BRPM | WEIGHT: 287.4 LBS

## 2022-07-29 DIAGNOSIS — M50.123 CERVICAL DISC DISORDER AT C6-C7 LEVEL WITH RADICULOPATHY: Primary | ICD-10-CM

## 2022-07-29 DIAGNOSIS — Z79.899 ENCOUNTER FOR LONG-TERM (CURRENT) USE OF HIGH-RISK MEDICATION: ICD-10-CM

## 2022-07-29 DIAGNOSIS — M96.1 POSTLAMINECTOMY SYNDROME OF THORACIC REGION: ICD-10-CM

## 2022-07-29 DIAGNOSIS — G89.4 CHRONIC PAIN SYNDROME: ICD-10-CM

## 2022-07-29 DIAGNOSIS — Z87.39 HISTORY OF OSTEOMYELITIS: ICD-10-CM

## 2022-07-29 DIAGNOSIS — M51.36 DDD (DEGENERATIVE DISC DISEASE), LUMBAR: ICD-10-CM

## 2022-07-29 PROCEDURE — 99214 OFFICE O/P EST MOD 30 MIN: CPT | Performed by: NURSE PRACTITIONER

## 2022-08-18 RX ORDER — TIZANIDINE 4 MG/1
TABLET ORAL
Qty: 60 TABLET | Refills: 0 | Status: SHIPPED | OUTPATIENT
Start: 2022-08-18 | End: 2022-08-19

## 2022-08-18 RX ORDER — LEVETIRACETAM 500 MG/1
TABLET ORAL
Qty: 90 TABLET | Refills: 0 | Status: SHIPPED | OUTPATIENT
Start: 2022-08-18 | End: 2022-09-14 | Stop reason: SDUPTHER

## 2022-08-19 ENCOUNTER — TELEPHONE (OUTPATIENT)
Dept: PAIN MEDICINE | Facility: CLINIC | Age: 57
End: 2022-08-19

## 2022-08-19 RX ORDER — TIZANIDINE 4 MG/1
4 TABLET ORAL 3 TIMES DAILY PRN
Qty: 90 TABLET | Refills: 0 | Status: SHIPPED | OUTPATIENT
Start: 2022-08-19 | End: 2022-09-15

## 2022-08-31 ENCOUNTER — OFFICE VISIT (OUTPATIENT)
Dept: PAIN MEDICINE | Facility: CLINIC | Age: 57
End: 2022-08-31

## 2022-08-31 VITALS
HEART RATE: 97 BPM | OXYGEN SATURATION: 96 % | HEIGHT: 67 IN | DIASTOLIC BLOOD PRESSURE: 81 MMHG | RESPIRATION RATE: 20 BRPM | BODY MASS INDEX: 44.42 KG/M2 | SYSTOLIC BLOOD PRESSURE: 133 MMHG | WEIGHT: 283 LBS | TEMPERATURE: 96.9 F

## 2022-08-31 DIAGNOSIS — Z87.39 HISTORY OF OSTEOMYELITIS: ICD-10-CM

## 2022-08-31 DIAGNOSIS — M51.36 DDD (DEGENERATIVE DISC DISEASE), LUMBAR: ICD-10-CM

## 2022-08-31 DIAGNOSIS — M50.123 CERVICAL DISC DISORDER AT C6-C7 LEVEL WITH RADICULOPATHY: Primary | ICD-10-CM

## 2022-08-31 DIAGNOSIS — M50.123 CERVICAL DISC DISORDER AT C6-C7 LEVEL WITH RADICULOPATHY: ICD-10-CM

## 2022-08-31 DIAGNOSIS — Z79.899 ENCOUNTER FOR LONG-TERM (CURRENT) USE OF HIGH-RISK MEDICATION: ICD-10-CM

## 2022-08-31 DIAGNOSIS — M50.122 CERVICAL DISC DISORDER AT C5-C6 LEVEL WITH RADICULOPATHY: ICD-10-CM

## 2022-08-31 DIAGNOSIS — Z74.09 IMPAIRED MOBILITY: ICD-10-CM

## 2022-08-31 DIAGNOSIS — G89.4 CHRONIC PAIN SYNDROME: ICD-10-CM

## 2022-08-31 DIAGNOSIS — M96.1 POSTLAMINECTOMY SYNDROME OF THORACIC REGION: ICD-10-CM

## 2022-08-31 DIAGNOSIS — G89.29 OTHER CHRONIC PAIN: ICD-10-CM

## 2022-08-31 PROCEDURE — 99214 OFFICE O/P EST MOD 30 MIN: CPT | Performed by: NURSE PRACTITIONER

## 2022-08-31 RX ORDER — GABAPENTIN 600 MG/1
TABLET ORAL
Qty: 180 TABLET | Refills: 0 | Status: SHIPPED | OUTPATIENT
Start: 2022-08-31 | End: 2022-09-28 | Stop reason: SDUPTHER

## 2022-08-31 RX ORDER — ALBUTEROL SULFATE 90 UG/1
2 AEROSOL, METERED RESPIRATORY (INHALATION) EVERY 4 HOURS PRN
COMMUNITY
Start: 2022-07-29

## 2022-08-31 RX ORDER — OXYCODONE AND ACETAMINOPHEN 10; 325 MG/1; MG/1
1 TABLET ORAL EVERY 6 HOURS PRN
Qty: 120 TABLET | Refills: 0 | Status: SHIPPED | OUTPATIENT
Start: 2022-08-31 | End: 2022-09-28 | Stop reason: SDUPTHER

## 2022-09-14 RX ORDER — LEVETIRACETAM 500 MG/1
500 TABLET ORAL 3 TIMES DAILY
Qty: 90 TABLET | Refills: 0 | Status: SHIPPED | OUTPATIENT
Start: 2022-09-14 | End: 2022-10-18

## 2022-09-15 RX ORDER — TIZANIDINE 4 MG/1
TABLET ORAL
Qty: 90 TABLET | Refills: 0 | Status: SHIPPED | OUTPATIENT
Start: 2022-09-15 | End: 2022-10-12

## 2022-09-28 ENCOUNTER — OFFICE VISIT (OUTPATIENT)
Dept: PAIN MEDICINE | Facility: CLINIC | Age: 57
End: 2022-09-28

## 2022-09-28 VITALS
TEMPERATURE: 96.4 F | WEIGHT: 286 LBS | HEIGHT: 67 IN | BODY MASS INDEX: 44.89 KG/M2 | SYSTOLIC BLOOD PRESSURE: 136 MMHG | OXYGEN SATURATION: 95 % | HEART RATE: 82 BPM | RESPIRATION RATE: 20 BRPM | DIASTOLIC BLOOD PRESSURE: 82 MMHG

## 2022-09-28 DIAGNOSIS — Z87.39 HISTORY OF OSTEOMYELITIS: ICD-10-CM

## 2022-09-28 DIAGNOSIS — Z79.899 ENCOUNTER FOR LONG-TERM (CURRENT) USE OF HIGH-RISK MEDICATION: ICD-10-CM

## 2022-09-28 DIAGNOSIS — Z74.09 IMPAIRED MOBILITY: ICD-10-CM

## 2022-09-28 DIAGNOSIS — M50.122 CERVICAL DISC DISORDER AT C5-C6 LEVEL WITH RADICULOPATHY: ICD-10-CM

## 2022-09-28 DIAGNOSIS — M17.11 OSTEOARTHRITIS OF RIGHT KNEE, UNSPECIFIED OSTEOARTHRITIS TYPE: ICD-10-CM

## 2022-09-28 DIAGNOSIS — G89.4 CHRONIC PAIN SYNDROME: ICD-10-CM

## 2022-09-28 DIAGNOSIS — M50.123 CERVICAL DISC DISORDER AT C6-C7 LEVEL WITH RADICULOPATHY: ICD-10-CM

## 2022-09-28 DIAGNOSIS — G89.29 OTHER CHRONIC PAIN: Primary | ICD-10-CM

## 2022-09-28 DIAGNOSIS — M96.1 POSTLAMINECTOMY SYNDROME OF THORACIC REGION: ICD-10-CM

## 2022-09-28 DIAGNOSIS — G89.29 OTHER CHRONIC PAIN: ICD-10-CM

## 2022-09-28 DIAGNOSIS — M25.561 CHRONIC PAIN OF RIGHT KNEE: ICD-10-CM

## 2022-09-28 DIAGNOSIS — G89.29 CHRONIC PAIN OF RIGHT KNEE: ICD-10-CM

## 2022-09-28 PROCEDURE — 99214 OFFICE O/P EST MOD 30 MIN: CPT | Performed by: NURSE PRACTITIONER

## 2022-09-28 RX ORDER — GABAPENTIN 600 MG/1
1200 TABLET ORAL 3 TIMES DAILY
Qty: 180 TABLET | Refills: 0 | Status: SHIPPED | OUTPATIENT
Start: 2022-09-28 | End: 2022-10-28 | Stop reason: SDUPTHER

## 2022-09-28 RX ORDER — OXYCODONE AND ACETAMINOPHEN 10; 325 MG/1; MG/1
1 TABLET ORAL EVERY 6 HOURS PRN
Qty: 120 TABLET | Refills: 0 | Status: SHIPPED | OUTPATIENT
Start: 2022-09-28 | End: 2022-10-28 | Stop reason: SDUPTHER

## 2022-10-03 ENCOUNTER — TELEPHONE (OUTPATIENT)
Dept: ORTHOPEDIC SURGERY | Facility: CLINIC | Age: 57
End: 2022-10-03

## 2022-10-11 ENCOUNTER — CLINICAL SUPPORT (OUTPATIENT)
Dept: ORTHOPEDIC SURGERY | Facility: CLINIC | Age: 57
End: 2022-10-11

## 2022-10-11 VITALS — TEMPERATURE: 97.5 F | HEIGHT: 67 IN | BODY MASS INDEX: 44.29 KG/M2 | WEIGHT: 282.2 LBS

## 2022-10-11 DIAGNOSIS — R52 PAIN: Primary | ICD-10-CM

## 2022-10-11 DIAGNOSIS — M17.0 PRIMARY OSTEOARTHRITIS OF BOTH KNEES: ICD-10-CM

## 2022-10-11 PROCEDURE — 73562 X-RAY EXAM OF KNEE 3: CPT | Performed by: NURSE PRACTITIONER

## 2022-10-11 PROCEDURE — 20610 DRAIN/INJ JOINT/BURSA W/O US: CPT | Performed by: NURSE PRACTITIONER

## 2022-10-11 RX ORDER — METHYLPREDNISOLONE ACETATE 80 MG/ML
80 INJECTION, SUSPENSION INTRA-ARTICULAR; INTRALESIONAL; INTRAMUSCULAR; SOFT TISSUE
Status: COMPLETED | OUTPATIENT
Start: 2022-10-11 | End: 2022-10-11

## 2022-10-11 RX ORDER — LIDOCAINE HYDROCHLORIDE 10 MG/ML
1 INJECTION, SOLUTION EPIDURAL; INFILTRATION; INTRACAUDAL; PERINEURAL
Status: COMPLETED | OUTPATIENT
Start: 2022-10-11 | End: 2022-10-11

## 2022-10-11 RX ADMIN — LIDOCAINE HYDROCHLORIDE 1 ML: 10 INJECTION, SOLUTION EPIDURAL; INFILTRATION; INTRACAUDAL; PERINEURAL at 10:34

## 2022-10-11 RX ADMIN — METHYLPREDNISOLONE ACETATE 80 MG: 80 INJECTION, SUSPENSION INTRA-ARTICULAR; INTRALESIONAL; INTRAMUSCULAR; SOFT TISSUE at 10:34

## 2022-10-12 RX ORDER — TIZANIDINE 4 MG/1
TABLET ORAL
Qty: 90 TABLET | Refills: 0 | Status: SHIPPED | OUTPATIENT
Start: 2022-10-12 | End: 2022-11-18

## 2022-10-18 RX ORDER — LEVETIRACETAM 500 MG/1
TABLET ORAL
Qty: 90 TABLET | Refills: 0 | Status: SHIPPED | OUTPATIENT
Start: 2022-10-18 | End: 2022-11-18

## 2022-10-28 ENCOUNTER — OFFICE VISIT (OUTPATIENT)
Dept: PAIN MEDICINE | Facility: CLINIC | Age: 57
End: 2022-10-28

## 2022-10-28 VITALS
HEART RATE: 87 BPM | DIASTOLIC BLOOD PRESSURE: 50 MMHG | WEIGHT: 280.4 LBS | OXYGEN SATURATION: 94 % | SYSTOLIC BLOOD PRESSURE: 110 MMHG | TEMPERATURE: 96.8 F | BODY MASS INDEX: 44.01 KG/M2 | RESPIRATION RATE: 18 BRPM | HEIGHT: 67 IN

## 2022-10-28 DIAGNOSIS — M51.36 DDD (DEGENERATIVE DISC DISEASE), LUMBAR: ICD-10-CM

## 2022-10-28 DIAGNOSIS — M54.12 CERVICAL RADICULOPATHY: ICD-10-CM

## 2022-10-28 DIAGNOSIS — Z79.899 ENCOUNTER FOR LONG-TERM (CURRENT) USE OF HIGH-RISK MEDICATION: ICD-10-CM

## 2022-10-28 DIAGNOSIS — G89.4 CHRONIC PAIN SYNDROME: ICD-10-CM

## 2022-10-28 DIAGNOSIS — M50.122 CERVICAL DISC DISORDER AT C5-C6 LEVEL WITH RADICULOPATHY: ICD-10-CM

## 2022-10-28 DIAGNOSIS — Z74.09 IMPAIRED MOBILITY: ICD-10-CM

## 2022-10-28 DIAGNOSIS — M96.1 POSTLAMINECTOMY SYNDROME OF THORACIC REGION: ICD-10-CM

## 2022-10-28 DIAGNOSIS — G89.29 OTHER CHRONIC PAIN: ICD-10-CM

## 2022-10-28 DIAGNOSIS — M50.123 CERVICAL DISC DISORDER AT C6-C7 LEVEL WITH RADICULOPATHY: ICD-10-CM

## 2022-10-28 DIAGNOSIS — Z87.39 HISTORY OF OSTEOMYELITIS: ICD-10-CM

## 2022-10-28 DIAGNOSIS — G89.4 CHRONIC PAIN SYNDROME: Primary | ICD-10-CM

## 2022-10-28 PROCEDURE — 99214 OFFICE O/P EST MOD 30 MIN: CPT | Performed by: NURSE PRACTITIONER

## 2022-10-28 RX ORDER — OXYCODONE AND ACETAMINOPHEN 10; 325 MG/1; MG/1
1 TABLET ORAL EVERY 6 HOURS PRN
Qty: 120 TABLET | Refills: 0 | Status: SHIPPED | OUTPATIENT
Start: 2022-10-28 | End: 2022-11-29 | Stop reason: SDUPTHER

## 2022-10-28 RX ORDER — GABAPENTIN 600 MG/1
1200 TABLET ORAL 3 TIMES DAILY
Qty: 180 TABLET | Refills: 0 | Status: SHIPPED | OUTPATIENT
Start: 2022-10-28 | End: 2022-11-29 | Stop reason: SDUPTHER

## 2022-11-18 RX ORDER — LEVETIRACETAM 500 MG/1
TABLET ORAL
Qty: 90 TABLET | Refills: 0 | Status: SHIPPED | OUTPATIENT
Start: 2022-11-18 | End: 2022-12-16

## 2022-11-18 RX ORDER — TIZANIDINE 4 MG/1
TABLET ORAL
Qty: 90 TABLET | Refills: 0 | Status: SHIPPED | OUTPATIENT
Start: 2022-11-18 | End: 2022-11-29 | Stop reason: SDUPTHER

## 2022-11-27 DIAGNOSIS — G89.29 OTHER CHRONIC PAIN: ICD-10-CM

## 2022-11-29 ENCOUNTER — OFFICE VISIT (OUTPATIENT)
Dept: PAIN MEDICINE | Facility: CLINIC | Age: 57
End: 2022-11-29

## 2022-11-29 VITALS
BODY MASS INDEX: 44.1 KG/M2 | HEART RATE: 77 BPM | TEMPERATURE: 96.4 F | RESPIRATION RATE: 20 BRPM | WEIGHT: 281 LBS | OXYGEN SATURATION: 97 % | DIASTOLIC BLOOD PRESSURE: 85 MMHG | SYSTOLIC BLOOD PRESSURE: 163 MMHG | HEIGHT: 67 IN

## 2022-11-29 DIAGNOSIS — M50.123 CERVICAL DISC DISORDER AT C6-C7 LEVEL WITH RADICULOPATHY: ICD-10-CM

## 2022-11-29 DIAGNOSIS — M50.122 CERVICAL DISC DISORDER AT C5-C6 LEVEL WITH RADICULOPATHY: ICD-10-CM

## 2022-11-29 DIAGNOSIS — Z87.39 HISTORY OF OSTEOMYELITIS: ICD-10-CM

## 2022-11-29 DIAGNOSIS — Z74.09 IMPAIRED MOBILITY: ICD-10-CM

## 2022-11-29 DIAGNOSIS — Z79.899 ENCOUNTER FOR LONG-TERM (CURRENT) USE OF HIGH-RISK MEDICATION: ICD-10-CM

## 2022-11-29 DIAGNOSIS — G89.29 OTHER CHRONIC PAIN: ICD-10-CM

## 2022-11-29 DIAGNOSIS — G89.4 CHRONIC PAIN SYNDROME: ICD-10-CM

## 2022-11-29 DIAGNOSIS — M50.123 CERVICAL DISC DISORDER AT C6-C7 LEVEL WITH RADICULOPATHY: Primary | ICD-10-CM

## 2022-11-29 DIAGNOSIS — M96.1 POSTLAMINECTOMY SYNDROME OF THORACIC REGION: ICD-10-CM

## 2022-11-29 LAB
POC AMPHETAMINES: ABNORMAL
POC BARBITURATES: ABNORMAL
POC BENZODIAZEPHINES: ABNORMAL
POC COCAINE: ABNORMAL
POC METHADONE: ABNORMAL
POC METHAMPHETAMINE SCREEN URINE: ABNORMAL
POC OPIATES: ABNORMAL
POC OXYCODONE: ABNORMAL
POC PHENCYCLIDINE: ABNORMAL
POC PROPOXYPHENE: ABNORMAL
POC THC: ABNORMAL
POC TRICYCLIC ANTIDEPRESSANTS: ABNORMAL

## 2022-11-29 PROCEDURE — 80305 DRUG TEST PRSMV DIR OPT OBS: CPT

## 2022-11-29 PROCEDURE — 99214 OFFICE O/P EST MOD 30 MIN: CPT

## 2022-11-29 RX ORDER — BUSPIRONE HYDROCHLORIDE 10 MG/1
1 TABLET ORAL 3 TIMES DAILY
COMMUNITY
Start: 2022-10-18

## 2022-11-29 RX ORDER — GABAPENTIN 600 MG/1
TABLET ORAL
Qty: 180 TABLET | Refills: 0 | OUTPATIENT
Start: 2022-11-29

## 2022-11-29 RX ORDER — OXYCODONE AND ACETAMINOPHEN 10; 325 MG/1; MG/1
1 TABLET ORAL EVERY 6 HOURS PRN
Qty: 120 TABLET | Refills: 0 | Status: SHIPPED | OUTPATIENT
Start: 2022-11-29 | End: 2022-12-30 | Stop reason: SDUPTHER

## 2022-11-29 RX ORDER — TIZANIDINE 4 MG/1
4 TABLET ORAL 3 TIMES DAILY PRN
Qty: 90 TABLET | Refills: 1 | Status: SHIPPED | OUTPATIENT
Start: 2022-11-29 | End: 2022-12-30 | Stop reason: SDUPTHER

## 2022-11-29 RX ORDER — GABAPENTIN 600 MG/1
1200 TABLET ORAL 3 TIMES DAILY
Qty: 180 TABLET | Refills: 0 | Status: SHIPPED | OUTPATIENT
Start: 2022-11-29 | End: 2022-12-30 | Stop reason: SDUPTHER

## 2022-12-16 RX ORDER — LEVETIRACETAM 500 MG/1
TABLET ORAL
Qty: 90 TABLET | Refills: 0 | Status: SHIPPED | OUTPATIENT
Start: 2022-12-16 | End: 2022-12-30 | Stop reason: SDUPTHER

## 2022-12-29 DIAGNOSIS — G89.29 OTHER CHRONIC PAIN: ICD-10-CM

## 2022-12-30 ENCOUNTER — OFFICE VISIT (OUTPATIENT)
Dept: PAIN MEDICINE | Facility: CLINIC | Age: 57
End: 2022-12-30

## 2022-12-30 VITALS
HEIGHT: 67 IN | RESPIRATION RATE: 18 BRPM | TEMPERATURE: 96.6 F | HEART RATE: 85 BPM | SYSTOLIC BLOOD PRESSURE: 124 MMHG | BODY MASS INDEX: 44.73 KG/M2 | WEIGHT: 285 LBS | DIASTOLIC BLOOD PRESSURE: 79 MMHG | OXYGEN SATURATION: 96 %

## 2022-12-30 DIAGNOSIS — M50.122 CERVICAL DISC DISORDER AT C5-C6 LEVEL WITH RADICULOPATHY: ICD-10-CM

## 2022-12-30 DIAGNOSIS — G89.29 OTHER CHRONIC PAIN: Primary | ICD-10-CM

## 2022-12-30 DIAGNOSIS — M96.1 POSTLAMINECTOMY SYNDROME OF THORACIC REGION: ICD-10-CM

## 2022-12-30 DIAGNOSIS — M50.123 CERVICAL DISC DISORDER AT C6-C7 LEVEL WITH RADICULOPATHY: ICD-10-CM

## 2022-12-30 DIAGNOSIS — G89.4 CHRONIC PAIN SYNDROME: ICD-10-CM

## 2022-12-30 DIAGNOSIS — Z74.09 IMPAIRED MOBILITY: ICD-10-CM

## 2022-12-30 DIAGNOSIS — Z79.899 ENCOUNTER FOR LONG-TERM (CURRENT) USE OF HIGH-RISK MEDICATION: ICD-10-CM

## 2022-12-30 DIAGNOSIS — Z87.39 HISTORY OF OSTEOMYELITIS: ICD-10-CM

## 2022-12-30 PROCEDURE — 99214 OFFICE O/P EST MOD 30 MIN: CPT

## 2022-12-30 RX ORDER — TIZANIDINE 4 MG/1
4 TABLET ORAL 3 TIMES DAILY PRN
Qty: 90 TABLET | Refills: 1 | Status: SHIPPED | OUTPATIENT
Start: 2022-12-30 | End: 2023-02-24 | Stop reason: SDUPTHER

## 2022-12-30 RX ORDER — OXYCODONE AND ACETAMINOPHEN 10; 325 MG/1; MG/1
1 TABLET ORAL EVERY 6 HOURS PRN
Qty: 120 TABLET | Refills: 0 | Status: SHIPPED | OUTPATIENT
Start: 2022-12-30 | End: 2023-01-31 | Stop reason: SDUPTHER

## 2022-12-30 RX ORDER — GABAPENTIN 600 MG/1
1200 TABLET ORAL 3 TIMES DAILY
Qty: 180 TABLET | Refills: 0 | Status: SHIPPED | OUTPATIENT
Start: 2022-12-30 | End: 2023-01-31 | Stop reason: SDUPTHER

## 2022-12-30 RX ORDER — LEVETIRACETAM 500 MG/1
500 TABLET ORAL 3 TIMES DAILY
Qty: 90 TABLET | Refills: 0 | Status: SHIPPED | OUTPATIENT
Start: 2022-12-30 | End: 2023-02-20

## 2022-12-30 RX ORDER — GABAPENTIN 600 MG/1
TABLET ORAL
Qty: 180 TABLET | Refills: 0 | OUTPATIENT
Start: 2022-12-30

## 2023-01-13 ENCOUNTER — CLINICAL SUPPORT (OUTPATIENT)
Dept: ORTHOPEDIC SURGERY | Facility: CLINIC | Age: 58
End: 2023-01-13
Payer: MEDICARE

## 2023-01-13 VITALS — TEMPERATURE: 98.6 F | HEIGHT: 67 IN | WEIGHT: 278.5 LBS | BODY MASS INDEX: 43.71 KG/M2

## 2023-01-13 DIAGNOSIS — M17.0 PRIMARY OSTEOARTHRITIS OF BOTH KNEES: Primary | ICD-10-CM

## 2023-01-13 PROCEDURE — 20610 DRAIN/INJ JOINT/BURSA W/O US: CPT | Performed by: NURSE PRACTITIONER

## 2023-01-13 RX ORDER — METHYLPREDNISOLONE ACETATE 80 MG/ML
80 INJECTION, SUSPENSION INTRA-ARTICULAR; INTRALESIONAL; INTRAMUSCULAR; SOFT TISSUE
Status: COMPLETED | OUTPATIENT
Start: 2023-01-13 | End: 2023-01-13

## 2023-01-13 RX ORDER — LIDOCAINE HYDROCHLORIDE 10 MG/ML
1 INJECTION, SOLUTION EPIDURAL; INFILTRATION; INTRACAUDAL; PERINEURAL
Status: COMPLETED | OUTPATIENT
Start: 2023-01-13 | End: 2023-01-13

## 2023-01-13 RX ADMIN — METHYLPREDNISOLONE ACETATE 80 MG: 80 INJECTION, SUSPENSION INTRA-ARTICULAR; INTRALESIONAL; INTRAMUSCULAR; SOFT TISSUE at 11:08

## 2023-01-13 RX ADMIN — LIDOCAINE HYDROCHLORIDE 1 ML: 10 INJECTION, SOLUTION EPIDURAL; INFILTRATION; INTRACAUDAL; PERINEURAL at 11:09

## 2023-01-13 RX ADMIN — LIDOCAINE HYDROCHLORIDE 1 ML: 10 INJECTION, SOLUTION EPIDURAL; INFILTRATION; INTRACAUDAL; PERINEURAL at 11:08

## 2023-01-13 RX ADMIN — METHYLPREDNISOLONE ACETATE 80 MG: 80 INJECTION, SUSPENSION INTRA-ARTICULAR; INTRALESIONAL; INTRAMUSCULAR; SOFT TISSUE at 11:09

## 2023-01-31 ENCOUNTER — OFFICE VISIT (OUTPATIENT)
Dept: PAIN MEDICINE | Facility: CLINIC | Age: 58
End: 2023-01-31
Payer: MEDICARE

## 2023-01-31 VITALS
RESPIRATION RATE: 18 BRPM | WEIGHT: 277.2 LBS | DIASTOLIC BLOOD PRESSURE: 86 MMHG | OXYGEN SATURATION: 95 % | HEART RATE: 88 BPM | BODY MASS INDEX: 43.51 KG/M2 | HEIGHT: 67 IN | TEMPERATURE: 96.4 F | SYSTOLIC BLOOD PRESSURE: 116 MMHG

## 2023-01-31 DIAGNOSIS — M51.36 DDD (DEGENERATIVE DISC DISEASE), LUMBAR: ICD-10-CM

## 2023-01-31 DIAGNOSIS — M96.1 POSTLAMINECTOMY SYNDROME OF THORACIC REGION: ICD-10-CM

## 2023-01-31 DIAGNOSIS — G89.4 CHRONIC PAIN SYNDROME: ICD-10-CM

## 2023-01-31 DIAGNOSIS — M50.122 CERVICAL DISC DISORDER AT C5-C6 LEVEL WITH RADICULOPATHY: ICD-10-CM

## 2023-01-31 DIAGNOSIS — Z79.899 ENCOUNTER FOR LONG-TERM (CURRENT) USE OF HIGH-RISK MEDICATION: ICD-10-CM

## 2023-01-31 DIAGNOSIS — M50.123 CERVICAL DISC DISORDER AT C6-C7 LEVEL WITH RADICULOPATHY: Primary | ICD-10-CM

## 2023-01-31 DIAGNOSIS — M50.123 CERVICAL DISC DISORDER AT C6-C7 LEVEL WITH RADICULOPATHY: ICD-10-CM

## 2023-01-31 PROCEDURE — 99214 OFFICE O/P EST MOD 30 MIN: CPT

## 2023-01-31 RX ORDER — GABAPENTIN 600 MG/1
1200 TABLET ORAL 3 TIMES DAILY
Qty: 180 TABLET | Refills: 0 | Status: SHIPPED | OUTPATIENT
Start: 2023-01-31 | End: 2023-03-02

## 2023-01-31 RX ORDER — OXYCODONE AND ACETAMINOPHEN 10; 325 MG/1; MG/1
1 TABLET ORAL EVERY 6 HOURS PRN
Qty: 120 TABLET | Refills: 0 | Status: SHIPPED | OUTPATIENT
Start: 2023-01-31 | End: 2023-02-24 | Stop reason: SDUPTHER

## 2023-02-18 DIAGNOSIS — M50.123 CERVICAL DISC DISORDER AT C6-C7 LEVEL WITH RADICULOPATHY: ICD-10-CM

## 2023-02-20 RX ORDER — LEVETIRACETAM 500 MG/1
TABLET ORAL
Qty: 90 TABLET | Refills: 1 | Status: SHIPPED | OUTPATIENT
Start: 2023-02-20 | End: 2023-03-20

## 2023-02-24 ENCOUNTER — OFFICE VISIT (OUTPATIENT)
Dept: PAIN MEDICINE | Facility: CLINIC | Age: 58
End: 2023-02-24
Payer: MEDICARE

## 2023-02-24 VITALS
HEART RATE: 69 BPM | RESPIRATION RATE: 18 BRPM | DIASTOLIC BLOOD PRESSURE: 74 MMHG | HEIGHT: 67 IN | WEIGHT: 284.8 LBS | SYSTOLIC BLOOD PRESSURE: 120 MMHG | OXYGEN SATURATION: 94 % | BODY MASS INDEX: 44.7 KG/M2 | TEMPERATURE: 97.8 F

## 2023-02-24 DIAGNOSIS — G89.4 CHRONIC PAIN SYNDROME: ICD-10-CM

## 2023-02-24 DIAGNOSIS — M50.122 CERVICAL DISC DISORDER AT C5-C6 LEVEL WITH RADICULOPATHY: ICD-10-CM

## 2023-02-24 DIAGNOSIS — M50.123 CERVICAL DISC DISORDER AT C6-C7 LEVEL WITH RADICULOPATHY: ICD-10-CM

## 2023-02-24 DIAGNOSIS — M62.838 MUSCLE SPASM: ICD-10-CM

## 2023-02-24 DIAGNOSIS — M96.1 POSTLAMINECTOMY SYNDROME OF THORACIC REGION: ICD-10-CM

## 2023-02-24 DIAGNOSIS — M50.122 CERVICAL DISC DISORDER AT C5-C6 LEVEL WITH RADICULOPATHY: Primary | ICD-10-CM

## 2023-02-24 DIAGNOSIS — Z79.899 ENCOUNTER FOR LONG-TERM (CURRENT) USE OF HIGH-RISK MEDICATION: ICD-10-CM

## 2023-02-24 DIAGNOSIS — M51.36 DDD (DEGENERATIVE DISC DISEASE), LUMBAR: ICD-10-CM

## 2023-02-24 PROCEDURE — 99214 OFFICE O/P EST MOD 30 MIN: CPT

## 2023-02-24 RX ORDER — OXYCODONE AND ACETAMINOPHEN 10; 325 MG/1; MG/1
1 TABLET ORAL EVERY 6 HOURS PRN
Qty: 120 TABLET | Refills: 0 | Status: SHIPPED | OUTPATIENT
Start: 2023-02-24 | End: 2023-03-24 | Stop reason: SDUPTHER

## 2023-02-24 RX ORDER — TIZANIDINE 4 MG/1
4 TABLET ORAL 3 TIMES DAILY PRN
Qty: 90 TABLET | Refills: 1 | Status: SHIPPED | OUTPATIENT
Start: 2023-02-24 | End: 2023-03-20

## 2023-03-02 ENCOUNTER — TELEPHONE (OUTPATIENT)
Dept: PAIN MEDICINE | Facility: CLINIC | Age: 58
End: 2023-03-02

## 2023-03-02 DIAGNOSIS — M51.36 DDD (DEGENERATIVE DISC DISEASE), LUMBAR: ICD-10-CM

## 2023-03-02 DIAGNOSIS — M50.123 CERVICAL DISC DISORDER AT C6-C7 LEVEL WITH RADICULOPATHY: ICD-10-CM

## 2023-03-02 RX ORDER — GABAPENTIN 600 MG/1
TABLET ORAL
Qty: 180 TABLET | Refills: 1 | Status: SHIPPED | OUTPATIENT
Start: 2023-03-02

## 2023-03-20 DIAGNOSIS — M50.123 CERVICAL DISC DISORDER AT C6-C7 LEVEL WITH RADICULOPATHY: ICD-10-CM

## 2023-03-20 DIAGNOSIS — M62.838 MUSCLE SPASM: ICD-10-CM

## 2023-03-20 RX ORDER — LEVETIRACETAM 500 MG/1
TABLET ORAL
Qty: 90 TABLET | Refills: 1 | Status: SHIPPED | OUTPATIENT
Start: 2023-03-20

## 2023-03-20 RX ORDER — TIZANIDINE 4 MG/1
TABLET ORAL
Qty: 90 TABLET | Refills: 1 | Status: SHIPPED | OUTPATIENT
Start: 2023-03-20

## 2023-03-24 ENCOUNTER — OFFICE VISIT (OUTPATIENT)
Dept: PAIN MEDICINE | Facility: CLINIC | Age: 58
End: 2023-03-24
Payer: MEDICARE

## 2023-03-24 VITALS
BODY MASS INDEX: 45.77 KG/M2 | HEART RATE: 86 BPM | OXYGEN SATURATION: 94 % | WEIGHT: 291.6 LBS | SYSTOLIC BLOOD PRESSURE: 129 MMHG | TEMPERATURE: 97.7 F | HEIGHT: 67 IN | DIASTOLIC BLOOD PRESSURE: 83 MMHG

## 2023-03-24 DIAGNOSIS — M50.123 CERVICAL DISC DISORDER AT C6-C7 LEVEL WITH RADICULOPATHY: ICD-10-CM

## 2023-03-24 DIAGNOSIS — G89.4 CHRONIC PAIN SYNDROME: ICD-10-CM

## 2023-03-24 DIAGNOSIS — M50.122 CERVICAL DISC DISORDER AT C5-C6 LEVEL WITH RADICULOPATHY: ICD-10-CM

## 2023-03-24 DIAGNOSIS — M50.123 CERVICAL DISC DISORDER AT C6-C7 LEVEL WITH RADICULOPATHY: Primary | ICD-10-CM

## 2023-03-24 DIAGNOSIS — M51.36 DDD (DEGENERATIVE DISC DISEASE), LUMBAR: ICD-10-CM

## 2023-03-24 DIAGNOSIS — Z79.899 ENCOUNTER FOR LONG-TERM (CURRENT) USE OF HIGH-RISK MEDICATION: ICD-10-CM

## 2023-03-24 DIAGNOSIS — M96.1 POSTLAMINECTOMY SYNDROME OF THORACIC REGION: ICD-10-CM

## 2023-03-24 DIAGNOSIS — M62.838 MUSCLE SPASM: ICD-10-CM

## 2023-03-24 PROCEDURE — 99214 OFFICE O/P EST MOD 30 MIN: CPT

## 2023-03-24 PROCEDURE — 1125F AMNT PAIN NOTED PAIN PRSNT: CPT

## 2023-03-24 PROCEDURE — 1159F MED LIST DOCD IN RCRD: CPT

## 2023-03-24 PROCEDURE — 1160F RVW MEDS BY RX/DR IN RCRD: CPT

## 2023-03-24 RX ORDER — OXYCODONE AND ACETAMINOPHEN 10; 325 MG/1; MG/1
1 TABLET ORAL EVERY 6 HOURS PRN
Qty: 120 TABLET | Refills: 0 | Status: SHIPPED | OUTPATIENT
Start: 2023-03-24

## 2023-04-14 ENCOUNTER — CLINICAL SUPPORT (OUTPATIENT)
Dept: ORTHOPEDIC SURGERY | Facility: CLINIC | Age: 58
End: 2023-04-14
Payer: MEDICARE

## 2023-04-14 VITALS — TEMPERATURE: 97.7 F | WEIGHT: 291 LBS | HEIGHT: 67 IN | BODY MASS INDEX: 45.67 KG/M2

## 2023-04-14 DIAGNOSIS — M17.0 PRIMARY OSTEOARTHRITIS OF BOTH KNEES: Primary | ICD-10-CM

## 2023-04-14 RX ORDER — LIDOCAINE HYDROCHLORIDE 10 MG/ML
1 INJECTION, SOLUTION EPIDURAL; INFILTRATION; INTRACAUDAL; PERINEURAL
Status: COMPLETED | OUTPATIENT
Start: 2023-04-14 | End: 2023-04-14

## 2023-04-14 RX ORDER — METHYLPREDNISOLONE ACETATE 80 MG/ML
80 INJECTION, SUSPENSION INTRA-ARTICULAR; INTRALESIONAL; INTRAMUSCULAR; SOFT TISSUE
Status: COMPLETED | OUTPATIENT
Start: 2023-04-14 | End: 2023-04-14

## 2023-04-14 RX ADMIN — METHYLPREDNISOLONE ACETATE 80 MG: 80 INJECTION, SUSPENSION INTRA-ARTICULAR; INTRALESIONAL; INTRAMUSCULAR; SOFT TISSUE at 13:42

## 2023-04-14 RX ADMIN — LIDOCAINE HYDROCHLORIDE 1 ML: 10 INJECTION, SOLUTION EPIDURAL; INFILTRATION; INTRACAUDAL; PERINEURAL at 13:43

## 2023-04-14 RX ADMIN — METHYLPREDNISOLONE ACETATE 80 MG: 80 INJECTION, SUSPENSION INTRA-ARTICULAR; INTRALESIONAL; INTRAMUSCULAR; SOFT TISSUE at 13:43

## 2023-04-14 RX ADMIN — LIDOCAINE HYDROCHLORIDE 1 ML: 10 INJECTION, SOLUTION EPIDURAL; INFILTRATION; INTRACAUDAL; PERINEURAL at 13:42

## 2023-04-25 ENCOUNTER — OFFICE VISIT (OUTPATIENT)
Dept: PAIN MEDICINE | Facility: CLINIC | Age: 58
End: 2023-04-25
Payer: MEDICARE

## 2023-04-25 ENCOUNTER — PREP FOR SURGERY (OUTPATIENT)
Dept: SURGERY | Facility: SURGERY CENTER | Age: 58
End: 2023-04-25
Payer: MEDICARE

## 2023-04-25 VITALS
RESPIRATION RATE: 20 BRPM | HEART RATE: 95 BPM | WEIGHT: 280.8 LBS | BODY MASS INDEX: 44.07 KG/M2 | DIASTOLIC BLOOD PRESSURE: 82 MMHG | TEMPERATURE: 96.2 F | OXYGEN SATURATION: 94 % | SYSTOLIC BLOOD PRESSURE: 136 MMHG | HEIGHT: 67 IN

## 2023-04-25 DIAGNOSIS — M50.122 CERVICAL DISC DISORDER AT C5-C6 LEVEL WITH RADICULOPATHY: ICD-10-CM

## 2023-04-25 DIAGNOSIS — M54.12 CERVICAL RADICULOPATHY: Primary | ICD-10-CM

## 2023-04-25 DIAGNOSIS — M51.36 DDD (DEGENERATIVE DISC DISEASE), LUMBAR: ICD-10-CM

## 2023-04-25 DIAGNOSIS — M62.838 MUSCLE SPASM: ICD-10-CM

## 2023-04-25 DIAGNOSIS — M50.123 CERVICAL DISC DISORDER AT C6-C7 LEVEL WITH RADICULOPATHY: ICD-10-CM

## 2023-04-25 DIAGNOSIS — G89.4 CHRONIC PAIN SYNDROME: ICD-10-CM

## 2023-04-25 DIAGNOSIS — M50.123 CERVICAL DISC DISORDER AT C6-C7 LEVEL WITH RADICULOPATHY: Primary | ICD-10-CM

## 2023-04-25 DIAGNOSIS — M96.1 POSTLAMINECTOMY SYNDROME OF THORACIC REGION: ICD-10-CM

## 2023-04-25 DIAGNOSIS — M48.02 DEGENERATIVE CERVICAL SPINAL STENOSIS: ICD-10-CM

## 2023-04-25 DIAGNOSIS — Z79.899 ENCOUNTER FOR LONG-TERM (CURRENT) USE OF HIGH-RISK MEDICATION: ICD-10-CM

## 2023-04-25 RX ORDER — OXYCODONE AND ACETAMINOPHEN 10; 325 MG/1; MG/1
1 TABLET ORAL EVERY 6 HOURS PRN
Qty: 120 TABLET | Refills: 0 | Status: SHIPPED | OUTPATIENT
Start: 2023-04-25

## 2023-04-25 RX ORDER — DIAZEPAM 5 MG/1
10 TABLET ORAL ONCE
OUTPATIENT
Start: 2023-04-25

## 2023-05-01 DIAGNOSIS — M51.36 DDD (DEGENERATIVE DISC DISEASE), LUMBAR: ICD-10-CM

## 2023-05-01 DIAGNOSIS — M50.123 CERVICAL DISC DISORDER AT C6-C7 LEVEL WITH RADICULOPATHY: ICD-10-CM

## 2023-05-01 RX ORDER — GABAPENTIN 600 MG/1
TABLET ORAL
Qty: 180 TABLET | Refills: 0 | Status: SHIPPED | OUTPATIENT
Start: 2023-05-01

## 2023-05-26 ENCOUNTER — OFFICE VISIT (OUTPATIENT)
Dept: PAIN MEDICINE | Facility: CLINIC | Age: 58
End: 2023-05-26
Payer: MEDICARE

## 2023-05-26 VITALS
WEIGHT: 288.2 LBS | SYSTOLIC BLOOD PRESSURE: 143 MMHG | DIASTOLIC BLOOD PRESSURE: 81 MMHG | HEIGHT: 67 IN | OXYGEN SATURATION: 93 % | HEART RATE: 91 BPM | TEMPERATURE: 97.6 F | BODY MASS INDEX: 45.24 KG/M2

## 2023-05-26 DIAGNOSIS — M50.122 CERVICAL DISC DISORDER AT C5-C6 LEVEL WITH RADICULOPATHY: ICD-10-CM

## 2023-05-26 DIAGNOSIS — M62.838 MUSCLE SPASM: ICD-10-CM

## 2023-05-26 DIAGNOSIS — M96.1 POSTLAMINECTOMY SYNDROME OF THORACIC REGION: ICD-10-CM

## 2023-05-26 DIAGNOSIS — M50.123 CERVICAL DISC DISORDER AT C6-C7 LEVEL WITH RADICULOPATHY: Primary | ICD-10-CM

## 2023-05-26 DIAGNOSIS — G89.4 CHRONIC PAIN SYNDROME: ICD-10-CM

## 2023-05-26 DIAGNOSIS — M50.123 CERVICAL DISC DISORDER AT C6-C7 LEVEL WITH RADICULOPATHY: ICD-10-CM

## 2023-05-26 DIAGNOSIS — Z79.899 ENCOUNTER FOR LONG-TERM (CURRENT) USE OF HIGH-RISK MEDICATION: ICD-10-CM

## 2023-05-26 DIAGNOSIS — M51.36 DDD (DEGENERATIVE DISC DISEASE), LUMBAR: ICD-10-CM

## 2023-05-26 RX ORDER — OXYCODONE AND ACETAMINOPHEN 10; 325 MG/1; MG/1
1 TABLET ORAL EVERY 6 HOURS PRN
Qty: 120 TABLET | Refills: 0 | Status: SHIPPED | OUTPATIENT
Start: 2023-05-26

## 2023-05-26 RX ORDER — GABAPENTIN 600 MG/1
1200 TABLET ORAL 3 TIMES DAILY
Qty: 180 TABLET | Refills: 1 | Status: SHIPPED | OUTPATIENT
Start: 2023-05-26

## 2023-06-15 DIAGNOSIS — M50.123 CERVICAL DISC DISORDER AT C6-C7 LEVEL WITH RADICULOPATHY: ICD-10-CM

## 2023-06-15 RX ORDER — LEVETIRACETAM 500 MG/1
TABLET ORAL
Qty: 90 TABLET | Refills: 0 | Status: SHIPPED | OUTPATIENT
Start: 2023-06-15

## 2023-07-28 ENCOUNTER — OFFICE VISIT (OUTPATIENT)
Dept: PAIN MEDICINE | Facility: CLINIC | Age: 58
End: 2023-07-28
Payer: MEDICARE

## 2023-07-28 VITALS
HEIGHT: 67 IN | WEIGHT: 281 LBS | TEMPERATURE: 96.4 F | OXYGEN SATURATION: 95 % | SYSTOLIC BLOOD PRESSURE: 129 MMHG | BODY MASS INDEX: 44.1 KG/M2 | DIASTOLIC BLOOD PRESSURE: 81 MMHG | HEART RATE: 75 BPM

## 2023-07-28 DIAGNOSIS — Z79.899 ENCOUNTER FOR LONG-TERM (CURRENT) USE OF HIGH-RISK MEDICATION: ICD-10-CM

## 2023-07-28 DIAGNOSIS — M50.123 CERVICAL DISC DISORDER AT C6-C7 LEVEL WITH RADICULOPATHY: ICD-10-CM

## 2023-07-28 DIAGNOSIS — G89.4 CHRONIC PAIN SYNDROME: ICD-10-CM

## 2023-07-28 DIAGNOSIS — M50.122 CERVICAL DISC DISORDER AT C5-C6 LEVEL WITH RADICULOPATHY: ICD-10-CM

## 2023-07-28 DIAGNOSIS — M62.838 MUSCLE SPASM: ICD-10-CM

## 2023-07-28 DIAGNOSIS — M51.36 DDD (DEGENERATIVE DISC DISEASE), LUMBAR: ICD-10-CM

## 2023-07-28 DIAGNOSIS — M50.123 CERVICAL DISC DISORDER AT C6-C7 LEVEL WITH RADICULOPATHY: Primary | ICD-10-CM

## 2023-07-28 PROCEDURE — 99214 OFFICE O/P EST MOD 30 MIN: CPT

## 2023-07-28 PROCEDURE — 1159F MED LIST DOCD IN RCRD: CPT

## 2023-07-28 PROCEDURE — 1160F RVW MEDS BY RX/DR IN RCRD: CPT

## 2023-07-28 PROCEDURE — 1125F AMNT PAIN NOTED PAIN PRSNT: CPT

## 2023-07-28 RX ORDER — OXYCODONE AND ACETAMINOPHEN 10; 325 MG/1; MG/1
1 TABLET ORAL EVERY 6 HOURS PRN
Qty: 120 TABLET | Refills: 0 | Status: SHIPPED | OUTPATIENT
Start: 2023-07-28

## 2023-07-28 RX ORDER — GABAPENTIN 600 MG/1
1200 TABLET ORAL 3 TIMES DAILY
Qty: 180 TABLET | Refills: 1 | Status: SHIPPED | OUTPATIENT
Start: 2023-07-28

## 2023-09-01 ENCOUNTER — OFFICE VISIT (OUTPATIENT)
Dept: PAIN MEDICINE | Facility: CLINIC | Age: 58
End: 2023-09-01
Payer: MEDICARE

## 2023-09-01 VITALS
DIASTOLIC BLOOD PRESSURE: 60 MMHG | HEIGHT: 67 IN | TEMPERATURE: 96 F | BODY MASS INDEX: 45.74 KG/M2 | SYSTOLIC BLOOD PRESSURE: 132 MMHG | WEIGHT: 291.4 LBS | RESPIRATION RATE: 20 BRPM | HEART RATE: 76 BPM | OXYGEN SATURATION: 93 %

## 2023-09-01 DIAGNOSIS — M50.123 CERVICAL DISC DISORDER AT C6-C7 LEVEL WITH RADICULOPATHY: ICD-10-CM

## 2023-09-01 DIAGNOSIS — M50.122 CERVICAL DISC DISORDER AT C5-C6 LEVEL WITH RADICULOPATHY: ICD-10-CM

## 2023-09-01 DIAGNOSIS — G89.4 CHRONIC PAIN SYNDROME: Primary | ICD-10-CM

## 2023-09-01 DIAGNOSIS — M51.36 DDD (DEGENERATIVE DISC DISEASE), LUMBAR: ICD-10-CM

## 2023-09-01 DIAGNOSIS — M62.838 MUSCLE SPASM: ICD-10-CM

## 2023-09-01 DIAGNOSIS — G89.4 CHRONIC PAIN SYNDROME: ICD-10-CM

## 2023-09-01 DIAGNOSIS — Z79.899 ENCOUNTER FOR LONG-TERM (CURRENT) USE OF HIGH-RISK MEDICATION: ICD-10-CM

## 2023-09-01 PROCEDURE — 1159F MED LIST DOCD IN RCRD: CPT

## 2023-09-01 PROCEDURE — 99214 OFFICE O/P EST MOD 30 MIN: CPT

## 2023-09-01 PROCEDURE — 1125F AMNT PAIN NOTED PAIN PRSNT: CPT

## 2023-09-01 PROCEDURE — 1160F RVW MEDS BY RX/DR IN RCRD: CPT

## 2023-09-01 RX ORDER — GABAPENTIN 600 MG/1
1200 TABLET ORAL 3 TIMES DAILY
Qty: 180 TABLET | Refills: 1 | Status: SHIPPED | OUTPATIENT
Start: 2023-09-01

## 2023-09-02 RX ORDER — OXYCODONE AND ACETAMINOPHEN 10; 325 MG/1; MG/1
1 TABLET ORAL EVERY 6 HOURS PRN
Qty: 120 TABLET | Refills: 0 | Status: SHIPPED | OUTPATIENT
Start: 2023-09-02

## 2023-09-14 ENCOUNTER — TRANSCRIBE ORDERS (OUTPATIENT)
Dept: ADMINISTRATIVE | Facility: HOSPITAL | Age: 58
End: 2023-09-14
Payer: MEDICARE

## 2023-09-14 DIAGNOSIS — Z12.31 SCREENING MAMMOGRAM FOR BREAST CANCER: Primary | ICD-10-CM

## 2023-09-20 DIAGNOSIS — M50.123 CERVICAL DISC DISORDER AT C6-C7 LEVEL WITH RADICULOPATHY: ICD-10-CM

## 2023-09-20 DIAGNOSIS — M62.838 MUSCLE SPASM: ICD-10-CM

## 2023-09-21 RX ORDER — TIZANIDINE 4 MG/1
TABLET ORAL
Qty: 90 TABLET | Refills: 1 | Status: SHIPPED | OUTPATIENT
Start: 2023-09-21

## 2023-09-21 RX ORDER — LEVETIRACETAM 500 MG/1
TABLET ORAL
Qty: 90 TABLET | Refills: 1 | Status: SHIPPED | OUTPATIENT
Start: 2023-09-21

## 2023-09-29 ENCOUNTER — OFFICE VISIT (OUTPATIENT)
Dept: PAIN MEDICINE | Facility: CLINIC | Age: 58
End: 2023-09-29
Payer: MEDICARE

## 2023-09-29 VITALS
BODY MASS INDEX: 45.89 KG/M2 | HEART RATE: 78 BPM | RESPIRATION RATE: 18 BRPM | SYSTOLIC BLOOD PRESSURE: 132 MMHG | HEIGHT: 67 IN | WEIGHT: 292.4 LBS | OXYGEN SATURATION: 94 % | TEMPERATURE: 96.8 F | DIASTOLIC BLOOD PRESSURE: 86 MMHG

## 2023-09-29 DIAGNOSIS — G89.4 CHRONIC PAIN SYNDROME: ICD-10-CM

## 2023-09-29 DIAGNOSIS — M51.36 DDD (DEGENERATIVE DISC DISEASE), LUMBAR: ICD-10-CM

## 2023-09-29 DIAGNOSIS — M50.123 CERVICAL DISC DISORDER AT C6-C7 LEVEL WITH RADICULOPATHY: Primary | ICD-10-CM

## 2023-09-29 DIAGNOSIS — Z79.899 ENCOUNTER FOR LONG-TERM (CURRENT) USE OF HIGH-RISK MEDICATION: ICD-10-CM

## 2023-09-29 DIAGNOSIS — M62.838 MUSCLE SPASM: ICD-10-CM

## 2023-09-29 DIAGNOSIS — M50.122 CERVICAL DISC DISORDER AT C5-C6 LEVEL WITH RADICULOPATHY: ICD-10-CM

## 2023-09-29 DIAGNOSIS — M50.123 CERVICAL DISC DISORDER AT C6-C7 LEVEL WITH RADICULOPATHY: ICD-10-CM

## 2023-09-29 PROCEDURE — 1125F AMNT PAIN NOTED PAIN PRSNT: CPT

## 2023-09-29 PROCEDURE — 1160F RVW MEDS BY RX/DR IN RCRD: CPT

## 2023-09-29 PROCEDURE — 1159F MED LIST DOCD IN RCRD: CPT

## 2023-09-29 PROCEDURE — 99214 OFFICE O/P EST MOD 30 MIN: CPT

## 2023-09-29 RX ORDER — OXYCODONE AND ACETAMINOPHEN 10; 325 MG/1; MG/1
1 TABLET ORAL EVERY 6 HOURS PRN
Qty: 120 TABLET | Refills: 0 | Status: SHIPPED | OUTPATIENT
Start: 2023-09-29

## 2023-10-23 ENCOUNTER — CLINICAL SUPPORT (OUTPATIENT)
Dept: ORTHOPEDIC SURGERY | Facility: CLINIC | Age: 58
End: 2023-10-23
Payer: MEDICARE

## 2023-10-23 ENCOUNTER — HOSPITAL ENCOUNTER (OUTPATIENT)
Dept: MAMMOGRAPHY | Facility: HOSPITAL | Age: 58
Discharge: HOME | End: 2023-10-23
Admitting: NURSE PRACTITIONER
Payer: MEDICARE

## 2023-10-23 VITALS — BODY MASS INDEX: 44.84 KG/M2 | WEIGHT: 285.7 LBS | HEIGHT: 67 IN | TEMPERATURE: 97.5 F

## 2023-10-23 DIAGNOSIS — M51.36 DDD (DEGENERATIVE DISC DISEASE), LUMBAR: ICD-10-CM

## 2023-10-23 DIAGNOSIS — Z12.31 SCREENING MAMMOGRAM FOR BREAST CANCER: ICD-10-CM

## 2023-10-23 DIAGNOSIS — E66.01 OBESITY, MORBID, BMI 40.0-49.9: ICD-10-CM

## 2023-10-23 DIAGNOSIS — G89.4 CHRONIC PAIN SYNDROME: Primary | ICD-10-CM

## 2023-10-23 DIAGNOSIS — M17.0 ARTHRITIS OF BOTH KNEES: ICD-10-CM

## 2023-10-23 PROCEDURE — 77063 BREAST TOMOSYNTHESIS BI: CPT

## 2023-10-23 PROCEDURE — 77067 SCR MAMMO BI INCL CAD: CPT

## 2023-10-23 PROCEDURE — 99214 OFFICE O/P EST MOD 30 MIN: CPT | Performed by: ORTHOPAEDIC SURGERY

## 2023-11-02 ENCOUNTER — OFFICE VISIT (OUTPATIENT)
Dept: PAIN MEDICINE | Facility: CLINIC | Age: 58
End: 2023-11-02
Payer: MEDICARE

## 2023-11-02 VITALS
DIASTOLIC BLOOD PRESSURE: 64 MMHG | OXYGEN SATURATION: 93 % | WEIGHT: 293 LBS | BODY MASS INDEX: 45.99 KG/M2 | TEMPERATURE: 97.5 F | HEART RATE: 80 BPM | HEIGHT: 67 IN | SYSTOLIC BLOOD PRESSURE: 98 MMHG

## 2023-11-02 DIAGNOSIS — G89.4 CHRONIC PAIN SYNDROME: ICD-10-CM

## 2023-11-02 DIAGNOSIS — M50.123 CERVICAL DISC DISORDER AT C6-C7 LEVEL WITH RADICULOPATHY: ICD-10-CM

## 2023-11-02 DIAGNOSIS — M62.838 MUSCLE SPASM: ICD-10-CM

## 2023-11-02 DIAGNOSIS — Z79.899 ENCOUNTER FOR LONG-TERM (CURRENT) USE OF HIGH-RISK MEDICATION: Primary | ICD-10-CM

## 2023-11-02 DIAGNOSIS — M50.122 CERVICAL DISC DISORDER AT C5-C6 LEVEL WITH RADICULOPATHY: ICD-10-CM

## 2023-11-02 DIAGNOSIS — M51.36 DDD (DEGENERATIVE DISC DISEASE), LUMBAR: ICD-10-CM

## 2023-11-02 RX ORDER — TIZANIDINE 4 MG/1
4 TABLET ORAL 3 TIMES DAILY PRN
Qty: 90 TABLET | Refills: 1 | Status: SHIPPED | OUTPATIENT
Start: 2023-11-02

## 2023-11-02 RX ORDER — GABAPENTIN 600 MG/1
1200 TABLET ORAL 3 TIMES DAILY
Qty: 180 TABLET | Refills: 1 | Status: SHIPPED | OUTPATIENT
Start: 2023-11-02

## 2023-11-02 RX ORDER — OXYCODONE AND ACETAMINOPHEN 10; 325 MG/1; MG/1
1 TABLET ORAL EVERY 6 HOURS PRN
Qty: 120 TABLET | Refills: 0 | Status: SHIPPED | OUTPATIENT
Start: 2023-11-02

## 2023-11-02 RX ORDER — LEVETIRACETAM 500 MG/1
500 TABLET ORAL 3 TIMES DAILY
Qty: 90 TABLET | Refills: 1 | Status: SHIPPED | OUTPATIENT
Start: 2023-11-02

## 2023-11-26 ENCOUNTER — APPOINTMENT (OUTPATIENT)
Dept: CT IMAGING | Facility: HOSPITAL | Age: 58
DRG: 189 | End: 2023-11-26
Payer: MEDICARE

## 2023-11-26 ENCOUNTER — HOSPITAL ENCOUNTER (INPATIENT)
Facility: HOSPITAL | Age: 58
LOS: 2 days | Discharge: HOME | DRG: 189 | End: 2023-11-29
Attending: EMERGENCY MEDICINE | Admitting: INTERNAL MEDICINE
Payer: MEDICARE

## 2023-11-26 ENCOUNTER — APPOINTMENT (OUTPATIENT)
Dept: GENERAL RADIOLOGY | Facility: HOSPITAL | Age: 58
DRG: 189 | End: 2023-11-26
Payer: MEDICARE

## 2023-11-26 DIAGNOSIS — J96.21 ACUTE ON CHRONIC RESPIRATORY FAILURE WITH HYPOXIA AND HYPERCAPNIA: Primary | ICD-10-CM

## 2023-11-26 DIAGNOSIS — J44.1 COPD WITH ACUTE EXACERBATION: ICD-10-CM

## 2023-11-26 DIAGNOSIS — R93.89 ABNORMAL CT OF THE CHEST: ICD-10-CM

## 2023-11-26 DIAGNOSIS — J96.22 ACUTE ON CHRONIC RESPIRATORY FAILURE WITH HYPOXIA AND HYPERCAPNIA: Primary | ICD-10-CM

## 2023-11-26 DIAGNOSIS — B34.8 RHINOVIRUS INFECTION: ICD-10-CM

## 2023-11-26 PROBLEM — J96.20 RESPIRATORY FAILURE, ACUTE AND CHRONIC: Status: ACTIVE | Noted: 2023-11-26

## 2023-11-26 LAB
ALBUMIN SERPL-MCNC: 3.8 G/DL (ref 3.5–5.2)
ALBUMIN/GLOB SERPL: 1.1 G/DL
ALP SERPL-CCNC: 86 U/L (ref 39–117)
ALT SERPL W P-5'-P-CCNC: 71 U/L (ref 1–33)
ANION GAP SERPL CALCULATED.3IONS-SCNC: 12 MMOL/L (ref 5–15)
ANISOCYTOSIS BLD QL: ABNORMAL
ARTERIAL PATENCY WRIST A: POSITIVE
AST SERPL-CCNC: 49 U/L (ref 1–32)
ATMOSPHERIC PRESS: 745.9 MMHG
B PARAPERT DNA SPEC QL NAA+PROBE: NOT DETECTED
B PERT DNA SPEC QL NAA+PROBE: NOT DETECTED
BASE EXCESS BLDA CALC-SCNC: 5.7 MMOL/L (ref 0–2)
BDY SITE: ABNORMAL
BILIRUB SERPL-MCNC: 0.3 MG/DL (ref 0–1.2)
BUN SERPL-MCNC: 36 MG/DL (ref 8–23)
BUN/CREAT SERPL: 27.3 (ref 7–25)
C PNEUM DNA NPH QL NAA+NON-PROBE: NOT DETECTED
CALCIUM SPEC-SCNC: 9.5 MG/DL (ref 8.6–10.5)
CHLORIDE SERPL-SCNC: 103 MMOL/L (ref 98–107)
CO2 BLDA-SCNC: 34.1 MMOL/L (ref 23–27)
CO2 SERPL-SCNC: 27 MMOL/L (ref 22–29)
CREAT SERPL-MCNC: 1.32 MG/DL (ref 0.57–1)
D DIMER PPP FEU-MCNC: 0.75 MCGFEU/ML (ref 0–0.6)
D-LACTATE SERPL-SCNC: 0.8 MMOL/L (ref 0.5–2)
DEPRECATED RDW RBC AUTO: 46.3 FL (ref 37–54)
EGFRCR SERPLBLD CKD-EPI 2021: 46.3 ML/MIN/1.73
ERYTHROCYTE [DISTWIDTH] IN BLOOD BY AUTOMATED COUNT: 14.6 % (ref 12.3–15.4)
FLUAV SUBTYP SPEC NAA+PROBE: NOT DETECTED
FLUBV RNA ISLT QL NAA+PROBE: NOT DETECTED
GAS FLOW AIRWAY: 3 LPM
GLOBULIN UR ELPH-MCNC: 3.4 GM/DL
GLUCOSE SERPL-MCNC: 96 MG/DL (ref 65–99)
HADV DNA SPEC NAA+PROBE: NOT DETECTED
HCO3 BLDA-SCNC: 32.4 MMOL/L (ref 22–28)
HCOV 229E RNA SPEC QL NAA+PROBE: NOT DETECTED
HCOV HKU1 RNA SPEC QL NAA+PROBE: NOT DETECTED
HCOV NL63 RNA SPEC QL NAA+PROBE: NOT DETECTED
HCOV OC43 RNA SPEC QL NAA+PROBE: NOT DETECTED
HCT VFR BLD AUTO: 39.5 % (ref 34–46.6)
HEMODILUTION: NO
HGB BLD-MCNC: 12.3 G/DL (ref 12–15.9)
HMPV RNA NPH QL NAA+NON-PROBE: NOT DETECTED
HOLD SPECIMEN: NORMAL
HOLD SPECIMEN: NORMAL
HPIV1 RNA ISLT QL NAA+PROBE: NOT DETECTED
HPIV2 RNA SPEC QL NAA+PROBE: NOT DETECTED
HPIV3 RNA NPH QL NAA+PROBE: NOT DETECTED
HPIV4 P GENE NPH QL NAA+PROBE: NOT DETECTED
LYMPHOCYTES # BLD MANUAL: 0.12 10*3/MM3 (ref 0.7–3.1)
LYMPHOCYTES NFR BLD MANUAL: 10.3 % (ref 5–12)
M PNEUMO IGG SER IA-ACNC: NOT DETECTED
MCH RBC QN AUTO: 27 PG (ref 26.6–33)
MCHC RBC AUTO-ENTMCNC: 31.1 G/DL (ref 31.5–35.7)
MCV RBC AUTO: 86.8 FL (ref 79–97)
METAMYELOCYTES NFR BLD MANUAL: 2.1 % (ref 0–0)
MICROCYTES BLD QL: ABNORMAL
MODALITY: ABNORMAL
MONOCYTES # BLD: 1.24 10*3/MM3 (ref 0.1–0.9)
MYELOCYTES NFR BLD MANUAL: 1 % (ref 0–0)
NEUTROPHILS # BLD AUTO: 10.32 10*3/MM3 (ref 1.7–7)
NEUTROPHILS NFR BLD MANUAL: 85.6 % (ref 42.7–76)
NT-PROBNP SERPL-MCNC: 315 PG/ML (ref 0–900)
PCO2 BLDA: 54.7 MM HG (ref 35–45)
PH BLDA: 7.38 PH UNITS (ref 7.35–7.45)
PLAT MORPH BLD: NORMAL
PLATELET # BLD AUTO: 303 10*3/MM3 (ref 140–450)
PMV BLD AUTO: 10.8 FL (ref 6–12)
PO2 BLDA: 65.4 MM HG (ref 80–100)
POTASSIUM SERPL-SCNC: 4 MMOL/L (ref 3.5–5.2)
PROCALCITONIN SERPL-MCNC: 0.06 NG/ML (ref 0–0.25)
PROT SERPL-MCNC: 7.2 G/DL (ref 6–8.5)
RBC # BLD AUTO: 4.55 10*6/MM3 (ref 3.77–5.28)
RHINOVIRUS RNA SPEC NAA+PROBE: DETECTED
RSV RNA NPH QL NAA+NON-PROBE: NOT DETECTED
SAO2 % BLDCOA: 91.6 % (ref 92–98.5)
SARS-COV-2 RNA NPH QL NAA+NON-PROBE: NOT DETECTED
SET MECH RESP RATE: 22
SODIUM SERPL-SCNC: 142 MMOL/L (ref 136–145)
TROPONIN T SERPL HS-MCNC: 14 NG/L
VARIANT LYMPHS NFR BLD MANUAL: 1 % (ref 19.6–45.3)
WBC MORPH BLD: NORMAL
WBC NRBC COR # BLD AUTO: 12.06 10*3/MM3 (ref 3.4–10.8)
WHOLE BLOOD HOLD COAG: NORMAL
WHOLE BLOOD HOLD SPECIMEN: NORMAL

## 2023-11-26 PROCEDURE — 83605 ASSAY OF LACTIC ACID: CPT | Performed by: EMERGENCY MEDICINE

## 2023-11-26 PROCEDURE — 25810000003 SODIUM CHLORIDE 0.9 % SOLUTION: Performed by: EMERGENCY MEDICINE

## 2023-11-26 PROCEDURE — 93005 ELECTROCARDIOGRAM TRACING: CPT | Performed by: EMERGENCY MEDICINE

## 2023-11-26 PROCEDURE — 25010000002 METHYLPREDNISOLONE PER 125 MG: Performed by: EMERGENCY MEDICINE

## 2023-11-26 PROCEDURE — 71045 X-RAY EXAM CHEST 1 VIEW: CPT

## 2023-11-26 PROCEDURE — 94640 AIRWAY INHALATION TREATMENT: CPT

## 2023-11-26 PROCEDURE — 85025 COMPLETE CBC W/AUTO DIFF WBC: CPT

## 2023-11-26 PROCEDURE — 94761 N-INVAS EAR/PLS OXIMETRY MLT: CPT

## 2023-11-26 PROCEDURE — 99285 EMERGENCY DEPT VISIT HI MDM: CPT

## 2023-11-26 PROCEDURE — 80053 COMPREHEN METABOLIC PANEL: CPT

## 2023-11-26 PROCEDURE — 93005 ELECTROCARDIOGRAM TRACING: CPT

## 2023-11-26 PROCEDURE — 94799 UNLISTED PULMONARY SVC/PX: CPT

## 2023-11-26 PROCEDURE — 85007 BL SMEAR W/DIFF WBC COUNT: CPT

## 2023-11-26 PROCEDURE — 85379 FIBRIN DEGRADATION QUANT: CPT | Performed by: EMERGENCY MEDICINE

## 2023-11-26 PROCEDURE — 84484 ASSAY OF TROPONIN QUANT: CPT

## 2023-11-26 PROCEDURE — 36600 WITHDRAWAL OF ARTERIAL BLOOD: CPT

## 2023-11-26 PROCEDURE — 83880 ASSAY OF NATRIURETIC PEPTIDE: CPT

## 2023-11-26 PROCEDURE — 0202U NFCT DS 22 TRGT SARS-COV-2: CPT | Performed by: EMERGENCY MEDICINE

## 2023-11-26 PROCEDURE — 71275 CT ANGIOGRAPHY CHEST: CPT

## 2023-11-26 PROCEDURE — 93010 ELECTROCARDIOGRAM REPORT: CPT | Performed by: INTERNAL MEDICINE

## 2023-11-26 PROCEDURE — 84145 PROCALCITONIN (PCT): CPT | Performed by: EMERGENCY MEDICINE

## 2023-11-26 PROCEDURE — G0378 HOSPITAL OBSERVATION PER HR: HCPCS

## 2023-11-26 PROCEDURE — 25510000001 IOPAMIDOL PER 1 ML: Performed by: EMERGENCY MEDICINE

## 2023-11-26 PROCEDURE — 82803 BLOOD GASES ANY COMBINATION: CPT

## 2023-11-26 RX ORDER — IPRATROPIUM BROMIDE AND ALBUTEROL SULFATE 2.5; .5 MG/3ML; MG/3ML
3 SOLUTION RESPIRATORY (INHALATION) EVERY 4 HOURS PRN
Status: DISCONTINUED | OUTPATIENT
Start: 2023-11-26 | End: 2023-11-27

## 2023-11-26 RX ORDER — AMOXICILLIN 250 MG
2 CAPSULE ORAL 2 TIMES DAILY
Status: DISCONTINUED | OUTPATIENT
Start: 2023-11-26 | End: 2023-11-28

## 2023-11-26 RX ORDER — ACETAMINOPHEN 325 MG/1
650 TABLET ORAL EVERY 4 HOURS PRN
Status: DISCONTINUED | OUTPATIENT
Start: 2023-11-26 | End: 2023-11-29 | Stop reason: HOSPADM

## 2023-11-26 RX ORDER — NITROGLYCERIN 0.4 MG/1
0.4 TABLET SUBLINGUAL
Status: DISCONTINUED | OUTPATIENT
Start: 2023-11-26 | End: 2023-11-29 | Stop reason: HOSPADM

## 2023-11-26 RX ORDER — BISACODYL 5 MG/1
5 TABLET, DELAYED RELEASE ORAL DAILY PRN
Status: DISCONTINUED | OUTPATIENT
Start: 2023-11-26 | End: 2023-11-28

## 2023-11-26 RX ORDER — METHYLPREDNISOLONE SODIUM SUCCINATE 125 MG/2ML
125 INJECTION, POWDER, LYOPHILIZED, FOR SOLUTION INTRAMUSCULAR; INTRAVENOUS ONCE
Status: COMPLETED | OUTPATIENT
Start: 2023-11-26 | End: 2023-11-26

## 2023-11-26 RX ORDER — SODIUM CHLORIDE 9 MG/ML
40 INJECTION, SOLUTION INTRAVENOUS AS NEEDED
Status: DISCONTINUED | OUTPATIENT
Start: 2023-11-26 | End: 2023-11-29 | Stop reason: HOSPADM

## 2023-11-26 RX ORDER — ONDANSETRON 2 MG/ML
4 INJECTION INTRAMUSCULAR; INTRAVENOUS EVERY 6 HOURS PRN
Status: DISCONTINUED | OUTPATIENT
Start: 2023-11-26 | End: 2023-11-29 | Stop reason: HOSPADM

## 2023-11-26 RX ORDER — METHYLPREDNISOLONE SODIUM SUCCINATE 40 MG/ML
40 INJECTION, POWDER, LYOPHILIZED, FOR SOLUTION INTRAMUSCULAR; INTRAVENOUS DAILY
Status: DISCONTINUED | OUTPATIENT
Start: 2023-11-27 | End: 2023-11-27

## 2023-11-26 RX ORDER — ONDANSETRON 4 MG/1
4 TABLET, FILM COATED ORAL EVERY 6 HOURS PRN
Status: DISCONTINUED | OUTPATIENT
Start: 2023-11-26 | End: 2023-11-29 | Stop reason: HOSPADM

## 2023-11-26 RX ORDER — ACETAMINOPHEN 650 MG/1
650 SUPPOSITORY RECTAL EVERY 4 HOURS PRN
Status: DISCONTINUED | OUTPATIENT
Start: 2023-11-26 | End: 2023-11-29 | Stop reason: HOSPADM

## 2023-11-26 RX ORDER — IPRATROPIUM BROMIDE AND ALBUTEROL SULFATE 2.5; .5 MG/3ML; MG/3ML
3 SOLUTION RESPIRATORY (INHALATION) ONCE
Status: COMPLETED | OUTPATIENT
Start: 2023-11-26 | End: 2023-11-26

## 2023-11-26 RX ORDER — SODIUM CHLORIDE 0.9 % (FLUSH) 0.9 %
10 SYRINGE (ML) INJECTION EVERY 12 HOURS SCHEDULED
Status: DISCONTINUED | OUTPATIENT
Start: 2023-11-26 | End: 2023-11-29 | Stop reason: HOSPADM

## 2023-11-26 RX ORDER — ACETAMINOPHEN 160 MG/5ML
650 SOLUTION ORAL EVERY 4 HOURS PRN
Status: DISCONTINUED | OUTPATIENT
Start: 2023-11-26 | End: 2023-11-29 | Stop reason: HOSPADM

## 2023-11-26 RX ORDER — SODIUM CHLORIDE 0.9 % (FLUSH) 0.9 %
10 SYRINGE (ML) INJECTION AS NEEDED
Status: DISCONTINUED | OUTPATIENT
Start: 2023-11-26 | End: 2023-11-29 | Stop reason: HOSPADM

## 2023-11-26 RX ORDER — BISACODYL 10 MG
10 SUPPOSITORY, RECTAL RECTAL DAILY PRN
Status: DISCONTINUED | OUTPATIENT
Start: 2023-11-26 | End: 2023-11-28

## 2023-11-26 RX ORDER — POLYETHYLENE GLYCOL 3350 17 G/17G
17 POWDER, FOR SOLUTION ORAL DAILY PRN
Status: DISCONTINUED | OUTPATIENT
Start: 2023-11-26 | End: 2023-11-28

## 2023-11-26 RX ADMIN — SODIUM CHLORIDE 1000 ML: 9 INJECTION, SOLUTION INTRAVENOUS at 23:33

## 2023-11-26 RX ADMIN — IOPAMIDOL 95 ML: 755 INJECTION, SOLUTION INTRAVENOUS at 21:43

## 2023-11-26 RX ADMIN — IPRATROPIUM BROMIDE AND ALBUTEROL SULFATE 3 ML: 2.5; .5 SOLUTION RESPIRATORY (INHALATION) at 20:38

## 2023-11-26 RX ADMIN — METHYLPREDNISOLONE SODIUM SUCCINATE 125 MG: 125 INJECTION, POWDER, FOR SOLUTION INTRAMUSCULAR; INTRAVENOUS at 20:41

## 2023-11-27 PROBLEM — J44.1 COPD EXACERBATION: Status: ACTIVE | Noted: 2023-11-27

## 2023-11-27 PROBLEM — J44.1 CHRONIC OBSTRUCTIVE PULMONARY DISEASE WITH ACUTE EXACERBATION: Status: ACTIVE | Noted: 2023-11-27

## 2023-11-27 PROBLEM — B34.8 RHINOVIRUS INFECTION: Status: ACTIVE | Noted: 2023-11-27

## 2023-11-27 PROBLEM — J44.1 CHRONIC OBSTRUCTIVE PULMONARY DISEASE WITH (ACUTE) EXACERBATION: Status: ACTIVE | Noted: 2023-11-27

## 2023-11-27 LAB
ANION GAP SERPL CALCULATED.3IONS-SCNC: 10.3 MMOL/L (ref 5–15)
BUN SERPL-MCNC: 26 MG/DL (ref 8–23)
BUN/CREAT SERPL: 30.2 (ref 7–25)
CALCIUM SPEC-SCNC: 9.2 MG/DL (ref 8.6–10.5)
CHLORIDE SERPL-SCNC: 104 MMOL/L (ref 98–107)
CO2 SERPL-SCNC: 28.7 MMOL/L (ref 22–29)
CREAT SERPL-MCNC: 0.86 MG/DL (ref 0.57–1)
DEPRECATED RDW RBC AUTO: 45 FL (ref 37–54)
EGFRCR SERPLBLD CKD-EPI 2021: 77.5 ML/MIN/1.73
ERYTHROCYTE [DISTWIDTH] IN BLOOD BY AUTOMATED COUNT: 14.3 % (ref 12.3–15.4)
GLUCOSE SERPL-MCNC: 139 MG/DL (ref 65–99)
HCT VFR BLD AUTO: 37.1 % (ref 34–46.6)
HGB BLD-MCNC: 11.7 G/DL (ref 12–15.9)
MCH RBC QN AUTO: 27.1 PG (ref 26.6–33)
MCHC RBC AUTO-ENTMCNC: 31.5 G/DL (ref 31.5–35.7)
MCV RBC AUTO: 85.9 FL (ref 79–97)
PLATELET # BLD AUTO: 288 10*3/MM3 (ref 140–450)
PMV BLD AUTO: 10.5 FL (ref 6–12)
POTASSIUM SERPL-SCNC: 4.3 MMOL/L (ref 3.5–5.2)
QT INTERVAL: 335 MS
QTC INTERVAL: 394 MS
RBC # BLD AUTO: 4.32 10*6/MM3 (ref 3.77–5.28)
SODIUM SERPL-SCNC: 143 MMOL/L (ref 136–145)
WBC NRBC COR # BLD AUTO: 11.39 10*3/MM3 (ref 3.4–10.8)

## 2023-11-27 PROCEDURE — 25010000002 ENOXAPARIN PER 10 MG: Performed by: INTERNAL MEDICINE

## 2023-11-27 PROCEDURE — 36415 COLL VENOUS BLD VENIPUNCTURE: CPT | Performed by: NURSE PRACTITIONER

## 2023-11-27 PROCEDURE — 87205 SMEAR GRAM STAIN: CPT | Performed by: INTERNAL MEDICINE

## 2023-11-27 PROCEDURE — 87185 SC STD ENZYME DETCJ PER NZM: CPT | Performed by: INTERNAL MEDICINE

## 2023-11-27 PROCEDURE — 97530 THERAPEUTIC ACTIVITIES: CPT

## 2023-11-27 PROCEDURE — 25010000002 METHYLPREDNISOLONE PER 40 MG: Performed by: NURSE PRACTITIONER

## 2023-11-27 PROCEDURE — 94799 UNLISTED PULMONARY SVC/PX: CPT

## 2023-11-27 PROCEDURE — 25010000002 METHYLPREDNISOLONE PER 125 MG: Performed by: INTERNAL MEDICINE

## 2023-11-27 PROCEDURE — 94664 DEMO&/EVAL PT USE INHALER: CPT

## 2023-11-27 PROCEDURE — 94761 N-INVAS EAR/PLS OXIMETRY MLT: CPT

## 2023-11-27 PROCEDURE — 87070 CULTURE OTHR SPECIMN AEROBIC: CPT | Performed by: INTERNAL MEDICINE

## 2023-11-27 PROCEDURE — 85027 COMPLETE CBC AUTOMATED: CPT | Performed by: NURSE PRACTITIONER

## 2023-11-27 PROCEDURE — 80048 BASIC METABOLIC PNL TOTAL CA: CPT | Performed by: NURSE PRACTITIONER

## 2023-11-27 PROCEDURE — 94760 N-INVAS EAR/PLS OXIMETRY 1: CPT

## 2023-11-27 PROCEDURE — 97162 PT EVAL MOD COMPLEX 30 MIN: CPT

## 2023-11-27 PROCEDURE — 87077 CULTURE AEROBIC IDENTIFY: CPT | Performed by: INTERNAL MEDICINE

## 2023-11-27 RX ORDER — BUSPIRONE HYDROCHLORIDE 10 MG/1
10 TABLET ORAL 3 TIMES DAILY
Status: DISCONTINUED | OUTPATIENT
Start: 2023-11-27 | End: 2023-11-29 | Stop reason: HOSPADM

## 2023-11-27 RX ORDER — LEVETIRACETAM 500 MG/1
500 TABLET ORAL 3 TIMES DAILY
Status: DISCONTINUED | OUTPATIENT
Start: 2023-11-27 | End: 2023-11-29 | Stop reason: HOSPADM

## 2023-11-27 RX ORDER — PANTOPRAZOLE SODIUM 40 MG/1
40 TABLET, DELAYED RELEASE ORAL EVERY MORNING
Status: DISCONTINUED | OUTPATIENT
Start: 2023-11-27 | End: 2023-11-29 | Stop reason: HOSPADM

## 2023-11-27 RX ORDER — CETIRIZINE HYDROCHLORIDE 10 MG/1
10 TABLET ORAL DAILY
Status: DISCONTINUED | OUTPATIENT
Start: 2023-11-27 | End: 2023-11-29 | Stop reason: HOSPADM

## 2023-11-27 RX ORDER — BUDESONIDE AND FORMOTEROL FUMARATE DIHYDRATE 160; 4.5 UG/1; UG/1
2 AEROSOL RESPIRATORY (INHALATION)
Status: DISCONTINUED | OUTPATIENT
Start: 2023-11-27 | End: 2023-11-29 | Stop reason: HOSPADM

## 2023-11-27 RX ORDER — DICLOFENAC SODIUM 1 MG/ML
1 SOLUTION/ DROPS OPHTHALMIC 4 TIMES DAILY
Status: DISCONTINUED | OUTPATIENT
Start: 2023-11-27 | End: 2023-11-28

## 2023-11-27 RX ORDER — OXYCODONE AND ACETAMINOPHEN 10; 325 MG/1; MG/1
1 TABLET ORAL EVERY 6 HOURS PRN
Status: DISCONTINUED | OUTPATIENT
Start: 2023-11-27 | End: 2023-11-27

## 2023-11-27 RX ORDER — ALBUTEROL SULFATE 2.5 MG/3ML
2.5 SOLUTION RESPIRATORY (INHALATION) EVERY 6 HOURS PRN
Status: DISCONTINUED | OUTPATIENT
Start: 2023-11-27 | End: 2023-11-27

## 2023-11-27 RX ORDER — BUPROPION HYDROCHLORIDE 300 MG/1
300 TABLET ORAL EVERY MORNING
Status: DISCONTINUED | OUTPATIENT
Start: 2023-11-27 | End: 2023-11-29 | Stop reason: HOSPADM

## 2023-11-27 RX ORDER — ALBUTEROL SULFATE 2.5 MG/3ML
2.5 SOLUTION RESPIRATORY (INHALATION) EVERY 6 HOURS PRN
Status: DISCONTINUED | OUTPATIENT
Start: 2023-11-27 | End: 2023-11-29 | Stop reason: HOSPADM

## 2023-11-27 RX ORDER — IPRATROPIUM BROMIDE AND ALBUTEROL SULFATE 2.5; .5 MG/3ML; MG/3ML
3 SOLUTION RESPIRATORY (INHALATION)
Status: DISCONTINUED | OUTPATIENT
Start: 2023-11-27 | End: 2023-11-29 | Stop reason: HOSPADM

## 2023-11-27 RX ORDER — MONTELUKAST SODIUM 10 MG/1
10 TABLET ORAL EVERY EVENING
Status: DISCONTINUED | OUTPATIENT
Start: 2023-11-27 | End: 2023-11-29 | Stop reason: HOSPADM

## 2023-11-27 RX ORDER — L.ACID,PARA/B.BIFIDUM/S.THERM 8B CELL
1 CAPSULE ORAL DAILY
Status: DISCONTINUED | OUTPATIENT
Start: 2023-11-27 | End: 2023-11-29 | Stop reason: HOSPADM

## 2023-11-27 RX ORDER — MOXIFLOXACIN 5 MG/ML
1 SOLUTION/ DROPS OPHTHALMIC 3 TIMES DAILY
Status: DISCONTINUED | OUTPATIENT
Start: 2023-11-27 | End: 2023-11-28

## 2023-11-27 RX ORDER — PREDNISOLONE ACETATE 10 MG/ML
1 SUSPENSION/ DROPS OPHTHALMIC 3 TIMES DAILY
Status: DISCONTINUED | OUTPATIENT
Start: 2023-11-27 | End: 2023-11-28

## 2023-11-27 RX ORDER — HYDROCODONE BITARTRATE AND HOMATROPINE METHYLBROMIDE ORAL SOLUTION 5; 1.5 MG/5ML; MG/5ML
5 LIQUID ORAL EVERY 6 HOURS PRN
Status: DISCONTINUED | OUTPATIENT
Start: 2023-11-27 | End: 2023-11-29 | Stop reason: HOSPADM

## 2023-11-27 RX ORDER — GUAIFENESIN 600 MG/1
1200 TABLET, EXTENDED RELEASE ORAL EVERY 12 HOURS SCHEDULED
Status: DISCONTINUED | OUTPATIENT
Start: 2023-11-27 | End: 2023-11-29 | Stop reason: HOSPADM

## 2023-11-27 RX ORDER — METHYLPREDNISOLONE SODIUM SUCCINATE 125 MG/2ML
60 INJECTION, POWDER, LYOPHILIZED, FOR SOLUTION INTRAMUSCULAR; INTRAVENOUS EVERY 12 HOURS
Status: DISPENSED | OUTPATIENT
Start: 2023-11-27 | End: 2023-11-28

## 2023-11-27 RX ORDER — TIZANIDINE 4 MG/1
4 TABLET ORAL 3 TIMES DAILY PRN
Status: DISCONTINUED | OUTPATIENT
Start: 2023-11-27 | End: 2023-11-29 | Stop reason: HOSPADM

## 2023-11-27 RX ORDER — VENLAFAXINE HYDROCHLORIDE 75 MG/1
75 CAPSULE, EXTENDED RELEASE ORAL DAILY
Status: DISCONTINUED | OUTPATIENT
Start: 2023-11-27 | End: 2023-11-29 | Stop reason: HOSPADM

## 2023-11-27 RX ORDER — GABAPENTIN 300 MG/1
600 CAPSULE ORAL EVERY 8 HOURS SCHEDULED
Status: DISCONTINUED | OUTPATIENT
Start: 2023-11-27 | End: 2023-11-29 | Stop reason: HOSPADM

## 2023-11-27 RX ORDER — ENOXAPARIN SODIUM 100 MG/ML
40 INJECTION SUBCUTANEOUS EVERY 24 HOURS
Status: DISCONTINUED | OUTPATIENT
Start: 2023-11-27 | End: 2023-11-28

## 2023-11-27 RX ORDER — FLUTICASONE PROPIONATE 50 MCG
1 SPRAY, SUSPENSION (ML) NASAL EVERY MORNING
Status: DISCONTINUED | OUTPATIENT
Start: 2023-11-27 | End: 2023-11-29 | Stop reason: HOSPADM

## 2023-11-27 RX ORDER — OXYCODONE AND ACETAMINOPHEN 10; 325 MG/1; MG/1
1 TABLET ORAL EVERY 4 HOURS PRN
Status: DISCONTINUED | OUTPATIENT
Start: 2023-11-27 | End: 2023-11-29 | Stop reason: HOSPADM

## 2023-11-27 RX ADMIN — FLUTICASONE PROPIONATE 1 SPRAY: 50 SPRAY, METERED NASAL at 06:10

## 2023-11-27 RX ADMIN — GUAIFENESIN 1200 MG: 600 TABLET, EXTENDED RELEASE ORAL at 14:11

## 2023-11-27 RX ADMIN — ENOXAPARIN SODIUM 40 MG: 100 INJECTION SUBCUTANEOUS at 14:11

## 2023-11-27 RX ADMIN — IPRATROPIUM BROMIDE AND ALBUTEROL SULFATE 3 ML: 2.5; .5 SOLUTION RESPIRATORY (INHALATION) at 10:48

## 2023-11-27 RX ADMIN — BUDESONIDE AND FORMOTEROL FUMARATE DIHYDRATE 2 PUFF: 160; 4.5 AEROSOL RESPIRATORY (INHALATION) at 07:23

## 2023-11-27 RX ADMIN — LEVETIRACETAM 500 MG: 500 TABLET, FILM COATED ORAL at 21:59

## 2023-11-27 RX ADMIN — GUAIFENESIN 1200 MG: 600 TABLET, EXTENDED RELEASE ORAL at 21:59

## 2023-11-27 RX ADMIN — ALBUTEROL SULFATE 2.5 MG: 2.5 SOLUTION RESPIRATORY (INHALATION) at 23:14

## 2023-11-27 RX ADMIN — OXYCODONE AND ACETAMINOPHEN 1 TABLET: 10; 325 TABLET ORAL at 22:04

## 2023-11-27 RX ADMIN — MOXIFLOXACIN HYDROCHLORIDE 0.05 ML: 5 SOLUTION/ DROPS OPHTHALMIC at 09:00

## 2023-11-27 RX ADMIN — IPRATROPIUM BROMIDE AND ALBUTEROL SULFATE 3 ML: 2.5; .5 SOLUTION RESPIRATORY (INHALATION) at 19:18

## 2023-11-27 RX ADMIN — Medication 10 ML: at 02:42

## 2023-11-27 RX ADMIN — VENLAFAXINE HYDROCHLORIDE 75 MG: 75 CAPSULE, EXTENDED RELEASE ORAL at 08:21

## 2023-11-27 RX ADMIN — PANTOPRAZOLE SODIUM 40 MG: 40 TABLET, DELAYED RELEASE ORAL at 06:10

## 2023-11-27 RX ADMIN — BUSPIRONE HYDROCHLORIDE 10 MG: 10 TABLET ORAL at 22:04

## 2023-11-27 RX ADMIN — IPRATROPIUM BROMIDE AND ALBUTEROL SULFATE 3 ML: 2.5; .5 SOLUTION RESPIRATORY (INHALATION) at 15:15

## 2023-11-27 RX ADMIN — BUDESONIDE AND FORMOTEROL FUMARATE DIHYDRATE 2 PUFF: 160; 4.5 AEROSOL RESPIRATORY (INHALATION) at 22:52

## 2023-11-27 RX ADMIN — CETIRIZINE HYDROCHLORIDE 10 MG: 10 TABLET, FILM COATED ORAL at 08:21

## 2023-11-27 RX ADMIN — METHYLPREDNISOLONE SODIUM SUCCINATE 60 MG: 125 INJECTION INTRAMUSCULAR; INTRAVENOUS at 21:57

## 2023-11-27 RX ADMIN — BUSPIRONE HYDROCHLORIDE 10 MG: 10 TABLET ORAL at 16:47

## 2023-11-27 RX ADMIN — OXYCODONE AND ACETAMINOPHEN 1 TABLET: 10; 325 TABLET ORAL at 03:22

## 2023-11-27 RX ADMIN — METHYLPREDNISOLONE SODIUM SUCCINATE 40 MG: 40 INJECTION, POWDER, LYOPHILIZED, FOR SOLUTION INTRAMUSCULAR; INTRAVENOUS at 08:21

## 2023-11-27 RX ADMIN — GABAPENTIN 600 MG: 300 CAPSULE ORAL at 14:11

## 2023-11-27 RX ADMIN — PREDNISOLONE ACETATE 1 DROP: 10 SUSPENSION/ DROPS OPHTHALMIC at 09:00

## 2023-11-27 RX ADMIN — BUPROPION HYDROCHLORIDE 300 MG: 300 TABLET, EXTENDED RELEASE ORAL at 06:10

## 2023-11-27 RX ADMIN — MONTELUKAST 10 MG: 10 TABLET, FILM COATED ORAL at 16:47

## 2023-11-27 RX ADMIN — LEVETIRACETAM 500 MG: 500 TABLET, FILM COATED ORAL at 16:47

## 2023-11-27 RX ADMIN — GABAPENTIN 600 MG: 300 CAPSULE ORAL at 21:59

## 2023-11-27 RX ADMIN — GABAPENTIN 600 MG: 300 CAPSULE ORAL at 06:10

## 2023-11-27 RX ADMIN — Medication 10 ML: at 22:15

## 2023-11-27 RX ADMIN — LEVETIRACETAM 500 MG: 500 TABLET, FILM COATED ORAL at 08:21

## 2023-11-27 RX ADMIN — Medication 10 ML: at 08:21

## 2023-11-27 RX ADMIN — Medication 1 CAPSULE: at 09:00

## 2023-11-27 RX ADMIN — BUSPIRONE HYDROCHLORIDE 10 MG: 10 TABLET ORAL at 08:21

## 2023-11-28 ENCOUNTER — APPOINTMENT (OUTPATIENT)
Dept: CARDIOLOGY | Facility: HOSPITAL | Age: 58
DRG: 189 | End: 2023-11-28
Payer: MEDICARE

## 2023-11-28 LAB
ALBUMIN SERPL-MCNC: 3.2 G/DL (ref 3.5–5.2)
ALBUMIN/GLOB SERPL: 0.8 G/DL
ALP SERPL-CCNC: 78 U/L (ref 39–117)
ALT SERPL W P-5'-P-CCNC: 76 U/L (ref 1–33)
ANION GAP SERPL CALCULATED.3IONS-SCNC: 9.4 MMOL/L (ref 5–15)
AST SERPL-CCNC: 38 U/L (ref 1–32)
BH CV LOWER VASCULAR LEFT COMMON FEMORAL AUGMENT: NORMAL
BH CV LOWER VASCULAR LEFT COMMON FEMORAL COMPETENT: NORMAL
BH CV LOWER VASCULAR LEFT COMMON FEMORAL COMPRESS: NORMAL
BH CV LOWER VASCULAR LEFT COMMON FEMORAL PHASIC: NORMAL
BH CV LOWER VASCULAR LEFT COMMON FEMORAL SPONT: NORMAL
BH CV LOWER VASCULAR LEFT DISTAL FEMORAL COMPRESS: NORMAL
BH CV LOWER VASCULAR LEFT GASTRONEMIUS COMPRESS: NORMAL
BH CV LOWER VASCULAR LEFT GREATER SAPH AK COMPRESS: NORMAL
BH CV LOWER VASCULAR LEFT GREATER SAPH BK COMPRESS: NORMAL
BH CV LOWER VASCULAR LEFT LESSER SAPH COMPRESS: NORMAL
BH CV LOWER VASCULAR LEFT MID FEMORAL AUGMENT: NORMAL
BH CV LOWER VASCULAR LEFT MID FEMORAL COMPETENT: NORMAL
BH CV LOWER VASCULAR LEFT MID FEMORAL COMPRESS: NORMAL
BH CV LOWER VASCULAR LEFT MID FEMORAL PHASIC: NORMAL
BH CV LOWER VASCULAR LEFT MID FEMORAL SPONT: NORMAL
BH CV LOWER VASCULAR LEFT PERONEAL COMPRESS: NORMAL
BH CV LOWER VASCULAR LEFT POPLITEAL AUGMENT: NORMAL
BH CV LOWER VASCULAR LEFT POPLITEAL COMPETENT: NORMAL
BH CV LOWER VASCULAR LEFT POPLITEAL COMPRESS: NORMAL
BH CV LOWER VASCULAR LEFT POPLITEAL PHASIC: NORMAL
BH CV LOWER VASCULAR LEFT POPLITEAL SPONT: NORMAL
BH CV LOWER VASCULAR LEFT POSTERIOR TIBIAL COMPRESS: NORMAL
BH CV LOWER VASCULAR LEFT PROFUNDA FEMORAL COMPRESS: NORMAL
BH CV LOWER VASCULAR LEFT PROXIMAL FEMORAL COMPRESS: NORMAL
BH CV LOWER VASCULAR LEFT SAPHENOFEMORAL JUNCTION COMPRESS: NORMAL
BH CV LOWER VASCULAR RIGHT COMMON FEMORAL AUGMENT: NORMAL
BH CV LOWER VASCULAR RIGHT COMMON FEMORAL COMPETENT: NORMAL
BH CV LOWER VASCULAR RIGHT COMMON FEMORAL COMPRESS: NORMAL
BH CV LOWER VASCULAR RIGHT COMMON FEMORAL PHASIC: NORMAL
BH CV LOWER VASCULAR RIGHT COMMON FEMORAL SPONT: NORMAL
BH CV LOWER VASCULAR RIGHT DISTAL FEMORAL COMPRESS: NORMAL
BH CV LOWER VASCULAR RIGHT GASTRONEMIUS COMPRESS: NORMAL
BH CV LOWER VASCULAR RIGHT GREATER SAPH AK COMPRESS: NORMAL
BH CV LOWER VASCULAR RIGHT GREATER SAPH BK COMPRESS: NORMAL
BH CV LOWER VASCULAR RIGHT LESSER SAPH COMPRESS: NORMAL
BH CV LOWER VASCULAR RIGHT MID FEMORAL AUGMENT: NORMAL
BH CV LOWER VASCULAR RIGHT MID FEMORAL COMPETENT: NORMAL
BH CV LOWER VASCULAR RIGHT MID FEMORAL COMPRESS: NORMAL
BH CV LOWER VASCULAR RIGHT MID FEMORAL PHASIC: NORMAL
BH CV LOWER VASCULAR RIGHT MID FEMORAL SPONT: NORMAL
BH CV LOWER VASCULAR RIGHT PERONEAL COMPRESS: NORMAL
BH CV LOWER VASCULAR RIGHT POPLITEAL AUGMENT: NORMAL
BH CV LOWER VASCULAR RIGHT POPLITEAL COMPETENT: NORMAL
BH CV LOWER VASCULAR RIGHT POPLITEAL COMPRESS: NORMAL
BH CV LOWER VASCULAR RIGHT POPLITEAL PHASIC: NORMAL
BH CV LOWER VASCULAR RIGHT POPLITEAL SPONT: NORMAL
BH CV LOWER VASCULAR RIGHT POSTERIOR TIBIAL COMPRESS: NORMAL
BH CV LOWER VASCULAR RIGHT PROFUNDA FEMORAL COMPRESS: NORMAL
BH CV LOWER VASCULAR RIGHT PROXIMAL FEMORAL COMPRESS: NORMAL
BH CV LOWER VASCULAR RIGHT SAPHENOFEMORAL JUNCTION COMPRESS: NORMAL
BH CV POP FLUID COLLECT LEFT: 1
BILIRUB SERPL-MCNC: 0.3 MG/DL (ref 0–1.2)
BUN SERPL-MCNC: 23 MG/DL (ref 8–23)
BUN/CREAT SERPL: 28 (ref 7–25)
CALCIUM SPEC-SCNC: 9.1 MG/DL (ref 8.6–10.5)
CHLORIDE SERPL-SCNC: 100 MMOL/L (ref 98–107)
CO2 SERPL-SCNC: 30.6 MMOL/L (ref 22–29)
CREAT SERPL-MCNC: 0.82 MG/DL (ref 0.57–1)
DEPRECATED RDW RBC AUTO: 44.4 FL (ref 37–54)
EGFRCR SERPLBLD CKD-EPI 2021: 82 ML/MIN/1.73
ERYTHROCYTE [DISTWIDTH] IN BLOOD BY AUTOMATED COUNT: 14.2 % (ref 12.3–15.4)
GLOBULIN UR ELPH-MCNC: 3.8 GM/DL
GLUCOSE SERPL-MCNC: 153 MG/DL (ref 65–99)
HCT VFR BLD AUTO: 38.9 % (ref 34–46.6)
HGB BLD-MCNC: 12.2 G/DL (ref 12–15.9)
MCH RBC QN AUTO: 26.9 PG (ref 26.6–33)
MCHC RBC AUTO-ENTMCNC: 31.4 G/DL (ref 31.5–35.7)
MCV RBC AUTO: 85.9 FL (ref 79–97)
PLATELET # BLD AUTO: 285 10*3/MM3 (ref 140–450)
PMV BLD AUTO: 10.3 FL (ref 6–12)
POTASSIUM SERPL-SCNC: 4.5 MMOL/L (ref 3.5–5.2)
PROT SERPL-MCNC: 7 G/DL (ref 6–8.5)
RBC # BLD AUTO: 4.53 10*6/MM3 (ref 3.77–5.28)
SODIUM SERPL-SCNC: 140 MMOL/L (ref 136–145)
WBC NRBC COR # BLD AUTO: 13.96 10*3/MM3 (ref 3.4–10.8)

## 2023-11-28 PROCEDURE — 93970 EXTREMITY STUDY: CPT

## 2023-11-28 PROCEDURE — 94799 UNLISTED PULMONARY SVC/PX: CPT

## 2023-11-28 PROCEDURE — 85027 COMPLETE CBC AUTOMATED: CPT | Performed by: INTERNAL MEDICINE

## 2023-11-28 PROCEDURE — 25010000002 METHYLPREDNISOLONE PER 125 MG: Performed by: INTERNAL MEDICINE

## 2023-11-28 PROCEDURE — 94664 DEMO&/EVAL PT USE INHALER: CPT

## 2023-11-28 PROCEDURE — 94761 N-INVAS EAR/PLS OXIMETRY MLT: CPT

## 2023-11-28 PROCEDURE — 94760 N-INVAS EAR/PLS OXIMETRY 1: CPT

## 2023-11-28 PROCEDURE — 80053 COMPREHEN METABOLIC PANEL: CPT | Performed by: INTERNAL MEDICINE

## 2023-11-28 RX ORDER — PREDNISONE 20 MG/1
40 TABLET ORAL
Status: DISCONTINUED | OUTPATIENT
Start: 2023-11-29 | End: 2023-11-29 | Stop reason: HOSPADM

## 2023-11-28 RX ORDER — ENOXAPARIN SODIUM 100 MG/ML
40 INJECTION SUBCUTANEOUS EVERY 12 HOURS
Status: DISCONTINUED | OUTPATIENT
Start: 2023-11-29 | End: 2023-11-29 | Stop reason: HOSPADM

## 2023-11-28 RX ADMIN — Medication 10 ML: at 21:44

## 2023-11-28 RX ADMIN — BUSPIRONE HYDROCHLORIDE 10 MG: 10 TABLET ORAL at 09:10

## 2023-11-28 RX ADMIN — VENLAFAXINE HYDROCHLORIDE 75 MG: 75 CAPSULE, EXTENDED RELEASE ORAL at 09:10

## 2023-11-28 RX ADMIN — LEVETIRACETAM 500 MG: 500 TABLET, FILM COATED ORAL at 09:10

## 2023-11-28 RX ADMIN — GUAIFENESIN 1200 MG: 600 TABLET, EXTENDED RELEASE ORAL at 09:10

## 2023-11-28 RX ADMIN — GABAPENTIN 600 MG: 300 CAPSULE ORAL at 21:10

## 2023-11-28 RX ADMIN — BUDESONIDE AND FORMOTEROL FUMARATE DIHYDRATE 2 PUFF: 160; 4.5 AEROSOL RESPIRATORY (INHALATION) at 19:24

## 2023-11-28 RX ADMIN — Medication 1 CAPSULE: at 09:10

## 2023-11-28 RX ADMIN — BUSPIRONE HYDROCHLORIDE 10 MG: 10 TABLET ORAL at 21:10

## 2023-11-28 RX ADMIN — MONTELUKAST 10 MG: 10 TABLET, FILM COATED ORAL at 17:45

## 2023-11-28 RX ADMIN — IPRATROPIUM BROMIDE AND ALBUTEROL SULFATE 3 ML: 2.5; .5 SOLUTION RESPIRATORY (INHALATION) at 15:10

## 2023-11-28 RX ADMIN — GABAPENTIN 600 MG: 300 CAPSULE ORAL at 08:24

## 2023-11-28 RX ADMIN — METHYLPREDNISOLONE SODIUM SUCCINATE 60 MG: 125 INJECTION INTRAMUSCULAR; INTRAVENOUS at 09:10

## 2023-11-28 RX ADMIN — IPRATROPIUM BROMIDE AND ALBUTEROL SULFATE 3 ML: 2.5; .5 SOLUTION RESPIRATORY (INHALATION) at 07:19

## 2023-11-28 RX ADMIN — CETIRIZINE HYDROCHLORIDE 10 MG: 10 TABLET, FILM COATED ORAL at 09:10

## 2023-11-28 RX ADMIN — IPRATROPIUM BROMIDE AND ALBUTEROL SULFATE 3 ML: 2.5; .5 SOLUTION RESPIRATORY (INHALATION) at 11:06

## 2023-11-28 RX ADMIN — GUAIFENESIN 1200 MG: 600 TABLET, EXTENDED RELEASE ORAL at 21:10

## 2023-11-28 RX ADMIN — IPRATROPIUM BROMIDE AND ALBUTEROL SULFATE 3 ML: 2.5; .5 SOLUTION RESPIRATORY (INHALATION) at 19:19

## 2023-11-28 RX ADMIN — OXYCODONE AND ACETAMINOPHEN 1 TABLET: 10; 325 TABLET ORAL at 21:11

## 2023-11-28 RX ADMIN — BUPROPION HYDROCHLORIDE 300 MG: 300 TABLET, EXTENDED RELEASE ORAL at 09:10

## 2023-11-28 RX ADMIN — GABAPENTIN 600 MG: 300 CAPSULE ORAL at 15:04

## 2023-11-28 RX ADMIN — BUDESONIDE AND FORMOTEROL FUMARATE DIHYDRATE 2 PUFF: 160; 4.5 AEROSOL RESPIRATORY (INHALATION) at 07:20

## 2023-11-28 RX ADMIN — OXYCODONE AND ACETAMINOPHEN 1 TABLET: 10; 325 TABLET ORAL at 14:34

## 2023-11-28 RX ADMIN — LEVETIRACETAM 500 MG: 500 TABLET, FILM COATED ORAL at 15:04

## 2023-11-28 RX ADMIN — LEVETIRACETAM 500 MG: 500 TABLET, FILM COATED ORAL at 21:10

## 2023-11-28 RX ADMIN — PANTOPRAZOLE SODIUM 40 MG: 40 TABLET, DELAYED RELEASE ORAL at 08:24

## 2023-11-28 RX ADMIN — BUSPIRONE HYDROCHLORIDE 10 MG: 10 TABLET ORAL at 15:04

## 2023-11-28 RX ADMIN — Medication 10 ML: at 09:10

## 2023-11-28 RX ADMIN — METHYLPREDNISOLONE SODIUM SUCCINATE 60 MG: 125 INJECTION INTRAMUSCULAR; INTRAVENOUS at 21:10

## 2023-11-29 ENCOUNTER — READMISSION MANAGEMENT (OUTPATIENT)
Dept: CALL CENTER | Facility: HOSPITAL | Age: 58
End: 2023-11-29
Payer: MEDICARE

## 2023-11-29 VITALS
HEART RATE: 74 BPM | SYSTOLIC BLOOD PRESSURE: 136 MMHG | RESPIRATION RATE: 18 BRPM | TEMPERATURE: 98.6 F | BODY MASS INDEX: 45.44 KG/M2 | DIASTOLIC BLOOD PRESSURE: 67 MMHG | HEIGHT: 67 IN | WEIGHT: 289.5 LBS | OXYGEN SATURATION: 91 %

## 2023-11-29 LAB
BACTERIA SPEC RESP CULT: ABNORMAL
BACTERIA SPEC RESP CULT: ABNORMAL
GRAM STN SPEC: ABNORMAL

## 2023-11-29 PROCEDURE — 63710000001 PREDNISONE PER 1 MG: Performed by: INTERNAL MEDICINE

## 2023-11-29 PROCEDURE — 94799 UNLISTED PULMONARY SVC/PX: CPT

## 2023-11-29 PROCEDURE — 94761 N-INVAS EAR/PLS OXIMETRY MLT: CPT

## 2023-11-29 PROCEDURE — 94664 DEMO&/EVAL PT USE INHALER: CPT

## 2023-11-29 PROCEDURE — 25010000002 ENOXAPARIN PER 10 MG: Performed by: INTERNAL MEDICINE

## 2023-11-29 RX ORDER — PREDNISONE 20 MG/1
40 TABLET ORAL
Qty: 8 TABLET | Refills: 0 | Status: SHIPPED | OUTPATIENT
Start: 2023-11-30 | End: 2023-12-04

## 2023-11-29 RX ADMIN — BUSPIRONE HYDROCHLORIDE 10 MG: 10 TABLET ORAL at 09:33

## 2023-11-29 RX ADMIN — LEVETIRACETAM 500 MG: 500 TABLET, FILM COATED ORAL at 09:33

## 2023-11-29 RX ADMIN — VENLAFAXINE HYDROCHLORIDE 75 MG: 75 CAPSULE, EXTENDED RELEASE ORAL at 09:33

## 2023-11-29 RX ADMIN — ENOXAPARIN SODIUM 40 MG: 100 INJECTION SUBCUTANEOUS at 00:39

## 2023-11-29 RX ADMIN — Medication 1 CAPSULE: at 09:33

## 2023-11-29 RX ADMIN — FLUTICASONE PROPIONATE 1 SPRAY: 50 SPRAY, METERED NASAL at 06:51

## 2023-11-29 RX ADMIN — CETIRIZINE HYDROCHLORIDE 10 MG: 10 TABLET, FILM COATED ORAL at 09:33

## 2023-11-29 RX ADMIN — IPRATROPIUM BROMIDE AND ALBUTEROL SULFATE 3 ML: 2.5; .5 SOLUTION RESPIRATORY (INHALATION) at 11:43

## 2023-11-29 RX ADMIN — IPRATROPIUM BROMIDE AND ALBUTEROL SULFATE 3 ML: 2.5; .5 SOLUTION RESPIRATORY (INHALATION) at 07:20

## 2023-11-29 RX ADMIN — PANTOPRAZOLE SODIUM 40 MG: 40 TABLET, DELAYED RELEASE ORAL at 06:49

## 2023-11-29 RX ADMIN — GUAIFENESIN 1200 MG: 600 TABLET, EXTENDED RELEASE ORAL at 09:33

## 2023-11-29 RX ADMIN — GABAPENTIN 600 MG: 300 CAPSULE ORAL at 06:49

## 2023-11-29 RX ADMIN — BUPROPION HYDROCHLORIDE 300 MG: 300 TABLET, EXTENDED RELEASE ORAL at 06:49

## 2023-11-29 RX ADMIN — PREDNISONE 40 MG: 20 TABLET ORAL at 09:33

## 2023-12-01 ENCOUNTER — OFFICE VISIT (OUTPATIENT)
Dept: PAIN MEDICINE | Facility: CLINIC | Age: 58
End: 2023-12-01
Payer: MEDICARE

## 2023-12-01 VITALS
TEMPERATURE: 96.4 F | OXYGEN SATURATION: 93 % | WEIGHT: 273.6 LBS | DIASTOLIC BLOOD PRESSURE: 60 MMHG | SYSTOLIC BLOOD PRESSURE: 98 MMHG | HEART RATE: 86 BPM | RESPIRATION RATE: 20 BRPM | HEIGHT: 67 IN | BODY MASS INDEX: 42.94 KG/M2

## 2023-12-01 DIAGNOSIS — M62.838 MUSCLE SPASM: ICD-10-CM

## 2023-12-01 DIAGNOSIS — M50.123 CERVICAL DISC DISORDER AT C6-C7 LEVEL WITH RADICULOPATHY: ICD-10-CM

## 2023-12-01 DIAGNOSIS — Z79.899 ENCOUNTER FOR LONG-TERM (CURRENT) USE OF HIGH-RISK MEDICATION: ICD-10-CM

## 2023-12-01 DIAGNOSIS — G89.4 CHRONIC PAIN SYNDROME: Primary | ICD-10-CM

## 2023-12-01 DIAGNOSIS — M50.122 CERVICAL DISC DISORDER AT C5-C6 LEVEL WITH RADICULOPATHY: ICD-10-CM

## 2023-12-01 DIAGNOSIS — G89.4 CHRONIC PAIN SYNDROME: ICD-10-CM

## 2023-12-01 DIAGNOSIS — M96.1 POSTLAMINECTOMY SYNDROME OF THORACIC REGION: ICD-10-CM

## 2023-12-01 DIAGNOSIS — M51.36 DDD (DEGENERATIVE DISC DISEASE), LUMBAR: ICD-10-CM

## 2023-12-01 PROCEDURE — 1125F AMNT PAIN NOTED PAIN PRSNT: CPT

## 2023-12-01 PROCEDURE — 99214 OFFICE O/P EST MOD 30 MIN: CPT

## 2023-12-01 PROCEDURE — 1159F MED LIST DOCD IN RCRD: CPT

## 2023-12-01 PROCEDURE — 1160F RVW MEDS BY RX/DR IN RCRD: CPT

## 2023-12-01 RX ORDER — OXYCODONE AND ACETAMINOPHEN 10; 325 MG/1; MG/1
1 TABLET ORAL EVERY 6 HOURS PRN
Qty: 120 TABLET | Refills: 0 | Status: SHIPPED | OUTPATIENT
Start: 2023-12-01

## 2023-12-05 ENCOUNTER — READMISSION MANAGEMENT (OUTPATIENT)
Dept: CALL CENTER | Facility: HOSPITAL | Age: 58
End: 2023-12-05
Payer: MEDICARE

## 2023-12-11 ENCOUNTER — TELEPHONE (OUTPATIENT)
Dept: PAIN MEDICINE | Facility: CLINIC | Age: 58
End: 2023-12-11

## 2023-12-11 DIAGNOSIS — G89.4 CHRONIC PAIN SYNDROME: Primary | ICD-10-CM

## 2023-12-11 DIAGNOSIS — M51.36 DDD (DEGENERATIVE DISC DISEASE), LUMBAR: ICD-10-CM

## 2023-12-13 ENCOUNTER — READMISSION MANAGEMENT (OUTPATIENT)
Dept: CALL CENTER | Facility: HOSPITAL | Age: 58
End: 2023-12-13
Payer: MEDICARE

## 2023-12-15 ENCOUNTER — READMISSION MANAGEMENT (OUTPATIENT)
Dept: CALL CENTER | Facility: HOSPITAL | Age: 58
End: 2023-12-15
Payer: MEDICARE

## 2023-12-15 ENCOUNTER — HOSPITAL ENCOUNTER (INPATIENT)
Facility: HOSPITAL | Age: 58
LOS: 7 days | Discharge: HOME | DRG: 193 | End: 2023-12-22
Attending: EMERGENCY MEDICINE | Admitting: HOSPITALIST
Payer: MEDICARE

## 2023-12-15 ENCOUNTER — APPOINTMENT (OUTPATIENT)
Dept: GENERAL RADIOLOGY | Facility: HOSPITAL | Age: 58
DRG: 193 | End: 2023-12-15
Payer: MEDICARE

## 2023-12-15 ENCOUNTER — APPOINTMENT (OUTPATIENT)
Dept: CARDIOLOGY | Facility: HOSPITAL | Age: 58
DRG: 193 | End: 2023-12-15
Payer: MEDICARE

## 2023-12-15 ENCOUNTER — APPOINTMENT (OUTPATIENT)
Dept: CT IMAGING | Facility: HOSPITAL | Age: 58
DRG: 193 | End: 2023-12-15
Payer: MEDICARE

## 2023-12-15 DIAGNOSIS — J44.1 CHRONIC OBSTRUCTIVE PULMONARY DISEASE WITH ACUTE EXACERBATION: ICD-10-CM

## 2023-12-15 DIAGNOSIS — J44.1 CHRONIC OBSTRUCTIVE PULMONARY DISEASE WITH (ACUTE) EXACERBATION: ICD-10-CM

## 2023-12-15 DIAGNOSIS — J18.9 COMMUNITY ACQUIRED PNEUMONIA, UNSPECIFIED LATERALITY: Primary | ICD-10-CM

## 2023-12-15 DIAGNOSIS — J44.1 ACUTE EXACERBATION OF CHRONIC OBSTRUCTIVE PULMONARY DISEASE (COPD): ICD-10-CM

## 2023-12-15 DIAGNOSIS — R19.7 DIARRHEA, UNSPECIFIED TYPE: ICD-10-CM

## 2023-12-15 DIAGNOSIS — B34.8 INFECTION DUE TO HUMAN METAPNEUMOVIRUS (HMPV): ICD-10-CM

## 2023-12-15 DIAGNOSIS — J44.1 COPD EXACERBATION: ICD-10-CM

## 2023-12-15 DIAGNOSIS — J96.01 ACUTE HYPOXIC RESPIRATORY FAILURE: ICD-10-CM

## 2023-12-15 DIAGNOSIS — Z74.09 IMPAIRED MOBILITY: ICD-10-CM

## 2023-12-15 DIAGNOSIS — R10.30 LOWER ABDOMINAL PAIN: ICD-10-CM

## 2023-12-15 PROBLEM — J12.3 HUMAN METAPNEUMOVIRUS (HMPV) PNEUMONIA: Status: ACTIVE | Noted: 2023-12-15

## 2023-12-15 PROBLEM — J15.9 PNEUMONIA, BACTERIAL: Status: ACTIVE | Noted: 2023-12-15

## 2023-12-15 PROBLEM — E66.9 OBESITY: Status: ACTIVE | Noted: 2023-12-15

## 2023-12-15 LAB
ALBUMIN SERPL-MCNC: 3.4 G/DL (ref 3.5–5.2)
ALBUMIN/GLOB SERPL: 1 G/DL
ALP SERPL-CCNC: 67 U/L (ref 39–117)
ALT SERPL W P-5'-P-CCNC: 31 U/L (ref 1–33)
AMORPH URATE CRY URNS QL MICRO: ABNORMAL /HPF
ANION GAP SERPL CALCULATED.3IONS-SCNC: 9.6 MMOL/L (ref 5–15)
AST SERPL-CCNC: 33 U/L (ref 1–32)
B PARAPERT DNA SPEC QL NAA+PROBE: NOT DETECTED
B PERT DNA SPEC QL NAA+PROBE: NOT DETECTED
BACTERIA UR QL AUTO: ABNORMAL /HPF
BASOPHILS # BLD AUTO: 0.03 10*3/MM3 (ref 0–0.2)
BASOPHILS NFR BLD AUTO: 0.2 % (ref 0–1.5)
BH CV LOWER VASCULAR LEFT COMMON FEMORAL AUGMENT: NORMAL
BH CV LOWER VASCULAR LEFT COMMON FEMORAL COMPETENT: NORMAL
BH CV LOWER VASCULAR LEFT COMMON FEMORAL COMPRESS: NORMAL
BH CV LOWER VASCULAR LEFT COMMON FEMORAL PHASIC: NORMAL
BH CV LOWER VASCULAR LEFT COMMON FEMORAL SPONT: NORMAL
BH CV LOWER VASCULAR LEFT DISTAL FEMORAL COMPRESS: NORMAL
BH CV LOWER VASCULAR LEFT GASTRONEMIUS COMPRESS: NORMAL
BH CV LOWER VASCULAR LEFT GREATER SAPH AK COMPRESS: NORMAL
BH CV LOWER VASCULAR LEFT GREATER SAPH BK COMPRESS: NORMAL
BH CV LOWER VASCULAR LEFT LESSER SAPH COMPRESS: NORMAL
BH CV LOWER VASCULAR LEFT MID FEMORAL AUGMENT: NORMAL
BH CV LOWER VASCULAR LEFT MID FEMORAL COMPETENT: NORMAL
BH CV LOWER VASCULAR LEFT MID FEMORAL COMPRESS: NORMAL
BH CV LOWER VASCULAR LEFT MID FEMORAL PHASIC: NORMAL
BH CV LOWER VASCULAR LEFT MID FEMORAL SPONT: NORMAL
BH CV LOWER VASCULAR LEFT PERONEAL COMPRESS: NORMAL
BH CV LOWER VASCULAR LEFT POPLITEAL AUGMENT: NORMAL
BH CV LOWER VASCULAR LEFT POPLITEAL COMPETENT: NORMAL
BH CV LOWER VASCULAR LEFT POPLITEAL COMPRESS: NORMAL
BH CV LOWER VASCULAR LEFT POPLITEAL PHASIC: NORMAL
BH CV LOWER VASCULAR LEFT POPLITEAL SPONT: NORMAL
BH CV LOWER VASCULAR LEFT POSTERIOR TIBIAL COMPRESS: NORMAL
BH CV LOWER VASCULAR LEFT PROFUNDA FEMORAL COMPRESS: NORMAL
BH CV LOWER VASCULAR LEFT PROXIMAL FEMORAL COMPRESS: NORMAL
BH CV LOWER VASCULAR LEFT SAPHENOFEMORAL JUNCTION COMPRESS: NORMAL
BH CV LOWER VASCULAR RIGHT COMMON FEMORAL AUGMENT: NORMAL
BH CV LOWER VASCULAR RIGHT COMMON FEMORAL COMPETENT: NORMAL
BH CV LOWER VASCULAR RIGHT COMMON FEMORAL COMPRESS: NORMAL
BH CV LOWER VASCULAR RIGHT COMMON FEMORAL PHASIC: NORMAL
BH CV LOWER VASCULAR RIGHT COMMON FEMORAL SPONT: NORMAL
BH CV LOWER VASCULAR RIGHT DISTAL FEMORAL COMPRESS: NORMAL
BH CV LOWER VASCULAR RIGHT GASTRONEMIUS COMPRESS: NORMAL
BH CV LOWER VASCULAR RIGHT GREATER SAPH AK COMPRESS: NORMAL
BH CV LOWER VASCULAR RIGHT GREATER SAPH BK COMPRESS: NORMAL
BH CV LOWER VASCULAR RIGHT LESSER SAPH COMPRESS: NORMAL
BH CV LOWER VASCULAR RIGHT MID FEMORAL AUGMENT: NORMAL
BH CV LOWER VASCULAR RIGHT MID FEMORAL COMPETENT: NORMAL
BH CV LOWER VASCULAR RIGHT MID FEMORAL COMPRESS: NORMAL
BH CV LOWER VASCULAR RIGHT MID FEMORAL PHASIC: NORMAL
BH CV LOWER VASCULAR RIGHT MID FEMORAL SPONT: NORMAL
BH CV LOWER VASCULAR RIGHT PERONEAL COMPRESS: NORMAL
BH CV LOWER VASCULAR RIGHT POPLITEAL AUGMENT: NORMAL
BH CV LOWER VASCULAR RIGHT POPLITEAL COMPETENT: NORMAL
BH CV LOWER VASCULAR RIGHT POPLITEAL COMPRESS: NORMAL
BH CV LOWER VASCULAR RIGHT POPLITEAL PHASIC: NORMAL
BH CV LOWER VASCULAR RIGHT POPLITEAL SPONT: NORMAL
BH CV LOWER VASCULAR RIGHT POSTERIOR TIBIAL COMPRESS: NORMAL
BH CV LOWER VASCULAR RIGHT PROFUNDA FEMORAL COMPRESS: NORMAL
BH CV LOWER VASCULAR RIGHT PROXIMAL FEMORAL COMPRESS: NORMAL
BH CV LOWER VASCULAR RIGHT SAPHENOFEMORAL JUNCTION COMPRESS: NORMAL
BILIRUB SERPL-MCNC: 0.6 MG/DL (ref 0–1.2)
BILIRUB UR QL STRIP: NEGATIVE
BUN SERPL-MCNC: 11 MG/DL (ref 8–23)
BUN/CREAT SERPL: 13.4 (ref 7–25)
C PNEUM DNA NPH QL NAA+NON-PROBE: NOT DETECTED
CALCIUM SPEC-SCNC: 8.7 MG/DL (ref 8.6–10.5)
CHLORIDE SERPL-SCNC: 97 MMOL/L (ref 98–107)
CLARITY UR: CLEAR
CO2 SERPL-SCNC: 27.4 MMOL/L (ref 22–29)
COLOR UR: YELLOW
CREAT SERPL-MCNC: 0.82 MG/DL (ref 0.57–1)
D-LACTATE SERPL-SCNC: 0.9 MMOL/L (ref 0.5–2)
DEPRECATED RDW RBC AUTO: 44.7 FL (ref 37–54)
EGFRCR SERPLBLD CKD-EPI 2021: 82 ML/MIN/1.73
EOSINOPHIL # BLD AUTO: 0 10*3/MM3 (ref 0–0.4)
EOSINOPHIL NFR BLD AUTO: 0 % (ref 0.3–6.2)
ERYTHROCYTE [DISTWIDTH] IN BLOOD BY AUTOMATED COUNT: 15.1 % (ref 12.3–15.4)
FLUAV SUBTYP SPEC NAA+PROBE: NOT DETECTED
FLUBV RNA ISLT QL NAA+PROBE: NOT DETECTED
GEN 5 2HR TROPONIN T REFLEX: 14 NG/L
GLOBULIN UR ELPH-MCNC: 3.3 GM/DL
GLUCOSE SERPL-MCNC: 114 MG/DL (ref 65–99)
GLUCOSE UR STRIP-MCNC: ABNORMAL MG/DL
HADV DNA SPEC NAA+PROBE: NOT DETECTED
HCOV 229E RNA SPEC QL NAA+PROBE: NOT DETECTED
HCOV HKU1 RNA SPEC QL NAA+PROBE: NOT DETECTED
HCOV NL63 RNA SPEC QL NAA+PROBE: NOT DETECTED
HCOV OC43 RNA SPEC QL NAA+PROBE: NOT DETECTED
HCT VFR BLD AUTO: 37 % (ref 34–46.6)
HGB BLD-MCNC: 11.5 G/DL (ref 12–15.9)
HGB UR QL STRIP.AUTO: NEGATIVE
HMPV RNA NPH QL NAA+NON-PROBE: DETECTED
HOLD SPECIMEN: NORMAL
HPIV1 RNA ISLT QL NAA+PROBE: NOT DETECTED
HPIV2 RNA SPEC QL NAA+PROBE: NOT DETECTED
HPIV3 RNA NPH QL NAA+PROBE: NOT DETECTED
HPIV4 P GENE NPH QL NAA+PROBE: NOT DETECTED
HYALINE CASTS UR QL AUTO: ABNORMAL /LPF
IMM GRANULOCYTES # BLD AUTO: 0.06 10*3/MM3 (ref 0–0.05)
IMM GRANULOCYTES NFR BLD AUTO: 0.5 % (ref 0–0.5)
KETONES UR QL STRIP: NEGATIVE
LEUKOCYTE ESTERASE UR QL STRIP.AUTO: NEGATIVE
LIPASE SERPL-CCNC: 9 U/L (ref 13–60)
LYMPHOCYTES # BLD AUTO: 0.57 10*3/MM3 (ref 0.7–3.1)
LYMPHOCYTES NFR BLD AUTO: 4.5 % (ref 19.6–45.3)
M PNEUMO IGG SER IA-ACNC: NOT DETECTED
MAGNESIUM SERPL-MCNC: 1.8 MG/DL (ref 1.6–2.4)
MCH RBC QN AUTO: 25.8 PG (ref 26.6–33)
MCHC RBC AUTO-ENTMCNC: 31.1 G/DL (ref 31.5–35.7)
MCV RBC AUTO: 83 FL (ref 79–97)
MONOCYTES # BLD AUTO: 0.77 10*3/MM3 (ref 0.1–0.9)
MONOCYTES NFR BLD AUTO: 6.1 % (ref 5–12)
MUCOUS THREADS URNS QL MICRO: ABNORMAL /HPF
NEUTROPHILS NFR BLD AUTO: 11.28 10*3/MM3 (ref 1.7–7)
NEUTROPHILS NFR BLD AUTO: 88.7 % (ref 42.7–76)
NITRITE UR QL STRIP: NEGATIVE
NRBC BLD AUTO-RTO: 0.1 /100 WBC (ref 0–0.2)
NT-PROBNP SERPL-MCNC: 550 PG/ML (ref 0–900)
PH UR STRIP.AUTO: 6 [PH] (ref 5–8)
PLATELET # BLD AUTO: 142 10*3/MM3 (ref 140–450)
PMV BLD AUTO: 11.7 FL (ref 6–12)
POTASSIUM SERPL-SCNC: 4.3 MMOL/L (ref 3.5–5.2)
PROCALCITONIN SERPL-MCNC: 0.13 NG/ML (ref 0–0.25)
PROT SERPL-MCNC: 6.7 G/DL (ref 6–8.5)
PROT UR QL STRIP: ABNORMAL
QT INTERVAL: 332 MS
QTC INTERVAL: 411 MS
RBC # BLD AUTO: 4.46 10*6/MM3 (ref 3.77–5.28)
RBC # UR STRIP: ABNORMAL /HPF
REF LAB TEST METHOD: ABNORMAL
RHINOVIRUS RNA SPEC NAA+PROBE: NOT DETECTED
RSV RNA NPH QL NAA+NON-PROBE: NOT DETECTED
SARS-COV-2 RNA NPH QL NAA+NON-PROBE: NOT DETECTED
SODIUM SERPL-SCNC: 134 MMOL/L (ref 136–145)
SP GR UR STRIP: >=1.03 (ref 1–1.03)
SQUAMOUS #/AREA URNS HPF: ABNORMAL /HPF
TROPONIN T DELTA: 0 NG/L
TROPONIN T SERPL HS-MCNC: 14 NG/L
UROBILINOGEN UR QL STRIP: ABNORMAL
WBC # UR STRIP: ABNORMAL /HPF
WBC NRBC COR # BLD AUTO: 12.71 10*3/MM3 (ref 3.4–10.8)
WHOLE BLOOD HOLD COAG: NORMAL

## 2023-12-15 PROCEDURE — 94760 N-INVAS EAR/PLS OXIMETRY 1: CPT

## 2023-12-15 PROCEDURE — 71275 CT ANGIOGRAPHY CHEST: CPT

## 2023-12-15 PROCEDURE — 71045 X-RAY EXAM CHEST 1 VIEW: CPT

## 2023-12-15 PROCEDURE — 94799 UNLISTED PULMONARY SVC/PX: CPT

## 2023-12-15 PROCEDURE — 25010000002 ONDANSETRON PER 1 MG: Performed by: EMERGENCY MEDICINE

## 2023-12-15 PROCEDURE — 84484 ASSAY OF TROPONIN QUANT: CPT | Performed by: EMERGENCY MEDICINE

## 2023-12-15 PROCEDURE — 81001 URINALYSIS AUTO W/SCOPE: CPT | Performed by: EMERGENCY MEDICINE

## 2023-12-15 PROCEDURE — 25810000003 SODIUM CHLORIDE 0.9 % SOLUTION: Performed by: EMERGENCY MEDICINE

## 2023-12-15 PROCEDURE — 85025 COMPLETE CBC W/AUTO DIFF WBC: CPT | Performed by: EMERGENCY MEDICINE

## 2023-12-15 PROCEDURE — 84145 PROCALCITONIN (PCT): CPT | Performed by: EMERGENCY MEDICINE

## 2023-12-15 PROCEDURE — 25010000002 MORPHINE PER 10 MG: Performed by: EMERGENCY MEDICINE

## 2023-12-15 PROCEDURE — 25810000003 SODIUM CHLORIDE 0.9 % SOLUTION 250 ML FLEX CONT: Performed by: EMERGENCY MEDICINE

## 2023-12-15 PROCEDURE — 74177 CT ABD & PELVIS W/CONTRAST: CPT

## 2023-12-15 PROCEDURE — 83605 ASSAY OF LACTIC ACID: CPT | Performed by: EMERGENCY MEDICINE

## 2023-12-15 PROCEDURE — 83690 ASSAY OF LIPASE: CPT | Performed by: EMERGENCY MEDICINE

## 2023-12-15 PROCEDURE — 25010000002 CEFTRIAXONE PER 250 MG: Performed by: EMERGENCY MEDICINE

## 2023-12-15 PROCEDURE — 80053 COMPREHEN METABOLIC PANEL: CPT | Performed by: EMERGENCY MEDICINE

## 2023-12-15 PROCEDURE — 93970 EXTREMITY STUDY: CPT

## 2023-12-15 PROCEDURE — 25010000002 CEFTRIAXONE PER 250 MG: Performed by: HOSPITALIST

## 2023-12-15 PROCEDURE — 25510000001 IOPAMIDOL PER 1 ML: Performed by: EMERGENCY MEDICINE

## 2023-12-15 PROCEDURE — 99285 EMERGENCY DEPT VISIT HI MDM: CPT

## 2023-12-15 PROCEDURE — 94664 DEMO&/EVAL PT USE INHALER: CPT

## 2023-12-15 PROCEDURE — 93005 ELECTROCARDIOGRAM TRACING: CPT | Performed by: EMERGENCY MEDICINE

## 2023-12-15 PROCEDURE — 83735 ASSAY OF MAGNESIUM: CPT | Performed by: EMERGENCY MEDICINE

## 2023-12-15 PROCEDURE — 0202U NFCT DS 22 TRGT SARS-COV-2: CPT | Performed by: EMERGENCY MEDICINE

## 2023-12-15 PROCEDURE — 63710000001 PREDNISONE PER 1 MG: Performed by: HOSPITALIST

## 2023-12-15 PROCEDURE — 94761 N-INVAS EAR/PLS OXIMETRY MLT: CPT

## 2023-12-15 PROCEDURE — 87040 BLOOD CULTURE FOR BACTERIA: CPT | Performed by: EMERGENCY MEDICINE

## 2023-12-15 PROCEDURE — 25010000002 KETOROLAC TROMETHAMINE PER 15 MG: Performed by: EMERGENCY MEDICINE

## 2023-12-15 PROCEDURE — 36415 COLL VENOUS BLD VENIPUNCTURE: CPT | Performed by: EMERGENCY MEDICINE

## 2023-12-15 PROCEDURE — 94640 AIRWAY INHALATION TREATMENT: CPT

## 2023-12-15 PROCEDURE — 25010000002 AZITHROMYCIN PER 500 MG: Performed by: EMERGENCY MEDICINE

## 2023-12-15 PROCEDURE — 83880 ASSAY OF NATRIURETIC PEPTIDE: CPT | Performed by: EMERGENCY MEDICINE

## 2023-12-15 PROCEDURE — 93010 ELECTROCARDIOGRAM REPORT: CPT | Performed by: INTERNAL MEDICINE

## 2023-12-15 PROCEDURE — 25010000002 METHYLPREDNISOLONE PER 125 MG: Performed by: EMERGENCY MEDICINE

## 2023-12-15 RX ORDER — IPRATROPIUM BROMIDE AND ALBUTEROL SULFATE 2.5; .5 MG/3ML; MG/3ML
3 SOLUTION RESPIRATORY (INHALATION)
Status: DISCONTINUED | OUTPATIENT
Start: 2023-12-15 | End: 2023-12-18

## 2023-12-15 RX ORDER — BUDESONIDE AND FORMOTEROL FUMARATE DIHYDRATE 160; 4.5 UG/1; UG/1
2 AEROSOL RESPIRATORY (INHALATION)
Status: DISCONTINUED | OUTPATIENT
Start: 2023-12-15 | End: 2023-12-22 | Stop reason: HOSPADM

## 2023-12-15 RX ORDER — AMOXICILLIN 250 MG
2 CAPSULE ORAL 2 TIMES DAILY
Status: DISCONTINUED | OUTPATIENT
Start: 2023-12-15 | End: 2023-12-15

## 2023-12-15 RX ORDER — KETOROLAC TROMETHAMINE 15 MG/ML
15 INJECTION, SOLUTION INTRAMUSCULAR; INTRAVENOUS ONCE
Status: COMPLETED | OUTPATIENT
Start: 2023-12-15 | End: 2023-12-15

## 2023-12-15 RX ORDER — MONTELUKAST SODIUM 10 MG/1
10 TABLET ORAL EVERY EVENING
Status: DISCONTINUED | OUTPATIENT
Start: 2023-12-15 | End: 2023-12-22 | Stop reason: HOSPADM

## 2023-12-15 RX ORDER — IPRATROPIUM BROMIDE AND ALBUTEROL SULFATE 2.5; .5 MG/3ML; MG/3ML
3 SOLUTION RESPIRATORY (INHALATION)
Status: DISCONTINUED | OUTPATIENT
Start: 2023-12-15 | End: 2023-12-15 | Stop reason: SDUPTHER

## 2023-12-15 RX ORDER — GABAPENTIN 300 MG/1
600 CAPSULE ORAL EVERY 8 HOURS SCHEDULED
Status: DISCONTINUED | OUTPATIENT
Start: 2023-12-15 | End: 2023-12-22 | Stop reason: HOSPADM

## 2023-12-15 RX ORDER — SODIUM CHLORIDE 9 MG/ML
40 INJECTION, SOLUTION INTRAVENOUS AS NEEDED
Status: DISCONTINUED | OUTPATIENT
Start: 2023-12-15 | End: 2023-12-22 | Stop reason: HOSPADM

## 2023-12-15 RX ORDER — IPRATROPIUM BROMIDE AND ALBUTEROL SULFATE 2.5; .5 MG/3ML; MG/3ML
3 SOLUTION RESPIRATORY (INHALATION) ONCE
Status: COMPLETED | OUTPATIENT
Start: 2023-12-15 | End: 2023-12-15

## 2023-12-15 RX ORDER — BUSPIRONE HYDROCHLORIDE 10 MG/1
10 TABLET ORAL 3 TIMES DAILY
Status: DISCONTINUED | OUTPATIENT
Start: 2023-12-15 | End: 2023-12-22 | Stop reason: HOSPADM

## 2023-12-15 RX ORDER — ACETAMINOPHEN 650 MG/1
650 SUPPOSITORY RECTAL EVERY 4 HOURS PRN
Status: DISCONTINUED | OUTPATIENT
Start: 2023-12-15 | End: 2023-12-22 | Stop reason: HOSPADM

## 2023-12-15 RX ORDER — ACETAMINOPHEN 160 MG/5ML
650 SOLUTION ORAL EVERY 4 HOURS PRN
Status: DISCONTINUED | OUTPATIENT
Start: 2023-12-15 | End: 2023-12-22 | Stop reason: HOSPADM

## 2023-12-15 RX ORDER — CETIRIZINE HYDROCHLORIDE 10 MG/1
10 TABLET ORAL DAILY
Status: DISCONTINUED | OUTPATIENT
Start: 2023-12-15 | End: 2023-12-22 | Stop reason: HOSPADM

## 2023-12-15 RX ORDER — BISACODYL 10 MG
10 SUPPOSITORY, RECTAL RECTAL DAILY PRN
Status: DISCONTINUED | OUTPATIENT
Start: 2023-12-15 | End: 2023-12-22 | Stop reason: HOSPADM

## 2023-12-15 RX ORDER — BISACODYL 5 MG/1
5 TABLET, DELAYED RELEASE ORAL DAILY PRN
Status: DISCONTINUED | OUTPATIENT
Start: 2023-12-15 | End: 2023-12-22 | Stop reason: HOSPADM

## 2023-12-15 RX ORDER — IPRATROPIUM BROMIDE AND ALBUTEROL SULFATE 2.5; .5 MG/3ML; MG/3ML
3 SOLUTION RESPIRATORY (INHALATION)
Status: DISCONTINUED | OUTPATIENT
Start: 2023-12-15 | End: 2023-12-15

## 2023-12-15 RX ORDER — POLYETHYLENE GLYCOL 3350 17 G/17G
17 POWDER, FOR SOLUTION ORAL DAILY PRN
Status: DISCONTINUED | OUTPATIENT
Start: 2023-12-15 | End: 2023-12-22 | Stop reason: HOSPADM

## 2023-12-15 RX ORDER — L.ACID,PARA/B.BIFIDUM/S.THERM 8B CELL
1 CAPSULE ORAL DAILY
Status: DISCONTINUED | OUTPATIENT
Start: 2023-12-15 | End: 2023-12-22 | Stop reason: HOSPADM

## 2023-12-15 RX ORDER — ACETAMINOPHEN 325 MG/1
650 TABLET ORAL EVERY 4 HOURS PRN
Status: DISCONTINUED | OUTPATIENT
Start: 2023-12-15 | End: 2023-12-22 | Stop reason: HOSPADM

## 2023-12-15 RX ORDER — MORPHINE SULFATE 2 MG/ML
2 INJECTION, SOLUTION INTRAMUSCULAR; INTRAVENOUS ONCE AS NEEDED
Status: COMPLETED | OUTPATIENT
Start: 2023-12-15 | End: 2023-12-15

## 2023-12-15 RX ORDER — VENLAFAXINE HYDROCHLORIDE 75 MG/1
75 CAPSULE, EXTENDED RELEASE ORAL DAILY
Status: DISCONTINUED | OUTPATIENT
Start: 2023-12-15 | End: 2023-12-22 | Stop reason: HOSPADM

## 2023-12-15 RX ORDER — LEVETIRACETAM 500 MG/1
500 TABLET ORAL 3 TIMES DAILY
Status: DISCONTINUED | OUTPATIENT
Start: 2023-12-15 | End: 2023-12-22 | Stop reason: HOSPADM

## 2023-12-15 RX ORDER — ONDANSETRON 2 MG/ML
8 INJECTION INTRAMUSCULAR; INTRAVENOUS ONCE
Status: COMPLETED | OUTPATIENT
Start: 2023-12-15 | End: 2023-12-15

## 2023-12-15 RX ORDER — OXYCODONE AND ACETAMINOPHEN 10; 325 MG/1; MG/1
1 TABLET ORAL EVERY 6 HOURS PRN
Status: DISCONTINUED | OUTPATIENT
Start: 2023-12-15 | End: 2023-12-22 | Stop reason: HOSPADM

## 2023-12-15 RX ORDER — METHYLPREDNISOLONE SODIUM SUCCINATE 125 MG/2ML
125 INJECTION, POWDER, LYOPHILIZED, FOR SOLUTION INTRAMUSCULAR; INTRAVENOUS ONCE
Status: COMPLETED | OUTPATIENT
Start: 2023-12-15 | End: 2023-12-15

## 2023-12-15 RX ORDER — FLUTICASONE PROPIONATE 50 MCG
1 SPRAY, SUSPENSION (ML) NASAL EVERY MORNING
Status: DISCONTINUED | OUTPATIENT
Start: 2023-12-15 | End: 2023-12-22 | Stop reason: HOSPADM

## 2023-12-15 RX ORDER — SODIUM CHLORIDE 0.9 % (FLUSH) 0.9 %
10 SYRINGE (ML) INJECTION EVERY 12 HOURS SCHEDULED
Status: DISCONTINUED | OUTPATIENT
Start: 2023-12-15 | End: 2023-12-22 | Stop reason: HOSPADM

## 2023-12-15 RX ORDER — TIZANIDINE 4 MG/1
4 TABLET ORAL 3 TIMES DAILY PRN
Status: DISCONTINUED | OUTPATIENT
Start: 2023-12-15 | End: 2023-12-22 | Stop reason: HOSPADM

## 2023-12-15 RX ORDER — ALBUTEROL SULFATE 2.5 MG/3ML
2.5 SOLUTION RESPIRATORY (INHALATION) ONCE
Status: COMPLETED | OUTPATIENT
Start: 2023-12-15 | End: 2023-12-15

## 2023-12-15 RX ORDER — NITROGLYCERIN 0.4 MG/1
0.4 TABLET SUBLINGUAL
Status: DISCONTINUED | OUTPATIENT
Start: 2023-12-15 | End: 2023-12-22 | Stop reason: HOSPADM

## 2023-12-15 RX ORDER — PANTOPRAZOLE SODIUM 40 MG/1
40 TABLET, DELAYED RELEASE ORAL EVERY MORNING
Status: DISCONTINUED | OUTPATIENT
Start: 2023-12-15 | End: 2023-12-22 | Stop reason: HOSPADM

## 2023-12-15 RX ORDER — BUPROPION HYDROCHLORIDE 300 MG/1
300 TABLET ORAL EVERY MORNING
Status: DISCONTINUED | OUTPATIENT
Start: 2023-12-15 | End: 2023-12-22 | Stop reason: HOSPADM

## 2023-12-15 RX ORDER — SODIUM CHLORIDE 0.9 % (FLUSH) 0.9 %
10 SYRINGE (ML) INJECTION AS NEEDED
Status: DISCONTINUED | OUTPATIENT
Start: 2023-12-15 | End: 2023-12-22 | Stop reason: HOSPADM

## 2023-12-15 RX ORDER — ONDANSETRON 2 MG/ML
4 INJECTION INTRAMUSCULAR; INTRAVENOUS EVERY 6 HOURS PRN
Status: DISCONTINUED | OUTPATIENT
Start: 2023-12-15 | End: 2023-12-22 | Stop reason: HOSPADM

## 2023-12-15 RX ORDER — PREDNISONE 20 MG/1
40 TABLET ORAL
Status: DISCONTINUED | OUTPATIENT
Start: 2023-12-15 | End: 2023-12-16

## 2023-12-15 RX ADMIN — CEFTRIAXONE 2000 MG: 2 INJECTION, POWDER, FOR SOLUTION INTRAMUSCULAR; INTRAVENOUS at 04:39

## 2023-12-15 RX ADMIN — GABAPENTIN 600 MG: 300 CAPSULE ORAL at 21:54

## 2023-12-15 RX ADMIN — ONDANSETRON 8 MG: 2 INJECTION INTRAMUSCULAR; INTRAVENOUS at 01:57

## 2023-12-15 RX ADMIN — PANTOPRAZOLE SODIUM 40 MG: 40 TABLET, DELAYED RELEASE ORAL at 13:01

## 2023-12-15 RX ADMIN — Medication 1 CAPSULE: at 10:19

## 2023-12-15 RX ADMIN — BUDESONIDE AND FORMOTEROL FUMARATE DIHYDRATE 2 PUFF: 160; 4.5 AEROSOL RESPIRATORY (INHALATION) at 19:13

## 2023-12-15 RX ADMIN — IPRATROPIUM BROMIDE AND ALBUTEROL SULFATE 3 ML: 2.5; .5 SOLUTION RESPIRATORY (INHALATION) at 07:00

## 2023-12-15 RX ADMIN — BUSPIRONE HYDROCHLORIDE 10 MG: 10 TABLET ORAL at 10:19

## 2023-12-15 RX ADMIN — BUPROPION HYDROCHLORIDE 300 MG: 300 TABLET, EXTENDED RELEASE ORAL at 10:19

## 2023-12-15 RX ADMIN — BUSPIRONE HYDROCHLORIDE 10 MG: 10 TABLET ORAL at 16:50

## 2023-12-15 RX ADMIN — LEVETIRACETAM 500 MG: 500 TABLET, FILM COATED ORAL at 10:19

## 2023-12-15 RX ADMIN — IPRATROPIUM BROMIDE AND ALBUTEROL SULFATE 3 ML: 2.5; .5 SOLUTION RESPIRATORY (INHALATION) at 02:05

## 2023-12-15 RX ADMIN — IPRATROPIUM BROMIDE AND ALBUTEROL SULFATE 3 ML: 2.5; .5 SOLUTION RESPIRATORY (INHALATION) at 04:09

## 2023-12-15 RX ADMIN — SODIUM CHLORIDE 500 ML: 9 INJECTION, SOLUTION INTRAVENOUS at 02:22

## 2023-12-15 RX ADMIN — Medication 10 ML: at 20:05

## 2023-12-15 RX ADMIN — IPRATROPIUM BROMIDE AND ALBUTEROL SULFATE 3 ML: 2.5; .5 SOLUTION RESPIRATORY (INHALATION) at 23:27

## 2023-12-15 RX ADMIN — METHYLPREDNISOLONE SODIUM SUCCINATE 125 MG: 125 INJECTION, POWDER, FOR SOLUTION INTRAMUSCULAR; INTRAVENOUS at 04:39

## 2023-12-15 RX ADMIN — LEVETIRACETAM 500 MG: 500 TABLET, FILM COATED ORAL at 16:50

## 2023-12-15 RX ADMIN — BUSPIRONE HYDROCHLORIDE 10 MG: 10 TABLET ORAL at 20:05

## 2023-12-15 RX ADMIN — MONTELUKAST SODIUM 10 MG: 10 TABLET, FILM COATED ORAL at 16:50

## 2023-12-15 RX ADMIN — LEVETIRACETAM 500 MG: 500 TABLET, FILM COATED ORAL at 20:05

## 2023-12-15 RX ADMIN — PREDNISONE 40 MG: 20 TABLET ORAL at 10:19

## 2023-12-15 RX ADMIN — AZITHROMYCIN MONOHYDRATE 500 MG: 500 INJECTION, POWDER, LYOPHILIZED, FOR SOLUTION INTRAVENOUS at 05:43

## 2023-12-15 RX ADMIN — MORPHINE SULFATE 2 MG: 2 INJECTION, SOLUTION INTRAMUSCULAR; INTRAVENOUS at 02:23

## 2023-12-15 RX ADMIN — CETIRIZINE HYDROCHLORIDE 10 MG: 10 TABLET ORAL at 10:19

## 2023-12-15 RX ADMIN — IOPAMIDOL 85 ML: 755 INJECTION, SOLUTION INTRAVENOUS at 02:53

## 2023-12-15 RX ADMIN — IPRATROPIUM BROMIDE AND ALBUTEROL SULFATE 3 ML: 2.5; .5 SOLUTION RESPIRATORY (INHALATION) at 19:08

## 2023-12-15 RX ADMIN — VENLAFAXINE HYDROCHLORIDE 75 MG: 75 CAPSULE, EXTENDED RELEASE ORAL at 10:19

## 2023-12-15 RX ADMIN — FLUTICASONE PROPIONATE 1 SPRAY: 50 SPRAY, METERED NASAL at 13:02

## 2023-12-15 RX ADMIN — OXYCODONE AND ACETAMINOPHEN 1 TABLET: 10; 325 TABLET ORAL at 20:05

## 2023-12-15 RX ADMIN — ALBUTEROL SULFATE 2.5 MG: 2.5 SOLUTION RESPIRATORY (INHALATION) at 02:04

## 2023-12-15 RX ADMIN — ALBUTEROL SULFATE 2.5 MG: 2.5 SOLUTION RESPIRATORY (INHALATION) at 04:09

## 2023-12-15 RX ADMIN — IPRATROPIUM BROMIDE AND ALBUTEROL SULFATE 3 ML: 2.5; .5 SOLUTION RESPIRATORY (INHALATION) at 16:09

## 2023-12-15 RX ADMIN — OXYCODONE AND ACETAMINOPHEN 1 TABLET: 10; 325 TABLET ORAL at 13:23

## 2023-12-15 RX ADMIN — KETOROLAC TROMETHAMINE 15 MG: 15 INJECTION, SOLUTION INTRAMUSCULAR; INTRAVENOUS at 05:51

## 2023-12-15 RX ADMIN — GABAPENTIN 600 MG: 300 CAPSULE ORAL at 13:01

## 2023-12-15 RX ADMIN — CEFTRIAXONE 2000 MG: 2 INJECTION, POWDER, FOR SOLUTION INTRAMUSCULAR; INTRAVENOUS at 13:17

## 2023-12-16 LAB
ANION GAP SERPL CALCULATED.3IONS-SCNC: 6.5 MMOL/L (ref 5–15)
BASOPHILS # BLD AUTO: 0.01 10*3/MM3 (ref 0–0.2)
BASOPHILS NFR BLD AUTO: 0.1 % (ref 0–1.5)
BUN SERPL-MCNC: 20 MG/DL (ref 8–23)
BUN/CREAT SERPL: 23.8 (ref 7–25)
CALCIUM SPEC-SCNC: 9.1 MG/DL (ref 8.6–10.5)
CHLORIDE SERPL-SCNC: 101 MMOL/L (ref 98–107)
CO2 SERPL-SCNC: 33.5 MMOL/L (ref 22–29)
CREAT SERPL-MCNC: 0.84 MG/DL (ref 0.57–1)
DEPRECATED RDW RBC AUTO: 45.9 FL (ref 37–54)
EGFRCR SERPLBLD CKD-EPI 2021: 79.7 ML/MIN/1.73
EOSINOPHIL # BLD AUTO: 0 10*3/MM3 (ref 0–0.4)
EOSINOPHIL NFR BLD AUTO: 0 % (ref 0.3–6.2)
ERYTHROCYTE [DISTWIDTH] IN BLOOD BY AUTOMATED COUNT: 14.8 % (ref 12.3–15.4)
GLUCOSE SERPL-MCNC: 149 MG/DL (ref 65–99)
HCT VFR BLD AUTO: 34.1 % (ref 34–46.6)
HGB BLD-MCNC: 10.8 G/DL (ref 12–15.9)
LYMPHOCYTES # BLD AUTO: 0.62 10*3/MM3 (ref 0.7–3.1)
LYMPHOCYTES NFR BLD AUTO: 7 % (ref 19.6–45.3)
MCH RBC QN AUTO: 27.1 PG (ref 26.6–33)
MCHC RBC AUTO-ENTMCNC: 31.7 G/DL (ref 31.5–35.7)
MCV RBC AUTO: 85.5 FL (ref 79–97)
MONOCYTES # BLD AUTO: 0.31 10*3/MM3 (ref 0.1–0.9)
MONOCYTES NFR BLD AUTO: 3.5 % (ref 5–12)
NEUTROPHILS NFR BLD AUTO: 7.96 10*3/MM3 (ref 1.7–7)
NEUTROPHILS NFR BLD AUTO: 89.2 % (ref 42.7–76)
PLATELET # BLD AUTO: 130 10*3/MM3 (ref 140–450)
PMV BLD AUTO: 11.3 FL (ref 6–12)
POTASSIUM SERPL-SCNC: 3.7 MMOL/L (ref 3.5–5.2)
RBC # BLD AUTO: 3.99 10*6/MM3 (ref 3.77–5.28)
SODIUM SERPL-SCNC: 141 MMOL/L (ref 136–145)
WBC NRBC COR # BLD AUTO: 8.92 10*3/MM3 (ref 3.4–10.8)

## 2023-12-16 PROCEDURE — 97161 PT EVAL LOW COMPLEX 20 MIN: CPT

## 2023-12-16 PROCEDURE — 94799 UNLISTED PULMONARY SVC/PX: CPT

## 2023-12-16 PROCEDURE — 36415 COLL VENOUS BLD VENIPUNCTURE: CPT | Performed by: NURSE PRACTITIONER

## 2023-12-16 PROCEDURE — 80048 BASIC METABOLIC PNL TOTAL CA: CPT | Performed by: NURSE PRACTITIONER

## 2023-12-16 PROCEDURE — 25010000002 METHYLPREDNISOLONE PER 40 MG: Performed by: HOSPITALIST

## 2023-12-16 PROCEDURE — 25010000002 METHYLPREDNISOLONE PER 40 MG: Performed by: INTERNAL MEDICINE

## 2023-12-16 PROCEDURE — 94760 N-INVAS EAR/PLS OXIMETRY 1: CPT

## 2023-12-16 PROCEDURE — 97530 THERAPEUTIC ACTIVITIES: CPT

## 2023-12-16 PROCEDURE — 85025 COMPLETE CBC W/AUTO DIFF WBC: CPT | Performed by: NURSE PRACTITIONER

## 2023-12-16 PROCEDURE — 63710000001 PREDNISONE PER 1 MG: Performed by: HOSPITALIST

## 2023-12-16 PROCEDURE — 25010000002 CEFTRIAXONE PER 250 MG: Performed by: HOSPITALIST

## 2023-12-16 PROCEDURE — 94664 DEMO&/EVAL PT USE INHALER: CPT

## 2023-12-16 PROCEDURE — 94761 N-INVAS EAR/PLS OXIMETRY MLT: CPT

## 2023-12-16 RX ORDER — METHYLPREDNISOLONE SODIUM SUCCINATE 40 MG/ML
40 INJECTION, POWDER, LYOPHILIZED, FOR SOLUTION INTRAMUSCULAR; INTRAVENOUS EVERY 12 HOURS
Status: DISCONTINUED | OUTPATIENT
Start: 2023-12-16 | End: 2023-12-16

## 2023-12-16 RX ORDER — ACETYLCYSTEINE 200 MG/ML
4 SOLUTION ORAL; RESPIRATORY (INHALATION)
Status: DISCONTINUED | OUTPATIENT
Start: 2023-12-16 | End: 2023-12-18

## 2023-12-16 RX ORDER — METHYLPREDNISOLONE SODIUM SUCCINATE 40 MG/ML
40 INJECTION, POWDER, LYOPHILIZED, FOR SOLUTION INTRAMUSCULAR; INTRAVENOUS EVERY 8 HOURS
Status: COMPLETED | OUTPATIENT
Start: 2023-12-16 | End: 2023-12-17

## 2023-12-16 RX ADMIN — METHYLPREDNISOLONE SODIUM SUCCINATE 40 MG: 40 INJECTION INTRAMUSCULAR; INTRAVENOUS at 10:19

## 2023-12-16 RX ADMIN — VENLAFAXINE HYDROCHLORIDE 75 MG: 75 CAPSULE, EXTENDED RELEASE ORAL at 08:02

## 2023-12-16 RX ADMIN — FLUTICASONE PROPIONATE 1 SPRAY: 50 SPRAY, METERED NASAL at 08:03

## 2023-12-16 RX ADMIN — BUPROPION HYDROCHLORIDE 300 MG: 300 TABLET, EXTENDED RELEASE ORAL at 08:02

## 2023-12-16 RX ADMIN — ACETYLCYSTEINE 4 ML: 200 SOLUTION ORAL; RESPIRATORY (INHALATION) at 23:54

## 2023-12-16 RX ADMIN — IPRATROPIUM BROMIDE AND ALBUTEROL SULFATE 3 ML: 2.5; .5 SOLUTION RESPIRATORY (INHALATION) at 11:52

## 2023-12-16 RX ADMIN — BUSPIRONE HYDROCHLORIDE 10 MG: 10 TABLET ORAL at 16:16

## 2023-12-16 RX ADMIN — LEVETIRACETAM 500 MG: 500 TABLET, FILM COATED ORAL at 21:12

## 2023-12-16 RX ADMIN — BUDESONIDE AND FORMOTEROL FUMARATE DIHYDRATE 2 PUFF: 160; 4.5 AEROSOL RESPIRATORY (INHALATION) at 20:45

## 2023-12-16 RX ADMIN — MONTELUKAST SODIUM 10 MG: 10 TABLET, FILM COATED ORAL at 16:16

## 2023-12-16 RX ADMIN — BUDESONIDE AND FORMOTEROL FUMARATE DIHYDRATE 2 PUFF: 160; 4.5 AEROSOL RESPIRATORY (INHALATION) at 08:48

## 2023-12-16 RX ADMIN — OXYCODONE AND ACETAMINOPHEN 1 TABLET: 10; 325 TABLET ORAL at 08:08

## 2023-12-16 RX ADMIN — IPRATROPIUM BROMIDE AND ALBUTEROL SULFATE 3 ML: 2.5; .5 SOLUTION RESPIRATORY (INHALATION) at 23:54

## 2023-12-16 RX ADMIN — OXYCODONE AND ACETAMINOPHEN 1 TABLET: 10; 325 TABLET ORAL at 21:12

## 2023-12-16 RX ADMIN — GABAPENTIN 600 MG: 300 CAPSULE ORAL at 16:16

## 2023-12-16 RX ADMIN — METHYLPREDNISOLONE SODIUM SUCCINATE 40 MG: 40 INJECTION INTRAMUSCULAR; INTRAVENOUS at 18:40

## 2023-12-16 RX ADMIN — Medication 1 CAPSULE: at 08:02

## 2023-12-16 RX ADMIN — LEVETIRACETAM 500 MG: 500 TABLET, FILM COATED ORAL at 16:16

## 2023-12-16 RX ADMIN — CEFTRIAXONE 2000 MG: 2 INJECTION, POWDER, FOR SOLUTION INTRAMUSCULAR; INTRAVENOUS at 10:20

## 2023-12-16 RX ADMIN — GABAPENTIN 600 MG: 300 CAPSULE ORAL at 05:48

## 2023-12-16 RX ADMIN — Medication 10 ML: at 08:02

## 2023-12-16 RX ADMIN — IPRATROPIUM BROMIDE AND ALBUTEROL SULFATE 3 ML: 2.5; .5 SOLUTION RESPIRATORY (INHALATION) at 20:45

## 2023-12-16 RX ADMIN — CETIRIZINE HYDROCHLORIDE 10 MG: 10 TABLET ORAL at 08:02

## 2023-12-16 RX ADMIN — BUSPIRONE HYDROCHLORIDE 10 MG: 10 TABLET ORAL at 08:02

## 2023-12-16 RX ADMIN — Medication 10 ML: at 21:13

## 2023-12-16 RX ADMIN — BUSPIRONE HYDROCHLORIDE 10 MG: 10 TABLET ORAL at 21:12

## 2023-12-16 RX ADMIN — LEVETIRACETAM 500 MG: 500 TABLET, FILM COATED ORAL at 08:02

## 2023-12-16 RX ADMIN — ACETAMINOPHEN 650 MG: 325 TABLET, FILM COATED ORAL at 00:26

## 2023-12-16 RX ADMIN — GABAPENTIN 600 MG: 300 CAPSULE ORAL at 21:12

## 2023-12-16 RX ADMIN — PANTOPRAZOLE SODIUM 40 MG: 40 TABLET, DELAYED RELEASE ORAL at 08:02

## 2023-12-16 RX ADMIN — IPRATROPIUM BROMIDE AND ALBUTEROL SULFATE 3 ML: 2.5; .5 SOLUTION RESPIRATORY (INHALATION) at 14:54

## 2023-12-16 RX ADMIN — IPRATROPIUM BROMIDE AND ALBUTEROL SULFATE 3 ML: 2.5; .5 SOLUTION RESPIRATORY (INHALATION) at 03:45

## 2023-12-16 RX ADMIN — PREDNISONE 40 MG: 20 TABLET ORAL at 08:02

## 2023-12-16 RX ADMIN — IPRATROPIUM BROMIDE AND ALBUTEROL SULFATE 3 ML: 2.5; .5 SOLUTION RESPIRATORY (INHALATION) at 08:43

## 2023-12-17 LAB
ANION GAP SERPL CALCULATED.3IONS-SCNC: 7.4 MMOL/L (ref 5–15)
BUN SERPL-MCNC: 24 MG/DL (ref 8–23)
BUN/CREAT SERPL: 25.5 (ref 7–25)
CALCIUM SPEC-SCNC: 9.3 MG/DL (ref 8.6–10.5)
CHLORIDE SERPL-SCNC: 100 MMOL/L (ref 98–107)
CO2 SERPL-SCNC: 33.6 MMOL/L (ref 22–29)
CREAT SERPL-MCNC: 0.94 MG/DL (ref 0.57–1)
DEPRECATED RDW RBC AUTO: 47.3 FL (ref 37–54)
EGFRCR SERPLBLD CKD-EPI 2021: 69.6 ML/MIN/1.73
ERYTHROCYTE [DISTWIDTH] IN BLOOD BY AUTOMATED COUNT: 15 % (ref 12.3–15.4)
GLUCOSE SERPL-MCNC: 118 MG/DL (ref 65–99)
HCT VFR BLD AUTO: 35.6 % (ref 34–46.6)
HGB BLD-MCNC: 11.2 G/DL (ref 12–15.9)
MCH RBC QN AUTO: 27.3 PG (ref 26.6–33)
MCHC RBC AUTO-ENTMCNC: 31.5 G/DL (ref 31.5–35.7)
MCV RBC AUTO: 86.6 FL (ref 79–97)
PLATELET # BLD AUTO: 175 10*3/MM3 (ref 140–450)
PMV BLD AUTO: 10.7 FL (ref 6–12)
POTASSIUM SERPL-SCNC: 5.2 MMOL/L (ref 3.5–5.2)
RBC # BLD AUTO: 4.11 10*6/MM3 (ref 3.77–5.28)
SODIUM SERPL-SCNC: 141 MMOL/L (ref 136–145)
WBC NRBC COR # BLD AUTO: 7.84 10*3/MM3 (ref 3.4–10.8)

## 2023-12-17 PROCEDURE — 94799 UNLISTED PULMONARY SVC/PX: CPT

## 2023-12-17 PROCEDURE — 94664 DEMO&/EVAL PT USE INHALER: CPT

## 2023-12-17 PROCEDURE — 25010000002 CEFTRIAXONE PER 250 MG: Performed by: HOSPITALIST

## 2023-12-17 PROCEDURE — 80048 BASIC METABOLIC PNL TOTAL CA: CPT | Performed by: HOSPITALIST

## 2023-12-17 PROCEDURE — 85027 COMPLETE CBC AUTOMATED: CPT | Performed by: HOSPITALIST

## 2023-12-17 PROCEDURE — 25010000002 METHYLPREDNISOLONE PER 40 MG: Performed by: INTERNAL MEDICINE

## 2023-12-17 PROCEDURE — 25010000002 ENOXAPARIN PER 10 MG: Performed by: HOSPITALIST

## 2023-12-17 RX ORDER — PREDNISONE 20 MG/1
40 TABLET ORAL
Status: DISCONTINUED | OUTPATIENT
Start: 2023-12-18 | End: 2023-12-18

## 2023-12-17 RX ORDER — ENOXAPARIN SODIUM 100 MG/ML
40 INJECTION SUBCUTANEOUS EVERY 12 HOURS
Status: DISCONTINUED | OUTPATIENT
Start: 2023-12-17 | End: 2023-12-22 | Stop reason: HOSPADM

## 2023-12-17 RX ADMIN — PANTOPRAZOLE SODIUM 40 MG: 40 TABLET, DELAYED RELEASE ORAL at 05:32

## 2023-12-17 RX ADMIN — CEFTRIAXONE 2000 MG: 2 INJECTION, POWDER, FOR SOLUTION INTRAMUSCULAR; INTRAVENOUS at 12:35

## 2023-12-17 RX ADMIN — CETIRIZINE HYDROCHLORIDE 10 MG: 10 TABLET ORAL at 08:52

## 2023-12-17 RX ADMIN — BUSPIRONE HYDROCHLORIDE 10 MG: 10 TABLET ORAL at 16:30

## 2023-12-17 RX ADMIN — BUPROPION HYDROCHLORIDE 300 MG: 300 TABLET, EXTENDED RELEASE ORAL at 05:32

## 2023-12-17 RX ADMIN — ENOXAPARIN SODIUM 40 MG: 100 INJECTION SUBCUTANEOUS at 08:59

## 2023-12-17 RX ADMIN — IPRATROPIUM BROMIDE AND ALBUTEROL SULFATE 3 ML: 2.5; .5 SOLUTION RESPIRATORY (INHALATION) at 19:30

## 2023-12-17 RX ADMIN — OXYCODONE AND ACETAMINOPHEN 1 TABLET: 10; 325 TABLET ORAL at 11:52

## 2023-12-17 RX ADMIN — MONTELUKAST SODIUM 10 MG: 10 TABLET, FILM COATED ORAL at 16:30

## 2023-12-17 RX ADMIN — METHYLPREDNISOLONE SODIUM SUCCINATE 40 MG: 40 INJECTION INTRAMUSCULAR; INTRAVENOUS at 10:40

## 2023-12-17 RX ADMIN — LEVETIRACETAM 500 MG: 500 TABLET, FILM COATED ORAL at 08:52

## 2023-12-17 RX ADMIN — IPRATROPIUM BROMIDE AND ALBUTEROL SULFATE 3 ML: 2.5; .5 SOLUTION RESPIRATORY (INHALATION) at 15:17

## 2023-12-17 RX ADMIN — LEVETIRACETAM 500 MG: 500 TABLET, FILM COATED ORAL at 21:03

## 2023-12-17 RX ADMIN — OXYCODONE AND ACETAMINOPHEN 1 TABLET: 10; 325 TABLET ORAL at 21:03

## 2023-12-17 RX ADMIN — GABAPENTIN 600 MG: 300 CAPSULE ORAL at 21:03

## 2023-12-17 RX ADMIN — IPRATROPIUM BROMIDE AND ALBUTEROL SULFATE 3 ML: 2.5; .5 SOLUTION RESPIRATORY (INHALATION) at 03:37

## 2023-12-17 RX ADMIN — ACETYLCYSTEINE 4 ML: 200 SOLUTION ORAL; RESPIRATORY (INHALATION) at 15:17

## 2023-12-17 RX ADMIN — ENOXAPARIN SODIUM 40 MG: 100 INJECTION SUBCUTANEOUS at 21:03

## 2023-12-17 RX ADMIN — BUSPIRONE HYDROCHLORIDE 10 MG: 10 TABLET ORAL at 08:52

## 2023-12-17 RX ADMIN — ACETYLCYSTEINE 4 ML: 200 SOLUTION ORAL; RESPIRATORY (INHALATION) at 07:46

## 2023-12-17 RX ADMIN — Medication 10 ML: at 21:04

## 2023-12-17 RX ADMIN — ACETYLCYSTEINE 4 ML: 200 SOLUTION ORAL; RESPIRATORY (INHALATION) at 23:43

## 2023-12-17 RX ADMIN — GABAPENTIN 600 MG: 300 CAPSULE ORAL at 05:32

## 2023-12-17 RX ADMIN — LEVETIRACETAM 500 MG: 500 TABLET, FILM COATED ORAL at 16:30

## 2023-12-17 RX ADMIN — ACETAMINOPHEN 650 MG: 325 TABLET, FILM COATED ORAL at 02:35

## 2023-12-17 RX ADMIN — BUDESONIDE AND FORMOTEROL FUMARATE DIHYDRATE 2 PUFF: 160; 4.5 AEROSOL RESPIRATORY (INHALATION) at 19:30

## 2023-12-17 RX ADMIN — Medication 10 ML: at 08:53

## 2023-12-17 RX ADMIN — GABAPENTIN 600 MG: 300 CAPSULE ORAL at 13:55

## 2023-12-17 RX ADMIN — FLUTICASONE PROPIONATE 1 SPRAY: 50 SPRAY, METERED NASAL at 05:33

## 2023-12-17 RX ADMIN — METHYLPREDNISOLONE SODIUM SUCCINATE 40 MG: 40 INJECTION INTRAMUSCULAR; INTRAVENOUS at 02:35

## 2023-12-17 RX ADMIN — BUSPIRONE HYDROCHLORIDE 10 MG: 10 TABLET ORAL at 21:03

## 2023-12-17 RX ADMIN — VENLAFAXINE HYDROCHLORIDE 75 MG: 75 CAPSULE, EXTENDED RELEASE ORAL at 08:52

## 2023-12-17 RX ADMIN — Medication 1 CAPSULE: at 08:52

## 2023-12-17 RX ADMIN — IPRATROPIUM BROMIDE AND ALBUTEROL SULFATE 3 ML: 2.5; .5 SOLUTION RESPIRATORY (INHALATION) at 11:09

## 2023-12-17 RX ADMIN — BUDESONIDE AND FORMOTEROL FUMARATE DIHYDRATE 2 PUFF: 160; 4.5 AEROSOL RESPIRATORY (INHALATION) at 07:53

## 2023-12-17 RX ADMIN — OXYCODONE AND ACETAMINOPHEN 1 TABLET: 10; 325 TABLET ORAL at 05:42

## 2023-12-17 RX ADMIN — IPRATROPIUM BROMIDE AND ALBUTEROL SULFATE 3 ML: 2.5; .5 SOLUTION RESPIRATORY (INHALATION) at 23:43

## 2023-12-17 RX ADMIN — METHYLPREDNISOLONE SODIUM SUCCINATE 40 MG: 40 INJECTION INTRAMUSCULAR; INTRAVENOUS at 18:05

## 2023-12-17 RX ADMIN — IPRATROPIUM BROMIDE AND ALBUTEROL SULFATE 3 ML: 2.5; .5 SOLUTION RESPIRATORY (INHALATION) at 07:43

## 2023-12-18 LAB
ANION GAP SERPL CALCULATED.3IONS-SCNC: 8 MMOL/L (ref 5–15)
BUN SERPL-MCNC: 25 MG/DL (ref 8–23)
BUN/CREAT SERPL: 29.8 (ref 7–25)
CALCIUM SPEC-SCNC: 9 MG/DL (ref 8.6–10.5)
CHLORIDE SERPL-SCNC: 100 MMOL/L (ref 98–107)
CO2 SERPL-SCNC: 33 MMOL/L (ref 22–29)
CREAT SERPL-MCNC: 0.84 MG/DL (ref 0.57–1)
DEPRECATED RDW RBC AUTO: 47.8 FL (ref 37–54)
EGFRCR SERPLBLD CKD-EPI 2021: 79.7 ML/MIN/1.73
ERYTHROCYTE [DISTWIDTH] IN BLOOD BY AUTOMATED COUNT: 15.3 % (ref 12.3–15.4)
GLUCOSE SERPL-MCNC: 88 MG/DL (ref 65–99)
HCT VFR BLD AUTO: 34.9 % (ref 34–46.6)
HGB BLD-MCNC: 11.1 G/DL (ref 12–15.9)
MCH RBC QN AUTO: 27.5 PG (ref 26.6–33)
MCHC RBC AUTO-ENTMCNC: 31.8 G/DL (ref 31.5–35.7)
MCV RBC AUTO: 86.6 FL (ref 79–97)
PLATELET # BLD AUTO: 171 10*3/MM3 (ref 140–450)
PMV BLD AUTO: 10.9 FL (ref 6–12)
POTASSIUM SERPL-SCNC: 3.9 MMOL/L (ref 3.5–5.2)
RBC # BLD AUTO: 4.03 10*6/MM3 (ref 3.77–5.28)
SODIUM SERPL-SCNC: 141 MMOL/L (ref 136–145)
WBC NRBC COR # BLD AUTO: 5.37 10*3/MM3 (ref 3.4–10.8)

## 2023-12-18 PROCEDURE — 94799 UNLISTED PULMONARY SVC/PX: CPT

## 2023-12-18 PROCEDURE — 25010000002 ENOXAPARIN PER 10 MG: Performed by: HOSPITALIST

## 2023-12-18 PROCEDURE — 97110 THERAPEUTIC EXERCISES: CPT

## 2023-12-18 PROCEDURE — 63710000001 PREDNISONE PER 1 MG: Performed by: INTERNAL MEDICINE

## 2023-12-18 PROCEDURE — 94761 N-INVAS EAR/PLS OXIMETRY MLT: CPT

## 2023-12-18 PROCEDURE — 97165 OT EVAL LOW COMPLEX 30 MIN: CPT

## 2023-12-18 PROCEDURE — 85027 COMPLETE CBC AUTOMATED: CPT | Performed by: HOSPITALIST

## 2023-12-18 PROCEDURE — 25010000002 METHYLPREDNISOLONE PER 40 MG: Performed by: INTERNAL MEDICINE

## 2023-12-18 PROCEDURE — 80048 BASIC METABOLIC PNL TOTAL CA: CPT | Performed by: HOSPITALIST

## 2023-12-18 PROCEDURE — 25010000002 CEFTRIAXONE PER 250 MG: Performed by: HOSPITALIST

## 2023-12-18 RX ORDER — ACETYLCYSTEINE 200 MG/ML
4 SOLUTION ORAL; RESPIRATORY (INHALATION)
Status: DISCONTINUED | OUTPATIENT
Start: 2023-12-18 | End: 2023-12-22 | Stop reason: HOSPADM

## 2023-12-18 RX ORDER — IPRATROPIUM BROMIDE AND ALBUTEROL SULFATE 2.5; .5 MG/3ML; MG/3ML
3 SOLUTION RESPIRATORY (INHALATION)
Status: DISCONTINUED | OUTPATIENT
Start: 2023-12-18 | End: 2023-12-22 | Stop reason: HOSPADM

## 2023-12-18 RX ORDER — METHYLPREDNISOLONE SODIUM SUCCINATE 40 MG/ML
40 INJECTION, POWDER, LYOPHILIZED, FOR SOLUTION INTRAMUSCULAR; INTRAVENOUS 2 TIMES DAILY WITH MEALS
Status: COMPLETED | OUTPATIENT
Start: 2023-12-18 | End: 2023-12-20

## 2023-12-18 RX ADMIN — BUSPIRONE HYDROCHLORIDE 10 MG: 10 TABLET ORAL at 09:46

## 2023-12-18 RX ADMIN — IPRATROPIUM BROMIDE AND ALBUTEROL SULFATE 3 ML: 2.5; .5 SOLUTION RESPIRATORY (INHALATION) at 23:28

## 2023-12-18 RX ADMIN — BUSPIRONE HYDROCHLORIDE 10 MG: 10 TABLET ORAL at 21:04

## 2023-12-18 RX ADMIN — OXYCODONE AND ACETAMINOPHEN 1 TABLET: 10; 325 TABLET ORAL at 16:04

## 2023-12-18 RX ADMIN — GABAPENTIN 600 MG: 300 CAPSULE ORAL at 16:04

## 2023-12-18 RX ADMIN — VENLAFAXINE HYDROCHLORIDE 75 MG: 75 CAPSULE, EXTENDED RELEASE ORAL at 09:46

## 2023-12-18 RX ADMIN — CEFTRIAXONE 2000 MG: 2 INJECTION, POWDER, FOR SOLUTION INTRAMUSCULAR; INTRAVENOUS at 16:05

## 2023-12-18 RX ADMIN — MONTELUKAST SODIUM 10 MG: 10 TABLET, FILM COATED ORAL at 16:04

## 2023-12-18 RX ADMIN — PREDNISONE 40 MG: 20 TABLET ORAL at 09:46

## 2023-12-18 RX ADMIN — ACETYLCYSTEINE 4 ML: 200 SOLUTION ORAL; RESPIRATORY (INHALATION) at 07:02

## 2023-12-18 RX ADMIN — LEVETIRACETAM 500 MG: 500 TABLET, FILM COATED ORAL at 09:46

## 2023-12-18 RX ADMIN — ACETYLCYSTEINE 4 ML: 200 SOLUTION ORAL; RESPIRATORY (INHALATION) at 15:28

## 2023-12-18 RX ADMIN — IPRATROPIUM BROMIDE AND ALBUTEROL SULFATE 3 ML: 2.5; .5 SOLUTION RESPIRATORY (INHALATION) at 07:02

## 2023-12-18 RX ADMIN — FLUTICASONE PROPIONATE 1 SPRAY: 50 SPRAY, METERED NASAL at 06:08

## 2023-12-18 RX ADMIN — ENOXAPARIN SODIUM 40 MG: 100 INJECTION SUBCUTANEOUS at 09:50

## 2023-12-18 RX ADMIN — LEVETIRACETAM 500 MG: 500 TABLET, FILM COATED ORAL at 16:04

## 2023-12-18 RX ADMIN — PANTOPRAZOLE SODIUM 40 MG: 40 TABLET, DELAYED RELEASE ORAL at 06:08

## 2023-12-18 RX ADMIN — Medication 10 ML: at 21:04

## 2023-12-18 RX ADMIN — TIZANIDINE 4 MG: 4 TABLET ORAL at 09:50

## 2023-12-18 RX ADMIN — BUDESONIDE AND FORMOTEROL FUMARATE DIHYDRATE 2 PUFF: 160; 4.5 AEROSOL RESPIRATORY (INHALATION) at 19:33

## 2023-12-18 RX ADMIN — Medication 1 CAPSULE: at 09:46

## 2023-12-18 RX ADMIN — ACETYLCYSTEINE 4 ML: 200 SOLUTION ORAL; RESPIRATORY (INHALATION) at 19:23

## 2023-12-18 RX ADMIN — BUDESONIDE AND FORMOTEROL FUMARATE DIHYDRATE 2 PUFF: 160; 4.5 AEROSOL RESPIRATORY (INHALATION) at 11:13

## 2023-12-18 RX ADMIN — IPRATROPIUM BROMIDE AND ALBUTEROL SULFATE 3 ML: 2.5; .5 SOLUTION RESPIRATORY (INHALATION) at 03:33

## 2023-12-18 RX ADMIN — GABAPENTIN 600 MG: 300 CAPSULE ORAL at 06:07

## 2023-12-18 RX ADMIN — ACETYLCYSTEINE 4 ML: 200 SOLUTION ORAL; RESPIRATORY (INHALATION) at 23:28

## 2023-12-18 RX ADMIN — CETIRIZINE HYDROCHLORIDE 10 MG: 10 TABLET ORAL at 09:46

## 2023-12-18 RX ADMIN — ENOXAPARIN SODIUM 40 MG: 100 INJECTION SUBCUTANEOUS at 21:04

## 2023-12-18 RX ADMIN — ACETYLCYSTEINE 4 ML: 200 SOLUTION ORAL; RESPIRATORY (INHALATION) at 15:27

## 2023-12-18 RX ADMIN — IPRATROPIUM BROMIDE AND ALBUTEROL SULFATE 3 ML: 2.5; .5 SOLUTION RESPIRATORY (INHALATION) at 11:07

## 2023-12-18 RX ADMIN — BUPROPION HYDROCHLORIDE 300 MG: 300 TABLET, EXTENDED RELEASE ORAL at 06:09

## 2023-12-18 RX ADMIN — BUSPIRONE HYDROCHLORIDE 10 MG: 10 TABLET ORAL at 16:04

## 2023-12-18 RX ADMIN — OXYCODONE AND ACETAMINOPHEN 1 TABLET: 10; 325 TABLET ORAL at 09:50

## 2023-12-18 RX ADMIN — IPRATROPIUM BROMIDE AND ALBUTEROL SULFATE 3 ML: 2.5; .5 SOLUTION RESPIRATORY (INHALATION) at 19:23

## 2023-12-18 RX ADMIN — IPRATROPIUM BROMIDE AND ALBUTEROL SULFATE 3 ML: 2.5; .5 SOLUTION RESPIRATORY (INHALATION) at 15:27

## 2023-12-18 RX ADMIN — OXYCODONE AND ACETAMINOPHEN 1 TABLET: 10; 325 TABLET ORAL at 22:33

## 2023-12-18 RX ADMIN — GABAPENTIN 600 MG: 300 CAPSULE ORAL at 21:04

## 2023-12-18 RX ADMIN — LEVETIRACETAM 500 MG: 500 TABLET, FILM COATED ORAL at 21:04

## 2023-12-18 RX ADMIN — METHYLPREDNISOLONE SODIUM SUCCINATE 40 MG: 40 INJECTION INTRAMUSCULAR; INTRAVENOUS at 16:04

## 2023-12-18 RX ADMIN — Medication 10 ML: at 09:50

## 2023-12-19 LAB
ANION GAP SERPL CALCULATED.3IONS-SCNC: 9 MMOL/L (ref 5–15)
BUN SERPL-MCNC: 23 MG/DL (ref 8–23)
BUN/CREAT SERPL: 28 (ref 7–25)
CALCIUM SPEC-SCNC: 8.9 MG/DL (ref 8.6–10.5)
CHLORIDE SERPL-SCNC: 97 MMOL/L (ref 98–107)
CO2 SERPL-SCNC: 30 MMOL/L (ref 22–29)
CREAT SERPL-MCNC: 0.82 MG/DL (ref 0.57–1)
DEPRECATED RDW RBC AUTO: 44 FL (ref 37–54)
EGFRCR SERPLBLD CKD-EPI 2021: 82 ML/MIN/1.73
ERYTHROCYTE [DISTWIDTH] IN BLOOD BY AUTOMATED COUNT: 14.9 % (ref 12.3–15.4)
GLUCOSE SERPL-MCNC: 106 MG/DL (ref 65–99)
HCT VFR BLD AUTO: 37.5 % (ref 34–46.6)
HGB BLD-MCNC: 11.4 G/DL (ref 12–15.9)
MCH RBC QN AUTO: 25.5 PG (ref 26.6–33)
MCHC RBC AUTO-ENTMCNC: 30.4 G/DL (ref 31.5–35.7)
MCV RBC AUTO: 83.9 FL (ref 79–97)
PLATELET # BLD AUTO: 198 10*3/MM3 (ref 140–450)
PMV BLD AUTO: 9.9 FL (ref 6–12)
POTASSIUM SERPL-SCNC: 4.4 MMOL/L (ref 3.5–5.2)
RBC # BLD AUTO: 4.47 10*6/MM3 (ref 3.77–5.28)
SODIUM SERPL-SCNC: 136 MMOL/L (ref 136–145)
WBC NRBC COR # BLD AUTO: 5.64 10*3/MM3 (ref 3.4–10.8)

## 2023-12-19 PROCEDURE — 94761 N-INVAS EAR/PLS OXIMETRY MLT: CPT

## 2023-12-19 PROCEDURE — 94799 UNLISTED PULMONARY SVC/PX: CPT

## 2023-12-19 PROCEDURE — 25010000002 METHYLPREDNISOLONE PER 40 MG: Performed by: INTERNAL MEDICINE

## 2023-12-19 PROCEDURE — 25010000002 CEFTRIAXONE PER 250 MG: Performed by: HOSPITALIST

## 2023-12-19 PROCEDURE — 94760 N-INVAS EAR/PLS OXIMETRY 1: CPT

## 2023-12-19 PROCEDURE — 94664 DEMO&/EVAL PT USE INHALER: CPT

## 2023-12-19 PROCEDURE — 85027 COMPLETE CBC AUTOMATED: CPT | Performed by: HOSPITALIST

## 2023-12-19 PROCEDURE — 80048 BASIC METABOLIC PNL TOTAL CA: CPT | Performed by: HOSPITALIST

## 2023-12-19 PROCEDURE — 25010000002 ENOXAPARIN PER 10 MG: Performed by: HOSPITALIST

## 2023-12-19 RX ADMIN — ACETYLCYSTEINE 4 ML: 200 SOLUTION ORAL; RESPIRATORY (INHALATION) at 11:35

## 2023-12-19 RX ADMIN — ACETYLCYSTEINE 4 ML: 200 SOLUTION ORAL; RESPIRATORY (INHALATION) at 19:18

## 2023-12-19 RX ADMIN — ACETYLCYSTEINE 4 ML: 200 SOLUTION ORAL; RESPIRATORY (INHALATION) at 15:57

## 2023-12-19 RX ADMIN — MONTELUKAST SODIUM 10 MG: 10 TABLET, FILM COATED ORAL at 17:08

## 2023-12-19 RX ADMIN — ENOXAPARIN SODIUM 40 MG: 100 INJECTION SUBCUTANEOUS at 09:00

## 2023-12-19 RX ADMIN — BUSPIRONE HYDROCHLORIDE 10 MG: 10 TABLET ORAL at 21:55

## 2023-12-19 RX ADMIN — Medication 10 ML: at 09:03

## 2023-12-19 RX ADMIN — GABAPENTIN 600 MG: 300 CAPSULE ORAL at 06:15

## 2023-12-19 RX ADMIN — CETIRIZINE HYDROCHLORIDE 10 MG: 10 TABLET ORAL at 09:02

## 2023-12-19 RX ADMIN — Medication 1 CAPSULE: at 09:01

## 2023-12-19 RX ADMIN — LEVETIRACETAM 500 MG: 500 TABLET, FILM COATED ORAL at 09:02

## 2023-12-19 RX ADMIN — IPRATROPIUM BROMIDE AND ALBUTEROL SULFATE 3 ML: 2.5; .5 SOLUTION RESPIRATORY (INHALATION) at 23:17

## 2023-12-19 RX ADMIN — FLUTICASONE PROPIONATE 1 SPRAY: 50 SPRAY, METERED NASAL at 06:16

## 2023-12-19 RX ADMIN — BUSPIRONE HYDROCHLORIDE 10 MG: 10 TABLET ORAL at 09:01

## 2023-12-19 RX ADMIN — OXYCODONE AND ACETAMINOPHEN 1 TABLET: 10; 325 TABLET ORAL at 16:08

## 2023-12-19 RX ADMIN — CEFTRIAXONE 2000 MG: 2 INJECTION, POWDER, FOR SOLUTION INTRAMUSCULAR; INTRAVENOUS at 11:09

## 2023-12-19 RX ADMIN — BUDESONIDE AND FORMOTEROL FUMARATE DIHYDRATE 2 PUFF: 160; 4.5 AEROSOL RESPIRATORY (INHALATION) at 07:20

## 2023-12-19 RX ADMIN — LEVETIRACETAM 500 MG: 500 TABLET, FILM COATED ORAL at 21:55

## 2023-12-19 RX ADMIN — IPRATROPIUM BROMIDE AND ALBUTEROL SULFATE 3 ML: 2.5; .5 SOLUTION RESPIRATORY (INHALATION) at 19:17

## 2023-12-19 RX ADMIN — Medication 10 ML: at 21:56

## 2023-12-19 RX ADMIN — METHYLPREDNISOLONE SODIUM SUCCINATE 40 MG: 40 INJECTION INTRAMUSCULAR; INTRAVENOUS at 17:08

## 2023-12-19 RX ADMIN — IPRATROPIUM BROMIDE AND ALBUTEROL SULFATE 3 ML: 2.5; .5 SOLUTION RESPIRATORY (INHALATION) at 15:57

## 2023-12-19 RX ADMIN — ENOXAPARIN SODIUM 40 MG: 100 INJECTION SUBCUTANEOUS at 21:55

## 2023-12-19 RX ADMIN — VENLAFAXINE HYDROCHLORIDE 75 MG: 75 CAPSULE, EXTENDED RELEASE ORAL at 09:01

## 2023-12-19 RX ADMIN — BUSPIRONE HYDROCHLORIDE 10 MG: 10 TABLET ORAL at 15:55

## 2023-12-19 RX ADMIN — GABAPENTIN 600 MG: 300 CAPSULE ORAL at 21:55

## 2023-12-19 RX ADMIN — ACETAMINOPHEN 650 MG: 325 TABLET, FILM COATED ORAL at 22:02

## 2023-12-19 RX ADMIN — ACETYLCYSTEINE 4 ML: 200 SOLUTION ORAL; RESPIRATORY (INHALATION) at 23:19

## 2023-12-19 RX ADMIN — IPRATROPIUM BROMIDE AND ALBUTEROL SULFATE 3 ML: 2.5; .5 SOLUTION RESPIRATORY (INHALATION) at 07:20

## 2023-12-19 RX ADMIN — GABAPENTIN 600 MG: 300 CAPSULE ORAL at 13:44

## 2023-12-19 RX ADMIN — IPRATROPIUM BROMIDE AND ALBUTEROL SULFATE 3 ML: 2.5; .5 SOLUTION RESPIRATORY (INHALATION) at 11:35

## 2023-12-19 RX ADMIN — PANTOPRAZOLE SODIUM 40 MG: 40 TABLET, DELAYED RELEASE ORAL at 06:15

## 2023-12-19 RX ADMIN — METHYLPREDNISOLONE SODIUM SUCCINATE 40 MG: 40 INJECTION INTRAMUSCULAR; INTRAVENOUS at 08:57

## 2023-12-19 RX ADMIN — ACETYLCYSTEINE 4 ML: 200 SOLUTION ORAL; RESPIRATORY (INHALATION) at 07:20

## 2023-12-19 RX ADMIN — BUDESONIDE AND FORMOTEROL FUMARATE DIHYDRATE 2 PUFF: 160; 4.5 AEROSOL RESPIRATORY (INHALATION) at 19:19

## 2023-12-19 RX ADMIN — BUPROPION HYDROCHLORIDE 300 MG: 300 TABLET, EXTENDED RELEASE ORAL at 06:15

## 2023-12-19 RX ADMIN — LEVETIRACETAM 500 MG: 500 TABLET, FILM COATED ORAL at 15:55

## 2023-12-20 LAB
ANION GAP SERPL CALCULATED.3IONS-SCNC: 9.6 MMOL/L (ref 5–15)
BACTERIA SPEC AEROBE CULT: NORMAL
BACTERIA SPEC AEROBE CULT: NORMAL
BUN SERPL-MCNC: 22 MG/DL (ref 8–23)
BUN/CREAT SERPL: 26.2 (ref 7–25)
CALCIUM SPEC-SCNC: 9 MG/DL (ref 8.6–10.5)
CHLORIDE SERPL-SCNC: 95 MMOL/L (ref 98–107)
CO2 SERPL-SCNC: 29.4 MMOL/L (ref 22–29)
CREAT SERPL-MCNC: 0.84 MG/DL (ref 0.57–1)
DEPRECATED RDW RBC AUTO: 45.8 FL (ref 37–54)
EGFRCR SERPLBLD CKD-EPI 2021: 79.7 ML/MIN/1.73
ERYTHROCYTE [DISTWIDTH] IN BLOOD BY AUTOMATED COUNT: 15.3 % (ref 12.3–15.4)
GLUCOSE SERPL-MCNC: 108 MG/DL (ref 65–99)
HCT VFR BLD AUTO: 39.5 % (ref 34–46.6)
HGB BLD-MCNC: 12.7 G/DL (ref 12–15.9)
MCH RBC QN AUTO: 27 PG (ref 26.6–33)
MCHC RBC AUTO-ENTMCNC: 32.2 G/DL (ref 31.5–35.7)
MCV RBC AUTO: 83.9 FL (ref 79–97)
PLATELET # BLD AUTO: 217 10*3/MM3 (ref 140–450)
PMV BLD AUTO: 10.5 FL (ref 6–12)
POTASSIUM SERPL-SCNC: 4.5 MMOL/L (ref 3.5–5.2)
RBC # BLD AUTO: 4.71 10*6/MM3 (ref 3.77–5.28)
SODIUM SERPL-SCNC: 134 MMOL/L (ref 136–145)
WBC NRBC COR # BLD AUTO: 8.16 10*3/MM3 (ref 3.4–10.8)

## 2023-12-20 PROCEDURE — 94664 DEMO&/EVAL PT USE INHALER: CPT

## 2023-12-20 PROCEDURE — 25010000002 METHYLPREDNISOLONE PER 40 MG: Performed by: INTERNAL MEDICINE

## 2023-12-20 PROCEDURE — 25010000002 FUROSEMIDE PER 20 MG: Performed by: INTERNAL MEDICINE

## 2023-12-20 PROCEDURE — 25010000002 ENOXAPARIN PER 10 MG: Performed by: HOSPITALIST

## 2023-12-20 PROCEDURE — 97530 THERAPEUTIC ACTIVITIES: CPT

## 2023-12-20 PROCEDURE — 94799 UNLISTED PULMONARY SVC/PX: CPT

## 2023-12-20 PROCEDURE — 63710000001 PREDNISONE PER 1 MG: Performed by: INTERNAL MEDICINE

## 2023-12-20 PROCEDURE — 80048 BASIC METABOLIC PNL TOTAL CA: CPT | Performed by: HOSPITALIST

## 2023-12-20 PROCEDURE — 85027 COMPLETE CBC AUTOMATED: CPT | Performed by: HOSPITALIST

## 2023-12-20 PROCEDURE — 94761 N-INVAS EAR/PLS OXIMETRY MLT: CPT

## 2023-12-20 RX ORDER — GUAIFENESIN 600 MG/1
1200 TABLET, EXTENDED RELEASE ORAL EVERY 12 HOURS SCHEDULED
Status: DISCONTINUED | OUTPATIENT
Start: 2023-12-20 | End: 2023-12-22 | Stop reason: HOSPADM

## 2023-12-20 RX ORDER — FUROSEMIDE 10 MG/ML
40 INJECTION INTRAMUSCULAR; INTRAVENOUS ONCE
Status: COMPLETED | OUTPATIENT
Start: 2023-12-20 | End: 2023-12-20

## 2023-12-20 RX ORDER — PREDNISONE 20 MG/1
40 TABLET ORAL 2 TIMES DAILY WITH MEALS
Status: DISCONTINUED | OUTPATIENT
Start: 2023-12-20 | End: 2023-12-22 | Stop reason: HOSPADM

## 2023-12-20 RX ADMIN — FLUTICASONE PROPIONATE 1 SPRAY: 50 SPRAY, METERED NASAL at 06:13

## 2023-12-20 RX ADMIN — VENLAFAXINE HYDROCHLORIDE 75 MG: 75 CAPSULE, EXTENDED RELEASE ORAL at 09:13

## 2023-12-20 RX ADMIN — BUDESONIDE AND FORMOTEROL FUMARATE DIHYDRATE 2 PUFF: 160; 4.5 AEROSOL RESPIRATORY (INHALATION) at 07:13

## 2023-12-20 RX ADMIN — ACETYLCYSTEINE 4 ML: 200 SOLUTION ORAL; RESPIRATORY (INHALATION) at 18:58

## 2023-12-20 RX ADMIN — Medication 10 ML: at 09:17

## 2023-12-20 RX ADMIN — BUSPIRONE HYDROCHLORIDE 10 MG: 10 TABLET ORAL at 09:13

## 2023-12-20 RX ADMIN — GABAPENTIN 600 MG: 300 CAPSULE ORAL at 13:57

## 2023-12-20 RX ADMIN — ENOXAPARIN SODIUM 40 MG: 100 INJECTION SUBCUTANEOUS at 09:13

## 2023-12-20 RX ADMIN — ACETYLCYSTEINE 4 ML: 200 SOLUTION ORAL; RESPIRATORY (INHALATION) at 07:13

## 2023-12-20 RX ADMIN — ENOXAPARIN SODIUM 40 MG: 100 INJECTION SUBCUTANEOUS at 21:43

## 2023-12-20 RX ADMIN — GABAPENTIN 600 MG: 300 CAPSULE ORAL at 06:13

## 2023-12-20 RX ADMIN — PANTOPRAZOLE SODIUM 40 MG: 40 TABLET, DELAYED RELEASE ORAL at 06:13

## 2023-12-20 RX ADMIN — GUAIFENESIN 1200 MG: 600 TABLET, EXTENDED RELEASE ORAL at 21:43

## 2023-12-20 RX ADMIN — IPRATROPIUM BROMIDE AND ALBUTEROL SULFATE 3 ML: 2.5; .5 SOLUTION RESPIRATORY (INHALATION) at 23:40

## 2023-12-20 RX ADMIN — LEVETIRACETAM 500 MG: 500 TABLET, FILM COATED ORAL at 09:13

## 2023-12-20 RX ADMIN — METHYLPREDNISOLONE SODIUM SUCCINATE 40 MG: 40 INJECTION INTRAMUSCULAR; INTRAVENOUS at 09:09

## 2023-12-20 RX ADMIN — ACETAMINOPHEN 650 MG: 325 TABLET, FILM COATED ORAL at 21:46

## 2023-12-20 RX ADMIN — ACETYLCYSTEINE 4 ML: 200 SOLUTION ORAL; RESPIRATORY (INHALATION) at 10:25

## 2023-12-20 RX ADMIN — Medication 10 ML: at 21:44

## 2023-12-20 RX ADMIN — IPRATROPIUM BROMIDE AND ALBUTEROL SULFATE 3 ML: 2.5; .5 SOLUTION RESPIRATORY (INHALATION) at 10:25

## 2023-12-20 RX ADMIN — IPRATROPIUM BROMIDE AND ALBUTEROL SULFATE 3 ML: 2.5; .5 SOLUTION RESPIRATORY (INHALATION) at 18:58

## 2023-12-20 RX ADMIN — GABAPENTIN 600 MG: 300 CAPSULE ORAL at 21:43

## 2023-12-20 RX ADMIN — BUSPIRONE HYDROCHLORIDE 10 MG: 10 TABLET ORAL at 15:47

## 2023-12-20 RX ADMIN — IPRATROPIUM BROMIDE AND ALBUTEROL SULFATE 3 ML: 2.5; .5 SOLUTION RESPIRATORY (INHALATION) at 07:13

## 2023-12-20 RX ADMIN — ACETYLCYSTEINE 4 ML: 200 SOLUTION ORAL; RESPIRATORY (INHALATION) at 15:27

## 2023-12-20 RX ADMIN — FUROSEMIDE 40 MG: 10 INJECTION, SOLUTION INTRAMUSCULAR; INTRAVENOUS at 15:47

## 2023-12-20 RX ADMIN — CETIRIZINE HYDROCHLORIDE 10 MG: 10 TABLET ORAL at 09:13

## 2023-12-20 RX ADMIN — Medication 1 CAPSULE: at 09:13

## 2023-12-20 RX ADMIN — MONTELUKAST SODIUM 10 MG: 10 TABLET, FILM COATED ORAL at 17:21

## 2023-12-20 RX ADMIN — GUAIFENESIN 1200 MG: 600 TABLET, EXTENDED RELEASE ORAL at 10:04

## 2023-12-20 RX ADMIN — BUSPIRONE HYDROCHLORIDE 10 MG: 10 TABLET ORAL at 21:43

## 2023-12-20 RX ADMIN — LEVETIRACETAM 500 MG: 500 TABLET, FILM COATED ORAL at 15:47

## 2023-12-20 RX ADMIN — BUDESONIDE AND FORMOTEROL FUMARATE DIHYDRATE 2 PUFF: 160; 4.5 AEROSOL RESPIRATORY (INHALATION) at 19:04

## 2023-12-20 RX ADMIN — BUPROPION HYDROCHLORIDE 300 MG: 300 TABLET, EXTENDED RELEASE ORAL at 06:13

## 2023-12-20 RX ADMIN — OXYCODONE AND ACETAMINOPHEN 1 TABLET: 10; 325 TABLET ORAL at 13:57

## 2023-12-20 RX ADMIN — IPRATROPIUM BROMIDE AND ALBUTEROL SULFATE 3 ML: 2.5; .5 SOLUTION RESPIRATORY (INHALATION) at 15:27

## 2023-12-20 RX ADMIN — PREDNISONE 40 MG: 20 TABLET ORAL at 17:21

## 2023-12-20 RX ADMIN — LEVETIRACETAM 500 MG: 500 TABLET, FILM COATED ORAL at 21:43

## 2023-12-20 RX ADMIN — ACETYLCYSTEINE 4 ML: 200 SOLUTION ORAL; RESPIRATORY (INHALATION) at 23:40

## 2023-12-21 LAB
ANION GAP SERPL CALCULATED.3IONS-SCNC: 12 MMOL/L (ref 5–15)
BUN SERPL-MCNC: 31 MG/DL (ref 8–23)
BUN/CREAT SERPL: 29.5 (ref 7–25)
CALCIUM SPEC-SCNC: 9.4 MG/DL (ref 8.6–10.5)
CHLORIDE SERPL-SCNC: 95 MMOL/L (ref 98–107)
CO2 SERPL-SCNC: 28 MMOL/L (ref 22–29)
CREAT SERPL-MCNC: 1.05 MG/DL (ref 0.57–1)
DEPRECATED RDW RBC AUTO: 45.5 FL (ref 37–54)
EGFRCR SERPLBLD CKD-EPI 2021: 61 ML/MIN/1.73
ERYTHROCYTE [DISTWIDTH] IN BLOOD BY AUTOMATED COUNT: 15.7 % (ref 12.3–15.4)
GLUCOSE SERPL-MCNC: 120 MG/DL (ref 65–99)
HCT VFR BLD AUTO: 45.4 % (ref 34–46.6)
HGB BLD-MCNC: 14 G/DL (ref 12–15.9)
MCH RBC QN AUTO: 25.7 PG (ref 26.6–33)
MCHC RBC AUTO-ENTMCNC: 30.8 G/DL (ref 31.5–35.7)
MCV RBC AUTO: 83.3 FL (ref 79–97)
PLATELET # BLD AUTO: 300 10*3/MM3 (ref 140–450)
PMV BLD AUTO: 10.7 FL (ref 6–12)
POTASSIUM SERPL-SCNC: 4.4 MMOL/L (ref 3.5–5.2)
RBC # BLD AUTO: 5.45 10*6/MM3 (ref 3.77–5.28)
SODIUM SERPL-SCNC: 135 MMOL/L (ref 136–145)
WBC NRBC COR # BLD AUTO: 12.75 10*3/MM3 (ref 3.4–10.8)

## 2023-12-21 PROCEDURE — 94799 UNLISTED PULMONARY SVC/PX: CPT

## 2023-12-21 PROCEDURE — 85027 COMPLETE CBC AUTOMATED: CPT | Performed by: HOSPITALIST

## 2023-12-21 PROCEDURE — 94664 DEMO&/EVAL PT USE INHALER: CPT

## 2023-12-21 PROCEDURE — 25010000002 ENOXAPARIN PER 10 MG: Performed by: HOSPITALIST

## 2023-12-21 PROCEDURE — 94761 N-INVAS EAR/PLS OXIMETRY MLT: CPT

## 2023-12-21 PROCEDURE — 63710000001 PREDNISONE PER 1 MG: Performed by: INTERNAL MEDICINE

## 2023-12-21 PROCEDURE — 80048 BASIC METABOLIC PNL TOTAL CA: CPT | Performed by: HOSPITALIST

## 2023-12-21 RX ADMIN — ACETYLCYSTEINE 4 ML: 200 SOLUTION ORAL; RESPIRATORY (INHALATION) at 11:13

## 2023-12-21 RX ADMIN — ACETYLCYSTEINE 4 ML: 200 SOLUTION ORAL; RESPIRATORY (INHALATION) at 19:57

## 2023-12-21 RX ADMIN — BUSPIRONE HYDROCHLORIDE 10 MG: 10 TABLET ORAL at 08:53

## 2023-12-21 RX ADMIN — IPRATROPIUM BROMIDE AND ALBUTEROL SULFATE 3 ML: 2.5; .5 SOLUTION RESPIRATORY (INHALATION) at 19:57

## 2023-12-21 RX ADMIN — PREDNISONE 40 MG: 20 TABLET ORAL at 17:50

## 2023-12-21 RX ADMIN — BUPROPION HYDROCHLORIDE 300 MG: 300 TABLET, EXTENDED RELEASE ORAL at 06:13

## 2023-12-21 RX ADMIN — TIZANIDINE 4 MG: 4 TABLET ORAL at 21:08

## 2023-12-21 RX ADMIN — GUAIFENESIN 1200 MG: 600 TABLET, EXTENDED RELEASE ORAL at 08:53

## 2023-12-21 RX ADMIN — CETIRIZINE HYDROCHLORIDE 10 MG: 10 TABLET ORAL at 08:53

## 2023-12-21 RX ADMIN — BUDESONIDE AND FORMOTEROL FUMARATE DIHYDRATE 2 PUFF: 160; 4.5 AEROSOL RESPIRATORY (INHALATION) at 07:00

## 2023-12-21 RX ADMIN — GUAIFENESIN 1200 MG: 600 TABLET, EXTENDED RELEASE ORAL at 20:45

## 2023-12-21 RX ADMIN — PREDNISONE 40 MG: 20 TABLET ORAL at 08:53

## 2023-12-21 RX ADMIN — ENOXAPARIN SODIUM 40 MG: 100 INJECTION SUBCUTANEOUS at 08:52

## 2023-12-21 RX ADMIN — BUSPIRONE HYDROCHLORIDE 10 MG: 10 TABLET ORAL at 20:45

## 2023-12-21 RX ADMIN — MONTELUKAST SODIUM 10 MG: 10 TABLET, FILM COATED ORAL at 15:59

## 2023-12-21 RX ADMIN — PANTOPRAZOLE SODIUM 40 MG: 40 TABLET, DELAYED RELEASE ORAL at 06:13

## 2023-12-21 RX ADMIN — ENOXAPARIN SODIUM 40 MG: 100 INJECTION SUBCUTANEOUS at 20:45

## 2023-12-21 RX ADMIN — LEVETIRACETAM 500 MG: 500 TABLET, FILM COATED ORAL at 15:58

## 2023-12-21 RX ADMIN — ACETYLCYSTEINE 4 ML: 200 SOLUTION ORAL; RESPIRATORY (INHALATION) at 14:40

## 2023-12-21 RX ADMIN — GABAPENTIN 600 MG: 300 CAPSULE ORAL at 13:07

## 2023-12-21 RX ADMIN — LEVETIRACETAM 500 MG: 500 TABLET, FILM COATED ORAL at 20:45

## 2023-12-21 RX ADMIN — OXYCODONE AND ACETAMINOPHEN 1 TABLET: 10; 325 TABLET ORAL at 06:17

## 2023-12-21 RX ADMIN — ACETYLCYSTEINE 4 ML: 200 SOLUTION ORAL; RESPIRATORY (INHALATION) at 07:00

## 2023-12-21 RX ADMIN — VENLAFAXINE HYDROCHLORIDE 75 MG: 75 CAPSULE, EXTENDED RELEASE ORAL at 08:53

## 2023-12-21 RX ADMIN — Medication 10 ML: at 20:45

## 2023-12-21 RX ADMIN — Medication 1 CAPSULE: at 08:53

## 2023-12-21 RX ADMIN — LEVETIRACETAM 500 MG: 500 TABLET, FILM COATED ORAL at 08:53

## 2023-12-21 RX ADMIN — BUSPIRONE HYDROCHLORIDE 10 MG: 10 TABLET ORAL at 15:58

## 2023-12-21 RX ADMIN — FLUTICASONE PROPIONATE 1 SPRAY: 50 SPRAY, METERED NASAL at 06:13

## 2023-12-21 RX ADMIN — IPRATROPIUM BROMIDE AND ALBUTEROL SULFATE 3 ML: 2.5; .5 SOLUTION RESPIRATORY (INHALATION) at 23:46

## 2023-12-21 RX ADMIN — GABAPENTIN 600 MG: 300 CAPSULE ORAL at 21:08

## 2023-12-21 RX ADMIN — OXYCODONE AND ACETAMINOPHEN 1 TABLET: 10; 325 TABLET ORAL at 15:58

## 2023-12-21 RX ADMIN — GABAPENTIN 600 MG: 300 CAPSULE ORAL at 06:13

## 2023-12-21 RX ADMIN — IPRATROPIUM BROMIDE AND ALBUTEROL SULFATE 3 ML: 2.5; .5 SOLUTION RESPIRATORY (INHALATION) at 07:01

## 2023-12-21 RX ADMIN — IPRATROPIUM BROMIDE AND ALBUTEROL SULFATE 3 ML: 2.5; .5 SOLUTION RESPIRATORY (INHALATION) at 11:14

## 2023-12-21 RX ADMIN — Medication 10 ML: at 08:53

## 2023-12-21 RX ADMIN — ACETYLCYSTEINE 4 ML: 200 SOLUTION ORAL; RESPIRATORY (INHALATION) at 23:46

## 2023-12-21 RX ADMIN — BUDESONIDE AND FORMOTEROL FUMARATE DIHYDRATE 2 PUFF: 160; 4.5 AEROSOL RESPIRATORY (INHALATION) at 19:57

## 2023-12-21 RX ADMIN — IPRATROPIUM BROMIDE AND ALBUTEROL SULFATE 3 ML: 2.5; .5 SOLUTION RESPIRATORY (INHALATION) at 14:40

## 2023-12-22 ENCOUNTER — READMISSION MANAGEMENT (OUTPATIENT)
Dept: CALL CENTER | Facility: HOSPITAL | Age: 58
End: 2023-12-22
Payer: MEDICARE

## 2023-12-22 VITALS
DIASTOLIC BLOOD PRESSURE: 75 MMHG | TEMPERATURE: 97.9 F | WEIGHT: 281.7 LBS | HEART RATE: 71 BPM | RESPIRATION RATE: 14 BRPM | OXYGEN SATURATION: 91 % | SYSTOLIC BLOOD PRESSURE: 112 MMHG | HEIGHT: 67 IN | BODY MASS INDEX: 44.21 KG/M2

## 2023-12-22 LAB
ANION GAP SERPL CALCULATED.3IONS-SCNC: 7 MMOL/L (ref 5–15)
BUN SERPL-MCNC: 33 MG/DL (ref 8–23)
BUN/CREAT SERPL: 28.7 (ref 7–25)
CALCIUM SPEC-SCNC: 9.3 MG/DL (ref 8.6–10.5)
CHLORIDE SERPL-SCNC: 98 MMOL/L (ref 98–107)
CO2 SERPL-SCNC: 30 MMOL/L (ref 22–29)
CREAT SERPL-MCNC: 1.15 MG/DL (ref 0.57–1)
DEPRECATED RDW RBC AUTO: 47.1 FL (ref 37–54)
EGFRCR SERPLBLD CKD-EPI 2021: 54.6 ML/MIN/1.73
ERYTHROCYTE [DISTWIDTH] IN BLOOD BY AUTOMATED COUNT: 15.6 % (ref 12.3–15.4)
GLUCOSE SERPL-MCNC: 134 MG/DL (ref 65–99)
HCT VFR BLD AUTO: 42 % (ref 34–46.6)
HGB BLD-MCNC: 13.3 G/DL (ref 12–15.9)
MCH RBC QN AUTO: 26.9 PG (ref 26.6–33)
MCHC RBC AUTO-ENTMCNC: 31.7 G/DL (ref 31.5–35.7)
MCV RBC AUTO: 85 FL (ref 79–97)
PLATELET # BLD AUTO: 261 10*3/MM3 (ref 140–450)
PMV BLD AUTO: 10.4 FL (ref 6–12)
POTASSIUM SERPL-SCNC: 4.6 MMOL/L (ref 3.5–5.2)
RBC # BLD AUTO: 4.94 10*6/MM3 (ref 3.77–5.28)
SODIUM SERPL-SCNC: 135 MMOL/L (ref 136–145)
WBC NRBC COR # BLD AUTO: 13.21 10*3/MM3 (ref 3.4–10.8)

## 2023-12-22 PROCEDURE — 25010000002 ENOXAPARIN PER 10 MG: Performed by: HOSPITALIST

## 2023-12-22 PROCEDURE — 94761 N-INVAS EAR/PLS OXIMETRY MLT: CPT

## 2023-12-22 PROCEDURE — 80048 BASIC METABOLIC PNL TOTAL CA: CPT | Performed by: HOSPITALIST

## 2023-12-22 PROCEDURE — 94799 UNLISTED PULMONARY SVC/PX: CPT

## 2023-12-22 PROCEDURE — 85027 COMPLETE CBC AUTOMATED: CPT | Performed by: HOSPITALIST

## 2023-12-22 PROCEDURE — 63710000001 PREDNISONE PER 1 MG: Performed by: INTERNAL MEDICINE

## 2023-12-22 PROCEDURE — 94664 DEMO&/EVAL PT USE INHALER: CPT

## 2023-12-22 RX ORDER — FLUTICASONE FUROATE, UMECLIDINIUM BROMIDE AND VILANTEROL TRIFENATATE 100; 62.5; 25 UG/1; UG/1; UG/1
1 POWDER RESPIRATORY (INHALATION)
Qty: 60 EACH | Refills: 0 | Status: SHIPPED | OUTPATIENT
Start: 2023-12-22

## 2023-12-22 RX ORDER — PREDNISONE 10 MG/1
TABLET ORAL
Qty: 54 TABLET | Refills: 0 | Status: SHIPPED | OUTPATIENT
Start: 2023-12-22 | End: 2024-01-06

## 2023-12-22 RX ORDER — IPRATROPIUM BROMIDE AND ALBUTEROL SULFATE 2.5; .5 MG/3ML; MG/3ML
3 SOLUTION RESPIRATORY (INHALATION) EVERY 4 HOURS PRN
Qty: 360 ML | Refills: 0 | Status: SHIPPED | OUTPATIENT
Start: 2023-12-22

## 2023-12-22 RX ORDER — GUAIFENESIN 600 MG/1
1200 TABLET, EXTENDED RELEASE ORAL EVERY 12 HOURS SCHEDULED
Start: 2023-12-22 | End: 2024-01-05

## 2023-12-22 RX ADMIN — CETIRIZINE HYDROCHLORIDE 10 MG: 10 TABLET ORAL at 11:23

## 2023-12-22 RX ADMIN — IPRATROPIUM BROMIDE AND ALBUTEROL SULFATE 3 ML: 2.5; .5 SOLUTION RESPIRATORY (INHALATION) at 07:40

## 2023-12-22 RX ADMIN — ACETYLCYSTEINE 4 ML: 200 SOLUTION ORAL; RESPIRATORY (INHALATION) at 10:33

## 2023-12-22 RX ADMIN — Medication 1 CAPSULE: at 11:23

## 2023-12-22 RX ADMIN — FLUTICASONE PROPIONATE 1 SPRAY: 50 SPRAY, METERED NASAL at 06:51

## 2023-12-22 RX ADMIN — BUPROPION HYDROCHLORIDE 300 MG: 300 TABLET, EXTENDED RELEASE ORAL at 06:52

## 2023-12-22 RX ADMIN — BUSPIRONE HYDROCHLORIDE 10 MG: 10 TABLET ORAL at 11:23

## 2023-12-22 RX ADMIN — PANTOPRAZOLE SODIUM 40 MG: 40 TABLET, DELAYED RELEASE ORAL at 06:51

## 2023-12-22 RX ADMIN — OXYCODONE AND ACETAMINOPHEN 1 TABLET: 10; 325 TABLET ORAL at 11:23

## 2023-12-22 RX ADMIN — GABAPENTIN 600 MG: 300 CAPSULE ORAL at 06:51

## 2023-12-22 RX ADMIN — VENLAFAXINE HYDROCHLORIDE 75 MG: 75 CAPSULE, EXTENDED RELEASE ORAL at 11:23

## 2023-12-22 RX ADMIN — GUAIFENESIN 1200 MG: 600 TABLET, EXTENDED RELEASE ORAL at 11:23

## 2023-12-22 RX ADMIN — LEVETIRACETAM 500 MG: 500 TABLET, FILM COATED ORAL at 11:23

## 2023-12-22 RX ADMIN — BUDESONIDE AND FORMOTEROL FUMARATE DIHYDRATE 2 PUFF: 160; 4.5 AEROSOL RESPIRATORY (INHALATION) at 07:54

## 2023-12-22 RX ADMIN — IPRATROPIUM BROMIDE AND ALBUTEROL SULFATE 3 ML: 2.5; .5 SOLUTION RESPIRATORY (INHALATION) at 10:29

## 2023-12-22 RX ADMIN — ACETYLCYSTEINE 4 ML: 200 SOLUTION ORAL; RESPIRATORY (INHALATION) at 07:40

## 2023-12-22 RX ADMIN — ENOXAPARIN SODIUM 40 MG: 100 INJECTION SUBCUTANEOUS at 11:23

## 2023-12-22 RX ADMIN — PREDNISONE 40 MG: 20 TABLET ORAL at 11:23

## 2023-12-26 ENCOUNTER — READMISSION MANAGEMENT (OUTPATIENT)
Dept: CALL CENTER | Facility: HOSPITAL | Age: 58
End: 2023-12-26
Payer: MEDICARE

## 2023-12-29 ENCOUNTER — OFFICE VISIT (OUTPATIENT)
Dept: PAIN MEDICINE | Facility: CLINIC | Age: 58
End: 2023-12-29
Payer: MEDICARE

## 2023-12-29 VITALS
WEIGHT: 279.8 LBS | HEART RATE: 95 BPM | TEMPERATURE: 96.9 F | HEIGHT: 67 IN | SYSTOLIC BLOOD PRESSURE: 120 MMHG | OXYGEN SATURATION: 95 % | RESPIRATION RATE: 20 BRPM | DIASTOLIC BLOOD PRESSURE: 82 MMHG | BODY MASS INDEX: 43.92 KG/M2

## 2023-12-29 DIAGNOSIS — Z87.39 HISTORY OF OSTEOMYELITIS: ICD-10-CM

## 2023-12-29 DIAGNOSIS — G89.29 OTHER CHRONIC PAIN: ICD-10-CM

## 2023-12-29 DIAGNOSIS — M54.12 CERVICAL RADICULOPATHY: ICD-10-CM

## 2023-12-29 DIAGNOSIS — M50.123 CERVICAL DISC DISORDER AT C6-C7 LEVEL WITH RADICULOPATHY: ICD-10-CM

## 2023-12-29 DIAGNOSIS — M50.122 CERVICAL DISC DISORDER AT C5-C6 LEVEL WITH RADICULOPATHY: ICD-10-CM

## 2023-12-29 DIAGNOSIS — M96.1 POSTLAMINECTOMY SYNDROME OF THORACIC REGION: ICD-10-CM

## 2023-12-29 DIAGNOSIS — Z79.899 ENCOUNTER FOR LONG-TERM (CURRENT) USE OF HIGH-RISK MEDICATION: ICD-10-CM

## 2023-12-29 DIAGNOSIS — G89.4 CHRONIC PAIN SYNDROME: ICD-10-CM

## 2023-12-29 DIAGNOSIS — M62.838 MUSCLE SPASM: ICD-10-CM

## 2023-12-29 DIAGNOSIS — M51.36 DDD (DEGENERATIVE DISC DISEASE), LUMBAR: ICD-10-CM

## 2023-12-29 DIAGNOSIS — G89.4 CHRONIC PAIN SYNDROME: Primary | ICD-10-CM

## 2023-12-29 RX ORDER — LEVETIRACETAM 500 MG/1
500 TABLET ORAL 3 TIMES DAILY
Qty: 90 TABLET | Refills: 0 | Status: SHIPPED | OUTPATIENT
Start: 2023-12-29

## 2023-12-29 RX ORDER — ALBUTEROL SULFATE 2.5 MG/3ML
1 SOLUTION RESPIRATORY (INHALATION) EVERY 4 HOURS PRN
COMMUNITY
Start: 2023-11-20

## 2023-12-29 RX ORDER — GABAPENTIN 600 MG/1
1200 TABLET ORAL 3 TIMES DAILY
Qty: 180 TABLET | Refills: 0 | Status: SHIPPED | OUTPATIENT
Start: 2023-12-29

## 2023-12-29 RX ORDER — TIZANIDINE 4 MG/1
4 TABLET ORAL 3 TIMES DAILY PRN
Qty: 90 TABLET | Refills: 0 | Status: SHIPPED | OUTPATIENT
Start: 2023-12-29

## 2023-12-30 RX ORDER — OXYCODONE AND ACETAMINOPHEN 10; 325 MG/1; MG/1
1 TABLET ORAL EVERY 6 HOURS PRN
Qty: 120 TABLET | Refills: 0 | Status: SHIPPED | OUTPATIENT
Start: 2023-12-30

## 2024-01-04 ENCOUNTER — READMISSION MANAGEMENT (OUTPATIENT)
Dept: CALL CENTER | Facility: HOSPITAL | Age: 59
End: 2024-01-04
Payer: MEDICARE

## 2024-02-05 DIAGNOSIS — M62.838 MUSCLE SPASM: ICD-10-CM

## 2024-02-05 DIAGNOSIS — M51.36 DDD (DEGENERATIVE DISC DISEASE), LUMBAR: ICD-10-CM

## 2024-02-05 DIAGNOSIS — M50.123 CERVICAL DISC DISORDER AT C6-C7 LEVEL WITH RADICULOPATHY: ICD-10-CM

## 2024-02-07 RX ORDER — LEVETIRACETAM 500 MG/1
500 TABLET ORAL 3 TIMES DAILY
Qty: 90 TABLET | Refills: 0 | OUTPATIENT
Start: 2024-02-07

## 2024-02-07 RX ORDER — TIZANIDINE 4 MG/1
4 TABLET ORAL 3 TIMES DAILY PRN
Qty: 90 TABLET | Refills: 0 | OUTPATIENT
Start: 2024-02-07

## 2024-02-07 RX ORDER — GABAPENTIN 600 MG/1
1200 TABLET ORAL 3 TIMES DAILY
Qty: 180 TABLET | Refills: 0 | OUTPATIENT
Start: 2024-02-07

## 2024-02-12 ENCOUNTER — TELEPHONE (OUTPATIENT)
Dept: ORTHOPEDIC SURGERY | Facility: CLINIC | Age: 59
End: 2024-02-12

## 2024-05-07 ENCOUNTER — OFFICE VISIT (OUTPATIENT)
Dept: ORTHOPEDIC SURGERY | Facility: CLINIC | Age: 59
End: 2024-05-07
Payer: MEDICARE

## 2024-05-07 VITALS — WEIGHT: 286.2 LBS | TEMPERATURE: 97.8 F | HEIGHT: 67 IN | BODY MASS INDEX: 44.92 KG/M2

## 2024-05-07 DIAGNOSIS — R52 PAIN: Primary | ICD-10-CM

## 2024-05-07 DIAGNOSIS — M17.0 ARTHRITIS OF BOTH KNEES: ICD-10-CM

## 2024-05-07 RX ORDER — METHYLPREDNISOLONE ACETATE 80 MG/ML
80 INJECTION, SUSPENSION INTRA-ARTICULAR; INTRALESIONAL; INTRAMUSCULAR; SOFT TISSUE
Status: COMPLETED | OUTPATIENT
Start: 2024-05-07 | End: 2024-05-07

## 2024-05-07 RX ORDER — LIDOCAINE HYDROCHLORIDE 10 MG/ML
2 INJECTION, SOLUTION EPIDURAL; INFILTRATION; INTRACAUDAL; PERINEURAL
Status: COMPLETED | OUTPATIENT
Start: 2024-05-07 | End: 2024-05-07

## 2024-05-07 RX ADMIN — METHYLPREDNISOLONE ACETATE 80 MG: 80 INJECTION, SUSPENSION INTRA-ARTICULAR; INTRALESIONAL; INTRAMUSCULAR; SOFT TISSUE at 10:31

## 2024-05-07 RX ADMIN — LIDOCAINE HYDROCHLORIDE 2 ML: 10 INJECTION, SOLUTION EPIDURAL; INFILTRATION; INTRACAUDAL; PERINEURAL at 10:31

## 2024-08-20 ENCOUNTER — OFFICE VISIT (OUTPATIENT)
Dept: ORTHOPEDIC SURGERY | Facility: CLINIC | Age: 59
End: 2024-08-20
Payer: MEDICARE

## 2024-08-20 VITALS — HEIGHT: 67 IN | WEIGHT: 284 LBS | TEMPERATURE: 97.3 F | BODY MASS INDEX: 44.57 KG/M2

## 2024-08-20 DIAGNOSIS — M17.0 PRIMARY OSTEOARTHRITIS OF BOTH KNEES: Primary | ICD-10-CM

## 2024-08-20 PROCEDURE — 99213 OFFICE O/P EST LOW 20 MIN: CPT | Performed by: NURSE PRACTITIONER

## 2024-08-20 PROCEDURE — 20610 DRAIN/INJ JOINT/BURSA W/O US: CPT | Performed by: NURSE PRACTITIONER

## 2024-08-20 PROCEDURE — 1160F RVW MEDS BY RX/DR IN RCRD: CPT | Performed by: NURSE PRACTITIONER

## 2024-08-20 PROCEDURE — 1159F MED LIST DOCD IN RCRD: CPT | Performed by: NURSE PRACTITIONER

## 2024-08-20 RX ORDER — METHYLPREDNISOLONE ACETATE 80 MG/ML
1 INJECTION, SUSPENSION INTRA-ARTICULAR; INTRALESIONAL; INTRAMUSCULAR; SOFT TISSUE
Status: COMPLETED | OUTPATIENT
Start: 2024-08-20 | End: 2024-08-20

## 2024-08-20 RX ORDER — LIDOCAINE HYDROCHLORIDE 10 MG/ML
2 INJECTION, SOLUTION EPIDURAL; INFILTRATION; INTRACAUDAL; PERINEURAL
Status: COMPLETED | OUTPATIENT
Start: 2024-08-20 | End: 2024-08-20

## 2024-08-20 RX ADMIN — METHYLPREDNISOLONE ACETATE 1 ML: 80 INJECTION, SUSPENSION INTRA-ARTICULAR; INTRALESIONAL; INTRAMUSCULAR; SOFT TISSUE at 11:55

## 2024-08-20 RX ADMIN — LIDOCAINE HYDROCHLORIDE 2 ML: 10 INJECTION, SOLUTION EPIDURAL; INFILTRATION; INTRACAUDAL; PERINEURAL at 11:55

## 2024-11-04 ENCOUNTER — TRANSCRIBE ORDERS (OUTPATIENT)
Dept: ADMINISTRATIVE | Facility: HOSPITAL | Age: 59
End: 2024-11-04
Payer: MEDICARE

## 2024-11-04 DIAGNOSIS — Z12.31 SCREENING MAMMOGRAM FOR BREAST CANCER: Primary | ICD-10-CM

## 2024-11-21 ENCOUNTER — OFFICE VISIT (OUTPATIENT)
Dept: ORTHOPEDIC SURGERY | Facility: CLINIC | Age: 59
End: 2024-11-21
Payer: MEDICARE

## 2024-11-21 VITALS — HEIGHT: 63 IN | TEMPERATURE: 98.6 F | BODY MASS INDEX: 50.32 KG/M2 | WEIGHT: 284 LBS

## 2024-11-21 DIAGNOSIS — M17.0 PRIMARY OSTEOARTHRITIS OF BOTH KNEES: ICD-10-CM

## 2024-11-21 DIAGNOSIS — R52 PAIN: Primary | ICD-10-CM

## 2024-11-21 RX ORDER — METHYLPREDNISOLONE ACETATE 80 MG/ML
80 INJECTION, SUSPENSION INTRA-ARTICULAR; INTRALESIONAL; INTRAMUSCULAR; SOFT TISSUE
Status: COMPLETED | OUTPATIENT
Start: 2024-11-21 | End: 2024-11-21

## 2024-11-21 RX ADMIN — METHYLPREDNISOLONE ACETATE 80 MG: 80 INJECTION, SUSPENSION INTRA-ARTICULAR; INTRALESIONAL; INTRAMUSCULAR; SOFT TISSUE at 11:15

## 2024-11-21 RX ADMIN — METHYLPREDNISOLONE ACETATE 80 MG: 80 INJECTION, SUSPENSION INTRA-ARTICULAR; INTRALESIONAL; INTRAMUSCULAR; SOFT TISSUE at 11:14

## 2024-11-21 NOTE — PROGRESS NOTES
11/21/2024    Bethany Garcia is here today for worsening knee pain. Pt has undergone injection of the knee in the past with good resolution of symptoms. Pt is requesting a repeat injection.   Hx of bone infection in spine and not a candidate for TKA - working on weight loss   KNEE Injection Procedure Note:    Large Joint Arthrocentesis: L knee  Date/Time: 11/21/2024 11:14 AM  Consent given by: patient  Site marked: site marked  Timeout: Immediately prior to procedure a time out was called to verify the correct patient, procedure, equipment, support staff and site/side marked as required   Supporting Documentation  Indications: pain and joint swelling   Procedure Details  Location: knee - L knee  Preparation: Patient was prepped and draped in the usual sterile fashion  Needle gauge: 21 G.  Approach: anterolateral  Medications administered: 2 mL lidocaine (cardiac); 80 mg methylPREDNISolone acetate 80 MG/ML  Patient tolerance: patient tolerated the procedure well with no immediate complications      Large Joint Arthrocentesis: R knee  Date/Time: 11/21/2024 11:15 AM  Consent given by: patient  Site marked: site marked  Timeout: Immediately prior to procedure a time out was called to verify the correct patient, procedure, equipment, support staff and site/side marked as required   Supporting Documentation  Indications: pain and joint swelling   Procedure Details  Location: knee - R knee  Preparation: Patient was prepped and draped in the usual sterile fashion  Needle gauge: 21 G.  Approach: anterolateral  Medications administered: 2 mL lidocaine (cardiac); 80 mg methylPREDNISolone acetate 80 MG/ML  Patient tolerance: patient tolerated the procedure well with no immediate complications      At the conclusion of the injection I discussed the importance of continued quad strengthening exercises on a daily basis. I will see the patient back if the symptoms should fail to improve or worsen.    Yeni De Anda,  APRN  11/21/2024

## 2025-02-25 ENCOUNTER — OFFICE VISIT (OUTPATIENT)
Dept: ORTHOPEDIC SURGERY | Facility: CLINIC | Age: 60
End: 2025-02-25
Payer: MEDICARE

## 2025-02-25 VITALS — WEIGHT: 280.2 LBS | BODY MASS INDEX: 43.98 KG/M2 | TEMPERATURE: 97.8 F | HEIGHT: 67 IN

## 2025-02-25 DIAGNOSIS — M17.0 PRIMARY OSTEOARTHRITIS OF BOTH KNEES: Primary | ICD-10-CM

## 2025-02-25 RX ORDER — BUSPIRONE HYDROCHLORIDE 10 MG/1
10 TABLET ORAL 3 TIMES DAILY
COMMUNITY
Start: 2025-01-07

## 2025-03-04 RX ORDER — METHYLPREDNISOLONE ACETATE 80 MG/ML
80 INJECTION, SUSPENSION INTRA-ARTICULAR; INTRALESIONAL; INTRAMUSCULAR; SOFT TISSUE
Status: COMPLETED | OUTPATIENT
Start: 2025-03-04 | End: 2025-03-04

## 2025-03-04 RX ADMIN — METHYLPREDNISOLONE ACETATE 80 MG: 80 INJECTION, SUSPENSION INTRA-ARTICULAR; INTRALESIONAL; INTRAMUSCULAR; SOFT TISSUE at 14:19

## 2025-03-04 NOTE — PROGRESS NOTES
3/4/2025    Bethany Garcia is here today for worsening knee pain. Pt has undergone injection of the knee in the past with good resolution of symptoms. Pt is requesting a repeat injection.     KNEE Injection Procedure Note:    Large Joint Arthrocentesis: L knee  Date/Time: 3/4/2025 2:19 PM  Consent given by: patient  Site marked: site marked  Timeout: Immediately prior to procedure a time out was called to verify the correct patient, procedure, equipment, support staff and site/side marked as required   Supporting Documentation  Indications: pain and joint swelling   Procedure Details  Location: knee - L knee  Preparation: Patient was prepped and draped in the usual sterile fashion  Needle gauge: 21 G.  Approach: anterolateral  Medications administered: 2 mL lidocaine (cardiac); 80 mg methylPREDNISolone acetate 80 MG/ML  Patient tolerance: patient tolerated the procedure well with no immediate complications      Large Joint Arthrocentesis: R knee  Date/Time: 3/4/2025 2:19 PM  Consent given by: patient  Site marked: site marked  Timeout: Immediately prior to procedure a time out was called to verify the correct patient, procedure, equipment, support staff and site/side marked as required   Supporting Documentation  Indications: pain and joint swelling   Procedure Details  Location: knee - R knee  Preparation: Patient was prepped and draped in the usual sterile fashion  Needle gauge: 21 G.  Approach: anterolateral  Medications administered: 2 mL lidocaine (cardiac); 80 mg methylPREDNISolone acetate 80 MG/ML  Patient tolerance: patient tolerated the procedure well with no immediate complications      At the conclusion of the injection I discussed the importance of continued quad strengthening exercises on a daily basis. I will see the patient back if the symptoms should fail to improve or worsen.    Yeni De Anda, APRN  3/4/2025

## 2025-05-30 ENCOUNTER — OFFICE VISIT (OUTPATIENT)
Dept: ORTHOPEDIC SURGERY | Facility: CLINIC | Age: 60
End: 2025-05-30
Payer: MEDICARE

## 2025-05-30 VITALS — WEIGHT: 275.3 LBS | HEIGHT: 67 IN | TEMPERATURE: 98.4 F | BODY MASS INDEX: 43.21 KG/M2

## 2025-05-30 DIAGNOSIS — M17.12 PRIMARY OSTEOARTHRITIS OF LEFT KNEE: Primary | ICD-10-CM

## 2025-05-30 DIAGNOSIS — M17.0 PRIMARY OSTEOARTHRITIS OF BOTH KNEES: ICD-10-CM

## 2025-05-30 DIAGNOSIS — R52 PAIN: ICD-10-CM

## 2025-05-30 DIAGNOSIS — M17.11 PRIMARY OSTEOARTHRITIS OF RIGHT KNEE: ICD-10-CM

## 2025-05-30 DIAGNOSIS — L03.115 CELLULITIS OF RIGHT LOWER EXTREMITY: ICD-10-CM

## 2025-05-30 RX ORDER — TIZANIDINE HYDROCHLORIDE 6 MG/1
CAPSULE, GELATIN COATED ORAL
COMMUNITY
Start: 2025-05-08

## 2025-05-30 RX ORDER — CEPHALEXIN 500 MG/1
500 CAPSULE ORAL 3 TIMES DAILY
Qty: 30 CAPSULE | Refills: 0 | Status: SHIPPED | OUTPATIENT
Start: 2025-05-30

## 2025-05-30 RX ORDER — VENLAFAXINE HYDROCHLORIDE 150 MG/1
150 CAPSULE, EXTENDED RELEASE ORAL DAILY
COMMUNITY
Start: 2025-02-03

## 2025-05-30 RX ORDER — IBUPROFEN 200 MG
200 TABLET ORAL EVERY 6 HOURS PRN
COMMUNITY

## 2025-05-30 RX ORDER — AMMONIUM LACTATE 12 G/100G
LOTION TOPICAL
COMMUNITY
Start: 2025-02-03

## 2025-05-30 NOTE — PROGRESS NOTES
Patient ID: Bethany Garcia     Chief Complaint:    Chief Complaint   Patient presents with    Left Knee - Establish Care, Initial Evaluation, Pain    Right Knee - Establish Care, Initial Evaluation, Pain        HPI:    Bethany Garcia is a 59 y.o. who presents today for evaluation bilateral knee pain.  Presents with acute on chronic knee pain.  Left is worse than the right.  The left is quite more symptomatic is been so for some time she is tried over-the-counter medications as well as injections most recent injection over 3 months ago was not overly effective.  Her knee issues/symptoms are limiting her normal daily activities and overall quality life.  Patient does have COPD sees pulmonology.  States past history of anemia.  States she is prediabetic.  States she has not seen a dentist in a while and does have some concerns about some teeth/cavity.  Does have history of osteomyelitis of the spine years ago but since then had undergone neck surgery and has done fine with that.  Patient reports she gets some swelling in her legs and can lead to redness and cellulitis currently has a new area about the right heel and ankle that is red/cellulitic in nature from her account due to leg swelling.    Social History     Socioeconomic History    Marital status:    Tobacco Use    Smoking status: Former     Current packs/day: 0.00     Average packs/day: 2.0 packs/day for 20.0 years (40.0 ttl pk-yrs)     Types: Cigarettes     Start date:      Quit date: 2008     Years since quittin.4     Passive exposure: Past    Smokeless tobacco: Never   Vaping Use    Vaping status: Never Used   Substance and Sexual Activity    Alcohol use: Not Currently     Alcohol/week: 1.0 standard drink of alcohol     Types: 1 Glasses of wine per week     Comment: on occassion    Drug use: Yes     Types: Other     Comment: Percocets taken daily    Sexual activity: Defer     Past Medical History:   Diagnosis Date    Acute post-operative  "pain 08/19/2020    Allergic     Allergy     Anemia     Anxiety     Arthritis     Bronchitis     Cellulitis 12/25/2018    of right foot    Chronic neck pain 01/21/2016    COPD (chronic obstructive pulmonary disease)     Depression     Encounter for long-term (current) use of high-risk medication 07/07/2016    Gallstones     Headache, tension-type     HL (hearing loss)     Low back pain     Neck pain     Obesity     Osteomyelitis     2013    Peripheral vascular disease     Pneumonia 12/15/2023    Septicemia     Vision loss      Family History   Problem Relation Age of Onset    Osteoarthritis Mother     Neurofibromatosis Mother     Polymyalgia rheumatica Mother     Polychondritis Mother     MARCO disease Father     COPD Other     Polymyalgia rheumatica Other     Neuropathy Maternal Grandmother     Stroke Paternal Grandmother     Neuropathy Paternal Grandfather     Malig Hyperthermia Neg Hx        ROS:    ROS:  Constitutional:  Denies fever, shaking or chills         All other pertinent positives and negatives as noted above in HPI.    Physical Exam:     Vital Signs:  Temp 98.4 °F (36.9 °C) (Temporal)   Ht 170.2 cm (67.01\")   Wt 125 kg (275 lb 4.8 oz)   BMI 43.11 kg/m²   Constitutional: Awake alert and oriented x3, well developed, well nourished, no acute distress, non-toxic appearance.      Musculoskeletal:    Exam of the bilateral  knee:  Painful gait with a subtle limp  Pitting edema mainly RLE  no knee effusion  + medial> lateral joint line tenderness  Active range of motion 8-105 on the left and 8-110  5/5 strength flexion and extension  The knee is stable to varus and valgus stress testing  Mild varus alignment of the limb  Lachman negative  Posterior drawer negative  Deangelo's negative  Patellofemoral grind +  Sensation grossly intact to light tough throughout the lower extremity  Skin is intact however there is some redness about the right ankle foot region appears cellulitic in nature.  No breaks in the skin " seen.  Distal pulses are 2+  No signs or symptoms of DVT            Diagnostic Studies:     Imaging was personally and individually reviewed and discussed at length with the patient:    Previous knee films reviewed do show degenerative changes all 3 compartments medial compartments affected is severe/bone-on-bone with joint space loss bone sclerosis osteophyte formation.    AP pelvis was taken in the office today: Hip imaging demonstrates mild degenerative changes bilateral hip joints with some early bone sclerosis.            Assessment:     bilateral  Knee Osteoarthritis, right ankle/foot cellulitis            Plan:     Based on x-rays, history, and office evaluation, we have diagnosed Bethany Garcia with knee arthritis and right lower extremity cellulitis. At this time, we recommend starting with a conservative treatment program. This will consist of cortisone injection during significant flare-ups, NSAIDS for daily maintenance, physical therapy for strengthening and modalities as indicated, and bracing when appropriate. These measures will continue, until symptoms are no longer relieved, become more severe or function begins to significantly deteriorate. At that point joint replacement options will be discussed.    For the cellulitis Keflex sent to pharmacy instructed to follow-up with her PCP in the next 7 to 10 days to evaluate for lower extremity edema and cellulitis.  If any significant changes she will let us know.  Prior to beginning a candidate for total joint replacement surgery BMI will need to be decreased to 40 or less current BMI 43.11.  Patient does have COPD would require pulmonology clearance prior as well as dental clearance given recent dental history.  Will check blood levels.  May consider postop extended antibiotic biotic prophylaxis given history of osteomyelitis of the spine.    Given current cellulitis we will hold on steroid injections.  Would recommend possible trying a different  steroid medication such as Kenalog.  Because we are waiting we also talked about ultrasound guidance and she is agreeable to this we will make referral to Dr. Gao for injections for bilateral knees and the next 10 to 14 days as long as the cellulitis has cleared up.  Patient is agreeable to this.    Follow up in 2 months unless symptoms return or a new issue occurs.  Patient will call the office to schedule an appointment.     All questions were answered, the patient understands and agrees with the plan.      Total time spent 40 minutes

## 2025-07-14 ENCOUNTER — OFFICE VISIT (OUTPATIENT)
Dept: SPORTS MEDICINE | Facility: CLINIC | Age: 60
End: 2025-07-14
Payer: MEDICARE

## 2025-07-14 VITALS — RESPIRATION RATE: 18 BRPM | BODY MASS INDEX: 43.16 KG/M2 | WEIGHT: 275 LBS | HEIGHT: 67 IN

## 2025-07-14 DIAGNOSIS — M17.0 PRIMARY OSTEOARTHRITIS OF BOTH KNEES: Primary | ICD-10-CM

## 2025-07-14 PROCEDURE — 1159F MED LIST DOCD IN RCRD: CPT | Performed by: STUDENT IN AN ORGANIZED HEALTH CARE EDUCATION/TRAINING PROGRAM

## 2025-07-14 PROCEDURE — 1160F RVW MEDS BY RX/DR IN RCRD: CPT | Performed by: STUDENT IN AN ORGANIZED HEALTH CARE EDUCATION/TRAINING PROGRAM

## 2025-07-14 PROCEDURE — 20611 DRAIN/INJ JOINT/BURSA W/US: CPT | Performed by: STUDENT IN AN ORGANIZED HEALTH CARE EDUCATION/TRAINING PROGRAM

## 2025-07-14 RX ORDER — LIDOCAINE HYDROCHLORIDE 10 MG/ML
2 INJECTION, SOLUTION INFILTRATION; PERINEURAL
Status: COMPLETED | OUTPATIENT
Start: 2025-07-14 | End: 2025-07-14

## 2025-07-14 RX ORDER — TRIAMCINOLONE ACETONIDE 40 MG/ML
40 INJECTION, SUSPENSION INTRA-ARTICULAR; INTRAMUSCULAR
Status: COMPLETED | OUTPATIENT
Start: 2025-07-14 | End: 2025-07-14

## 2025-07-14 RX ADMIN — LIDOCAINE HYDROCHLORIDE 2 ML: 10 INJECTION, SOLUTION INFILTRATION; PERINEURAL at 11:31

## 2025-07-14 RX ADMIN — TRIAMCINOLONE ACETONIDE 40 MG: 40 INJECTION, SUSPENSION INTRA-ARTICULAR; INTRAMUSCULAR at 11:31

## 2025-07-14 NOTE — PROGRESS NOTES
"Chief Complaint   Patient presents with    Left Knee - Initial Evaluation     Bilateral knee injections.    Right Knee - Initial Evaluation     Bilateral knee injections.         History of Present Illness  Bethany is a 59 y.o. year old female patient of Dr. Rose and Yeni De Anda with previously diagnosed osteoarthritis here today for intra-articular cortisone injection. Last injection was on 3/4/25. Is hoping to proceed with TKA later this year. No new injuries or complaints.    The following data was reviewed by: Uzma Gao DO on 07/14/2025:  Data reviewed : Radiologic studies from 11/21/24 and Ortho note from 5/30/25     Resp 18   Ht 170.2 cm (67\")   Wt 125 kg (275 lb)   BMI 43.07 kg/m²        Physical Exam  Vital signs reviewed.   General: Well developed, well nourished; No acute distress.  Eyes: conjunctiva clear; pupils equally round and reactive  ENT: external ears and nose atraumatic; hearing normal  CV: extremity warm and well perfused  Resp: normal respiratory effort, no use of accessory muscles  Skin: normal color and pigmentation; no rashes or wounds; normal turgor  Psych: alert and oriented; mood and affect appropriate; recent and remote memory intact    - Large Joint Arthrocentesis: bilateral knee on 7/14/2025 11:31 AM  Indications: pain  Details: 22 G needle, ultrasound-guided superolateral approach  Medications (Right): 2 mL lidocaine 1 %; 40 mg triamcinolone acetonide 40 MG/ML  Medications (Left): 2 mL lidocaine 1 %; 40 mg triamcinolone acetonide 40 MG/ML  Outcome: tolerated well, no immediate complications  Procedure, treatment alternatives, risks and benefits explained, specific risks discussed. Consent was given by the patient. Immediately prior to procedure a time out was called to verify the correct patient, procedure, equipment, support staff and site/side marked as required. Patient was prepped and draped in the usual sterile fashion.         Assessment and Plan  Diagnoses and all " orders for this visit:    1. Primary osteoarthritis of both knees (Primary)  -     - Large Joint Arthrocentesis  -     US Guided Injection; Future    The procedure was well tolerated. She has been educated on the signs and symptoms of local reaction and infection and should call the office if she experiences any of these. She should take it easy for the next 24 to 48 hours and ice as needed. After that, she may return to normal activity. It was recommended to continue low impact activity. She may continue with previously recommended supportive measures. Will follow-up as needed.      All of her questions were answered and she is agreeable with the plan.    Dictated utilizing Dragon dictation.

## 2025-08-13 ENCOUNTER — HOSPITAL ENCOUNTER (OUTPATIENT)
Dept: MAMMOGRAPHY | Facility: HOSPITAL | Age: 60
Discharge: HOME | End: 2025-08-13
Admitting: NURSE PRACTITIONER
Payer: MEDICARE

## 2025-08-13 DIAGNOSIS — Z12.31 SCREENING MAMMOGRAM FOR BREAST CANCER: ICD-10-CM

## 2025-08-13 PROCEDURE — 77063 BREAST TOMOSYNTHESIS BI: CPT

## 2025-08-13 PROCEDURE — 77067 SCR MAMMO BI INCL CAD: CPT

## (undated) DEVICE — NEEDLE, QUINCKE, 20GX3.5": Brand: MEDLINE

## (undated) DEVICE — LN SMPL CO2 SHTRM SD STREAM W/M LUER

## (undated) DEVICE — CATH CHOLANG 4.5F18IN BRGNDY

## (undated) DEVICE — KT ORCA ORCAPOD DISP STRL

## (undated) DEVICE — ENDOPATH XCEL UNIVERSAL TROCAR STABLILITY SLEEVES: Brand: ENDOPATH XCEL

## (undated) DEVICE — SUT VIC 0/0 UR6 27IN DYED J603H

## (undated) DEVICE — PK NEURO SPINE 40

## (undated) DEVICE — NDL SPINE 17G 3 1/2IN TUOHY

## (undated) DEVICE — STPCK 3WY D201 DISCOFIX

## (undated) DEVICE — ENDOPATH XCEL BLUNT TIP TROCARS WITH SMOOTH SLEEVES: Brand: ENDOPATH XCEL

## (undated) DEVICE — THE SINGLE USE ETRAP – POLYP TRAP IS USED FOR SUCTION RETRIEVAL OF ENDOSCOPICALLY REMOVED POLYPS.: Brand: ETRAP

## (undated) DEVICE — Device: Brand: PORTEX

## (undated) DEVICE — ENDOCUT SCISSOR TIP, DISPOSABLE: Brand: RENEW

## (undated) DEVICE — ELECTRD BLD EXT EDGE 1P COAT 6.5IN STRL

## (undated) DEVICE — GLV SURG PREMIERPRO ORTHO LTX PF SZ6.5 BRN

## (undated) DEVICE — DISPOSABLE BIPOLAR FORCEPS 7 3/4" (19.7CM) SCOVILLE BAYONET, 1.5MM TIP AND 12 FT. (3.6M) CABLE: Brand: KIRWAN

## (undated) DEVICE — GLV SURG SENSICARE POLYISPRN W/ALOE PF LF 6.5 GRN STRL

## (undated) DEVICE — ADHS SKIN SURG TISS VISC PREMIERPRO EXOFIN HI/VISC FAST/DRY

## (undated) DEVICE — LAPAROSCOPIC SMOKE FILTRATION SYSTEM: Brand: PALL LAPAROSHIELD® PLUS LAPAROSCOPIC SMOKE FILTRATION SYSTEM

## (undated) DEVICE — Device

## (undated) DEVICE — VIOLET BRAIDED (POLYGLACTIN 910), SYNTHETIC ABSORBABLE SUTURE: Brand: COATED VICRYL

## (undated) DEVICE — SMOKE EVACUATION TUBING WITH 7/8 IN TO 1/4 IN REDUCER: Brand: BUFFALO FILTER

## (undated) DEVICE — GOWN ,SIRUS,NONREINFORCED SMALL: Brand: MEDLINE

## (undated) DEVICE — EXTENSION TABS: Brand: DEROYAL

## (undated) DEVICE — 3.0MM NEURO (MATCH HEAD) LESS AGGRESSIVE

## (undated) DEVICE — GLV SURG BIOGEL LTX PF 7

## (undated) DEVICE — ERBE NESSY®PLATE 170 SPLIT; 168CM²; CABLE 3M: Brand: ERBE

## (undated) DEVICE — 3M™ STERI-STRIP™ REINFORCED ADHESIVE SKIN CLOSURES, R1547, 1/2 IN X 4 IN (12 MM X 100 MM), 6 STRIPS/ENVELOPE: Brand: 3M™ STERI-STRIP™

## (undated) DEVICE — SENSR O2 OXIMAX FNGR A/ 18IN NONSTR

## (undated) DEVICE — ENDOPATH XCEL BLADELESS TROCARS WITH STABILITY SLEEVES: Brand: ENDOPATH XCEL

## (undated) DEVICE — LIMB HOLDER, WRIST/ANKLE: Brand: DEROYAL

## (undated) DEVICE — DRP C/ARM 41X74IN

## (undated) DEVICE — FASTNR CATH PERC 5 TO12FR

## (undated) DEVICE — NDL HYPO PRECISIONGLIDE REG 25G 1 1/2

## (undated) DEVICE — LOU LAP CHOLE: Brand: MEDLINE INDUSTRIES, INC.

## (undated) DEVICE — SNAR POLYP SENSATION STDOVL 27 240 BX40

## (undated) DEVICE — SUT VIC 3/0 SH 27IN J416H

## (undated) DEVICE — GLV SURG BIOGEL LTX PF 6

## (undated) DEVICE — CATH IV INSYTE AUTOGARD 14G 1 1/2IN ORNG

## (undated) DEVICE — DRAPE,REIN 53X77,STERILE: Brand: MEDLINE

## (undated) DEVICE — SOL NACL 0.9PCT 1000ML

## (undated) DEVICE — CANN O2 ETCO2 FITS ALL CONN CO2 SMPL A/ 7IN DISP LF

## (undated) DEVICE — BITEBLOCK OMNI BLOC

## (undated) DEVICE — DEV LK WIREGUIDE FUSN OLYMP SCP

## (undated) DEVICE — ANTIBACTERIAL UNDYED BRAIDED (POLYGLACTIN 910), SYNTHETIC ABSORBABLE SUTURE: Brand: COATED VICRYL

## (undated) DEVICE — ADAPT CLN BIOGUARD AIR/H2O DISP

## (undated) DEVICE — SPHINCTEROTOME: Brand: HYDRATOME RX 44

## (undated) DEVICE — CONN TBG Y 5 IN 1 LF STRL

## (undated) DEVICE — RETRIEVAL BALLOON CATHETER: Brand: EXTRACTOR™ PRO RX

## (undated) DEVICE — TUBING, SUCTION, 1/4" X 10', STRAIGHT: Brand: MEDLINE

## (undated) DEVICE — TRAP FLD MINIVAC MEGADYNE 100ML

## (undated) DEVICE — GOWN,NON-REINFORCED,SIRUS,SET IN SLV,XXL: Brand: MEDLINE

## (undated) DEVICE — 30977 SEE SHARP - ENHANCED INTRAOPERATIVE LAPAROSCOPE CLEANING & DEFOGGING: Brand: 30977 SEE SHARP - ENHANCED INTRAOPERATIVE LAPAROSCOPE CLEANING & DEFOGGING

## (undated) DEVICE — DRILL BIT 7080510 11 MM DRILL BIT S

## (undated) DEVICE — JACKSON-PRATT 100CC BULB RESERVOIR: Brand: CARDINAL HEALTH

## (undated) DEVICE — GLV SURG PREMIERPRO ORTHO LTX PF SZ8 BRN

## (undated) DEVICE — ENDOPOUCH RETRIEVER SPECIMEN RETRIEVAL BAGS: Brand: ENDOPOUCH RETRIEVER

## (undated) DEVICE — APPL CHLORAPREP W/TINT 26ML ORNG

## (undated) DEVICE — DRP MICROSCOPE 4 BINOCULAR CV 54X150IN

## (undated) DEVICE — COLR CERV MED/DENS NRW LNG

## (undated) DEVICE — EXTENSION SET, MALE LUER LOCK ADAPTER WITH RETRACTABLE COLLAR

## (undated) DEVICE — APPL HEMO SURG ARISTA/AH/FLEXITIP XL 38CM

## (undated) DEVICE — APPL CHLORAPREP HI/LITE 26ML ORNG

## (undated) DEVICE — CONTAINER,SPECIMEN,OR STERILE,4OZ: Brand: MEDLINE